# Patient Record
Sex: MALE | Race: WHITE | NOT HISPANIC OR LATINO | Employment: OTHER | URBAN - METROPOLITAN AREA
[De-identification: names, ages, dates, MRNs, and addresses within clinical notes are randomized per-mention and may not be internally consistent; named-entity substitution may affect disease eponyms.]

---

## 2023-02-13 PROBLEM — M19.90 ARTHRITIS: Status: ACTIVE | Noted: 2023-02-13

## 2023-02-13 PROBLEM — M48.00 SPINAL STENOSIS: Status: ACTIVE | Noted: 2023-02-13

## 2023-02-13 PROBLEM — R39.15 URGENCY OF URINATION: Status: ACTIVE | Noted: 2023-02-13

## 2023-02-13 PROBLEM — I10 HYPERTENSION: Status: ACTIVE | Noted: 2023-02-13

## 2023-02-13 PROBLEM — R55 NEAR SYNCOPE: Status: ACTIVE | Noted: 2023-02-13

## 2023-02-13 PROBLEM — N18.30 CKD (CHRONIC KIDNEY DISEASE) STAGE 3, GFR 30-59 ML/MIN (MULTI): Status: ACTIVE | Noted: 2023-02-13

## 2023-02-13 PROBLEM — I34.0 MILD MITRAL REGURGITATION: Status: ACTIVE | Noted: 2023-02-13

## 2023-02-13 PROBLEM — R39.9 UTI SYMPTOMS: Status: ACTIVE | Noted: 2023-02-13

## 2023-02-13 PROBLEM — R13.10 DYSPHAGIA: Status: ACTIVE | Noted: 2023-02-13

## 2023-02-13 PROBLEM — M70.20 OLECRANON BURSITIS: Status: ACTIVE | Noted: 2023-02-13

## 2023-02-13 PROBLEM — M48.061 LUMBAR CANAL STENOSIS: Status: ACTIVE | Noted: 2023-02-13

## 2023-02-13 PROBLEM — R06.09 DYSPNEA ON EXERTION: Status: ACTIVE | Noted: 2023-02-13

## 2023-02-13 PROBLEM — R26.89 IMBALANCE: Status: ACTIVE | Noted: 2023-02-13

## 2023-02-13 PROBLEM — I25.10 CAD (CORONARY ARTERY DISEASE): Status: ACTIVE | Noted: 2023-02-13

## 2023-02-13 PROBLEM — M65.342 TRIGGER RING FINGER OF LEFT HAND: Status: ACTIVE | Noted: 2023-02-13

## 2023-02-13 PROBLEM — L85.3 XEROSIS OF SKIN: Status: ACTIVE | Noted: 2023-02-13

## 2023-02-13 PROBLEM — R31.0 GROSS HEMATURIA: Status: ACTIVE | Noted: 2023-02-13

## 2023-02-13 PROBLEM — D64.9 ANEMIA: Status: ACTIVE | Noted: 2023-02-13

## 2023-02-13 PROBLEM — M25.552 PAIN OF LEFT HIP JOINT: Status: ACTIVE | Noted: 2023-02-13

## 2023-02-13 PROBLEM — M51.36 LUMBAR DEGENERATIVE DISC DISEASE: Status: ACTIVE | Noted: 2023-02-13

## 2023-02-13 PROBLEM — R91.1 PULMONARY NODULE: Status: ACTIVE | Noted: 2023-02-13

## 2023-02-13 PROBLEM — M12.811 ROTATOR CUFF ARTHROPATHY OF RIGHT SHOULDER: Status: ACTIVE | Noted: 2023-02-13

## 2023-02-13 PROBLEM — M54.9 CHRONIC BACK PAIN: Status: ACTIVE | Noted: 2023-02-13

## 2023-02-13 PROBLEM — M88.9 PAGET'S DISEASE OF THE BONE: Status: ACTIVE | Noted: 2023-02-13

## 2023-02-13 PROBLEM — I35.0 AORTIC STENOSIS, MILD: Status: ACTIVE | Noted: 2023-02-13

## 2023-02-13 PROBLEM — G89.29 CHRONIC BACK PAIN: Status: ACTIVE | Noted: 2023-02-13

## 2023-02-13 PROBLEM — R09.82 POST-NASAL DRIP: Status: ACTIVE | Noted: 2023-02-13

## 2023-02-13 PROBLEM — N40.0 BPH (BENIGN PROSTATIC HYPERPLASIA): Status: ACTIVE | Noted: 2023-02-13

## 2023-02-13 PROBLEM — E78.5 DYSLIPIDEMIA: Status: ACTIVE | Noted: 2023-02-13

## 2023-02-13 PROBLEM — I20.89 CHRONIC STABLE ANGINA (CMS-HCC): Status: ACTIVE | Noted: 2023-02-13

## 2023-02-13 PROBLEM — R35.1 NOCTURIA: Status: ACTIVE | Noted: 2023-02-13

## 2023-02-13 PROBLEM — R73.09 ELEVATED GLUCOSE: Status: ACTIVE | Noted: 2023-02-13

## 2023-02-13 PROBLEM — M25.511 RIGHT SHOULDER PAIN: Status: ACTIVE | Noted: 2023-02-13

## 2023-02-13 PROBLEM — N28.1 RENAL CYST: Status: ACTIVE | Noted: 2023-02-13

## 2023-02-13 PROBLEM — E55.9 VITAMIN D DEFICIENCY: Status: ACTIVE | Noted: 2023-02-13

## 2023-02-13 PROBLEM — R49.0 HOARSENESS: Status: ACTIVE | Noted: 2023-02-13

## 2023-02-13 PROBLEM — M47.816 LUMBAR SPONDYLOSIS: Status: ACTIVE | Noted: 2023-02-13

## 2023-02-13 PROBLEM — R21 RASH/SKIN ERUPTION: Status: ACTIVE | Noted: 2023-02-13

## 2023-02-13 PROBLEM — M10.9 ACUTE GOUT OF LEFT HAND: Status: ACTIVE | Noted: 2023-02-13

## 2023-02-13 PROBLEM — M10.9 GOUT: Status: ACTIVE | Noted: 2023-02-13

## 2023-02-13 PROBLEM — N18.2 CHRONIC KIDNEY DISEASE (CKD), STAGE II (MILD): Status: ACTIVE | Noted: 2023-02-13

## 2023-02-13 PROBLEM — R03.0 ELEVATED BLOOD PRESSURE READING: Status: ACTIVE | Noted: 2023-02-13

## 2023-02-13 PROBLEM — M54.2 NECK PAIN: Status: ACTIVE | Noted: 2023-02-13

## 2023-02-13 PROBLEM — R53.83 FATIGUE: Status: ACTIVE | Noted: 2023-02-13

## 2023-02-13 PROBLEM — F33.9 RECURRENT MAJOR DEPRESSIVE DISORDER (CMS-HCC): Status: ACTIVE | Noted: 2023-02-13

## 2023-02-13 PROBLEM — M75.100 ROTATOR CUFF TEAR: Status: ACTIVE | Noted: 2023-02-13

## 2023-02-13 PROBLEM — S46.819A TRAPEZIUS STRAIN: Status: ACTIVE | Noted: 2023-02-13

## 2023-02-13 PROBLEM — M79.89 SHOULDER SWELLING: Status: ACTIVE | Noted: 2023-02-13

## 2023-02-13 PROBLEM — H26.9 CATARACT: Status: ACTIVE | Noted: 2023-02-13

## 2023-02-13 PROBLEM — M79.646 FINGER PAIN: Status: ACTIVE | Noted: 2023-02-13

## 2023-02-13 PROBLEM — M51.369 LUMBAR DEGENERATIVE DISC DISEASE: Status: ACTIVE | Noted: 2023-02-13

## 2023-02-13 RX ORDER — POLYETHYLENE GLYCOL 3350 17 G/17G
POWDER, FOR SOLUTION ORAL
COMMUNITY
Start: 2021-02-22 | End: 2024-03-23 | Stop reason: WASHOUT

## 2023-02-13 RX ORDER — PEDI MULTIVIT NO.16 W-FLUORIDE 1 MG
1 TABLET,CHEWABLE ORAL DAILY
COMMUNITY
End: 2024-03-27 | Stop reason: WASHOUT

## 2023-02-13 RX ORDER — DULOXETINE 40 MG/1
1 CAPSULE, DELAYED RELEASE ORAL DAILY
COMMUNITY
Start: 2021-07-20 | End: 2024-03-23 | Stop reason: WASHOUT

## 2023-02-13 RX ORDER — GLUCOSAM/CHONDRO/HERB 149/HYAL 750-100 MG
1 TABLET ORAL 2 TIMES DAILY
COMMUNITY

## 2023-02-13 RX ORDER — VIT C/E/ZN/COPPR/LUTEIN/ZEAXAN 250MG-90MG
CAPSULE ORAL
COMMUNITY
End: 2024-03-22 | Stop reason: WASHOUT

## 2023-02-13 RX ORDER — COLCHICINE 0.6 MG/1
1 TABLET ORAL DAILY PRN
COMMUNITY
Start: 2012-01-28

## 2023-02-13 RX ORDER — LANOLIN ALCOHOL/MO/W.PET/CERES
1 CREAM (GRAM) TOPICAL DAILY
COMMUNITY
Start: 2018-02-26 | End: 2024-05-14 | Stop reason: ALTCHOICE

## 2023-02-13 RX ORDER — AMLODIPINE BESYLATE 2.5 MG/1
1 TABLET ORAL DAILY
COMMUNITY
Start: 2021-03-31 | End: 2023-04-24 | Stop reason: SDUPTHER

## 2023-02-13 RX ORDER — DOCUSATE SODIUM 100 MG/1
200 CAPSULE, LIQUID FILLED ORAL 2 TIMES DAILY
COMMUNITY
Start: 2021-02-22

## 2023-02-13 RX ORDER — MIRABEGRON 25 MG/1
1 TABLET, FILM COATED, EXTENDED RELEASE ORAL DAILY
COMMUNITY
Start: 2022-04-19 | End: 2023-04-24 | Stop reason: SDUPTHER

## 2023-02-13 RX ORDER — TAMSULOSIN HYDROCHLORIDE 0.4 MG/1
2 CAPSULE ORAL DAILY
COMMUNITY
Start: 2022-11-28 | End: 2023-04-24 | Stop reason: SDUPTHER

## 2023-02-13 RX ORDER — CARBOXYMETHYLCELLULOSE SODIUM 5 MG/ML
1 SOLUTION/ DROPS OPHTHALMIC 4 TIMES DAILY
COMMUNITY
Start: 2015-06-01

## 2023-02-13 RX ORDER — FLUTICASONE PROPIONATE 50 MCG
2 SPRAY, SUSPENSION (ML) NASAL DAILY PRN
COMMUNITY
Start: 2019-12-16

## 2023-02-13 RX ORDER — ATORVASTATIN CALCIUM 40 MG/1
1 TABLET, FILM COATED ORAL NIGHTLY
COMMUNITY
Start: 2020-04-15 | End: 2023-04-25 | Stop reason: SDUPTHER

## 2023-02-26 LAB — URINE CULTURE: NORMAL

## 2023-03-24 ENCOUNTER — OFFICE VISIT (OUTPATIENT)
Dept: PRIMARY CARE | Facility: CLINIC | Age: 88
End: 2023-03-24
Payer: MEDICARE

## 2023-03-24 VITALS
SYSTOLIC BLOOD PRESSURE: 120 MMHG | BODY MASS INDEX: 26.85 KG/M2 | TEMPERATURE: 97.6 F | DIASTOLIC BLOOD PRESSURE: 80 MMHG | WEIGHT: 179.2 LBS

## 2023-03-24 DIAGNOSIS — R39.9 UTI SYMPTOMS: Primary | ICD-10-CM

## 2023-03-24 DIAGNOSIS — N18.31 STAGE 3A CHRONIC KIDNEY DISEASE (MULTI): ICD-10-CM

## 2023-03-24 DIAGNOSIS — R73.09 ELEVATED GLUCOSE: ICD-10-CM

## 2023-03-24 DIAGNOSIS — D64.9 ANEMIA, UNSPECIFIED TYPE: ICD-10-CM

## 2023-03-24 DIAGNOSIS — I25.10 CORONARY ARTERY DISEASE INVOLVING NATIVE HEART WITHOUT ANGINA PECTORIS, UNSPECIFIED VESSEL OR LESION TYPE: ICD-10-CM

## 2023-03-24 DIAGNOSIS — M48.00 SPINAL STENOSIS, UNSPECIFIED SPINAL REGION: ICD-10-CM

## 2023-03-24 PROBLEM — R06.09 DYSPNEA ON EXERTION: Status: RESOLVED | Noted: 2023-02-13 | Resolved: 2023-03-24

## 2023-03-24 PROBLEM — N18.2 CHRONIC KIDNEY DISEASE (CKD), STAGE II (MILD): Status: RESOLVED | Noted: 2023-02-13 | Resolved: 2023-03-24

## 2023-03-24 LAB
APPEARANCE, URINE: CLEAR
BILIRUBIN, URINE: NEGATIVE
BLOOD, URINE: NEGATIVE
COLOR, URINE: YELLOW
GLUCOSE, URINE: NEGATIVE MG/DL
KETONES, URINE: NEGATIVE MG/DL
LEUKOCYTE ESTERASE, URINE: NEGATIVE
MUCUS, URINE: NORMAL /LPF
NITRITE, URINE: NEGATIVE
PH, URINE: 5 (ref 5–8)
PROTEIN, URINE: ABNORMAL MG/DL
RBC, URINE: 1 /HPF (ref 0–5)
SPECIFIC GRAVITY, URINE: 1.02 (ref 1–1.03)
SQUAMOUS EPITHELIAL CELLS, URINE: <1 /HPF
UROBILINOGEN, URINE: <2 MG/DL (ref 0–1.9)
WBC, URINE: <1 /HPF (ref 0–5)

## 2023-03-24 PROCEDURE — 3074F SYST BP LT 130 MM HG: CPT | Performed by: INTERNAL MEDICINE

## 2023-03-24 PROCEDURE — 87086 URINE CULTURE/COLONY COUNT: CPT

## 2023-03-24 PROCEDURE — 1036F TOBACCO NON-USER: CPT | Performed by: INTERNAL MEDICINE

## 2023-03-24 PROCEDURE — 1157F ADVNC CARE PLAN IN RCRD: CPT | Performed by: INTERNAL MEDICINE

## 2023-03-24 PROCEDURE — 1160F RVW MEDS BY RX/DR IN RCRD: CPT | Performed by: INTERNAL MEDICINE

## 2023-03-24 PROCEDURE — 81001 URINALYSIS AUTO W/SCOPE: CPT

## 2023-03-24 PROCEDURE — 99214 OFFICE O/P EST MOD 30 MIN: CPT | Performed by: INTERNAL MEDICINE

## 2023-03-24 PROCEDURE — 1159F MED LIST DOCD IN RCRD: CPT | Performed by: INTERNAL MEDICINE

## 2023-03-24 PROCEDURE — 3079F DIAST BP 80-89 MM HG: CPT | Performed by: INTERNAL MEDICINE

## 2023-03-24 ASSESSMENT — PATIENT HEALTH QUESTIONNAIRE - PHQ9
SUM OF ALL RESPONSES TO PHQ9 QUESTIONS 1 AND 2: 2
2. FEELING DOWN, DEPRESSED OR HOPELESS: SEVERAL DAYS
1. LITTLE INTEREST OR PLEASURE IN DOING THINGS: SEVERAL DAYS

## 2023-03-24 NOTE — PROGRESS NOTES
Subjective   Patient ID: Elroy Greco is a 89 y.o. male who presents for Follow-up (4 month fu).    HPI   Overall well  Increased urinary freq x 1-2 mths.  No pain.  Slowly progressive SANCHEZ x mths    Review of Systems   All other systems reviewed and are negative.      Objective   /80   Temp 36.4 °C (97.6 °F)   Wt 81.3 kg (179 lb 3.2 oz)   BMI 26.85 kg/m²     Physical Exam  Constitutional:       General: He is not in acute distress.     Appearance: Normal appearance. He is not ill-appearing or toxic-appearing.   Cardiovascular:      Rate and Rhythm: Normal rate and regular rhythm.      Heart sounds: No murmur heard.  Pulmonary:      Effort: Pulmonary effort is normal.      Breath sounds: Normal breath sounds.   Abdominal:      General: Abdomen is flat.      Palpations: Abdomen is soft. There is no mass.      Tenderness: There is no abdominal tenderness.   Neurological:      Mental Status: He is alert and oriented to person, place, and time.   Psychiatric:         Mood and Affect: Mood normal.         Thought Content: Thought content normal.         Judgment: Judgment normal.         Lab Results   Component Value Date    WBC 7.0 11/18/2022    HGB 12.7 (L) 11/18/2022    HCT 41.8 11/18/2022     11/18/2022    CHOL 157 11/18/2022    TRIG 104 11/18/2022    HDL 58.9 11/18/2022    ALT 12 11/18/2022    AST 19 06/10/2020     11/18/2022    K 4.1 11/18/2022     11/18/2022    CREATININE 1.15 11/18/2022    BUN 26 (H) 11/18/2022    CO2 30 11/18/2022    TSH 1.44 11/18/2022    INR 1.2 (H) 07/21/2020    HGBA1C 6.2 (A) 11/18/2022     par   Assessment/Plan        #1 hypertension- on target. Continue to follow  #2 ckd3- stable clinically. Check labs   #3 back pain- increased. [facility].   #4 renal/hepatic cyst- follow-up with urology  #5 Paget's disease- In remission. [facility]. Alkaline phosphatase through their office, per pt stable. stressed importance  #6 urinary  freq- increased symptoms, resume 2 Flomax every day, UA/Cx. . Follow-up with urology ASAP  #7 fatigue/ dyspnea- stable. No significant change with discontinuing metoprolol. Follow-up cardiology  #8 mod AI/AS- clinically stable. Continue afterload reduction. f/u cards   #9 mild, stable normocytic anemia. Normal iron, B12, folic acid. stable for >6 yrs. retest   #10 depression/anxiety- remains an issue. Continue increased duloxetine 30 mg po qd  #11 BPH w/ LUTS- s/p TURP. f/u   #12 mild SANCHEZ-stable/improved. f/u cards   #13 ? pulm HTN on echo 3/17- reviewed. clinically stable. f/u cardiology.   #14 diastolic dysfxn- reviewed. BP well controlled.   #15 gout- clinically resolved. UA next visit  #16 constipation- better. f/u w/ colo/rectal  #17 CAD/angina- stable. f/u cards   #18 elevated glucose (non-fasting)- subtly elevated hemoglobin A1c. stay focused on a reduced sugar low. carbohydrate diet, recheck next visit  #19 hip and shoulder pain- f/u ortho.  #20 lipids- on Rx. LDL excellent. retest 6 mths  #21 insomnia- reviewed. con't melatonin 1 mg po qhs  #22 vit d def- increase to 3,000 otc d3  #23 cough with eating/dysphasiaâ€“f/u GI  #24 mild imbalance- fall precautions reviewed. Better. Con't PT  #25 ? neuropathy rt arm- ?CTS. reviewed. rec NCS/EMG. he declines. normal exam. f/u inb 3-4 weeks.   #26 hip pain- reviewed. X-ray unremarkable. Physical therapy. Try low-dose Cymbalta.  #27 depression- low-dose Cymbalta. will try to increase to 40mg po qd  #28 syncope/presyncope- no further episodes. f/u cards.   #29 s/p surgery -doing well. Follow-up surgery. Gradually increase diet. Physical therapy.  #30 pulmonary nodules- f/u chest CT stable x >2 yrs  #31 ? pulm fibrosis- no s/sx. no cough/SOB. stable on CT 2017-->2022. reviewed DDx at length again. Rec pulm eval again. He still declines.     need for shingrix reviewed

## 2023-03-26 LAB — URINE CULTURE: NORMAL

## 2023-04-24 DIAGNOSIS — I10 PRIMARY HYPERTENSION: ICD-10-CM

## 2023-04-24 DIAGNOSIS — N40.0 BENIGN PROSTATIC HYPERPLASIA, UNSPECIFIED WHETHER LOWER URINARY TRACT SYMPTOMS PRESENT: Primary | ICD-10-CM

## 2023-04-24 RX ORDER — MIRABEGRON 25 MG/1
25 TABLET, FILM COATED, EXTENDED RELEASE ORAL DAILY
Qty: 90 TABLET | Refills: 1 | Status: SHIPPED | OUTPATIENT
Start: 2023-04-24 | End: 2023-04-25 | Stop reason: SDUPTHER

## 2023-04-24 RX ORDER — AMLODIPINE BESYLATE 2.5 MG/1
2.5 TABLET ORAL DAILY
Qty: 90 TABLET | Refills: 1 | Status: SHIPPED | OUTPATIENT
Start: 2023-04-24 | End: 2023-04-25 | Stop reason: SDUPTHER

## 2023-04-24 RX ORDER — TAMSULOSIN HYDROCHLORIDE 0.4 MG/1
0.8 CAPSULE ORAL DAILY
Qty: 90 CAPSULE | Refills: 1 | Status: SHIPPED | OUTPATIENT
Start: 2023-04-24 | End: 2023-04-25 | Stop reason: SDUPTHER

## 2023-04-24 NOTE — TELEPHONE ENCOUNTER
Pt stopped in today to see if Dr. Grayson would print out his RX's for him so [facility] they [facility] much [facility] for [facility] if he gets them filled [facility]. Pt wants to stop back in tomorrow to pick them up. RX put in for Dr. Grayson to approve.

## 2023-04-25 DIAGNOSIS — I10 PRIMARY HYPERTENSION: ICD-10-CM

## 2023-04-25 DIAGNOSIS — N40.0 BENIGN PROSTATIC HYPERPLASIA, UNSPECIFIED WHETHER LOWER URINARY TRACT SYMPTOMS PRESENT: ICD-10-CM

## 2023-04-25 DIAGNOSIS — I25.10 CORONARY ARTERY DISEASE INVOLVING NATIVE HEART WITHOUT ANGINA PECTORIS, UNSPECIFIED VESSEL OR LESION TYPE: Primary | ICD-10-CM

## 2023-04-25 RX ORDER — TAMSULOSIN HYDROCHLORIDE 0.4 MG/1
0.8 CAPSULE ORAL DAILY
Qty: 90 CAPSULE | Refills: 1 | Status: SHIPPED | OUTPATIENT
Start: 2023-04-25 | End: 2024-03-22 | Stop reason: WASHOUT

## 2023-04-25 RX ORDER — MIRABEGRON 25 MG/1
25 TABLET, FILM COATED, EXTENDED RELEASE ORAL DAILY
Qty: 90 TABLET | Refills: 1 | Status: SHIPPED | OUTPATIENT
Start: 2023-04-25 | End: 2023-10-23 | Stop reason: SDUPTHER

## 2023-04-25 RX ORDER — AMLODIPINE BESYLATE 2.5 MG/1
2.5 TABLET ORAL DAILY
Qty: 90 TABLET | Refills: 1 | Status: SHIPPED | OUTPATIENT
Start: 2023-04-25 | End: 2023-06-08 | Stop reason: SDUPTHER

## 2023-04-25 RX ORDER — ATORVASTATIN CALCIUM 40 MG/1
40 TABLET, FILM COATED ORAL NIGHTLY
Qty: 90 TABLET | Refills: 3 | Status: SHIPPED | OUTPATIENT
Start: 2023-04-25 | End: 2024-05-23

## 2023-06-08 DIAGNOSIS — I10 PRIMARY HYPERTENSION: ICD-10-CM

## 2023-06-08 RX ORDER — AMLODIPINE BESYLATE 2.5 MG/1
2.5 TABLET ORAL DAILY
Qty: 90 TABLET | Refills: 1 | Status: SHIPPED | OUTPATIENT
Start: 2023-06-08 | End: 2023-11-20 | Stop reason: WASHOUT

## 2023-07-18 ENCOUNTER — LAB (OUTPATIENT)
Dept: LAB | Facility: LAB | Age: 88
End: 2023-07-18
Payer: MEDICARE

## 2023-07-18 ENCOUNTER — OFFICE VISIT (OUTPATIENT)
Dept: PRIMARY CARE | Facility: CLINIC | Age: 88
End: 2023-07-18
Payer: MEDICARE

## 2023-07-18 VITALS
WEIGHT: 175.8 LBS | DIASTOLIC BLOOD PRESSURE: 80 MMHG | SYSTOLIC BLOOD PRESSURE: 130 MMHG | BODY MASS INDEX: 26.64 KG/M2 | HEIGHT: 68 IN

## 2023-07-18 DIAGNOSIS — F33.42 RECURRENT MAJOR DEPRESSIVE DISORDER, IN FULL REMISSION (CMS-HCC): Primary | ICD-10-CM

## 2023-07-18 DIAGNOSIS — N40.1 BENIGN PROSTATIC HYPERPLASIA WITH URINARY FREQUENCY: ICD-10-CM

## 2023-07-18 DIAGNOSIS — R73.09 ELEVATED GLUCOSE: ICD-10-CM

## 2023-07-18 DIAGNOSIS — R35.0 BENIGN PROSTATIC HYPERPLASIA WITH URINARY FREQUENCY: ICD-10-CM

## 2023-07-18 DIAGNOSIS — M88.9 PAGET'S DISEASE OF THE BONE: ICD-10-CM

## 2023-07-18 DIAGNOSIS — N18.31 STAGE 3A CHRONIC KIDNEY DISEASE (MULTI): ICD-10-CM

## 2023-07-18 DIAGNOSIS — E78.5 DYSLIPIDEMIA: ICD-10-CM

## 2023-07-18 DIAGNOSIS — Z00.00 ROUTINE CHECK-UP: ICD-10-CM

## 2023-07-18 DIAGNOSIS — I10 PRIMARY HYPERTENSION: ICD-10-CM

## 2023-07-18 DIAGNOSIS — I20.89 CHRONIC STABLE ANGINA (CMS-HCC): ICD-10-CM

## 2023-07-18 DIAGNOSIS — E44.1 MILD PROTEIN-CALORIE MALNUTRITION (MULTI): ICD-10-CM

## 2023-07-18 DIAGNOSIS — M48.062 SPINAL STENOSIS OF LUMBAR REGION WITH NEUROGENIC CLAUDICATION: ICD-10-CM

## 2023-07-18 PROBLEM — M10.9 ACUTE GOUT OF LEFT HAND: Status: RESOLVED | Noted: 2023-02-13 | Resolved: 2023-07-18

## 2023-07-18 PROBLEM — R55 NEAR SYNCOPE: Status: RESOLVED | Noted: 2023-02-13 | Resolved: 2023-07-18

## 2023-07-18 PROBLEM — M75.100 ROTATOR CUFF TEAR: Status: RESOLVED | Noted: 2023-02-13 | Resolved: 2023-07-18

## 2023-07-18 PROBLEM — R03.0 ELEVATED BLOOD PRESSURE READING: Status: RESOLVED | Noted: 2023-02-13 | Resolved: 2023-07-18

## 2023-07-18 PROBLEM — R21 RASH/SKIN ERUPTION: Status: RESOLVED | Noted: 2023-02-13 | Resolved: 2023-07-18

## 2023-07-18 PROBLEM — R39.9 UTI SYMPTOMS: Status: RESOLVED | Noted: 2023-02-13 | Resolved: 2023-07-18

## 2023-07-18 PROBLEM — R31.0 GROSS HEMATURIA: Status: RESOLVED | Noted: 2023-02-13 | Resolved: 2023-07-18

## 2023-07-18 PROBLEM — R35.1 NOCTURIA: Status: RESOLVED | Noted: 2023-02-13 | Resolved: 2023-07-18

## 2023-07-18 PROBLEM — L85.3 XEROSIS OF SKIN: Status: RESOLVED | Noted: 2023-02-13 | Resolved: 2023-07-18

## 2023-07-18 PROBLEM — R49.0 HOARSENESS: Status: RESOLVED | Noted: 2023-02-13 | Resolved: 2023-07-18

## 2023-07-18 PROBLEM — M70.20 OLECRANON BURSITIS: Status: RESOLVED | Noted: 2023-02-13 | Resolved: 2023-07-18

## 2023-07-18 LAB
ALANINE AMINOTRANSFERASE (SGPT) (U/L) IN SER/PLAS: 24 U/L (ref 10–52)
ANION GAP IN SER/PLAS: 13 MMOL/L (ref 10–20)
CALCIUM (MG/DL) IN SER/PLAS: 9.1 MG/DL (ref 8.6–10.3)
CARBON DIOXIDE, TOTAL (MMOL/L) IN SER/PLAS: 27 MMOL/L (ref 21–32)
CHLORIDE (MMOL/L) IN SER/PLAS: 106 MMOL/L (ref 98–107)
CREATININE (MG/DL) IN SER/PLAS: 1.27 MG/DL (ref 0.5–1.3)
ERYTHROCYTE DISTRIBUTION WIDTH (RATIO) BY AUTOMATED COUNT: 14.2 % (ref 11.5–14.5)
ERYTHROCYTE MEAN CORPUSCULAR HEMOGLOBIN CONCENTRATION (G/DL) BY AUTOMATED: 30.3 G/DL (ref 32–36)
ERYTHROCYTE MEAN CORPUSCULAR VOLUME (FL) BY AUTOMATED COUNT: 92 FL (ref 80–100)
ERYTHROCYTES (10*6/UL) IN BLOOD BY AUTOMATED COUNT: 4.33 X10E12/L (ref 4.5–5.9)
ESTIMATED AVERAGE GLUCOSE FOR HBA1C: 131 MG/DL
GFR MALE: 53 ML/MIN/1.73M2
GLUCOSE (MG/DL) IN SER/PLAS: 89 MG/DL (ref 74–99)
HEMATOCRIT (%) IN BLOOD BY AUTOMATED COUNT: 39.9 % (ref 41–52)
HEMOGLOBIN (G/DL) IN BLOOD: 12.1 G/DL (ref 13.5–17.5)
HEMOGLOBIN A1C/HEMOGLOBIN TOTAL IN BLOOD: 6.2 %
LEUKOCYTES (10*3/UL) IN BLOOD BY AUTOMATED COUNT: 7.9 X10E9/L (ref 4.4–11.3)
PLATELETS (10*3/UL) IN BLOOD AUTOMATED COUNT: 207 X10E9/L (ref 150–450)
POTASSIUM (MMOL/L) IN SER/PLAS: 4.5 MMOL/L (ref 3.5–5.3)
SODIUM (MMOL/L) IN SER/PLAS: 141 MMOL/L (ref 136–145)
UREA NITROGEN (MG/DL) IN SER/PLAS: 26 MG/DL (ref 6–23)

## 2023-07-18 PROCEDURE — 36415 COLL VENOUS BLD VENIPUNCTURE: CPT

## 2023-07-18 PROCEDURE — 80048 BASIC METABOLIC PNL TOTAL CA: CPT

## 2023-07-18 PROCEDURE — 3075F SYST BP GE 130 - 139MM HG: CPT | Performed by: INTERNAL MEDICINE

## 2023-07-18 PROCEDURE — 1036F TOBACCO NON-USER: CPT | Performed by: INTERNAL MEDICINE

## 2023-07-18 PROCEDURE — 1157F ADVNC CARE PLAN IN RCRD: CPT | Performed by: INTERNAL MEDICINE

## 2023-07-18 PROCEDURE — 83036 HEMOGLOBIN GLYCOSYLATED A1C: CPT

## 2023-07-18 PROCEDURE — 99397 PER PM REEVAL EST PAT 65+ YR: CPT | Performed by: INTERNAL MEDICINE

## 2023-07-18 PROCEDURE — 84460 ALANINE AMINO (ALT) (SGPT): CPT

## 2023-07-18 PROCEDURE — 1126F AMNT PAIN NOTED NONE PRSNT: CPT | Performed by: INTERNAL MEDICINE

## 2023-07-18 PROCEDURE — 85027 COMPLETE CBC AUTOMATED: CPT

## 2023-07-18 PROCEDURE — 1160F RVW MEDS BY RX/DR IN RCRD: CPT | Performed by: INTERNAL MEDICINE

## 2023-07-18 PROCEDURE — 3079F DIAST BP 80-89 MM HG: CPT | Performed by: INTERNAL MEDICINE

## 2023-07-18 PROCEDURE — 1159F MED LIST DOCD IN RCRD: CPT | Performed by: INTERNAL MEDICINE

## 2023-07-18 PROCEDURE — 1170F FXNL STATUS ASSESSED: CPT | Performed by: INTERNAL MEDICINE

## 2023-07-18 PROCEDURE — G0439 PPPS, SUBSEQ VISIT: HCPCS | Performed by: INTERNAL MEDICINE

## 2023-07-18 ASSESSMENT — PATIENT HEALTH QUESTIONNAIRE - PHQ9
1. LITTLE INTEREST OR PLEASURE IN DOING THINGS: NOT AT ALL
SUM OF ALL RESPONSES TO PHQ9 QUESTIONS 1 AND 2: 0
2. FEELING DOWN, DEPRESSED OR HOPELESS: NOT AT ALL
SUM OF ALL RESPONSES TO PHQ9 QUESTIONS 1 AND 2: 0
1. LITTLE INTEREST OR PLEASURE IN DOING THINGS: NOT AT ALL
2. FEELING DOWN, DEPRESSED OR HOPELESS: NOT AT ALL

## 2023-07-18 ASSESSMENT — ACTIVITIES OF DAILY LIVING (ADL)
DOING_HOUSEWORK: INDEPENDENT
GROCERY_SHOPPING: INDEPENDENT
BATHING: INDEPENDENT
DRESSING: INDEPENDENT
MANAGING_FINANCES: INDEPENDENT
TAKING_MEDICATION: INDEPENDENT

## 2023-07-18 NOTE — PROGRESS NOTES
"Subjective   Reason for Visit: Elroy Greco is an 89 y.o. male here for a Medicare Wellness visit.     Past Medical, Surgical, and Family History reviewed and updated in chart.    Reviewed all medications by prescribing practitioner or clinical pharmacist (such as prescriptions, OTCs, herbal therapies and supplements) and documented in the medical record.    HPI  Overall well   #1 htn  #2 CKD3  #3 LBP  #4 renal/hepatic cysts  #5 Padget's dz  #6 urinary freq    Patient Care Team:  Trice Grayson MD as PCP - General  Trice Grayson MD as PCP - [employer] PCP     Review of Systems   All other systems reviewed and are negative.      Objective   Vitals:  /80   Ht 1.721 m (5' 7.75\")   Wt 79.7 kg (175 lb 12.8 oz)   BMI 26.93 kg/m²       Physical Exam  Constitutional:       General: He is not in acute distress.     Appearance: Normal appearance. He is not ill-appearing.   HENT:      Head: Normocephalic and atraumatic.      Right Ear: Tympanic membrane and ear canal normal.      Left Ear: Tympanic membrane and ear canal normal.      Nose: Nose normal.      Mouth/Throat:      Mouth: Mucous membranes are moist.      Pharynx: Oropharynx is clear.   Eyes:      General: No scleral icterus.     Extraocular Movements: Extraocular movements intact.      Conjunctiva/sclera: Conjunctivae normal.      Pupils: Pupils are equal, round, and reactive to light.   Cardiovascular:      Rate and Rhythm: Normal rate and regular rhythm.      Pulses: Normal pulses.      Heart sounds: No murmur heard.     No friction rub.   Pulmonary:      Effort: Pulmonary effort is normal.      Breath sounds: Normal breath sounds. No rhonchi or rales.   Abdominal:      General: Abdomen is flat. Bowel sounds are normal. There is no distension.      Palpations: Abdomen is soft. There is no mass.      Tenderness: There is no abdominal tenderness.   Musculoskeletal:      Cervical back: Normal range of motion and neck supple.      Right " lower leg: No edema.      Left lower leg: No edema.   Skin:     General: Skin is warm.   Neurological:      General: No focal deficit present.      Mental Status: He is alert and oriented to person, place, and time.      Cranial Nerves: No cranial nerve deficit.   Psychiatric:         Mood and Affect: Mood normal.         Behavior: Behavior normal.         Judgment: Judgment normal.     Echo 4/23/23;  1. Left ventricular systolic function is normal with a 55-60% estimated ejection fraction.  2. Spectral Doppler shows an impaired relaxation pattern of left ventricular diastolic filling.  3. There is moderate mitral annular calcification.  4. Moderate aortic valve stenosis.  5. There is moderate aortic valve cusp calcification.    Assessment/Plan   Problem List Items Addressed This Visit    None   #1 hypertension- on target. Continue to follow  #2 ckd3- stable clinically. Check labs   #3 back pain- increased. [facility].   #4 renal/hepatic cyst- follow-up with urology  #5 Paget's disease- In remission. [facility]. Alkaline phosphatase through their office, per pt stable. stressed importance  #6 urinary freq- increased symptoms, resume 2 Flomax every day, UA/Cx. . Follow-up with urology ASAP  #7 fatigue/ dyspnea- stable. No significant change with discontinuing metoprolol. Follow-up cardiology  #8 mod AI/AS- clinically stable. Continue afterload reduction. f/u cards   #9 mild, stable normocytic anemia. Normal iron, B12, folic acid. stable for >6 yrs. retest   #10 depression/anxiety- remains an issue. Continue increased duloxetine 30 mg po qd  #11 BPH w/ LUTS- s/p TURP. f/u   #12 mild SANCHEZ-stable/improved. f/u cards   #13 ? pulm HTN on echo 3/17- reviewed. clinically stable. f/u cardiology.   #14 diastolic dysfxn- reviewed. BP well controlled.   #15 gout- clinically resolved. UA next visit  #16 constipation- better. f/u w/ colo/rectal  #17 CAD/angina- stable. f/u cards   #18 elevated  glucose (non-fasting)- elevated hemoglobin A1c. stay focused on a reduced sugar low. carbohydrate diet, recheck #19 hip and shoulder pain- f/u ortho.  #20 lipids- on Rx. LDL excellent. retest 6 mths  #21 insomnia- reviewed. con't melatonin 1 mg po qhs  #22 vit d def- increase to 3,000 otc d3  #23 cough with eating/dysphasiaâ€“f/u GI  #24 mild imbalance- fall precautions reviewed. Better. Con't PT  #25 ? neuropathy rt arm- ?CTS. reviewed. rec NCS/EMG. he declines. normal exam. f/u inb 3-4 weeks.   #26 hip pain- reviewed. Appt pending w/ [facility]  #27 depression- low-dose Cymbalta. will try to increase to 40mg po qd  #28 syncope/presyncope- no further episodes. f/u cards.   #29 s/p surgery -doing well. Follow-up surgery. Gradually increase diet. Physical therapy.  #30 pulmonary nodules- f/u chest CT stable x >2 yrs  #31 ? pulm fibrosis- no s/sx. no cough/SOB. stable on CT 2017-->2022. reviewed DDx at length again. Rec pulm eval again. He still declines.   #32 knee pain- appt pending w/ [facility]

## 2023-07-20 ENCOUNTER — TELEPHONE (OUTPATIENT)
Dept: PRIMARY CARE | Facility: CLINIC | Age: 88
End: 2023-07-20
Payer: MEDICARE

## 2023-07-20 NOTE — TELEPHONE ENCOUNTER
----- Message from Lindsey Louis CMA sent at 7/20/2023  6:31 AM EDT -----    ----- Message -----  From: Trice Grayson MD  Sent: 7/19/2023   8:25 PM EDT  To: [place] [id number] Clinical Support Staff    Notify patient all looks good/stable.  Stay focused on a low sugar and carbohydrate diet

## 2023-10-03 ENCOUNTER — APPOINTMENT (OUTPATIENT)
Dept: SPEECH THERAPY | Facility: CLINIC | Age: 88
End: 2023-10-03
Payer: MEDICARE

## 2023-10-12 ENCOUNTER — APPOINTMENT (OUTPATIENT)
Dept: SPEECH THERAPY | Facility: CLINIC | Age: 88
End: 2023-10-12
Payer: MEDICARE

## 2023-10-12 ENCOUNTER — DOCUMENTATION (OUTPATIENT)
Dept: SPEECH THERAPY | Facility: CLINIC | Age: 88
End: 2023-10-12
Payer: MEDICARE

## 2023-10-12 NOTE — PROGRESS NOTES
Speech-Language Pathology                 Therapy Communication Note    Patient Name: Elroy Greco  MRN: [id number]  Today's Date: 10/12/2023     Discipline: Speech Language Pathology    Missed Visit Reason:  Patient did not arrive for his 8:45 am speech therapy evaluation this date. Office will wait for patient to call in to reschedule if services are still desired.     Missed Time: No Show    Comment:

## 2023-10-16 ENCOUNTER — EVALUATION (OUTPATIENT)
Dept: SPEECH THERAPY | Facility: CLINIC | Age: 88
End: 2023-10-16
Payer: MEDICARE

## 2023-10-16 DIAGNOSIS — R13.12 OROPHARYNGEAL DYSPHAGIA: Primary | ICD-10-CM

## 2023-10-16 PROCEDURE — 92610 EVALUATE SWALLOWING FUNCTION: CPT | Mod: GN | Performed by: SPEECH-LANGUAGE PATHOLOGIST

## 2023-10-16 ASSESSMENT — PAIN SCALES - GENERAL: PAINLEVEL_OUTOF10: 0 - NO PAIN

## 2023-10-16 ASSESSMENT — PAIN - FUNCTIONAL ASSESSMENT: PAIN_FUNCTIONAL_ASSESSMENT: 0-10

## 2023-10-16 NOTE — Clinical Note
October 16, 2023     Patient: Elroy Greco   YOB: 1930   Date of Visit: 10/16/2023       To Whom it May Concern:    Elroy Greco was seen in my clinic on 10/16/2023. He {Return to school/sport:51370}.    If you have any questions or concerns, please don't hesitate to call.         Sincerely,          Annalisa Turner, SLP        CC: No Recipients

## 2023-10-16 NOTE — Clinical Note
October 16, 2023     Patient: Elroy Greco   YOB: 1930   Date of Visit: 10/16/2023       To Whom It May Concern:    It is my medical opinion that Elroy Greco {Work release (duty restriction):45808}.    If you have any questions or concerns, please don't hesitate to call.         Sincerely,        Annalisa Turner, SLP    CC: No Recipients

## 2023-10-16 NOTE — PROGRESS NOTES
Speech-Language Pathology    Outpatient Clinical Swallow Evaluation    Patient Name: Elroy Greco  MRN: [id number]  Today's Date: 10/16/2023   Time Calculation  Start Time: 0915  Stop Time: 1015  Time Calculation (min): 60 min          Current Problem:   Patient Active Problem List   Diagnosis    Anemia    Aortic stenosis, mild    Arthritis    BPH (benign prostatic hyperplasia)    CAD (coronary artery disease)    Cataract    Chronic back pain    CKD (chronic kidney disease) stage 3, GFR 30-59 ml/min (CMS/HCC)    Chronic stable angina    Dyslipidemia    Dysphagia    Elevated glucose    Fatigue    Finger pain    Gout    Pain of left hip joint    Hypertension    Imbalance    Lumbar canal stenosis    Lumbar degenerative disc disease    Lumbar spondylosis    Mild mitral regurgitation    Neck pain    Paget's disease of the bone    Post-nasal drip    Pulmonary nodule    Spinal stenosis    Recurrent major depressive disorder (CMS/HCC)    Right shoulder pain    Renal cyst    Rotator cuff arthropathy of right shoulder    Shoulder swelling    Trapezius strain    Trigger ring finger of left hand    Urgency of urination    Vitamin D deficiency    Mild protein-calorie malnutrition (CMS/HCC)         Recommendations:  Recommendations  Risk for Aspiration: Yes  Additional Recommendations: Modified barium swallow study, Dysphagia treatment  Solid Diet Recommendations : Soft & bite sized/chopped (IDDSI Level 6)  Liquid Diet Recommendations: Thin (IDDSI Level 0)  Compensatory Swallowing Strategies: Upright 90 degrees as possible for all oral intake, Remain upright for 20-30 minutes after meals, Alternate solids and liquids, Single sips, Small bites/sips, Eat/feed slowly, Add moisture to solids, Place bolus on left  Medication Administration Recommendations: Whole, With Liquid  Duration of Treatment: 1x/week, 6 weeks  Follow up treatments: Oral motor exercises, Pharyngeal exercises, Patient/family education  Dysphagia Goals: Patient  "will tolerate recommended diet without observed clinical signs of aspiration, Patient will demonstrate appropriate strategies for swallowing safety, Patient will progress to advanced diet      Assessment:  Assessment  Assessment Results: Patient is a 89 year old male who presents to speech therapy this date due to concerns regarding dysphagia. Per patient report, he had a bowel obstruction in 1/2022 which needed emergency surgery. He reports going to [facility]- patient reports \"a doctor looked in my throat because I was having a hard time swallowing\" though he never followed up. Per patient report and chart review, patient had an esophagram completed in 9/2023 with results coming back as normal. Patient reports avoiding solid foods such as sandwiches, steaks, meats, etc due to a combination of constipation and dysphagia. Patient reports regularly noticing globus sensations during meals and will often expectorate food such as pasta while clearing his throat. Patient reports lung nodules which are being monitored by his doctor. Patient also reports poor dentition and masticating on the left side of the oral cavity due to lack of dentition on the right side. Patient admits his mastication efforts are not as thorough or effective as they should be and feels this has a lot to do with his difficulty. Patient reports losing approximately 10 pounds since first noticing difficulty. Patient is not a smoker.  Prognosis: Fair  Treatment Provided: No  Strengths: Motivation  Barriers: Comorbidities      Plan:  Plan  Inpatient/Swing Bed or Outpatient: Outpatient  Treatment/Interventions: Diet recommendations, Patient/family education  SLP Plan: Skilled SLP  SLP Frequency: 1x per week  Duration: Other (Comment) (6 weeks)  SLP Discharge Recommendations: Outpatient SLP  Diet Recommendations: Solid, Liquid  Solid Consistency: Soft & bite sized/chopped (IDDSI Level 6)  Liquid Consistency: Thin (IDDSI Level 0)  Discussed POC: " Patient  Discussed Risks/Benefits: Yes, Patient  Patient/Caregiver Agreeable: Yes      Subjective   Patient was pleasant and participated well throughout the assessment this date.       General Visit Information:  General Information  Ordering Physician: Dr. Grayson  Reason for Referral: Dysphagia  Prior Level of Function: Decreased function (over the past year)  Developmental Status: Age Appropriate  Patient Seen During This Visit: Yes  Number of Authorized Treatments : unlimited based on med necessity  Total Number of Visits : 1  Reviewed Procedures and Risks: Yes  Date of Onset: 09/22/23  Date of Order: 09/22/23  [employer] Diet: reg/thin  Current Diet : reg/thin  Dysphagia Diagnosis:  (mild to moderate josé miguel-pharyngeal dysphagia)      Objective   Long Term Goal:  1. Patient will tolerate least restrictive diet consistencies without overt signs/symptoms of aspiration/penetration.    Short Term Goals:  1. Patient will tolerate recommended diet consistencies without overt signs/symptoms of aspiration/penetration.  2. Patient will complete swallow and oral mechanism exercises with return demo to increase strength, speed, and coordination of swallow.  3. Patient will utilize safe swallow strategies with return demo during all oral intake.   4. Patient/family education to be provided each session.      Baseline Assessment:  Baseline Assessment  Temperature Spikes Noted: No  Respiratory Status: Room air  History of Intubation: No  Behavior/Cognition: Cooperative, Pleasant mood  Vision: Functional for self-feeding  Hearing: Within Functional Limits  Patient Positioning: Upright in Chair  Baseline Vocal Quality: Dysphonic  Volitional Cough: Strong  Volitional Swallow: Within Functional Limits      Pain:  Pain Assessment  Pain Assessment: 0-10  Pain Score: 0 - No pain      Oral/Motor Assessment:  Oral/Motor Assessment  Oral Hygiene: clear, moist oral cavity. Blue lingual coating noted- patient reports using blue mouth wash  "earlier this date.  Dentition: Some Missing Teeth, Dentures (Upper Partial ) (Patient with imperfect dentures- patient reports looking into new dentures)  Oral Motor: Impaired Function  Lingual Symmetry: Abnormal symmetry left  Vocal Quality: Exceptions to WFL  Vocal Quality Impairment:  (consistently clearing throat. Dysphonia)  Apraxia: None present  Intelligibility: Intelligible  Breath Support: Adequate for speech  Hearing: Within Functional Limits      Clinical Observations:  Clinical Observations  Patient Positioning: Upright in Chair  Management of Oral Secretions: Other (Comment) (Patient with noted consistent throat clear- suspect poor management of oral secretions)  Signs/Symptoms of Aspiration: Throat Clearing (throat clearing at baseline, unclear if related to aspiration/penetration of oral trials from this date.)  Other Signs/Symptoms of Difficulty with Feeding: Other (Comment) (patient reports \"feels like a lump\" while swallowing puree trials)  Poor Management of Oral Secretions: Yes  Prolonged Oral Manipulation: Regular (IDDSI Level 7)  Impaired Mastication: Regular (IDDSI Level 7)  Oral Residue: Regular (IDDSI Level 7) (cleared with independent liquid wash)  Delayed Throat Clear: All consistencies Trialed (unclear if related to intake trialled)  Clinical Observation Comment: Patient consumed approximately 6 oz of thin liquid via cup sips- patient with seemingly adequate hyo-laryngeal elevation noted. Patient with throat clear at baseline- he reports this occurs as a conversation continues and does not feel it is directly related to oral intake.No overt signs/symptoms of aspiration/penetration noted. Patient with timely swallow onset, adequate A-P transfer. Patient consumed teaspoon sized bites of puree consistency- full oral clearance achieved, timely swallow onset, adequate bolus manipulation and transfer. Patient reports feeling a \"lump\" in his throat during all trials of puree this date, though " denied globus sensations and odynophagia. Patient consumed solid trials-- effortful mastication process noted with mild to moderate residuals remaining post swallow. Slow bolus manipulation and A-P transfer. Residuals cleared wtih liquid wash. Patient denied globus sensations post trials. No overt signs/symptoms of aspiration/penetration noted throughout any consistencies this date. Patient reports globus sensations regularly in the home setting as well as expectorating food.      SLP Outcome Measures:   Pt completed the EAT-10 this date and scored 9/40.  A score of 3 or higher indicates that there may be a swallowing dysfunction.  The higher the number, the more it is affecting the pt. Pt did note losing weight due to dysphagia, avoiding going out for meals due to dysphagia, swallowing liquids/solids takes extra effort, eating is affected by dysphagia, and food sticking in his throat.           Outpatient Education:   Education was provided to pt/family and reviewed how to carryover strategies learned in session to home.         Consultations/Referrals/Coordination of Services: Modified barium swallow study

## 2023-10-23 ENCOUNTER — APPOINTMENT (OUTPATIENT)
Dept: SPEECH THERAPY | Facility: CLINIC | Age: 88
End: 2023-10-23
Payer: MEDICARE

## 2023-10-23 ENCOUNTER — TELEPHONE (OUTPATIENT)
Dept: PRIMARY CARE | Facility: CLINIC | Age: 88
End: 2023-10-23
Payer: MEDICARE

## 2023-10-23 DIAGNOSIS — N40.0 BENIGN PROSTATIC HYPERPLASIA, UNSPECIFIED WHETHER LOWER URINARY TRACT SYMPTOMS PRESENT: ICD-10-CM

## 2023-10-23 RX ORDER — MIRABEGRON 25 MG/1
25 TABLET, FILM COATED, EXTENDED RELEASE ORAL DAILY
Qty: 90 TABLET | Refills: 1 | Status: SHIPPED | OUTPATIENT
Start: 2023-10-23 | End: 2024-03-22 | Stop reason: WASHOUT

## 2023-10-23 NOTE — TELEPHONE ENCOUNTER
Pt left a msg asking for a refill of he Myrbetriq 25 mg.  He doesn't want this script printed, he wants it sent to [facility].

## 2023-10-24 ENCOUNTER — TELEPHONE (OUTPATIENT)
Dept: PRIMARY CARE | Facility: CLINIC | Age: 88
End: 2023-10-24

## 2023-10-24 NOTE — TELEPHONE ENCOUNTER
[facility] called to request order for modified barium swallow for patient. Thank you.

## 2023-11-07 ENCOUNTER — TELEPHONE (OUTPATIENT)
Dept: PRIMARY CARE | Facility: CLINIC | Age: 88
End: 2023-11-07
Payer: MEDICARE

## 2023-11-07 NOTE — TELEPHONE ENCOUNTER
Pt left a msg stating that a swallow test was suppose to be entered for the pt.  He goes to a speech therapist in [place], Senthil Turner.  They are telling the pt that the order is not being sent to the correct place.  This therapist is part of .  I am not sure what is going on.

## 2023-11-08 DIAGNOSIS — R13.10 DYSPHAGIA, UNSPECIFIED TYPE: Primary | ICD-10-CM

## 2023-11-16 ENCOUNTER — APPOINTMENT (OUTPATIENT)
Dept: RADIOLOGY | Facility: HOSPITAL | Age: 88
End: 2023-11-16
Payer: MEDICARE

## 2023-11-20 ENCOUNTER — LAB (OUTPATIENT)
Dept: LAB | Facility: LAB | Age: 88
End: 2023-11-20
Payer: MEDICARE

## 2023-11-20 ENCOUNTER — OFFICE VISIT (OUTPATIENT)
Dept: PRIMARY CARE | Facility: CLINIC | Age: 88
End: 2023-11-20
Payer: MEDICARE

## 2023-11-20 VITALS — BODY MASS INDEX: 27.11 KG/M2 | DIASTOLIC BLOOD PRESSURE: 80 MMHG | SYSTOLIC BLOOD PRESSURE: 130 MMHG | WEIGHT: 177 LBS

## 2023-11-20 DIAGNOSIS — M25.561 CHRONIC PAIN OF BOTH KNEES: ICD-10-CM

## 2023-11-20 DIAGNOSIS — N18.31 STAGE 3A CHRONIC KIDNEY DISEASE (MULTI): ICD-10-CM

## 2023-11-20 DIAGNOSIS — R09.81 NASAL CONGESTION: ICD-10-CM

## 2023-11-20 DIAGNOSIS — M25.562 CHRONIC PAIN OF BOTH KNEES: ICD-10-CM

## 2023-11-20 DIAGNOSIS — E78.5 DYSLIPIDEMIA: ICD-10-CM

## 2023-11-20 DIAGNOSIS — G89.29 CHRONIC PAIN OF BOTH KNEES: ICD-10-CM

## 2023-11-20 DIAGNOSIS — R35.1 BENIGN PROSTATIC HYPERPLASIA WITH NOCTURIA: Primary | ICD-10-CM

## 2023-11-20 DIAGNOSIS — R73.09 ELEVATED GLUCOSE: ICD-10-CM

## 2023-11-20 DIAGNOSIS — I10 PRIMARY HYPERTENSION: ICD-10-CM

## 2023-11-20 DIAGNOSIS — N40.1 BENIGN PROSTATIC HYPERPLASIA WITH NOCTURIA: Primary | ICD-10-CM

## 2023-11-20 LAB
ALT SERPL W P-5'-P-CCNC: 10 U/L (ref 10–52)
ANION GAP SERPL CALC-SCNC: 10 MMOL/L (ref 10–20)
BUN SERPL-MCNC: 30 MG/DL (ref 6–23)
CALCIUM SERPL-MCNC: 9.1 MG/DL (ref 8.6–10.3)
CHLORIDE SERPL-SCNC: 105 MMOL/L (ref 98–107)
CHOLEST SERPL-MCNC: 147 MG/DL (ref 0–199)
CHOLESTEROL/HDL RATIO: 2.6
CO2 SERPL-SCNC: 30 MMOL/L (ref 21–32)
CREAT SERPL-MCNC: 1.1 MG/DL (ref 0.5–1.3)
ERYTHROCYTE [DISTWIDTH] IN BLOOD BY AUTOMATED COUNT: 13.4 % (ref 11.5–14.5)
EST. AVERAGE GLUCOSE BLD GHB EST-MCNC: 128 MG/DL
GFR SERPL CREATININE-BSD FRML MDRD: 63 ML/MIN/1.73M*2
GLUCOSE SERPL-MCNC: 92 MG/DL (ref 74–99)
HBA1C MFR BLD: 6.1 %
HCT VFR BLD AUTO: 39.7 % (ref 41–52)
HDLC SERPL-MCNC: 56.4 MG/DL
HGB BLD-MCNC: 12.1 G/DL (ref 13.5–17.5)
LDLC SERPL CALC-MCNC: 70 MG/DL
MCH RBC QN AUTO: 28.5 PG (ref 26–34)
MCHC RBC AUTO-ENTMCNC: 30.5 G/DL (ref 32–36)
MCV RBC AUTO: 94 FL (ref 80–100)
NON HDL CHOLESTEROL: 91 MG/DL (ref 0–149)
NRBC BLD-RTO: 0 /100 WBCS (ref 0–0)
PLATELET # BLD AUTO: 209 X10*3/UL (ref 150–450)
POTASSIUM SERPL-SCNC: 4.1 MMOL/L (ref 3.5–5.3)
RBC # BLD AUTO: 4.24 X10*6/UL (ref 4.5–5.9)
SODIUM SERPL-SCNC: 141 MMOL/L (ref 136–145)
TRIGL SERPL-MCNC: 105 MG/DL (ref 0–149)
VLDL: 21 MG/DL (ref 0–40)
WBC # BLD AUTO: 7.8 X10*3/UL (ref 4.4–11.3)

## 2023-11-20 PROCEDURE — 3075F SYST BP GE 130 - 139MM HG: CPT | Performed by: INTERNAL MEDICINE

## 2023-11-20 PROCEDURE — 1160F RVW MEDS BY RX/DR IN RCRD: CPT | Performed by: INTERNAL MEDICINE

## 2023-11-20 PROCEDURE — 3079F DIAST BP 80-89 MM HG: CPT | Performed by: INTERNAL MEDICINE

## 2023-11-20 PROCEDURE — 80048 BASIC METABOLIC PNL TOTAL CA: CPT

## 2023-11-20 PROCEDURE — 80061 LIPID PANEL: CPT

## 2023-11-20 PROCEDURE — 36415 COLL VENOUS BLD VENIPUNCTURE: CPT

## 2023-11-20 PROCEDURE — 84460 ALANINE AMINO (ALT) (SGPT): CPT

## 2023-11-20 PROCEDURE — 85027 COMPLETE CBC AUTOMATED: CPT

## 2023-11-20 PROCEDURE — 1036F TOBACCO NON-USER: CPT | Performed by: INTERNAL MEDICINE

## 2023-11-20 PROCEDURE — 1126F AMNT PAIN NOTED NONE PRSNT: CPT | Performed by: INTERNAL MEDICINE

## 2023-11-20 PROCEDURE — 99214 OFFICE O/P EST MOD 30 MIN: CPT | Performed by: INTERNAL MEDICINE

## 2023-11-20 PROCEDURE — 83036 HEMOGLOBIN GLYCOSYLATED A1C: CPT

## 2023-11-20 PROCEDURE — 1159F MED LIST DOCD IN RCRD: CPT | Performed by: INTERNAL MEDICINE

## 2023-11-20 RX ORDER — AZELASTINE 1 MG/ML
1 SPRAY, METERED NASAL 2 TIMES DAILY
Qty: 30 ML | Refills: 3 | Status: SHIPPED | OUTPATIENT
Start: 2023-11-20 | End: 2024-11-19

## 2023-11-20 ASSESSMENT — PATIENT HEALTH QUESTIONNAIRE - PHQ9
SUM OF ALL RESPONSES TO PHQ9 QUESTIONS 1 AND 2: 1
1. LITTLE INTEREST OR PLEASURE IN DOING THINGS: NOT AT ALL
2. FEELING DOWN, DEPRESSED OR HOPELESS: SEVERAL DAYS

## 2023-11-20 NOTE — PROGRESS NOTES
Subjective   Reason for Visit: Elroy Greco is an 89 y.o. male here for a f/u      Past Medical, Surgical, and Family History reviewed and updated in chart.         HPI  Overall well   #1 htn  #2 CKD3  #3 LBP  #4 renal/hepatic cysts  #5 Padget's dz  #6 urinary freq, urgency.  Increasing.  [facility]     Patient Care Team:  Trice Grayson MD as PCP - General  Trice Grayson MD as PCP - [employer] PCP     Review of Systems   All other systems reviewed and are negative.      Objective   Vitals:  /80   Wt 80.3 kg (177 lb)   BMI 27.11 kg/m²       Physical Exam  Constitutional:       General: He is not in acute distress.     Appearance: Normal appearance. He is not ill-appearing.   HENT:      Head: Normocephalic and atraumatic.      Right Ear: Tympanic membrane and ear canal normal.      Left Ear: Tympanic membrane and ear canal normal.      Nose: Nose normal.      Mouth/Throat:      Mouth: Mucous membranes are moist.      Pharynx: Oropharynx is clear.   Eyes:      General: No scleral icterus.     Extraocular Movements: Extraocular movements intact.      Conjunctiva/sclera: Conjunctivae normal.      Pupils: Pupils are equal, round, and reactive to light.   Cardiovascular:      Rate and Rhythm: Normal rate and regular rhythm.      Pulses: Normal pulses.      Heart sounds: No murmur heard.     No friction rub.   Pulmonary:      Effort: Pulmonary effort is normal.      Breath sounds: Normal breath sounds. No rhonchi or rales.   Abdominal:      General: Abdomen is flat. Bowel sounds are normal. There is no distension.      Palpations: Abdomen is soft. There is no mass.      Tenderness: There is no abdominal tenderness.   Musculoskeletal:      Cervical back: Normal range of motion and neck supple.      Right lower leg: No edema.      Left lower leg: No edema.   Skin:     General: Skin is warm.   Neurological:      General: No focal deficit present.      Mental Status: He is alert and  oriented to person, place, and time.      Cranial Nerves: No cranial nerve deficit.   Psychiatric:         Mood and Affect: Mood normal.         Behavior: Behavior normal.         Judgment: Judgment normal.       Echo 4/23/23;  1. Left ventricular systolic function is normal with a 55-60% estimated ejection fraction.  2. Spectral Doppler shows an impaired relaxation pattern of left ventricular diastolic filling.  3. There is moderate mitral annular calcification.  4. Moderate aortic valve stenosis.  5. There is moderate aortic valve cusp calcification.    Assessment/Plan   Problem List Items Addressed This Visit       BPH (benign prostatic hyperplasia) - Primary    Relevant Orders    Referral to Urology    CKD (chronic kidney disease) stage 3, GFR 30-59 ml/min (CMS/HCC)    Relevant Orders    Basic Metabolic Panel    Alanine Aminotransferase    CBC    Dyslipidemia    Relevant Orders    Alanine Aminotransferase    Lipid Panel    Elevated glucose    Relevant Orders    Hemoglobin A1C    Hypertension     Other Visit Diagnoses       Chronic pain of both knees        Relevant Orders    Referral to Orthopaedic Surgery    Nasal congestion        Relevant Medications    azelastine (Astelin) 137 mcg (0.1 %) nasal spray            #1 hypertension- on target. Continue to follow  #2 ckd3- stable clinically. Check labs next visit  #3 back pain- increased. [facility].   #4 renal/hepatic cyst- follow-up with urology  #5 Paget's disease- In remission. [facility]. Alkaline phosphatase through their office, per pt stable. stressed importance  #6 urinary freq- increased symptoms, resume 2 Flomax every day, UA/Cx. . Follow-up with urology ASAP  #7 fatigue/ dyspnea- stable. No significant change with discontinuing metoprolol. Follow-up cardiology  #8 mod AI/AS- clinically stable. Continue afterload reduction. f[facility]u cards at [facility]   #9 mild, stable normocytic anemia. Normal iron, B12, folic acid. stable for >6  yrs. retest   #10 depression/anxiety- remains an issue. Continue increased duloxetine 30 mg po qd  #11 BPH w/ LUTS- s/p TURP 2017. f[facility].  Per pt req, will c/s for 2nd opinion  #12 mild SANCHEZ-stable/improved. f/u cards   #13 ? pulm HTN on echo 3/17- reviewed. clinically stable. f/u cardiology.   #14 diastolic dysfxn- reviewed. BP well controlled.   #15 gout- clinically resolved. UA next visit  #16 constipation- remains an issue.  f/u  w/ GI (appt pending).  Add miralax every day.   #17 CAD/angina- stable. f/u cards   #18 elevated glucose (non-fasting)- elevated hemoglobin A1c. stay focused on a reduced sugar low. carbohydrate diet, recheck #19 hip and shoulder pain- f/u ortho.  #20 lipids- ? Why off Rx. resume  #21 insomnia- reviewed. con't melatonin 1 mg po qhs  #22 vit d def- increase to 3,000 otc d3  #23 cough with eating/dysphasiaâ€“f/u GI  #24 mild imbalance- fall precautions reviewed. Better. Con't PT  #25 ? neuropathy rt arm- ?CTS. reviewed. rec NCS/EMG. he declines. normal exam. f/u inb 3-4 weeks.   #26 hip pain- reviewed. Appt pending w/ [facility]  #27 depression- low-dose Cymbalta.    #28 syncope/presyncope- no further episodes. f/u cards.   #29 back pain- [facility]   #30 pulmonary nodules- f/u chest CT stable x >2 yrs  #31 ? pulm fibrosis- no s/sx. no cough/SOB. stable on CT 2017-->2022. reviewed DDx at length again. Rec pulm eval again. He still declines.   #32 knee pain- s/p remote tka. f[facility]  [facility].   C/s  ortho per pt req.   #33 nasal congestion- trial astelin

## 2023-11-29 ENCOUNTER — TELEPHONE (OUTPATIENT)
Dept: PRIMARY CARE | Facility: CLINIC | Age: 88
End: 2023-11-29
Payer: MEDICARE

## 2023-11-29 NOTE — TELEPHONE ENCOUNTER
----- Message from Lindsey Louis CMA sent at 11/22/2023  6:58 AM EST -----    ----- Message -----  From: Trice Grayson MD  Sent: 11/21/2023   6:02 PM EST  To: [id number]    Let know all good/stable

## 2023-12-14 ENCOUNTER — ANCILLARY PROCEDURE (OUTPATIENT)
Dept: RADIOLOGY | Facility: CLINIC | Age: 88
End: 2023-12-14
Payer: MEDICARE

## 2023-12-14 ENCOUNTER — OFFICE VISIT (OUTPATIENT)
Dept: ORTHOPEDIC SURGERY | Facility: CLINIC | Age: 88
End: 2023-12-14
Payer: MEDICARE

## 2023-12-14 VITALS — WEIGHT: 177 LBS | BODY MASS INDEX: 26.83 KG/M2 | HEIGHT: 68 IN

## 2023-12-14 DIAGNOSIS — G89.29 BILATERAL CHRONIC KNEE PAIN: ICD-10-CM

## 2023-12-14 DIAGNOSIS — G89.29 CHRONIC PAIN OF BOTH KNEES: ICD-10-CM

## 2023-12-14 DIAGNOSIS — M25.561 BILATERAL CHRONIC KNEE PAIN: ICD-10-CM

## 2023-12-14 DIAGNOSIS — M25.561 BILATERAL CHRONIC KNEE PAIN: Primary | ICD-10-CM

## 2023-12-14 DIAGNOSIS — G89.29 BILATERAL CHRONIC KNEE PAIN: Primary | ICD-10-CM

## 2023-12-14 DIAGNOSIS — M25.562 CHRONIC PAIN OF BOTH KNEES: ICD-10-CM

## 2023-12-14 DIAGNOSIS — Z96.653 H/O TOTAL KNEE REPLACEMENT, BILATERAL: ICD-10-CM

## 2023-12-14 DIAGNOSIS — M25.562 BILATERAL CHRONIC KNEE PAIN: Primary | ICD-10-CM

## 2023-12-14 DIAGNOSIS — M25.562 BILATERAL CHRONIC KNEE PAIN: ICD-10-CM

## 2023-12-14 DIAGNOSIS — M25.561 CHRONIC PAIN OF BOTH KNEES: ICD-10-CM

## 2023-12-14 PROCEDURE — 1036F TOBACCO NON-USER: CPT | Performed by: ORTHOPAEDIC SURGERY

## 2023-12-14 PROCEDURE — 99204 OFFICE O/P NEW MOD 45 MIN: CPT | Performed by: ORTHOPAEDIC SURGERY

## 2023-12-14 PROCEDURE — 1160F RVW MEDS BY RX/DR IN RCRD: CPT | Performed by: ORTHOPAEDIC SURGERY

## 2023-12-14 PROCEDURE — 1126F AMNT PAIN NOTED NONE PRSNT: CPT | Performed by: ORTHOPAEDIC SURGERY

## 2023-12-14 PROCEDURE — 73562 X-RAY EXAM OF KNEE 3: CPT | Mod: RIGHT SIDE | Performed by: STUDENT IN AN ORGANIZED HEALTH CARE EDUCATION/TRAINING PROGRAM

## 2023-12-14 PROCEDURE — 73560 X-RAY EXAM OF KNEE 1 OR 2: CPT | Mod: RIGHT SIDE | Performed by: STUDENT IN AN ORGANIZED HEALTH CARE EDUCATION/TRAINING PROGRAM

## 2023-12-14 PROCEDURE — 73562 X-RAY EXAM OF KNEE 3: CPT | Mod: LT

## 2023-12-14 PROCEDURE — 1159F MED LIST DOCD IN RCRD: CPT | Performed by: ORTHOPAEDIC SURGERY

## 2023-12-14 PROCEDURE — 73560 X-RAY EXAM OF KNEE 1 OR 2: CPT | Mod: RT

## 2023-12-14 NOTE — PROGRESS NOTES
PRIMARY CARE PHYSICIAN: rTice Grayson MD  REFERRING PROVIDER: No referring provider defined for this encounter.     CONSULT ORTHOPAEDIC: Knee Evaluation        ASSESSMENT & PLAN    IMPRESSION:   1.  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  Reviewed x-rays with him.  Currently no signs or symptoms of prosthetic joint infection, clinical instability, implant loosening or failure.  Most of symptoms seem to be related to overall weakness in the lower extremities.  Patient is very active but does do minimal resistance training so I recommended he work with physical therapy to see if this helps his symptoms.  Patient is agreeable to this.  Also has a history of spinal stenosis which could be a contributing factor to his current symptoms.  [facility] and had one 2 weeks ago and noticed that it did not improve any of his current symptoms.  Will follow-up.  He has improved with physical therapy but do not see any reason for surgical intervention at this time.      SUBJECTIVE  CHIEF COMPLAINT: Bilateral knee pain     HPI: Elroy Greco is a 89 y.o. patient. Elroy Greco has had progressive problems with their both knees over the past 1 month. They do report any trauma. He states he injured his knees during [place].  They do not report any constant or progressive numbness or tingling in their legs. Their symptoms are interfering with activities which include going up and down stairs, walking on inclines.     FUNCTIONAL STATUS: occasionally limited.  AMBULATORY STATUS: Househould ambulation independent without devices  PREVIOUS TREATMENTS: Goes to the [facility] center 2 times a weeks, has had replacemnts on both knees 20 and 22 years ago.   HISTORY OF SURGERY ON AFFECTED KNEE(S): Yes       REVIEW OF SYSTEMS  Constitutional: See HPI for pain assessment, No significant weight loss, recent  trauma  Cardiovascular: No chest pain, shortness of breath  Respiratory: No difficulty breathing, cough  Gastrointestinal: No nausea, vomiting, diarrhea, constipation  Musculoskeletal: Noted in HPI, positive for pain, restricted motion, stiffness  Integumentary: No rashes, easy bruising, redness   Neurological: no numbness or tingling in extremities, no gait disturbances   Psychiatric: No mood changes, memory changes, social issues  Heme/Lymph: no excessive swelling, easy bruising, excessive bleeding  ENT: No hearing changes  Eyes: No vision changes    Past Medical History:   Diagnosis Date    Nocturia 03/01/2017    Nocturia    Personal history of colonic polyps 02/24/2016    History of colonic polyps        Allergies   Allergen Reactions    Finasteride Rash    Penicillins Rash        Past Surgical History:   Procedure Laterality Date    CT ANGIO CORONARY ART WITH HEARTFLOW IF SCORE >30%  11/12/2018    CT HEART CORONARY ANGIOGRAM 11/12/2018 [facility]    HERNIA REPAIR  07/18/2012    Hernia Repair    OTHER SURGICAL HISTORY  07/18/2012    Nerve Block Transforaminal Epidural    OTHER SURGICAL HISTORY  07/18/2012    Nerve Ablation    TOTAL KNEE ARTHROPLASTY  07/18/2012    Knee Replacement    TOTAL KNEE ARTHROPLASTY  07/18/2012    Knee Replacement        Family History   Problem Relation Name Age of Onset    Cancer Mother      Cancer Father          Social History     Socioeconomic History    Marital status:      Spouse name: Not on file    Number of children: Not on file    Years of education: Not on file    Highest education level: Not on file   Occupational History    Not on file   Tobacco Use    Smoking status: Never    Smokeless tobacco: Never   Substance and Sexual Activity    Alcohol use: Never    Drug use: Never    Sexual activity: Not on file   Other Topics Concern    Not on file   Social History Narrative    Not on file     Social Determinants of Health     Financial Resource Strain: Not on  file   Food Insecurity: Not on file   Transportation Needs: Not on file   Physical Activity: Not on file   Stress: Not on file   Social Connections: Not on file   Intimate Partner Violence: Not on file   Housing Stability: Not on file        CURRENT MEDICATIONS:   Current Outpatient Medications   Medication Sig Dispense Refill    atorvastatin (Lipitor) 40 mg tablet Take 1 tablet (40 mg) by mouth once daily at bedtime. (Patient not taking: Reported on 7/18/2023) 90 tablet 3    azelastine (Astelin) 137 mcg (0.1 %) nasal spray Administer 1 spray into each nostril 2 times a day. Use in each nostril as directed 30 mL 3    carboxymethylcellulose (Refresh Plus) 0.5 % ophthalmic solution Administer 1 drop into both eyes 4 times a day.      cholecalciferol (Vitamin D-3) 25 MCG (1000 UT) capsule Take by mouth. TAKE AS DIRECTED.      colchicine 0.6 mg tablet Take 1 tablet (0.6 mg) by mouth once daily as needed.      cyanocobalamin (Vitamin B-12) 1,000 mcg tablet Take 1 tablet (1,000 mcg) by mouth once daily. AS DIRECTED.      docusate sodium (Colace) 100 mg capsule Take 1 capsule (100 mg) by mouth once daily as needed.      DULoxetine 40 mg DR capsule Take 1 capsule (40 mg) by mouth once daily.      fluticasone (Flonase) 50 mcg/actuation nasal spray Administer 2 sprays into each nostril once daily.      magnesium 250 mg tablet Take 1 tablet (250 mg) by mouth once daily.      mirabegron (Mybetriq) 25 mg tablet extended release 24 hr 24 hr tablet Take 1 tablet (25 mg) by mouth once daily. 90 tablet 1    omega 3-dha-epa-fish oil 1,000 mg (120 mg-180 mg) capsule Take 1 capsule (1,000 mg) by mouth 2 times a day.      pedi multivit no.17 w-fluoride (Multivitamins With Fluoride) 1 mg chewable tablet Chew 1 tablet once daily.      polyethylene glycol (Glycolax) 17 gram/dose powder MIX 1 PACKET IN 8 OUNCES OF LIQUID AND DRINK ONCE DAILY.      psyllium husk (METAMUCIL ORAL) Take 1 Wafer by mouth 1 (one) time each day.      tamsulosin  "(Flomax) 0.4 mg 24 hr capsule Take 2 capsules (0.8 mg) by mouth once daily. 1/2 hour after same meal or at bedtime with snack 90 capsule 1     No current facility-administered medications for this visit.        OBJECTIVE    PHYSICAL EXAM      10/21/2022     8:37 AM 11/18/2022     9:36 AM 3/24/2023     7:38 AM 4/21/2023     9:21 AM 7/18/2023     7:11 AM 11/20/2023     7:45 AM 12/14/2023     2:48 PM   Vitals   Systolic 134 130 120 135 130 130    Diastolic 86 84 80 83 80 80    Heart Rate 63 80  61      Temp  36.3 °C (97.3 °F) 36.4 °C (97.6 °F)       Height (in) 1.74 m (5' 8.5\") 1.74 m (5' 8.5\")  1.727 m (5' 8\") 1.721 m (5' 7.75\")  1.721 m (5' 7.75\")   Weight (lb) 180 181 179.2 177.5 175.8 177 177   BMI 26.97 kg/m2 27.12 kg/m2 26.85 kg/m2 26.99 kg/m2 26.93 kg/m2 27.11 kg/m2 27.11 kg/m2   BSA (m2) 1.99 m2 1.99 m2 1.98 m2 1.97 m2 1.95 m2 1.96 m2 1.96 m2   Visit Report   Report  Report Report Report      Body mass index is 27.11 kg/m².    GENERAL: A/Ox3, NAD. Appears healthy, well nourished  PSYCHIATRIC: Mood stable, appropriate memory recall  EYES: EOM intact, no scleral icterus  CARDIAC: regular rate  LUNGS: Breathing non-labored  SKIN: no erythema, rashes, or ecchymoses     MUSCULOSKELETAL:  Laterality: bilateral Knee Exam  - Alignment: Neutral  - ROM: 5 to 120 degrees  - Effusion: None evident  - Strength: knee extension and flexion 5/5, EHL/PF/DF motor intact  - Palpation: Nontender along knees  - Stability: Anterior/Posterior stable, varus/valgus stable  - Gait: normal  - Hip Exam: flexion to 100+ degrees, full extension, internal/external rotation adequate, and no pain with log roll  - Special Tests: Negative piston test bilateral      NEUROVASCULAR:  - Neurovascular Status: sensation intact to light touch distally  - Capillary refill brisk at extremities, Bilateral dorsalis pedis pulse 2+     IMAGING:  Multiple views of the affected bilateral knee(s) demonstrate: Cemented total knee arthroplasties without signs of " loosening or failure.   X-rays were personally reviewed and interpreted by me.  Radiology reports were reviewed by me as well, if readily available at the time.        Lucy Ghosh DO  Attending Surgeon  Joint Replacement and Adult Reconstructive Surgery  [place], [place]

## 2023-12-20 ENCOUNTER — OFFICE VISIT (OUTPATIENT)
Dept: UROLOGY | Facility: CLINIC | Age: 88
End: 2023-12-20
Payer: MEDICARE

## 2023-12-20 ENCOUNTER — TELEPHONE (OUTPATIENT)
Dept: PRIMARY CARE | Facility: CLINIC | Age: 88
End: 2023-12-20

## 2023-12-20 ENCOUNTER — APPOINTMENT (OUTPATIENT)
Dept: UROLOGY | Facility: CLINIC | Age: 88
End: 2023-12-20
Payer: MEDICARE

## 2023-12-20 VITALS — HEIGHT: 68 IN | WEIGHT: 176 LBS | BODY MASS INDEX: 26.67 KG/M2 | TEMPERATURE: 96.2 F

## 2023-12-20 DIAGNOSIS — N40.1 BENIGN PROSTATIC HYPERPLASIA WITH NOCTURIA: ICD-10-CM

## 2023-12-20 DIAGNOSIS — R35.1 BENIGN PROSTATIC HYPERPLASIA WITH NOCTURIA: ICD-10-CM

## 2023-12-20 PROCEDURE — 1036F TOBACCO NON-USER: CPT | Performed by: UROLOGY

## 2023-12-20 PROCEDURE — 1126F AMNT PAIN NOTED NONE PRSNT: CPT | Performed by: UROLOGY

## 2023-12-20 PROCEDURE — 1159F MED LIST DOCD IN RCRD: CPT | Performed by: UROLOGY

## 2023-12-20 PROCEDURE — 99204 OFFICE O/P NEW MOD 45 MIN: CPT | Performed by: UROLOGY

## 2023-12-20 PROCEDURE — 1160F RVW MEDS BY RX/DR IN RCRD: CPT | Performed by: UROLOGY

## 2023-12-20 ASSESSMENT — PAIN SCALES - GENERAL: PAINLEVEL: 0-NO PAIN

## 2023-12-20 NOTE — PROGRESS NOTES
Subjective   Elroy Greco is a 89 y.o. who presents today for evaluation of lower urinary tract symptoms. Patient has bothersome LUTS, he is mostly bothered by urinary frequency, urgency, and urge incontinence despite being on Tamsulosin. He was started on Myrbetriq he developed severe constipation and dry mouth and discontinued use. Patient is interested in definitive treatment. He has no personal history of prostate cancer.He denies flank pain, gross hematuria, kidney stones, and recurrent UTI.        Objective   Past Medical History:   Diagnosis Date    Nocturia 03/01/2017    Nocturia    Personal history of colonic polyps 02/24/2016    History of colonic polyps     Past Surgical History:   Procedure Laterality Date    CT ANGIO CORONARY ART WITH HEARTFLOW IF SCORE >30%  11/12/2018    CT HEART CORONARY ANGIOGRAM 11/12/2018 [facility]    HERNIA REPAIR  07/18/2012    Hernia Repair    OTHER SURGICAL HISTORY  07/18/2012    Nerve Block Transforaminal Epidural    OTHER SURGICAL HISTORY  07/18/2012    Nerve Ablation    TOTAL KNEE ARTHROPLASTY  07/18/2012    Knee Replacement    TOTAL KNEE ARTHROPLASTY  07/18/2012    Knee Replacement     Current Outpatient Medications on File Prior to Visit   Medication Sig Dispense Refill    carboxymethylcellulose (Refresh Plus) 0.5 % ophthalmic solution Administer 1 drop into both eyes 4 times a day.      cholecalciferol (Vitamin D-3) 25 MCG (1000 UT) capsule Take by mouth. TAKE AS DIRECTED.      colchicine 0.6 mg tablet Take 1 tablet (0.6 mg) by mouth once daily as needed.      cyanocobalamin (Vitamin B-12) 1,000 mcg tablet Take 1 tablet (1,000 mcg) by mouth once daily. AS DIRECTED.      docusate sodium (Colace) 100 mg capsule Take 1 capsule (100 mg) by mouth once daily as needed.      fluticasone (Flonase) 50 mcg/actuation nasal spray Administer 2 sprays into each nostril once daily.      magnesium 250 mg tablet Take 1 tablet (250 mg) by mouth once daily.      omega  "3-dha-epa-fish oil 1,000 mg (120 mg-180 mg) capsule Take 1 capsule (1,000 mg) by mouth 2 times a day.      pedi multivit no.17 w-fluoride (Multivitamins With Fluoride) 1 mg chewable tablet Chew 1 tablet once daily.      polyethylene glycol (Glycolax) 17 gram/dose powder MIX 1 PACKET IN 8 OUNCES OF LIQUID AND DRINK ONCE DAILY.      psyllium husk (METAMUCIL ORAL) Take 1 Wafer by mouth 1 (one) time each day.      atorvastatin (Lipitor) 40 mg tablet Take 1 tablet (40 mg) by mouth once daily at bedtime. (Patient not taking: Reported on 7/18/2023) 90 tablet 3    azelastine (Astelin) 137 mcg (0.1 %) nasal spray Administer 1 spray into each nostril 2 times a day. Use in each nostril as directed (Patient not taking: Reported on 12/20/2023) 30 mL 3    DULoxetine 40 mg DR capsule Take 1 capsule (40 mg) by mouth once daily.      mirabegron (Mybetriq) 25 mg tablet extended release 24 hr 24 hr tablet Take 1 tablet (25 mg) by mouth once daily. (Patient not taking: Reported on 12/20/2023) 90 tablet 1    tamsulosin (Flomax) 0.4 mg 24 hr capsule Take 2 capsules (0.8 mg) by mouth once daily. 1/2 hour after same meal or at bedtime with snack (Patient not taking: Reported on 12/20/2023) 90 capsule 1     No current facility-administered medications on file prior to visit.     Allergies   Allergen Reactions    Finasteride Rash    Penicillins Rash       Temp 35.7 °C (96.2 °F)   Ht 1.727 m (5' 8\")   Wt 79.8 kg (176 lb)   BMI 26.76 kg/m²   Physical Exam        Assessment/Plan     Diagnoses and all orders for this visit:  Benign prostatic hyperplasia with nocturia  -     Referral to Urology    BPH with LUTS     Patient has bothersome LUTS, he is interested in an outlet procedure, however, due to his age I recommend patient discuss if he is a surgical candidate with his PCP Dr. Grayson.     If patient is a surgical candidate we will proceed with cystoscopy/TRUS in anticipation of an outlet procedure with concurrent Botox.     All questions " were answered to the patient's satisfaction. Patient agrees with the plan and wishes to proceed. Follow-up will be scheduled appropriately.     Scribed for Dr. Wilkerson by Ashwini Bazan. I , Dr Wilkerson, have personally reviewed and agreed with the information entered by the Virtual Scribe.

## 2023-12-20 NOTE — TELEPHONE ENCOUNTER
Py just came from Dr. Kerline Gutiérrez office and they said he needs surgery. He said they needed to speak to you and so he gave me their number and asked if you could call them and talk to them. Their number is [phone].

## 2023-12-22 ENCOUNTER — APPOINTMENT (OUTPATIENT)
Dept: RADIOLOGY | Facility: HOSPITAL | Age: 88
End: 2023-12-22
Payer: MEDICARE

## 2023-12-29 NOTE — TELEPHONE ENCOUNTER
Jl called from Dr. Wilkerson's office to clarify message from pt. You can review Dr. Wilkerson's visit note from 12/20. She is asking for your opinion regarding the pt being a good candidate for surgery before moving forward with any testing. Do you need to see pt to discuss first? You can message Dr. Wilkerson in [facility] with your response.

## 2024-01-04 NOTE — TELEPHONE ENCOUNTER
Mr. Greco stopped in hoping to speak to you regarding the previous messages, one from him and another from Dr. Wilkerson's office regarding needed procedure and your opinion about him having this done.  Mr. Greco also provided the name of Dr. Wilkerson's nurse, Yuki Ojeda, RN, BSN, who said she would be happy to discus this with you.  She can be reached at [phone].    Mr. Greco said that if you need to see him for an appt, he would be fine with that.

## 2024-01-11 NOTE — TELEPHONE ENCOUNTER
Mr. Greco called again, asking about request for Dr. Grayson to contact Dr. Wilkerson's office as he's not heard anything back yet.  I printed off the task and spoke with Dr. Grayson about this.  I told Dr. Grayson to let me know if he wants us to scheduled an appt for Mr. Greco to be seen.

## 2024-02-29 ENCOUNTER — TELEPHONE (OUTPATIENT)
Dept: PRIMARY CARE | Facility: CLINIC | Age: 89
End: 2024-02-29
Payer: MEDICARE

## 2024-02-29 DIAGNOSIS — I1A.0 RESISTANT HYPERTENSION: ICD-10-CM

## 2024-02-29 RX ORDER — AMLODIPINE BESYLATE 2.5 MG/1
2.5 TABLET ORAL DAILY
Qty: 90 TABLET | Refills: 1 | Status: SHIPPED | OUTPATIENT
Start: 2024-02-29 | End: 2024-05-08

## 2024-02-29 NOTE — TELEPHONE ENCOUNTER
Pt left a msg asking for a refill of his Amlodipine 2.5 mg.  Pharm is [facility]. I found the script in EMR, last given 2/2023 thru 2/2024.

## 2024-03-11 ENCOUNTER — TELEPHONE (OUTPATIENT)
Dept: PRIMARY CARE | Facility: CLINIC | Age: 89
End: 2024-03-11
Payer: MEDICARE

## 2024-03-11 NOTE — TELEPHONE ENCOUNTER
Mr. Greco stopped in, requesting refill of Indomethacin 25 mg.  He said he only uses when has flare ups of his back pain.  Would like called into [facility] on [address] in [place].      LOV - 11/20/23    NOV - 3/22/24

## 2024-03-22 ENCOUNTER — OFFICE VISIT (OUTPATIENT)
Dept: PRIMARY CARE | Facility: CLINIC | Age: 89
End: 2024-03-22
Payer: MEDICARE

## 2024-03-22 VITALS
SYSTOLIC BLOOD PRESSURE: 114 MMHG | TEMPERATURE: 97.9 F | WEIGHT: 175.2 LBS | BODY MASS INDEX: 26.64 KG/M2 | DIASTOLIC BLOOD PRESSURE: 80 MMHG

## 2024-03-22 DIAGNOSIS — I35.0 AORTIC STENOSIS, MILD: ICD-10-CM

## 2024-03-22 DIAGNOSIS — E44.1 MILD PROTEIN-CALORIE MALNUTRITION (MULTI): ICD-10-CM

## 2024-03-22 DIAGNOSIS — I25.10 CORONARY ARTERY DISEASE INVOLVING NATIVE HEART WITHOUT ANGINA PECTORIS, UNSPECIFIED VESSEL OR LESION TYPE: ICD-10-CM

## 2024-03-22 DIAGNOSIS — F33.42 RECURRENT MAJOR DEPRESSIVE DISORDER, IN FULL REMISSION (CMS-HCC): ICD-10-CM

## 2024-03-22 DIAGNOSIS — N18.31 STAGE 3A CHRONIC KIDNEY DISEASE (MULTI): ICD-10-CM

## 2024-03-22 DIAGNOSIS — E78.5 DYSLIPIDEMIA: ICD-10-CM

## 2024-03-22 DIAGNOSIS — R73.09 ELEVATED GLUCOSE: ICD-10-CM

## 2024-03-22 DIAGNOSIS — I10 PRIMARY HYPERTENSION: Primary | ICD-10-CM

## 2024-03-22 PROCEDURE — 99214 OFFICE O/P EST MOD 30 MIN: CPT | Performed by: INTERNAL MEDICINE

## 2024-03-22 PROCEDURE — 3074F SYST BP LT 130 MM HG: CPT | Performed by: INTERNAL MEDICINE

## 2024-03-22 PROCEDURE — 3079F DIAST BP 80-89 MM HG: CPT | Performed by: INTERNAL MEDICINE

## 2024-03-22 PROCEDURE — 1036F TOBACCO NON-USER: CPT | Performed by: INTERNAL MEDICINE

## 2024-03-22 PROCEDURE — 1157F ADVNC CARE PLAN IN RCRD: CPT | Performed by: INTERNAL MEDICINE

## 2024-03-22 PROCEDURE — 1159F MED LIST DOCD IN RCRD: CPT | Performed by: INTERNAL MEDICINE

## 2024-03-22 RX ORDER — LIDOCAINE 50 MG/G
1 PATCH TOPICAL DAILY
COMMUNITY

## 2024-03-22 ASSESSMENT — PATIENT HEALTH QUESTIONNAIRE - PHQ9
SUM OF ALL RESPONSES TO PHQ9 QUESTIONS 1 AND 2: 3
2. FEELING DOWN, DEPRESSED OR HOPELESS: NEARLY EVERY DAY
1. LITTLE INTEREST OR PLEASURE IN DOING THINGS: NOT AT ALL

## 2024-03-22 NOTE — PROGRESS NOTES
Subjective   Reason for Visit: Elroy Greco is an 89 y.o. male here for a f/u      Past Medical, Surgical, and Family History reviewed and updated in chart.    Reviewed all medications by prescribing practitioner or clinical pharmacist (such as prescriptions, OTCs, herbal therapies and supplements) and documented in the medical record.    HPI  Overall well   #1 htn  #2 CKD3  #3 LBP  #4 renal/hepatic cysts  #5 Padget's dz  #6 urinary freq, urgency.  Increasing.  [facility].     Patient Care Team:  Trice Grayson MD as PCP - General  Trice Grayson MD as PCP - [employer] PCP     Review of Systems   All other systems reviewed and are negative.      Objective   Vitals:  /80   Temp 36.6 °C (97.9 °F)   Wt 79.5 kg (175 lb 3.2 oz)   BMI 26.64 kg/m²       Physical Exam  Constitutional:       General: He is not in acute distress.     Appearance: Normal appearance. He is not ill-appearing.   HENT:      Head: Normocephalic and atraumatic.      Right Ear: Tympanic membrane and ear canal normal.      Left Ear: Tympanic membrane and ear canal normal.      Nose: Nose normal.      Mouth/Throat:      Mouth: Mucous membranes are moist.      Pharynx: Oropharynx is clear.   Eyes:      General: No scleral icterus.     Extraocular Movements: Extraocular movements intact.      Conjunctiva/sclera: Conjunctivae normal.      Pupils: Pupils are equal, round, and reactive to light.   Cardiovascular:      Rate and Rhythm: Normal rate and regular rhythm.      Pulses: Normal pulses.      Heart sounds: No murmur heard.     No friction rub.   Pulmonary:      Effort: Pulmonary effort is normal.      Breath sounds: Normal breath sounds. No rhonchi or rales.   Abdominal:      General: Abdomen is flat. Bowel sounds are normal. There is no distension.      Palpations: Abdomen is soft. There is no mass.      Tenderness: There is no abdominal tenderness.   Musculoskeletal:      Cervical back: Normal range of motion  and neck supple.      Right lower leg: No edema.      Left lower leg: No edema.   Skin:     General: Skin is warm.   Neurological:      General: No focal deficit present.      Mental Status: He is alert and oriented to person, place, and time.      Cranial Nerves: No cranial nerve deficit.   Psychiatric:         Mood and Affect: Mood normal.         Behavior: Behavior normal.         Judgment: Judgment normal.       Lab Results   Component Value Date    WBC 7.8 11/20/2023    HGB 12.1 (L) 11/20/2023    HCT 39.7 (L) 11/20/2023     11/20/2023    CHOL 147 11/20/2023    TRIG 105 11/20/2023    HDL 56.4 11/20/2023    ALT 10 11/20/2023    AST 19 06/10/2020     11/20/2023    K 4.1 11/20/2023     11/20/2023    CREATININE 1.10 11/20/2023    BUN 30 (H) 11/20/2023    CO2 30 11/20/2023    TSH 1.44 11/18/2022    INR 1.2 (H) 07/21/2020    HGBA1C 6.1 (H) 11/20/2023       Echo 4/23/23;  1. Left ventricular systolic function is normal with a 55-60% estimated ejection fraction.  2. Spectral Doppler shows an impaired relaxation pattern of left ventricular diastolic filling.  3. There is moderate mitral annular calcification.  4. Moderate aortic valve stenosis.  5. There is moderate aortic valve cusp calcification.    Assessment/Plan   Problem List Items Addressed This Visit    None      #1 hypertension- on target. Continue to follow  #2 ckd3- stable clinically. Check labs next visit  #3 back pain- increased. Follow-up [facility] medicine [facility].   #4 renal/hepatic cyst- follow-up with urology  #5 Paget's disease- In remission. follow-up endocrinology at [facility]. Alkaline phosphatase through their office, per pt stable. stressed importance  #6 urinary freq- increased symptoms, resume 2 Flomax every day, UA/Cx. . Follow-up with urology ASA  #7 fatigue/ dyspnea- stable. No significant change with discontinuing metoprolol. Follow-up cardiology  #8 mod AI/AS- clinically stable. Continue afterload reduction. f/u cards at [facility]   #9 mild,  stable normocytic anemia. Normal iron, B12, folic acid. stable for >6 yrs. retest   #10 depression/anxiety- remains an issue. Continue increased duloxetine 30 mg po qd  #11 BPH w/ LUTS- s/p TURP 2017. f/u  at [facility].  Per pt req, will c/s for 2nd opinion  #12 mild SANCHEZ-stable/improved. f/u cards   #13 ? pulm HTN on echo 3/17- reviewed. clinically stable. f/u cardiology.   #14 diastolic dysfxn- reviewed. BP well controlled.   #15 gout- clinically resolved. UA next visit  #16 constipation- remains an issue.  f/u  w/ GI (appt pending).  Add miralax every day.   #17 CAD/angina- stable. f/u cards   #18 elevated glucose (non-fasting)- elevated hemoglobin A1c. stay focused on a reduced sugar low. carbohydrate diet, recheck #19 hip and shoulder pain- f/u ortho.  #20 lipids- ? Why off Rx. resume  #21 insomnia- reviewed. con't melatonin 1 mg po qhs  #22 vit d def- increase to 3,000 otc d3  #23 cough with eating/dysphasiaâ€“f/u GI  #24 mild imbalance- fall precautions reviewed. Better. Con't PT  #25 ? neuropathy rt arm- ?CTS. reviewed. rec NCS/EMG. he declines. normal exam. f/u inb 3-4 weeks.   #26 hip pain- reviewed. Appt pending w/ [facility]  #27 depression- low-dose Cymbalta.    #28 syncope/presyncope- no further episodes. f/u cards.   #29 back pain- f[facility]   #30 pulmonary nodules- f/u chest CT stable x >2 yrs  #31 ? pulm fibrosis- no s/sx. no cough/SOB. stable on CT 2017-->2022. reviewed DDx at length again. Rec pulm eval again. He still declines.   #32 knee pain- s/p remote tka. f/u  w/ ortho at [facility].   C/s [facility] orth[facility] per pt req.   #33 nasal congestion- trial astelin    Rt knee pain- better w/ brace.  f/u  ortho at [facility]  Bph- ok w/ surgery

## 2024-03-27 ENCOUNTER — OFFICE VISIT (OUTPATIENT)
Dept: CARDIOLOGY | Facility: CLINIC | Age: 89
End: 2024-03-27
Payer: MEDICARE

## 2024-03-27 VITALS
WEIGHT: 174 LBS | SYSTOLIC BLOOD PRESSURE: 120 MMHG | DIASTOLIC BLOOD PRESSURE: 76 MMHG | HEIGHT: 68 IN | BODY MASS INDEX: 26.37 KG/M2 | OXYGEN SATURATION: 97 % | HEART RATE: 68 BPM | RESPIRATION RATE: 20 BRPM

## 2024-03-27 DIAGNOSIS — E78.5 DYSLIPIDEMIA: ICD-10-CM

## 2024-03-27 DIAGNOSIS — I35.0 AORTIC STENOSIS, MILD: Primary | ICD-10-CM

## 2024-03-27 DIAGNOSIS — I10 PRIMARY HYPERTENSION: ICD-10-CM

## 2024-03-27 DIAGNOSIS — R06.02 SHORTNESS OF BREATH: ICD-10-CM

## 2024-03-27 DIAGNOSIS — I25.10 CORONARY ARTERY DISEASE INVOLVING NATIVE HEART WITHOUT ANGINA PECTORIS, UNSPECIFIED VESSEL OR LESION TYPE: ICD-10-CM

## 2024-03-27 PROCEDURE — 1160F RVW MEDS BY RX/DR IN RCRD: CPT | Performed by: NURSE PRACTITIONER

## 2024-03-27 PROCEDURE — 93010 ELECTROCARDIOGRAM REPORT: CPT | Performed by: INTERNAL MEDICINE

## 2024-03-27 PROCEDURE — 99214 OFFICE O/P EST MOD 30 MIN: CPT | Performed by: NURSE PRACTITIONER

## 2024-03-27 PROCEDURE — 1159F MED LIST DOCD IN RCRD: CPT | Performed by: NURSE PRACTITIONER

## 2024-03-27 PROCEDURE — 3078F DIAST BP <80 MM HG: CPT | Performed by: NURSE PRACTITIONER

## 2024-03-27 PROCEDURE — 1157F ADVNC CARE PLAN IN RCRD: CPT | Performed by: NURSE PRACTITIONER

## 2024-03-27 PROCEDURE — 1036F TOBACCO NON-USER: CPT | Performed by: NURSE PRACTITIONER

## 2024-03-27 PROCEDURE — 3074F SYST BP LT 130 MM HG: CPT | Performed by: NURSE PRACTITIONER

## 2024-03-27 PROCEDURE — 93005 ELECTROCARDIOGRAM TRACING: CPT | Performed by: NURSE PRACTITIONER

## 2024-03-27 PROCEDURE — 1126F AMNT PAIN NOTED NONE PRSNT: CPT | Performed by: NURSE PRACTITIONER

## 2024-03-27 RX ORDER — BISMUTH SUBSALICYLATE 262 MG
1 TABLET,CHEWABLE ORAL DAILY
COMMUNITY

## 2024-03-27 ASSESSMENT — ENCOUNTER SYMPTOMS
DEPRESSION: 0
LOSS OF SENSATION IN FEET: 0
OCCASIONAL FEELINGS OF UNSTEADINESS: 0

## 2024-03-27 ASSESSMENT — PATIENT HEALTH QUESTIONNAIRE - PHQ9
1. LITTLE INTEREST OR PLEASURE IN DOING THINGS: SEVERAL DAYS
10. IF YOU CHECKED OFF ANY PROBLEMS, HOW DIFFICULT HAVE THESE PROBLEMS MADE IT FOR YOU TO DO YOUR WORK, TAKE CARE OF THINGS AT HOME, OR GET ALONG WITH OTHER PEOPLE: NOT DIFFICULT AT ALL
SUM OF ALL RESPONSES TO PHQ9 QUESTIONS 1 AND 2: 2
2. FEELING DOWN, DEPRESSED OR HOPELESS: SEVERAL DAYS

## 2024-03-27 ASSESSMENT — PAIN SCALES - GENERAL: PAINLEVEL: 0-NO PAIN

## 2024-03-27 NOTE — PROGRESS NOTES
Subjective   Elroy Greco is a 89 y.o. male.    Chief Complaint:  Valve Disorder    Mr. Greco returns for his annual follow up. He is also anticipating a urologic procedure and is needing cardiac clearance. He has been feeling well from a cardiac standpoint. He has remained compliant with his medications, denying any intolerances. He remains active in water aerobics and riding a stationary bike. He has some fatigue and shortness of breath, though this remains stable and unchanged. 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 He denies any recent ER visits or hospitalizations. He offers no specific cardiovascular complaints or concerns today. He denies any complaints of chest pain, shortness of breath, lightheadedness, dizziness, palpitations, syncope, orthopnea, paroxysmal nocturnal dyspnea, lower extremity swelling or bleeding concerns.              Review of Systems   All other systems reviewed and are negative.      Objective   Physical Exam  Constitutional:       Appearance: Healthy appearance. In no distress  Pulmonary:      Effort: Pulmonary effort is normal.      Breath sounds: Normal breath sounds.   Cardiovascular:      Normal rate. Regular rhythm. Normal S1. Normal S2.       Murmurs: There is no murmur.      Carotids: right carotid pulse +2, no bruit heard over the right carotid. left carotid pulse +2, no bruit heard over the left carotid.  Edema:     Peripheral edema absent.   Abdominal:      Palpations: Abdomen is soft.   Musculoskeletal:       Cervical back: Normal range of motion.   Skin:     General: Skin is warm and dry. Normal color and pigmentation   Neurological:      Mental Status: Alert and oriented to person, place and time.   Psychiatric:     Mood and Affect: appropriate mood and appropriate affect.     EKG obtained and reviewed. Sinus rhythm with 1st AV block. Nonspecific T wave abnormality. HR 64    Lab Review:   Lab Results   Component Value Date     11/20/2023     K 4.1 11/20/2023     11/20/2023    CO2 30 11/20/2023    BUN 30 (H) 11/20/2023    CREATININE 1.10 11/20/2023    GLUCOSE 92 11/20/2023    CALCIUM 9.1 11/20/2023     Lab Results   Component Value Date    WBC 7.8 11/20/2023    HGB 12.1 (L) 11/20/2023    HCT 39.7 (L) 11/20/2023    MCV 94 11/20/2023     11/20/2023     Lab Results   Component Value Date    CHOL 147 11/20/2023    TRIG 105 11/20/2023    HDL 56.4 11/20/2023       Assessment/Plan   Mr. Greco is a very pleasant 89 year old  male with a past medical history significant for hypertension, severe 2 vessel CAD by coronary CTA in 2018, hyperlipidemia and spinal stenosis. Echocardiogram 4/2023 showed an EF of 55-60 % with moderate mitral annular calcification and moderate AS, stable and unchanged from prior studies. He presents today for routine follow up stable from a cardiac standpoint. He is also anticipating a urologic procedure and is needing cardiac clearance. His VS and EKG remain stable today. He remains on appropriate risk factor reduction therapy. Despite his age he remains fairly active. His dyspnea on exertion as well as fatigue remain stable and unchanged. He denies any exertional chest pain. I will have him continue all medications unchanged. I will schedule him for an echocardiogram to reassess his LV function and valvular disease. I will call him with his results once available and make further recommendations at that time. Should his echocardiogram remain stable, he will be cleared for his urologic procedure. He will follow up with us in clinic in one year. He knows to call for any concerns.

## 2024-03-29 LAB
ATRIAL RATE: 64 BPM
P AXIS: -9 DEGREES
P OFFSET: 157 MS
P ONSET: 99 MS
PR INTERVAL: 228 MS
Q ONSET: 213 MS
QRS COUNT: 11 BEATS
QRS DURATION: 96 MS
QT INTERVAL: 406 MS
QTC CALCULATION(BAZETT): 418 MS
QTC FREDERICIA: 414 MS
R AXIS: -41 DEGREES
T AXIS: -17 DEGREES
T OFFSET: 416 MS
VENTRICULAR RATE: 64 BPM

## 2024-04-05 ENCOUNTER — HOSPITAL ENCOUNTER (OUTPATIENT)
Dept: CARDIOLOGY | Facility: CLINIC | Age: 89
Discharge: HOME | End: 2024-04-05
Payer: MEDICARE

## 2024-04-05 DIAGNOSIS — R06.02 SHORTNESS OF BREATH: ICD-10-CM

## 2024-04-05 DIAGNOSIS — I35.0 AORTIC STENOSIS, MILD: ICD-10-CM

## 2024-04-05 PROCEDURE — 93306 TTE W/DOPPLER COMPLETE: CPT | Performed by: STUDENT IN AN ORGANIZED HEALTH CARE EDUCATION/TRAINING PROGRAM

## 2024-04-05 PROCEDURE — 93306 TTE W/DOPPLER COMPLETE: CPT

## 2024-04-07 LAB
AORTIC VALVE MEAN GRADIENT: 15 MMHG
AORTIC VALVE PEAK VELOCITY: 2.5 M/S
AV PEAK GRADIENT: 25 MMHG
AVA (PEAK VEL): 1.32 CM2
AVA (VTI): 1.27 CM2
EJECTION FRACTION APICAL 4 CHAMBER: 66.3
LEFT ATRIUM VOLUME AREA LENGTH INDEX BSA: 38.7 ML/M2
LEFT VENTRICLE INTERNAL DIMENSION DIASTOLE: 4.51 CM (ref 3.5–6)
LEFT VENTRICULAR OUTFLOW TRACT DIAMETER: 2.29 CM
LV EJECTION FRACTION BIPLANE: 65 %
MITRAL VALVE E/A RATIO: 0.5
MITRAL VALVE E/E' RATIO: 8.44
RIGHT VENTRICLE FREE WALL PEAK S': 7.69 CM/S
TRICUSPID ANNULAR PLANE SYSTOLIC EXCURSION: 1.6 CM

## 2024-04-09 ENCOUNTER — TELEPHONE (OUTPATIENT)
Dept: CARDIOLOGY | Facility: HOSPITAL | Age: 89
End: 2024-04-09
Payer: MEDICARE

## 2024-04-15 DIAGNOSIS — R35.1 BENIGN PROSTATIC HYPERPLASIA WITH NOCTURIA: Primary | ICD-10-CM

## 2024-04-15 DIAGNOSIS — Z79.2 NEED FOR PROPHYLACTIC ANTIBIOTIC: ICD-10-CM

## 2024-04-15 DIAGNOSIS — N40.1 BENIGN PROSTATIC HYPERPLASIA WITH NOCTURIA: Primary | ICD-10-CM

## 2024-04-15 RX ORDER — NITROFURANTOIN 25; 75 MG/1; MG/1
100 CAPSULE ORAL ONCE
Qty: 1 CAPSULE | Refills: 0 | Status: SHIPPED | OUTPATIENT
Start: 2024-04-15 | End: 2024-04-15

## 2024-04-19 ENCOUNTER — PROCEDURE VISIT (OUTPATIENT)
Dept: UROLOGY | Facility: CLINIC | Age: 89
End: 2024-04-19
Payer: MEDICARE

## 2024-04-19 VITALS
SYSTOLIC BLOOD PRESSURE: 139 MMHG | WEIGHT: 173 LBS | DIASTOLIC BLOOD PRESSURE: 83 MMHG | HEART RATE: 73 BPM | HEIGHT: 66 IN | TEMPERATURE: 96.6 F | BODY MASS INDEX: 27.8 KG/M2

## 2024-04-19 DIAGNOSIS — Z01.818 PREOP TESTING: ICD-10-CM

## 2024-04-19 DIAGNOSIS — N33 BLADDER DISORDERS IN DISEASES CLASSIFIED ELSEWHERE: ICD-10-CM

## 2024-04-19 DIAGNOSIS — R35.0 BENIGN PROSTATIC HYPERPLASIA WITH URINARY FREQUENCY: Primary | ICD-10-CM

## 2024-04-19 DIAGNOSIS — N40.1 BENIGN PROSTATIC HYPERPLASIA WITH URINARY FREQUENCY: Primary | ICD-10-CM

## 2024-04-19 DIAGNOSIS — N39.41 URGE INCONTINENCE: ICD-10-CM

## 2024-04-19 DIAGNOSIS — N40.0 BENIGN PROSTATIC HYPERPLASIA, UNSPECIFIED WHETHER LOWER URINARY TRACT SYMPTOMS PRESENT: ICD-10-CM

## 2024-04-19 LAB
POC APPEARANCE, URINE: CLEAR
POC BILIRUBIN, URINE: NEGATIVE
POC BLOOD, URINE: NEGATIVE
POC COLOR, URINE: YELLOW
POC GLUCOSE, URINE: NEGATIVE MG/DL
POC KETONES, URINE: NEGATIVE MG/DL
POC LEUKOCYTES, URINE: NEGATIVE
POC NITRITE,URINE: NEGATIVE
POC PH, URINE: 5.5 PH
POC PROTEIN, URINE: ABNORMAL MG/DL
POC SPECIFIC GRAVITY, URINE: 1.01
POC UROBILINOGEN, URINE: 0.2 EU/DL

## 2024-04-19 PROCEDURE — 76872 US TRANSRECTAL: CPT | Performed by: UROLOGY

## 2024-04-19 PROCEDURE — 99215 OFFICE O/P EST HI 40 MIN: CPT | Performed by: UROLOGY

## 2024-04-19 PROCEDURE — 81002 URINALYSIS NONAUTO W/O SCOPE: CPT | Performed by: UROLOGY

## 2024-04-19 PROCEDURE — 52000 CYSTOURETHROSCOPY: CPT | Performed by: UROLOGY

## 2024-04-19 RX ORDER — CIPROFLOXACIN 2 MG/ML
400 INJECTION, SOLUTION INTRAVENOUS ONCE
Status: CANCELLED | OUTPATIENT
Start: 2024-04-19 | End: 2024-04-19

## 2024-04-19 RX ORDER — SODIUM CHLORIDE, SODIUM LACTATE, POTASSIUM CHLORIDE, CALCIUM CHLORIDE 600; 310; 30; 20 MG/100ML; MG/100ML; MG/100ML; MG/100ML
100 INJECTION, SOLUTION INTRAVENOUS CONTINUOUS
Status: CANCELLED | OUTPATIENT
Start: 2024-04-19

## 2024-04-19 ASSESSMENT — PAIN SCALES - GENERAL: PAINLEVEL: 0-NO PAIN

## 2024-04-19 NOTE — PROGRESS NOTES
Scribed for Dr. Regina Wilkerson by Jose Toribio.  I, Dr. Regina Wilkerson, have personally reviewed and agreed with the information entered by the Virtual Scribe. 04/19/24    History of Present Illness (HPI):  TODAY: (04/19/24)  Elroy Greco is a 89 y.o. male with history of BPH with LUTS, OAB symptoms and UUI. Remains symptomatic despite tamsulosin, and has failed myrbetriq in the past 2/2 side-effects. Presents today for cystoscopy in anticipation of an outlet procedure.     Cystoscopy performed:   Elroy Greco identified using two (2) forms of identification.  Procedure: diagnostic cystourethroscopy  Indications for procedure: LUTS  Risks, benefits, and alternatives were discussed in detail.   Patient appears to understand and agrees to proceed.   Patient has signed the procedure consent form.     Cystoscopy findings:  Urethra: normal course and caliber, no evidence of stricture or lesion.  Prostate: lateral hypertrophy, no obstructing median lobe. Complete channel occlusion.  Bladder: heavily trabeculated bladder, no diverticulum, no stone, tumors or other lesions.  Post-cystoscopy: Patient tolerated procedure without complications.    [x] Lateral hypertrophy, with complete channel occlusion.  [x] Good sphincter coaptation.    [x] TRUS performed (Y/N)  [x] Microcalcification's present.    Prostate measurements:  Length: 46  Width: 28  Height: 44  Total volume: 30 cc's    TO REVIEW: Initial visit (12/20/23)  Patient has bothersome LUTS, he is mostly bothered by urinary frequency, urgency, and urge incontinence despite being on Tamsulosin. He was started on Myrbetriq he developed severe constipation and dry mouth and discontinued use. Patient is interested in definitive treatment. He has no personal history of prostate cancer. He denies flank pain, gross hematuria, kidney stones, and recurrent UTI.    Patient has bothersome LUTS, he is interested in an outlet procedure, however, due to his age I recommend  patient discuss if he is a surgical candidate with his PCP Dr. Grayson.     Past Medical History:   Diagnosis Date    Nocturia 03/01/2017    Nocturia    Personal history of colonic polyps 02/24/2016    History of colonic polyps     Past Surgical History:   Procedure Laterality Date    CT ANGIO CORONARY ART WITH HEARTFLOW IF SCORE >30%  11/12/2018    CT HEART CORONARY ANGIOGRAM 11/12/2018 [facility]    HERNIA REPAIR  07/18/2012    Hernia Repair    OTHER SURGICAL HISTORY  07/18/2012    Nerve Block Transforaminal Epidural    OTHER SURGICAL HISTORY  07/18/2012    Nerve Ablation    TOTAL KNEE ARTHROPLASTY  07/18/2012    Knee Replacement    TOTAL KNEE ARTHROPLASTY  07/18/2012    Knee Replacement     Family History   Problem Relation Name Age of Onset    Cancer Mother      Cancer Father       Social History     Tobacco Use   Smoking Status Never   Smokeless Tobacco Never     Current Outpatient Medications   Medication Sig Dispense Refill    amLODIPine (Norvasc) 2.5 mg tablet Take 1 tablet (2.5 mg) by mouth once daily. 90 tablet 1    atorvastatin (Lipitor) 40 mg tablet Take 1 tablet (40 mg) by mouth once daily at bedtime. 90 tablet 3    azelastine (Astelin) 137 mcg (0.1 %) nasal spray Administer 1 spray into each nostril 2 times a day. Use in each nostril as directed 30 mL 3    carboxymethylcellulose (Refresh Plus) 0.5 % ophthalmic solution Administer 1 drop into both eyes 4 times a day.      colchicine 0.6 mg tablet Take 1 tablet (0.6 mg) by mouth once daily as needed.      cyanocobalamin (Vitamin B-12) 1,000 mcg tablet Take 1 tablet (1,000 mcg) by mouth once daily. AS DIRECTED.      docusate sodium (Colace) 100 mg capsule Take 2 capsules (200 mg) by mouth 2 times a day.      fluticasone (Flonase) 50 mcg/actuation nasal spray Administer 2 sprays into each nostril once daily.      lidocaine (Lidoderm) 5 % patch Place 1 patch on the skin once daily. Remove & discard patch within 12 hours or as directed by MD.       magnesium 250 mg tablet Take 1 tablet (250 mg) by mouth once daily.      multivitamin tablet Take 1 tablet by mouth once daily.      omega 3-dha-epa-fish oil 1,000 mg (120 mg-180 mg) capsule Take 1 capsule (1,000 mg) by mouth 2 times a day.      psyllium (Metamucil) 3.4 gram packet Take 1 Wafer by mouth once daily.      psyllium husk (METAMUCIL ORAL) Take 1 Wafer by mouth 1 (one) time each day.       No current facility-administered medications for this visit.     Allergies   Allergen Reactions    Finasteride Rash    Mirabegron Swelling    Penicillins Rash     Past medical, surgical, family and social history in the chart was reviewed and is accurate including any additions to what is in this HPI.    Review of systems (ROS):   Pertinent information as listed in the HPI.        Objective   Visit Vitals  /83   Pulse 73   Temp 35.9 °C (96.6 °F)     Physical Exam:  Constitutional: NAD  HEENT: AT/NC  Resp: Non labored respirations.  Skin: No jaundice or visible skin lesions.  Neuro: No focal deficits.  Psych: Appropriate mood and affect.    Lab Review:  Lab Results   Component Value Date    WBC 7.8 11/20/2023    RBC 4.24 (L) 11/20/2023    HGB 12.1 (L) 11/20/2023    HCT 39.7 (L) 11/20/2023     11/20/2023      Lab Results   Component Value Date    BUN 30 (H) 11/20/2023    CREATININE 1.10 11/20/2023      Lab Results   Component Value Date    HGBA1C 6.1 (H) 11/20/2023     Lab Results   Component Value Date    CHOL 147 11/20/2023    TRIG 105 11/20/2023    HDL 56.4 11/20/2023    ALT 10 11/20/2023    AST 19 06/10/2020     11/20/2023    K 4.1 11/20/2023     11/20/2023    CO2 30 11/20/2023    TSH 1.44 11/18/2022    INR 1.2 (H) 07/21/2020        ASSESSMENT:  Problem List Items Addressed This Visit       BPH (benign prostatic hyperplasia) - Primary    Relevant Orders    Case Request Operating Room: Enucleation Transurethral Prostate, Cystoscopy with Injection Therapeutic Agent (Completed)    POCT UA  (nonautomated) manually resulted (Completed)    Case Request Operating Room: Enucleation Transurethral Prostate, Cystoscopy with Injection Therapeutic Agent (Completed)     Other Visit Diagnoses       Urge incontinence        Relevant Orders    Case Request Operating Room: Enucleation Transurethral Prostate, Cystoscopy with Injection Therapeutic Agent (Completed)           PLAN:  1. BPH with LUTS  2. OAB symptoms  3. UUI    Elects to proceed with HoLEP + concurrent bladder botox.   Risks, benefits and complications were discussed.   Will schedule the OR appropriately.     Given the large volume of the prostate, I recommended proceeding with HoLEP. I discussed that a laser will be used to shell out the obstructing tissue from the inside of the prostate gland. I discussed in detail the risks associated with the HoLEP procedure. As with any surgical procedure, HoLEP surgery has some risks. Such as, incontinence of urine which is common for a few months after surgery but is rarely permanent (about one percent of cases), bleeding after surgery, the need for transfusion or another operation due to bleeding, UTI, damage to the bladder, damage to the ureteral orifice (a small tube where ethe kidney drains into the bladder), prolonged need for a catheter after surgery, or scar tissue in the area of surgery.     Risks of intravesical Botox injection were discussed with the patient in great detail. Adverse reactions reported have been UTI, dysuria, hematuria, elevated PVR and urinary retention in up to 15% of patients. I explained that urinary retention is not permanent and usually resolves within 6 weeks. She will be taught CIC technique to be used PRN. To decrease the risk of infection, UA will be obtained prior to the injection. She will be started on an antibiotic one day prior to the Botox injection.     Patient will follow up for another round of intravesical Botox injections when symptoms recur, or sooner if needed.      All questions were answered to the patient’s satisfaction.  Patient agrees with the plan and wishes to proceed.  Continue follow-up for ongoing care of his chronic medical conditions.    I spent 40 minutes of dedicated E&M time, including preparation and review of records, notes, and data, time spent with patient/family, and documentation.    Scribed for Dr. Regina Wilkerson by Jose Toribio.  I, Dr. Regina Wilkerson, have personally reviewed and agreed with the information entered by the Virtual Scribe. 04/19/24

## 2024-05-08 ENCOUNTER — LAB (OUTPATIENT)
Dept: LAB | Facility: LAB | Age: 89
End: 2024-05-08
Payer: MEDICARE

## 2024-05-08 DIAGNOSIS — R35.0 BENIGN PROSTATIC HYPERPLASIA WITH URINARY FREQUENCY: ICD-10-CM

## 2024-05-08 DIAGNOSIS — I1A.0 RESISTANT HYPERTENSION: ICD-10-CM

## 2024-05-08 DIAGNOSIS — N40.1 BENIGN PROSTATIC HYPERPLASIA WITH URINARY FREQUENCY: ICD-10-CM

## 2024-05-08 DIAGNOSIS — N33 BLADDER DISORDERS IN DISEASES CLASSIFIED ELSEWHERE: ICD-10-CM

## 2024-05-08 DIAGNOSIS — Z01.818 PREOP TESTING: ICD-10-CM

## 2024-05-08 LAB
ANION GAP SERPL CALC-SCNC: 11 MMOL/L (ref 10–20)
BUN SERPL-MCNC: 26 MG/DL (ref 6–23)
CALCIUM SERPL-MCNC: 8.9 MG/DL (ref 8.6–10.6)
CHLORIDE SERPL-SCNC: 106 MMOL/L (ref 98–107)
CO2 SERPL-SCNC: 29 MMOL/L (ref 21–32)
CREAT SERPL-MCNC: 1.06 MG/DL (ref 0.5–1.3)
EGFRCR SERPLBLD CKD-EPI 2021: 65 ML/MIN/1.73M*2
ERYTHROCYTE [DISTWIDTH] IN BLOOD BY AUTOMATED COUNT: 13.3 % (ref 11.5–14.5)
GLUCOSE SERPL-MCNC: 91 MG/DL (ref 74–99)
HCT VFR BLD AUTO: 38.7 % (ref 41–52)
HGB BLD-MCNC: 12.1 G/DL (ref 13.5–17.5)
INR PPP: 0.7 (ref 0.9–1.1)
MCH RBC QN AUTO: 28.7 PG (ref 26–34)
MCHC RBC AUTO-ENTMCNC: 31.3 G/DL (ref 32–36)
MCV RBC AUTO: 92 FL (ref 80–100)
NRBC BLD-RTO: 0 /100 WBCS (ref 0–0)
PLATELET # BLD AUTO: 206 X10*3/UL (ref 150–450)
POTASSIUM SERPL-SCNC: 4.1 MMOL/L (ref 3.5–5.3)
PROTHROMBIN TIME: 8.2 SECONDS (ref 9.8–12.8)
RBC # BLD AUTO: 4.21 X10*6/UL (ref 4.5–5.9)
SODIUM SERPL-SCNC: 142 MMOL/L (ref 136–145)
WBC # BLD AUTO: 6.9 X10*3/UL (ref 4.4–11.3)

## 2024-05-08 PROCEDURE — 85610 PROTHROMBIN TIME: CPT

## 2024-05-08 PROCEDURE — 36415 COLL VENOUS BLD VENIPUNCTURE: CPT

## 2024-05-08 PROCEDURE — 87086 URINE CULTURE/COLONY COUNT: CPT

## 2024-05-08 PROCEDURE — 85027 COMPLETE CBC AUTOMATED: CPT

## 2024-05-08 PROCEDURE — 80048 BASIC METABOLIC PNL TOTAL CA: CPT

## 2024-05-08 RX ORDER — AMLODIPINE BESYLATE 2.5 MG/1
2.5 TABLET ORAL DAILY
Qty: 100 TABLET | Refills: 2 | Status: SHIPPED | OUTPATIENT
Start: 2024-05-08

## 2024-05-09 LAB — BACTERIA UR CULT: NORMAL

## 2024-05-14 ENCOUNTER — PRE-ADMISSION TESTING (OUTPATIENT)
Dept: PREADMISSION TESTING | Facility: HOSPITAL | Age: 89
End: 2024-05-14
Payer: MEDICARE

## 2024-05-14 VITALS
OXYGEN SATURATION: 96 % | BODY MASS INDEX: 27.27 KG/M2 | HEIGHT: 67 IN | WEIGHT: 173.72 LBS | DIASTOLIC BLOOD PRESSURE: 86 MMHG | RESPIRATION RATE: 16 BRPM | HEART RATE: 63 BPM | TEMPERATURE: 97.9 F | SYSTOLIC BLOOD PRESSURE: 131 MMHG

## 2024-05-14 DIAGNOSIS — Z01.818 PREOP TESTING: ICD-10-CM

## 2024-05-14 PROCEDURE — 99203 OFFICE O/P NEW LOW 30 MIN: CPT

## 2024-05-14 RX ORDER — DIPHENHYDRAMINE HCL 50 MG
50 CAPSULE ORAL NIGHTLY PRN
COMMUNITY

## 2024-05-14 RX ORDER — UBIDECARENONE 75 MG
500 CAPSULE ORAL DAILY
COMMUNITY

## 2024-05-14 ASSESSMENT — DUKE ACTIVITY SCORE INDEX (DASI)
CAN YOU DO LIGHT WORK AROUND THE HOUSE LIKE DUSTING OR WASHING DISHES: YES
DASI METS SCORE: 5.8
CAN YOU DO MODERATE WORK AROUND THE HOUSE LIKE VACUUMING, SWEEPING FLOORS OR CARRYING GROCERIES: YES
CAN YOU WALK INDOORS, SUCH AS AROUND YOUR HOUSE: YES
CAN YOU DO HEAVY WORK AROUND THE HOUSE LIKE SCRUBBING FLOORS OR LIFTING AND MOVING HEAVY FURNITURE: NO
CAN YOU PARTICIPATE IN MODERATE RECREATIONAL ACTIVITIES LIKE GOLF, BOWLING, DANCING, DOUBLES TENNIS OR THROWING A BASEBALL OR FOOTBALL: YES
CAN YOU DO YARD WORK LIKE RAKING LEAVES, WEEDING OR PUSHING A MOWER: NO
CAN YOU WALK A BLOCK OR TWO ON LEVEL GROUND: YES
CAN YOU TAKE CARE OF YOURSELF (EAT, DRESS, BATHE, OR USE TOILET): YES
TOTAL_SCORE: 24.7
CAN YOU HAVE SEXUAL RELATIONS: YES
CAN YOU PARTICIPATE IN STRENOUS SPORTS LIKE SWIMMING, SINGLES TENNIS, FOOTBALL, BASKETBALL, OR SKIING: NO
CAN YOU CLIMB A FLIGHT OF STAIRS OR WALK UP A HILL: NO
CAN YOU RUN A SHORT DISTANCE: NO

## 2024-05-14 ASSESSMENT — PAIN - FUNCTIONAL ASSESSMENT: PAIN_FUNCTIONAL_ASSESSMENT: 0-10

## 2024-05-14 ASSESSMENT — PAIN SCALES - GENERAL: PAINLEVEL_OUTOF10: 0 - NO PAIN

## 2024-05-14 NOTE — PREPROCEDURE INSTRUCTIONS
Medication List            Accurate as of May 14, 2024 12:48 PM. Always use your most recent med list.                amLODIPine 2.5 mg tablet  Commonly known as: Norvasc  TAKE 1 TABLET BY MOUTH ONCE  DAILY  Medication Adjustments for Surgery: Take morning of surgery with sip of water, no other fluids     atorvastatin 40 mg tablet  Commonly known as: Lipitor  Take 1 tablet (40 mg) by mouth once daily at bedtime.  Medication Adjustments for Surgery: Take morning of surgery with sip of water, no other fluids     azelastine 137 mcg (0.1 %) nasal spray  Commonly known as: Astelin  Administer 1 spray into each nostril 2 times a day. Use in each nostril as directed  Medication Adjustments for Surgery: Other (Comment)  Notes to patient: Continue as prescribed     carboxymethylcellulose 0.5 % ophthalmic solution  Commonly known as: Refresh Plus  Medication Adjustments for Surgery: Other (Comment)  Notes to patient: Continue as prescribed     colchicine 0.6 mg tablet  Medication Adjustments for Surgery: Other (Comment)  Notes to patient: Per prescriber instructions     COLLAGEN 1500 PLUS C ORAL  Medication Adjustments for Surgery: Stop 7 days before surgery  Notes to patient: Last dose preoperatively 5/15/2024     cyanocobalamin 500 mcg tablet  Commonly known as: Vitamin B-12  Medication Adjustments for Surgery: Stop 7 days before surgery  Notes to patient: Last dose preoperatively 5/15/2024     diphenhydrAMINE 50 mg capsule  Commonly known as: BENADryl  Medication Adjustments for Surgery: Take morning of surgery with sip of water, no other fluids     docusate sodium 100 mg capsule  Commonly known as: Colace  Medication Adjustments for Surgery: Continue until night before surgery  Notes to patient: Last dose preoperatively 5/22/2024     fluticasone 50 mcg/actuation nasal spray  Commonly known as: Flonase  Medication Adjustments for Surgery: Other (Comment)  Notes to patient: Continue as prescribed     lidocaine 5 %  patch  Commonly known as: Lidoderm  Medication Adjustments for Surgery: Other (Comment)  Notes to patient: Continue as prescribed     MAGNESIUM ORAL  Medication Adjustments for Surgery: Stop 7 days before surgery  Notes to patient: Last dose preoperatively 5/15/2024     multivitamin tablet  Medication Adjustments for Surgery: Stop 7 days before surgery  Notes to patient: Last dose preoperatively 5/15/2024     Ocuvite Eye Health 50 mg-15 unit- 4.5 mg-2.5 mg tablet,chewable  Generic drug: vit C-E-zinc cit-lutein-zeaxan  Notes to patient: Last dose preoperatively 5/15/2024     omega 3-dha-epa-fish oil 1,000 mg (120 mg-180 mg) capsule  Medication Adjustments for Surgery: Stop 7 days before surgery  Notes to patient: Last dose preoperatively 5/15/2024     psyllium powder  Commonly known as: Metamucil  Medication Adjustments for Surgery: Stop 7 days before surgery  Notes to patient: Last dose preoperatively 5/15/2024            NPO Instructions:     Do not eat any food after midnight the night before your surgery/procedure.  You may have clear liquids until TWO hours before surgery/procedure. This includes water, black tea/coffee, (no milk or cream) apple juice and electrolyte drinks (Gatorade).  You may chew gum up to TWO hours before your surgery/procedure.     Additional Instructions:      Seven/Six Days before Surgery:  Review your medication instructions, stop indicated medications  Five Days before Surgery:  Review your medication instructions, stop indicated medications  Three Days before Surgery:  Review your medication instructions, stop indicated medications  The Day before Surgery:  Review your medication instructions, stop indicated medications  You will be contacted regarding the time of your arrival to facility and surgery time  Do not eat any food after Midnight  Day of Surgery:  Review your medication instructions, take indicated medications  If you have diabetes, please check your fasting blood sugar upon  awakening.  If fasting blood sugar is <80 mg/dl, drink 100 ml of apple juice, time limit of 2 hours before  You may have clear liquids until TWO hours before surgery/procedure.  This includes water, black tea/coffee, (no milk or cream) apple juice and electrolyte drinks (Gatorade)  You may chew gum up to TWO hours before your surgery/procedure  Wear  comfortable loose fitting clothing  Do not use moisturizers, creams, lotions or perfume  All jewelry and valuables should be left at home     CONTACT SURGEON'S OFFICE IF YOU DEVELOP:  * Fever = 100.4 F   * New respiratory symptoms (e.g. cough, shortness of breath, respiratory distress, sore throat)  * Recent loss of taste or smell  *Flu like symptoms such as headache, fatigue or gastrointestinal symptoms  * You develop any open sores, shingles, burning or painful urination   AND/OR:  * You no longer wish to have the surgery.  * Any other personal circumstances change that may lead to the need to cancel or defer this surgery.  *You were admitted to any hospital within one week of your planned procedure.     SMOKING:  *Quitting smoking can make a huge difference to your health and recovery from surgery.    *If you need help with quitting, call 5-394-QUIT-NOW.     THE DAY BEFORE SURGERY:  *Do not eat any food after midnight the night before your surgery.   *You may have up to TEN OUNCES of clear liquids until TWO hours before your instructed ARRIVAL TIME to hospital. This includes water, black tea/coffee, (no milk or cream) apple juice, clear broth and electrolyte drinks (Gatorade). Please avoid clear liquids that are red in color.   *You may chew gum/mints up to TWO hours before your surgery/procedure.     SURGICAL TIME:  *You will be contacted between 2 p.m. and 3 p.m. the business day before your surgery with your arrival time.  *If you haven't received a call by 3pm, call [phone]  *Scheduled surgery times may change and you will be notified if this occurs-check  your personal voicemail for any updates.     ON THE MORNING OF SURGERY:  *Wear comfortable, loose fitting clothing.   *Do not use moisturizers, creams, lotions or perfume.  *All jewelry and valuables should be left at home.  *Prosthetic devices such as contact lenses, hearing aids, dentures, eyelash extensions, hairpins and body piercing must be removed before surgery.     BRING WITH YOU:  *Photo ID and insurance card  *Current list of medications and allergies  *Pacemaker/Defibrillator/Heart stent cards  *CPAP machine and mask  *Slings/splints/crutches  *Copy of your complete Advanced Directive/DHPOA-if applicable  *Neurostimulator implant remote     PARKING AND ARRIVAL:  *Check in at the Main Entrance desk and let them know you are here for surgery.     IF YOU ARE HAVING OUTPATIENT/SAME DAY SURGERY:  *A responsible adult MUST accompany you at the time of discharge and stay with you for 24 hours after your surgery.  *You may NOT drive yourself home after surgery.  *You may use a taxi or ride sharing service ([employer], [employer]) to return home ONLY if you are accompanied by a friend or family member.  *Instructions for resuming your medications will be provided by your surgeon.     Thank you for coming to Pre Admission testing.      If I have prescribe medication please don't forget to  at your pharmacy.      Any questions about today's visit call [phone] and leave a message in the general mailbox.     Patient instructed to ambulate as soon as possible postoperatively to decrease thromboembolic risk.     Wan Chen, APRN-CNP     Thank you for visiting [facility].  If you have any changes to your health condition, please call the surgeons office to alert them and give them details of your symptoms.        Preoperative Fasting Guidelines     Why must I stop eating and drinking near surgery time?  With sedation, food or liquid in your stomach can enter your lungs causing serious  complications  Increases nausea and vomiting     When do I need to stop eating and drinking before my surgery?  Do not eat any food after midnight the night before your surgery/procedure.  You may have up to TEN ounces of clear liquid until TWO hours before your instructed arrival time to the hospital.  This includes water, black tea/coffee, (no milk or cream) apple juice, and electrolyte drinks (Gatorade)  You may chew gum until TWO hours before your surgery/procedure        Additional Instructions:      The Day before Surgery:  -Review your medication instructions, stop indicated medications  -You will be contacted in the evening regarding the time of your arrival to facility and surgery time     Day of Surgery:  -Review your medication instructions, take indicated medications  -Wear comfortable loose fitting clothing  -Do not use moisturizers, creams, lotions or perfume  -All jewelry and valuables should be left at home                   Preoperative Brain Exercises     What are brain exercises?  A brain exercise is any activity that engages your thinking (cognitive) skills.     What types of activities are considered brain exercises?  Jigsaw puzzles, crossword puzzles, word jumble, memory games, word search, and many more.  Many can be found free online or on your phone via a mobile luis.     Why should I do brain exercises before my surgery?  More recent research has shown brain exercise before surgery can lower the risk of postoperative delirium (confusion) which can be especially important for older adults.  Patients who did brain exercises for 5 to 10 hours the days before surgery, cut their risk of postoperative delirium in half up to 1 week after surgery.                         [facility]     Preoperative Deep Breathing Exercises     Why it is important to do deep breathing exercises before my surgery?  Deep breathing exercises strengthen your breathing muscles.  This helps you  to recover after your surgery and decreases the chance of breathing complications.        How are the deep breathing exercises done?  Sit straight with your back supported.  Breathe in deeply and slowly through your nose. Your lower rib cage should expand and your abdomen may move forward.  Hold that breath for 3 to 5 seconds.  Breathe out through pursed lips, slowly and completely.  Rest and repeat 10 times every hour while awake.  Rest longer if you become dizzy or lightheaded.                      [facility]     Preoperative Deep Breathing Exercises     Why it is important to do deep breathing exercises before my surgery?  Deep breathing exercises strengthen your breathing muscles.  This helps you to recover after your surgery and decreases the chance of breathing complications.        How are the deep breathing exercises done?  Sit straight with your back supported.  Breathe in deeply and slowly through your nose. Your lower rib cage should expand and your abdomen may move forward.  Hold that breath for 3 to 5 seconds.  Breathe out through pursed lips, slowly and completely.  Rest and repeat 10 times every hour while awake.  Rest longer if you become dizzy or lightheaded.        Patient Information: Incentive Spirometer  What is an incentive spirometer?  An incentive spirometer is a device used before and after surgery to “exercise” your lungs.  It helps you to take deeper breaths to expand your lungs.  Below is an example of a basic incentive spirometer.  The device you receive may differ slightly but they all function the same.    Why do I need to use an incentive spirometer?  Using your incentive spirometer prepares your lungs for surgery and helps prevent lung problems after surgery.  How do I use my incentive spirometer?  When you're using your incentive spirometer, make sure to breathe through your mouth. If you breathe through your nose, the incentive spirometer won't work  properly. You can hold your nose if you have trouble.  If you feel dizzy at any time, stop and rest. Try again at a later time.  Follow the steps below:  Set up your incentive spirometer, expand the flexible tubing and connect to the outlet.  Sit upright in a chair or bed. Hold the incentive spirometer at eye level.   Put the mouthpiece in your mouth and close your lips tightly around it. Slowly breathe out (exhale) completely.  Breathe in (inhale) slowly through your mouth as deeply as you can. As you take a breath, you will see the piston rise inside the large column. While the piston rises, the indicator should move upwards. It should stay in between the 2 arrows (see Figure).  Try to get the piston as high as you can, while keeping the indicator between the arrows.   If the indicator doesn't stay between the arrows, you're breathing either too fast or too slow.  When you get it as high as you can, hold your breath for 10 seconds, or as long as possible. While you're holding your breath, the piston will slowly fall to the base of the spirometer.  Once the piston reaches the bottom of the spirometer, breathe out slowly through your mouth. Rest for a few seconds.  Repeat 10 times. Try to get the piston to the same level with each breath.  Repeat every hour while awake  You can carefully clean the outside of the mouthpiece with an alcohol wipe or soap and water.       Patient and Family Education             Ways You Can Help Prevent Blood Clots                    This handout explains some simple things you can do to help prevent blood clots.      Blood clots are blockages that can form in the body's veins. When a blood clot forms in your deep veins, it may be called a deep vein thrombosis, or DVT for short. Blood clots can happen in any part of the body where blood flows, but they are most common in the arms and legs. If a piece of a blood clot breaks free and travels to the lungs, it is called a pulmonary embolus  (PE). A PE can be a very serious problem.         Being in the hospital or having surgery can raise your chances of getting a blood clot because you may not be well enough to move around as much as you normally do.         Ways you can help prevent blood clots in the hospital           Wearing SCDs. SCDs stands for Sequential Compression Devices.   SCDs are special sleeves that wrap around your legs  They attach to a pump that fills them with air to gently squeeze your legs every few minutes.   This helps return the blood in your legs to your heart.   SCDs should only be taken off when walking or bathing.   SCDs may not be comfortable, but they can help save your life.                                            Wearing compression stockings - if your doctor orders them. These special snug fitting stockings gently squeeze your legs to help blood flow.       Walking. Walking helps move the blood in your legs.   If your doctor says it is ok, try walking the halls at least   5 times a day. Ask us to help you get up, so you don't fall.      Taking any blood thinning medicines your doctor orders.        Page 1 of 2            [facility]; 3/23   Ways you can help prevent blood clots at home         Wearing compression stockings - if your doctor orders them. ? Walking - to help move the blood in your legs.       Taking any blood thinning medicines your doctor orders.      Signs of a blood clot or PE        Tell your doctor or nurse know right away if you have of the problems listed below.    If you are at home, seek medical care right away. Call 911 for chest pain or problems breathing.                Signs of a blood clot (DVT) - such as pain,  swelling, redness or warmth in your arm or leg      Signs of a pulmonary embolism (PE) - such as chest     pain or feeling short of breath

## 2024-05-14 NOTE — CPM/PAT H&P
CPM/PAT Evaluation       Name: Elroy Greco (Elroy Greco)  /Age:  y.o.     In-Person       Chief Complaint: Benign prostatic hyperplasia with urinary frequency, Urge incontinence    HPI  Patient is an alert and oriented year 89 old male scheduled for a holmium laser enucleation of the prostate/intravesicular botox injection on 2024 with Dr. Wilkerson for  Benign prostatic hyperplasia with urinary frequency, Urge incontinence. PMHX includes CAD, aortic stenosis, HLD, and HTN.  Presents to [facility] PAT today for perioperative risk stratification and optimization.     Past Medical History:   Diagnosis Date    Aortic stenosis, moderate     CAD (coronary artery disease)     Hyperlipidemia     Hypertension     Nocturia 2017    Nocturia    Personal history of colonic polyps 2016    History of colonic polyps     Past Surgical History:   Procedure Laterality Date    CT ANGIO CORONARY ART WITH HEARTFLOW IF SCORE >30%  2018    CT HEART CORONARY ANGIOGRAM 2018 [facility]    HERNIA REPAIR  2012    Hernia Repair    OTHER SURGICAL HISTORY  2012    Nerve Block Transforaminal Epidural    OTHER SURGICAL HISTORY  2012    Nerve Ablation    TOTAL KNEE ARTHROPLASTY  2012    Knee Replacement    TOTAL KNEE ARTHROPLASTY  2012    Knee Replacement     Patient  has no history on file for sexual activity.    Family History   Problem Relation Name Age of Onset    Cancer Mother      Cancer Father       Allergies   Allergen Reactions    Finasteride Rash    Mirabegron Constipation    Penicillins Rash     Medication Documentation Review Audit       Reviewed by Carolyn Benson RN (Registered Nurse) on 24 at 1239      Medication Order Taking? Sig Documenting Provider Last Dose Status     Discontinued 24 1253   amLODIPine (Norvasc) 2.5 mg tablet [id number] Yes TAKE 1 TABLET BY MOUTH ONCE  DAILY Trice Grayson MD 2024 Active   atorvastatin (Lipitor)  40 mg tablet [id number] Yes Take 1 tablet (40 mg) by mouth once daily at bedtime. Trice Grayson MD 5/13/2024 Active   azelastine (Astelin) 137 mcg (0.1 %) nasal spray [id number] Yes Administer 1 spray into each nostril 2 times a day. Use in each nostril as directed Trice Grayson MD Past Week Active   carboxymethylcellulose (Refresh Plus) 0.5 % ophthalmic solution [id number] Yes Administer 1 drop into both eyes 4 times a day. Carl Mccullough MD 5/14/2024 Active   colchicine 0.6 mg tablet [id number] No Take 1 tablet (0.6 mg) by mouth once daily as needed. Carl Mccullough MD More than a month Active   collagen/biotin/ascorbic acid (COLLAGEN 1500 PLUS C ORAL) [id number] Yes Take by mouth once daily. Carl Mccullough MD 5/13/2024 Active     Discontinued 05/14/24 1233   cyanocobalamin (Vitamin B-12) 500 mcg tablet [id number] Yes Take 1 tablet (500 mcg) by mouth once daily. Carl Mccullough MD Past Week Active   diphenhydrAMINE (BENADryl) 50 mg capsule [id number] Yes Take 1 capsule (50 mg) by mouth as needed at bedtime for itching. Carl Mccullough MD Past Month Active   docusate sodium (Colace) 100 mg capsule [id number] Yes Take 2 capsules (200 mg) by mouth 2 times a day. Carl Mccullough MD 5/13/2024 Active   fluticasone (Flonase) 50 mcg/actuation nasal spray [id number] Yes Administer 2 sprays into each nostril once daily as needed. Carl Mccullough MD Past Week Active   lidocaine (Lidoderm) 5 % patch [id number] Yes Place 1 patch on the skin once daily. Remove & discard patch within 12 hours or as directed by MD. Carl Mccullough MD 5/13/2024 Active   MAGNESIUM ORAL [id number] Yes Take 1 tablet by mouth once daily. Takes 500mg Carl Mccullough MD Past Week Active   multivitamin tablet [id number] Yes Take 1 tablet by mouth once daily. Carl Mccullough MD Past Week Active   omega 3-dha-epa-fish oil 1,000 mg (120 mg-180 mg) capsule [id number] No Take 1 capsule (1,000 mg) by mouth 2 times a day.  Historical Provider, MD More than a month Active     Discontinued 05/14/24 1235   psyllium (Metamucil) powder [id number] Yes Take 1 Dose (5.8 g) by mouth 2 times a day. Historical Provider, MD 5/13/2024 Active     Discontinued 05/14/24 1235   vit C-E-zinc cit-lutein-zeaxan ([employer]) 50 mg-15 unit- 4.5 mg-2.5 mg tablet,chewable [id number] Yes Chew 2 tablets once daily. Historical Provider, MD 5/13/2024 Active                   Review of Systems   Constitutional: Negative for chills, decreased appetite, diaphoresis, fever and malaise/fatigue.   Eyes:  Negative for blurred vision and double vision.   Cardiovascular:  Negative for chest pain, claudication, cyanosis, irregular heartbeat, leg swelling, near-syncope and palpitations. Positive for dyspnea on exertion-baseline  Respiratory:  Negative for cough, hemoptysis, shortness of breath and wheezing.    Endocrine: Negative for cold intolerance, heat intolerance, polydipsia, polyphagia and polyuria.   Gastrointestinal:  Negative for abdominal pain, constipation, diarrhea, dysphagia, nausea and vomiting.   Genitourinary:  Negative for bladder incontinence, dysuria, hematuria, incomplete emptying, pelvic pain. Positive for frequency, urgency, and nocturia.  Neurological:  Negative for headaches, light-headedness, paresthesias, sensory change and weakness.   Psychiatric/Behavioral:  Negative for altered mental status.       Vitals and nursing note reviewed.     Physical exam  Constitutional:       Appearance: Normal appearance. He is normal weight.   HENT:      Head: Normocephalic and atraumatic.      Mouth/Throat:      Mouth: Mucous membranes are moist.      Pharynx: Oropharynx is clear.   Eyes:      Extraocular Movements: Extraocular movements intact.      Conjunctiva/sclera: Conjunctivae normal.      Pupils: Pupils are equal, round, and reactive to light.   Cardiovascular:      Rate and Rhythm: Normal rate and regular rhythm.      Pulses: Normal pulses.       "Heart sounds: Grade II systolic ejection murmur heard in RUSB.      No audible carotid bruit  Pulmonary:      Effort: Pulmonary effort is normal.      Breath sounds: Normal breath sounds.   Abdominal:      General: Abdomen is flat. Bowel sounds are normal.      Palpations: Abdomen is soft.   Musculoskeletal:      Cervical back: Normal range of motion and neck supple.   Skin:     General: Skin is warm and dry.      Capillary Refill: Capillary refill takes less than 2 seconds.   Neurological:      General: No focal deficit present.      Mental Status: He is alert and oriented to person, place, and time. Mental status is at baseline.   Psychiatric:         Mood and Affect: Mood normal.         Behavior: Behavior normal.         Thought Content: Thought content normal.         Judgment: Judgment normal.     Vitals and nursing note reviewed. Physical exam within normal limits.      Visit Vitals  /86   Pulse 63   Temp 36.6 °C (97.9 °F) (Temporal)   Resp 16   Ht 1.702 m (5' 7\")   Wt 78.8 kg (173 lb 11.6 oz)   SpO2 96%   BMI 27.21 kg/m²   Smoking Status Never   BSA 1.93 m²      DASI Risk Score      Flowsheet Row Most Recent Value   DASI SCORE 24.7   METS Score (Will be calculated only when all the questions are answered) 5.8          Caprini DVT Assessment      Flowsheet Row Most Recent Value   DVT Score 6   Current Status Major surgery planned, including arthroscopic and laproscopic (1-2 hours)   Age Over 75 years   BMI 30 or less          Modified Frailty Index    No data to display       CHADS2 Stroke Risk  Current as of 9 minutes ago        N/A 3 to 100%: High Risk   2 to < 3%: Medium Risk   0 to < 2%: Low Risk     Last Change: N/A          This score determines the patient's risk of having a stroke if the patient has atrial fibrillation.        This score is not applicable to this patient. Components are not calculated.          Revised Cardiac Risk Index      Flowsheet Row Most Recent Value   Revised Cardiac " Risk Calculator 1          Apfel Simplified Score    No data to display       Risk Analysis Index Results This Encounter    No data found in the last 1 encounters.       Stop Bang Score      Flowsheet Row Most Recent Value   Do you snore loudly? 0   Do you often feel tired or fatigued after your sleep? 0   Has anyone ever observed you stop breathing in your sleep? 0   Do you have or are you being treated for high blood pressure? 1   Recent BMI (Calculated) 27.2   Is BMI greater than 35 kg/m2? 0=No   Age older than 50 years old? 1=Yes   Is your neck circumference greater than 17 inches (Male) or 16 inches (Female)? 0   Gender - Male 1=Yes   STOP-BANG Total Score 3          Assessment & Plan:    Neuro:  No diagnosis or significant findings on chart review or clinical presentation and evaluation.     HEENT/Airway:  No diagnosis or significant findings on chart review or clinical presentation and evaluation.   STOP-BANG Score-3 points moderate risk for MARY    Mallampati::  II    TM distance::  >3 FB    Neck ROM::  Full  No dentures or dental implants. Crowns x 2 and multiple missing teeth    Cardiovascular:  No significant findings on chart review or clinical presentation and evaluation.   History of Hypertension-Managed with Amlodipine. BP in /88  History of Hyperlipidemia-Managed with Atorvastatin  History of Aortic stenosis-mild to moderate per ECHO completed 4/5/2024 AV area 1.27 cm. Asymptomatic. Grade II systolic ejection murmur heard over RUSB  History of CAD-No chest pain/SOB. Followed by cardiology. Severe 2 vessel disease LAD/RCA-see CTA cardiac report  METS: 5.8  RCRI: 1 point, 6.0% risk for postoperative MACE   DEBRA: 0.4% risk for postoperative MACE  EKG - Last EKG completed 3/27/2024-SR with 1st degree AVB. LAD, incomplete RBBB. Rate 64. No acute changes. Reviewed by Dr Bhat cardiology  Angio heart coronary completed 11/12/2018-1. There is severe 2 vessel coronary artery disease.  Specifically,  there are extensive multifocal calcified and noncalcified plaques throughout the proximal and mid LAD and to lesser extent within  distal LAD, resulting in hemodynamically significant stenosis on CT-FFR analysis. 2. Prominent calcifications within the second diagonal branch which  is equivocal for flow limitation on CT FFR analysis (values of 0.76). 3. Moderate calcified plaques throughout the RCA, left main, and 1st diagonal branch, with overall less than 50% stenosis, with similar findings on CT FFR analysis. 4. Minimal nonobstructive calcified atherosclerotic disease within distal LCX 5. Mild calcifications in the aortic valve. Correlate with clinical concern for aortic stenosis. 6. Main pulmonary artery is dilated; correlate with clinical concern for pulmonary hypertension.  ECHO completed 4/5/2024-LVEF 65%-in EP[facility], moderate aortic stenosis  Stress test completed 4/4/2016-No inducible ischemia  Followed by Cardiology Dr Bhat and Jill Mayo CNP-Clearance note in [facility]    Pulmonary:  No diagnosis or significant findings on chart review or clinical presentation and evaluation.   ARISCAT: <26 points, 1.6% risk of in-hospital postoperative pulmonary complication  PRODIGY: High risk for opioid induced respiratory depression  Smoking History-denies    Renal:  No diagnosis or significant findings on chart review or clinical presentation and evaluation, however, the patient is at increased risk of perioperative renal complications secondary to age>/= 56, male sex, and HTN. Preventative measures include BP monitoring, hydration management, and medication compliance.  BMP completed 5/8/2024 reviewed     Endocrine:  No diagnosis or significant findings on chart review or clinical presentation and evaluation.   Last HBA1C completed 11/20/2023 6.1%    Hematologic:  Hgb 12.1  CBC completed 5/8/2024 reviewed   Caprini Score 6, patient at High risk for postoperative DVT. Pt supplied education/VTE  handout    Gastrointestinal:   No diagnosis or significant findings on chart review or clinical presentation and evaluation.   Alcohol use-denies  Drug use-denies    Infectious disease:   No diagnosis or significant findings on chart review or clinical presentation and evaluation.     Musculoskeletal:   No diagnosis or significant findings on chart review or clinical presentation and evaluation.   JHFRAT score-17 points high risk for falls    Anesthesia:  No anesthesia complications  Family history of anesthesia complications    Labs & Imaging ordered:  Labs/EKG completed PTA  Nickel/metal allergy-denies  Shellfish allergy-denies    Overall, patient at moderate risk for the scheduled surgery. Discussed with patient medication instructions, NPO guidelines, and any questions or concerns  Face to face time-30 minutes    Followed by Cardiology Dr Bhat and Jill Mayo CNP-Clearance note in [facility]

## 2024-05-14 NOTE — H&P (VIEW-ONLY)
CPM/PAT Evaluation       Name: Elroy Greco (Elroy Greco)  /Age:  y.o.     In-Person       Chief Complaint: Benign prostatic hyperplasia with urinary frequency, Urge incontinence    HPI  Patient is an alert and oriented year 89 old male scheduled for a holmium laser enucleation of the prostate/intravesicular botox injection on 2024 with Dr. Wilkerson for  Benign prostatic hyperplasia with urinary frequency, Urge incontinence. PMHX includes CAD, aortic stenosis, HLD, and HTN.  Presents to [facility] PAT today for perioperative risk stratification and optimization.     Past Medical History:   Diagnosis Date    Aortic stenosis, moderate     CAD (coronary artery disease)     Hyperlipidemia     Hypertension     Nocturia 2017    Nocturia    Personal history of colonic polyps 2016    History of colonic polyps     Past Surgical History:   Procedure Laterality Date    CT ANGIO CORONARY ART WITH HEARTFLOW IF SCORE >30%  2018    CT HEART CORONARY ANGIOGRAM 2018 [facility]    HERNIA REPAIR  2012    Hernia Repair    OTHER SURGICAL HISTORY  2012    Nerve Block Transforaminal Epidural    OTHER SURGICAL HISTORY  2012    Nerve Ablation    TOTAL KNEE ARTHROPLASTY  2012    Knee Replacement    TOTAL KNEE ARTHROPLASTY  2012    Knee Replacement     Patient  has no history on file for sexual activity.    Family History   Problem Relation Name Age of Onset    Cancer Mother      Cancer Father       Allergies   Allergen Reactions    Finasteride Rash    Mirabegron Constipation    Penicillins Rash     Medication Documentation Review Audit       Reviewed by Carolyn Benson RN (Registered Nurse) on 24 at 1239      Medication Order Taking? Sig Documenting Provider Last Dose Status     Discontinued 24 1253   amLODIPine (Norvasc) 2.5 mg tablet [id number] Yes TAKE 1 TABLET BY MOUTH ONCE  DAILY Trice Grayson MD 2024 Active   atorvastatin (Lipitor)  40 mg tablet [id number] Yes Take 1 tablet (40 mg) by mouth once daily at bedtime. Trice Grayson MD 5/13/2024 Active   azelastine (Astelin) 137 mcg (0.1 %) nasal spray [id number] Yes Administer 1 spray into each nostril 2 times a day. Use in each nostril as directed Trice Grayson MD Past Week Active   carboxymethylcellulose (Refresh Plus) 0.5 % ophthalmic solution [id number] Yes Administer 1 drop into both eyes 4 times a day. Carl Mccullough MD 5/14/2024 Active   colchicine 0.6 mg tablet [id number] No Take 1 tablet (0.6 mg) by mouth once daily as needed. Carl Mccullough MD More than a month Active   collagen/biotin/ascorbic acid (COLLAGEN 1500 PLUS C ORAL) [id number] Yes Take by mouth once daily. Carl Mccullough MD 5/13/2024 Active     Discontinued 05/14/24 1233   cyanocobalamin (Vitamin B-12) 500 mcg tablet [id number] Yes Take 1 tablet (500 mcg) by mouth once daily. Carl Mccullough MD Past Week Active   diphenhydrAMINE (BENADryl) 50 mg capsule [id number] Yes Take 1 capsule (50 mg) by mouth as needed at bedtime for itching. Carl Mccullough MD Past Month Active   docusate sodium (Colace) 100 mg capsule [id number] Yes Take 2 capsules (200 mg) by mouth 2 times a day. Carl Mccullough MD 5/13/2024 Active   fluticasone (Flonase) 50 mcg/actuation nasal spray [id number] Yes Administer 2 sprays into each nostril once daily as needed. Carl Mccullough MD Past Week Active   lidocaine (Lidoderm) 5 % patch [id number] Yes Place 1 patch on the skin once daily. Remove & discard patch within 12 hours or as directed by MD. Carl Mccullough MD 5/13/2024 Active   MAGNESIUM ORAL [id number] Yes Take 1 tablet by mouth once daily. Takes 500mg Carl Mccullough MD Past Week Active   multivitamin tablet [id number] Yes Take 1 tablet by mouth once daily. Carl Mccullough MD Past Week Active   omega 3-dha-epa-fish oil 1,000 mg (120 mg-180 mg) capsule [id number] No Take 1 capsule (1,000 mg) by mouth 2 times a day.  Historical Provider, MD More than a month Active     Discontinued 05/14/24 1235   psyllium (Metamucil) powder [id number] Yes Take 1 Dose (5.8 g) by mouth 2 times a day. Historical Provider, MD 5/13/2024 Active     Discontinued 05/14/24 1235   vit C-E-zinc cit-lutein-zeaxan ([place]) 50 mg-15 unit- 4.5 mg-2.5 mg tablet,chewable [id number] Yes Chew 2 tablets once daily. Historical Provider, MD 5/13/2024 Active                   Review of Systems   Constitutional: Negative for chills, decreased appetite, diaphoresis, fever and malaise/fatigue.   Eyes:  Negative for blurred vision and double vision.   Cardiovascular:  Negative for chest pain, claudication, cyanosis, irregular heartbeat, leg swelling, near-syncope and palpitations. Positive for dyspnea on exertion-baseline  Respiratory:  Negative for cough, hemoptysis, shortness of breath and wheezing.    Endocrine: Negative for cold intolerance, heat intolerance, polydipsia, polyphagia and polyuria.   Gastrointestinal:  Negative for abdominal pain, constipation, diarrhea, dysphagia, nausea and vomiting.   Genitourinary:  Negative for bladder incontinence, dysuria, hematuria, incomplete emptying, pelvic pain. Positive for frequency, urgency, and nocturia.  Neurological:  Negative for headaches, light-headedness, paresthesias, sensory change and weakness.   Psychiatric/Behavioral:  Negative for altered mental status.       Vitals and nursing note reviewed.     Physical exam  Constitutional:       Appearance: Normal appearance. He is normal weight.   HENT:      Head: Normocephalic and atraumatic.      Mouth/Throat:      Mouth: Mucous membranes are moist.      Pharynx: Oropharynx is clear.   Eyes:      Extraocular Movements: Extraocular movements intact.      Conjunctiva/sclera: Conjunctivae normal.      Pupils: Pupils are equal, round, and reactive to light.   Cardiovascular:      Rate and Rhythm: Normal rate and regular rhythm.      Pulses: Normal pulses.       "Heart sounds: Grade II systolic ejection murmur heard in RUSB.      No audible carotid bruit  Pulmonary:      Effort: Pulmonary effort is normal.      Breath sounds: Normal breath sounds.   Abdominal:      General: Abdomen is flat. Bowel sounds are normal.      Palpations: Abdomen is soft.   Musculoskeletal:      Cervical back: Normal range of motion and neck supple.   Skin:     General: Skin is warm and dry.      Capillary Refill: Capillary refill takes less than 2 seconds.   Neurological:      General: No focal deficit present.      Mental Status: He is alert and oriented to person, place, and time. Mental status is at baseline.   Psychiatric:         Mood and Affect: Mood normal.         Behavior: Behavior normal.         Thought Content: Thought content normal.         Judgment: Judgment normal.     Vitals and nursing note reviewed. Physical exam within normal limits.      Visit Vitals  /86   Pulse 63   Temp 36.6 °C (97.9 °F) (Temporal)   Resp 16   Ht 1.702 m (5' 7\")   Wt 78.8 kg (173 lb 11.6 oz)   SpO2 96%   BMI 27.21 kg/m²   Smoking Status Never   BSA 1.93 m²      DASI Risk Score      Flowsheet Row Most Recent Value   DASI SCORE 24.7   METS Score (Will be calculated only when all the questions are answered) 5.8          Caprini DVT Assessment      Flowsheet Row Most Recent Value   DVT Score 6   Current Status Major surgery planned, including arthroscopic and laproscopic (1-2 hours)   Age Over 75 years   BMI 30 or less          Modified Frailty Index    No data to display       CHADS2 Stroke Risk  Current as of 9 minutes ago        N/A 3 to 100%: High Risk   2 to < 3%: Medium Risk   0 to < 2%: Low Risk     Last Change: N/A          This score determines the patient's risk of having a stroke if the patient has atrial fibrillation.        This score is not applicable to this patient. Components are not calculated.          Revised Cardiac Risk Index      Flowsheet Row Most Recent Value   Revised Cardiac " Risk Calculator 1          Apfel Simplified Score    No data to display       Risk Analysis Index Results This Encounter    No data found in the last 1 encounters.       Stop Bang Score      Flowsheet Row Most Recent Value   Do you snore loudly? 0   Do you often feel tired or fatigued after your sleep? 0   Has anyone ever observed you stop breathing in your sleep? 0   Do you have or are you being treated for high blood pressure? 1   Recent BMI (Calculated) 27.2   Is BMI greater than 35 kg/m2? 0=No   Age older than 50 years old? 1=Yes   Is your neck circumference greater than 17 inches (Male) or 16 inches (Female)? 0   Gender - Male 1=Yes   STOP-BANG Total Score 3          Assessment & Plan:    Neuro:  No diagnosis or significant findings on chart review or clinical presentation and evaluation.     HEENT/Airway:  No diagnosis or significant findings on chart review or clinical presentation and evaluation.   STOP-BANG Score-3 points moderate risk for MARY    Mallampati::  II    TM distance::  >3 FB    Neck ROM::  Full  No dentures or dental implants. Crowns x 2 and multiple missing teeth    Cardiovascular:  No significant findings on chart review or clinical presentation and evaluation.   History of Hypertension-Managed with Amlodipine. BP in /88  History of Hyperlipidemia-Managed with Atorvastatin  History of Aortic stenosis-mild to moderate per ECHO completed 4/5/2024 AV area 1.27 cm. Asymptomatic. Grade II systolic ejection murmur heard over RUSB  History of CAD-No chest pain/SOB. Followed by cardiology. Severe 2 vessel disease LAD/RCA-see CTA cardiac report  METS: 5.8  RCRI: 1 point, 6.0% risk for postoperative MACE   DEBRA: 0.4% risk for postoperative MACE  EKG - Last EKG completed 3/27/2024-SR with 1st degree AVB. LAD, incomplete RBBB. Rate 64. No acute changes. Reviewed by Dr Bhat cardiology  Angio heart coronary completed 11/12/2018-1. There is severe 2 vessel coronary artery disease.  Specifically,  there are extensive multifocal calcified and noncalcified plaques throughout the proximal and mid LAD and to lesser extent within  distal LAD, resulting in hemodynamically significant stenosis on CT-FFR analysis. 2. Prominent calcifications within the second diagonal branch which  is equivocal for flow limitation on CT FFR analysis (values of 0.76). 3. Moderate calcified plaques throughout the RCA, left main, and 1st diagonal branch, with overall less than 50% stenosis, with similar findings on CT FFR analysis. 4. Minimal nonobstructive calcified atherosclerotic disease within distal LCX 5. Mild calcifications in the aortic valve. Correlate with clinical concern for aortic stenosis. 6. Main pulmonary artery is dilated; correlate with clinical concern for pulmonary hypertension.  ECHO completed 4/5/2024-LVEF 65%-in EPIC, moderate aortic stenosis  Stress test completed 4/4/2016-No inducible ischemia  Followed by Cardiology Dr Bhat and Jill Mayo CNP-Clearance note in [facility]    Pulmonary:  No diagnosis or significant findings on chart review or clinical presentation and evaluation.   ARISCAT: <26 points, 1.6% risk of in-hospital postoperative pulmonary complication  PRODIGY: High risk for opioid induced respiratory depression  Smoking History-denies    Renal:  No diagnosis or significant findings on chart review or clinical presentation and evaluation, however, the patient is at increased risk of perioperative renal complications secondary to age>/= 56, male sex, and HTN. Preventative measures include BP monitoring, hydration management, and medication compliance.  BMP completed 5/8/2024 reviewed     Endocrine:  No diagnosis or significant findings on chart review or clinical presentation and evaluation.   Last HBA1C completed 11/20/2023 6.1%    Hematologic:  Hgb 12.1  CBC completed 5/8/2024 reviewed   Caprini Score 6, patient at High risk for postoperative DVT. Pt supplied education/VTE  handout    Gastrointestinal:   No diagnosis or significant findings on chart review or clinical presentation and evaluation.   Alcohol use-denies  Drug use-denies    Infectious disease:   No diagnosis or significant findings on chart review or clinical presentation and evaluation.     Musculoskeletal:   No diagnosis or significant findings on chart review or clinical presentation and evaluation.   JHFRAT score-17 points high risk for falls    Anesthesia:  No anesthesia complications  Family history of anesthesia complications    Labs & Imaging ordered:  Labs/EKG completed PTA  Nickel/metal allergy-denies  Shellfish allergy-denies    Overall, patient at moderate risk for the scheduled surgery. Discussed with patient medication instructions, NPO guidelines, and any questions or concerns  Face to face time-30 minutes    Followed by Cardiology Dr Bhat and Jill Mayo CNP-Clearance note in [facility]

## 2024-05-23 ENCOUNTER — ANESTHESIA EVENT (OUTPATIENT)
Dept: OPERATING ROOM | Facility: HOSPITAL | Age: 89
End: 2024-05-23
Payer: MEDICARE

## 2024-05-23 ENCOUNTER — ANESTHESIA (OUTPATIENT)
Dept: OPERATING ROOM | Facility: HOSPITAL | Age: 89
End: 2024-05-23
Payer: MEDICARE

## 2024-05-23 ENCOUNTER — HOSPITAL ENCOUNTER (OUTPATIENT)
Facility: HOSPITAL | Age: 89
Setting detail: OUTPATIENT SURGERY
Discharge: HOME | End: 2024-05-23
Attending: UROLOGY | Admitting: UROLOGY
Payer: MEDICARE

## 2024-05-23 VITALS
HEART RATE: 56 BPM | DIASTOLIC BLOOD PRESSURE: 82 MMHG | BODY MASS INDEX: 25.73 KG/M2 | TEMPERATURE: 97.5 F | WEIGHT: 169.75 LBS | HEIGHT: 68 IN | OXYGEN SATURATION: 99 % | SYSTOLIC BLOOD PRESSURE: 139 MMHG | RESPIRATION RATE: 16 BRPM

## 2024-05-23 DIAGNOSIS — N40.1 BENIGN PROSTATIC HYPERPLASIA WITH URINARY FREQUENCY: ICD-10-CM

## 2024-05-23 DIAGNOSIS — R35.0 BENIGN PROSTATIC HYPERPLASIA WITH URINARY FREQUENCY: ICD-10-CM

## 2024-05-23 DIAGNOSIS — N39.41 URGE INCONTINENCE: Primary | ICD-10-CM

## 2024-05-23 PROCEDURE — 2500000004 HC RX 250 GENERAL PHARMACY W/ HCPCS (ALT 636 FOR OP/ED): Mod: JZ | Performed by: UROLOGY

## 2024-05-23 PROCEDURE — A52287 PR CYSTOURETHROSCOPY INJ CHEMODENERVATION BLADDER: Performed by: STUDENT IN AN ORGANIZED HEALTH CARE EDUCATION/TRAINING PROGRAM

## 2024-05-23 PROCEDURE — 2500000004 HC RX 250 GENERAL PHARMACY W/ HCPCS (ALT 636 FOR OP/ED): Performed by: ANESTHESIOLOGIST ASSISTANT

## 2024-05-23 PROCEDURE — 2500000005 HC RX 250 GENERAL PHARMACY W/O HCPCS: Performed by: NURSE ANESTHETIST, CERTIFIED REGISTERED

## 2024-05-23 PROCEDURE — C1769 GUIDE WIRE: HCPCS | Performed by: UROLOGY

## 2024-05-23 PROCEDURE — 2500000005 HC RX 250 GENERAL PHARMACY W/O HCPCS: Performed by: UROLOGY

## 2024-05-23 PROCEDURE — 2720000007 HC OR 272 NO HCPCS: Performed by: UROLOGY

## 2024-05-23 PROCEDURE — C1889 IMPLANT/INSERT DEVICE, NOC: HCPCS | Performed by: UROLOGY

## 2024-05-23 PROCEDURE — 52649 PROSTATE LASER ENUCLEATION: CPT | Performed by: UROLOGY

## 2024-05-23 PROCEDURE — 7100000002 HC RECOVERY ROOM TIME - EACH INCREMENTAL 1 MINUTE: Performed by: UROLOGY

## 2024-05-23 PROCEDURE — 52287 CYSTOSCOPY CHEMODENERVATION: CPT | Performed by: UROLOGY

## 2024-05-23 PROCEDURE — C1758 CATHETER, URETERAL: HCPCS | Performed by: UROLOGY

## 2024-05-23 PROCEDURE — A52287 PR CYSTOURETHROSCOPY INJ CHEMODENERVATION BLADDER: Performed by: ANESTHESIOLOGIST ASSISTANT

## 2024-05-23 PROCEDURE — 7100000010 HC PHASE TWO TIME - EACH INCREMENTAL 1 MINUTE: Performed by: UROLOGY

## 2024-05-23 PROCEDURE — 2500000005 HC RX 250 GENERAL PHARMACY W/O HCPCS: Performed by: ANESTHESIOLOGIST ASSISTANT

## 2024-05-23 PROCEDURE — 88307 TISSUE EXAM BY PATHOLOGIST: CPT | Mod: TC,BEALAB,WESLAB | Performed by: UROLOGY

## 2024-05-23 PROCEDURE — C1782 MORCELLATOR: HCPCS | Performed by: UROLOGY

## 2024-05-23 PROCEDURE — 3600000009 HC OR TIME - EACH INCREMENTAL 1 MINUTE - PROCEDURE LEVEL FOUR: Performed by: UROLOGY

## 2024-05-23 PROCEDURE — 7100000009 HC PHASE TWO TIME - INITIAL BASE CHARGE: Performed by: UROLOGY

## 2024-05-23 PROCEDURE — 7100000001 HC RECOVERY ROOM TIME - INITIAL BASE CHARGE: Performed by: UROLOGY

## 2024-05-23 PROCEDURE — 3600000004 HC OR TIME - INITIAL BASE CHARGE - PROCEDURE LEVEL FOUR: Performed by: UROLOGY

## 2024-05-23 PROCEDURE — 3700000001 HC GENERAL ANESTHESIA TIME - INITIAL BASE CHARGE: Performed by: UROLOGY

## 2024-05-23 PROCEDURE — 2500000004 HC RX 250 GENERAL PHARMACY W/ HCPCS (ALT 636 FOR OP/ED): Performed by: STUDENT IN AN ORGANIZED HEALTH CARE EDUCATION/TRAINING PROGRAM

## 2024-05-23 PROCEDURE — 3700000002 HC GENERAL ANESTHESIA TIME - EACH INCREMENTAL 1 MINUTE: Performed by: UROLOGY

## 2024-05-23 PROCEDURE — 99100 ANES PT EXTEME AGE<1 YR&>70: CPT | Performed by: STUDENT IN AN ORGANIZED HEALTH CARE EDUCATION/TRAINING PROGRAM

## 2024-05-23 PROCEDURE — 2500000004 HC RX 250 GENERAL PHARMACY W/ HCPCS (ALT 636 FOR OP/ED): Performed by: UROLOGY

## 2024-05-23 RX ORDER — DIPHENHYDRAMINE HYDROCHLORIDE 50 MG/ML
12.5 INJECTION INTRAMUSCULAR; INTRAVENOUS ONCE AS NEEDED
Status: DISCONTINUED | OUTPATIENT
Start: 2024-05-23 | End: 2024-05-23 | Stop reason: HOSPADM

## 2024-05-23 RX ORDER — HYDRALAZINE HYDROCHLORIDE 20 MG/ML
5 INJECTION INTRAMUSCULAR; INTRAVENOUS EVERY 30 MIN PRN
Status: DISCONTINUED | OUTPATIENT
Start: 2024-05-23 | End: 2024-05-23 | Stop reason: HOSPADM

## 2024-05-23 RX ORDER — CIPROFLOXACIN 2 MG/ML
400 INJECTION, SOLUTION INTRAVENOUS ONCE
Status: COMPLETED | OUTPATIENT
Start: 2024-05-23 | End: 2024-05-23

## 2024-05-23 RX ORDER — PROCHLORPERAZINE EDISYLATE 5 MG/ML
5 INJECTION INTRAMUSCULAR; INTRAVENOUS ONCE AS NEEDED
Status: DISCONTINUED | OUTPATIENT
Start: 2024-05-23 | End: 2024-05-23 | Stop reason: HOSPADM

## 2024-05-23 RX ORDER — FENTANYL CITRATE 50 UG/ML
INJECTION, SOLUTION INTRAMUSCULAR; INTRAVENOUS AS NEEDED
Status: DISCONTINUED | OUTPATIENT
Start: 2024-05-23 | End: 2024-05-23

## 2024-05-23 RX ORDER — FENTANYL CITRATE 50 UG/ML
25 INJECTION, SOLUTION INTRAMUSCULAR; INTRAVENOUS EVERY 5 MIN PRN
Status: DISCONTINUED | OUTPATIENT
Start: 2024-05-23 | End: 2024-05-23 | Stop reason: HOSPADM

## 2024-05-23 RX ORDER — SULFAMETHOXAZOLE AND TRIMETHOPRIM 800; 160 MG/1; MG/1
1 TABLET ORAL 2 TIMES DAILY
Qty: 14 TABLET | Refills: 0 | Status: SHIPPED | OUTPATIENT
Start: 2024-05-23 | End: 2024-05-30

## 2024-05-23 RX ORDER — SODIUM CHLORIDE, SODIUM LACTATE, POTASSIUM CHLORIDE, CALCIUM CHLORIDE 600; 310; 30; 20 MG/100ML; MG/100ML; MG/100ML; MG/100ML
100 INJECTION, SOLUTION INTRAVENOUS CONTINUOUS
Status: DISCONTINUED | OUTPATIENT
Start: 2024-05-23 | End: 2024-05-23 | Stop reason: HOSPADM

## 2024-05-23 RX ORDER — IPRATROPIUM BROMIDE 0.5 MG/2.5ML
500 SOLUTION RESPIRATORY (INHALATION) AS NEEDED
Status: DISCONTINUED | OUTPATIENT
Start: 2024-05-23 | End: 2024-05-23 | Stop reason: HOSPADM

## 2024-05-23 RX ORDER — LIDOCAINE HYDROCHLORIDE 10 MG/ML
INJECTION, SOLUTION EPIDURAL; INFILTRATION; INTRACAUDAL; PERINEURAL AS NEEDED
Status: DISCONTINUED | OUTPATIENT
Start: 2024-05-23 | End: 2024-05-23

## 2024-05-23 RX ORDER — ALBUTEROL SULFATE 0.83 MG/ML
2.5 SOLUTION RESPIRATORY (INHALATION) ONCE AS NEEDED
Status: DISCONTINUED | OUTPATIENT
Start: 2024-05-23 | End: 2024-05-23 | Stop reason: HOSPADM

## 2024-05-23 RX ORDER — ONDANSETRON HYDROCHLORIDE 2 MG/ML
4 INJECTION, SOLUTION INTRAVENOUS ONCE AS NEEDED
Status: DISCONTINUED | OUTPATIENT
Start: 2024-05-23 | End: 2024-05-23 | Stop reason: HOSPADM

## 2024-05-23 RX ORDER — ROCURONIUM BROMIDE 10 MG/ML
INJECTION, SOLUTION INTRAVENOUS AS NEEDED
Status: DISCONTINUED | OUTPATIENT
Start: 2024-05-23 | End: 2024-05-23

## 2024-05-23 RX ORDER — MEPERIDINE HYDROCHLORIDE 25 MG/ML
12.5 INJECTION INTRAMUSCULAR; INTRAVENOUS; SUBCUTANEOUS EVERY 10 MIN PRN
Status: DISCONTINUED | OUTPATIENT
Start: 2024-05-23 | End: 2024-05-23 | Stop reason: HOSPADM

## 2024-05-23 RX ORDER — LABETALOL HYDROCHLORIDE 5 MG/ML
5 INJECTION, SOLUTION INTRAVENOUS ONCE AS NEEDED
Status: DISCONTINUED | OUTPATIENT
Start: 2024-05-23 | End: 2024-05-23 | Stop reason: HOSPADM

## 2024-05-23 RX ORDER — FENTANYL CITRATE 50 UG/ML
50 INJECTION, SOLUTION INTRAMUSCULAR; INTRAVENOUS EVERY 5 MIN PRN
Status: DISCONTINUED | OUTPATIENT
Start: 2024-05-23 | End: 2024-05-23 | Stop reason: HOSPADM

## 2024-05-23 RX ORDER — PROPOFOL 10 MG/ML
INJECTION, EMULSION INTRAVENOUS AS NEEDED
Status: DISCONTINUED | OUTPATIENT
Start: 2024-05-23 | End: 2024-05-23

## 2024-05-23 RX ORDER — ONDANSETRON HYDROCHLORIDE 2 MG/ML
INJECTION, SOLUTION INTRAVENOUS AS NEEDED
Status: DISCONTINUED | OUTPATIENT
Start: 2024-05-23 | End: 2024-05-23

## 2024-05-23 RX ORDER — PHENAZOPYRIDINE HYDROCHLORIDE 100 MG/1
100 TABLET, FILM COATED ORAL 3 TIMES DAILY
Qty: 42 TABLET | Refills: 0 | Status: SHIPPED | OUTPATIENT
Start: 2024-05-23 | End: 2024-06-06

## 2024-05-23 RX ORDER — LIDOCAINE HYDROCHLORIDE 20 MG/ML
JELLY TOPICAL AS NEEDED
Status: DISCONTINUED | OUTPATIENT
Start: 2024-05-23 | End: 2024-05-23 | Stop reason: HOSPADM

## 2024-05-23 RX ADMIN — ROCURONIUM BROMIDE 35 MG: 10 INJECTION INTRAVENOUS at 11:33

## 2024-05-23 RX ADMIN — CIPROFLOXACIN 400 MG: 2 INJECTION, SOLUTION INTRAVENOUS at 11:35

## 2024-05-23 RX ADMIN — SUGAMMADEX 100 MG: 100 INJECTION, SOLUTION INTRAVENOUS at 12:32

## 2024-05-23 RX ADMIN — DEXAMETHASONE SODIUM PHOSPHATE 4 MG: 4 INJECTION, SOLUTION INTRAMUSCULAR; INTRAVENOUS at 11:34

## 2024-05-23 RX ADMIN — FENTANYL CITRATE 50 MCG: 50 INJECTION INTRAMUSCULAR; INTRAVENOUS at 11:32

## 2024-05-23 RX ADMIN — HYDRALAZINE HYDROCHLORIDE 5 MG: 20 INJECTION INTRAMUSCULAR; INTRAVENOUS at 13:32

## 2024-05-23 RX ADMIN — ROCURONIUM BROMIDE 10 MG: 10 INJECTION INTRAVENOUS at 12:04

## 2024-05-23 RX ADMIN — LIDOCAINE HYDROCHLORIDE 5 ML: 10 INJECTION, SOLUTION EPIDURAL; INFILTRATION; INTRACAUDAL; PERINEURAL at 11:32

## 2024-05-23 RX ADMIN — PROPOFOL 40 MG: 10 INJECTION, EMULSION INTRAVENOUS at 11:55

## 2024-05-23 RX ADMIN — FENTANYL CITRATE 25 MCG: 50 INJECTION INTRAMUSCULAR; INTRAVENOUS at 12:10

## 2024-05-23 RX ADMIN — SUGAMMADEX 100 MG: 100 INJECTION, SOLUTION INTRAVENOUS at 12:34

## 2024-05-23 RX ADMIN — PROPOFOL 30 MG: 10 INJECTION, EMULSION INTRAVENOUS at 11:45

## 2024-05-23 RX ADMIN — PROPOFOL 100 MG: 10 INJECTION, EMULSION INTRAVENOUS at 11:32

## 2024-05-23 RX ADMIN — ROCURONIUM BROMIDE 5 MG: 10 INJECTION INTRAVENOUS at 12:13

## 2024-05-23 RX ADMIN — PROPOFOL 30 MG: 10 INJECTION, EMULSION INTRAVENOUS at 11:49

## 2024-05-23 RX ADMIN — FENTANYL CITRATE 25 MCG: 50 INJECTION INTRAMUSCULAR; INTRAVENOUS at 12:05

## 2024-05-23 RX ADMIN — ONDANSETRON 4 MG: 2 INJECTION INTRAMUSCULAR; INTRAVENOUS at 11:34

## 2024-05-23 RX ADMIN — SODIUM CHLORIDE, SODIUM LACTATE, POTASSIUM CHLORIDE, AND CALCIUM CHLORIDE 100 ML/HR: 600; 310; 30; 20 INJECTION, SOLUTION INTRAVENOUS at 11:24

## 2024-05-23 ASSESSMENT — PAIN - FUNCTIONAL ASSESSMENT
PAIN_FUNCTIONAL_ASSESSMENT: 0-10

## 2024-05-23 ASSESSMENT — PAIN SCALES - GENERAL
PAINLEVEL_OUTOF10: 0 - NO PAIN

## 2024-05-23 ASSESSMENT — COLUMBIA-SUICIDE SEVERITY RATING SCALE - C-SSRS
1. IN THE PAST MONTH, HAVE YOU WISHED YOU WERE DEAD OR WISHED YOU COULD GO TO SLEEP AND NOT WAKE UP?: NO
2. HAVE YOU ACTUALLY HAD ANY THOUGHTS OF KILLING YOURSELF?: NO
6. HAVE YOU EVER DONE ANYTHING, STARTED TO DO ANYTHING, OR PREPARED TO DO ANYTHING TO END YOUR LIFE?: NO

## 2024-05-23 NOTE — DISCHARGE INSTRUCTIONS
Diet  You can eat whatever you like after your surgery. Sometimes the anesthesia can cause nausea, so it may be a good idea to stay away from heavy foods right after you get home from the procedure.    Romo catheter  You will have a tube in the bladder called a romo catheter. This drains urine from the bladder and exits the penis. Be sure the catheter is well secured to the leg at all times. This catheter typically stays in from one day to a week (7 days) depending on your circumstances. There should never be any tension or tugging on the catheter. Take care not to pull on the catheter when rolling in bed or changing position. The nurses will show you how to attach the romo catheter to a leg bag during the day and a big bag at night.    The romo catheter has a balloon on the end of it to keep it in place in the bladder. This may give you the feeling you need to urinate. Be assured the catheter is draining and the sensation is from the romo catheter balloon. You may also notice urine or blood-tinged urine leaking around the catheter out the tip of the penis. Typically, this due to a bladder spasm and is not a cause for alarm. If the catheter stops draining, get up and walk around. If it is still not draining, call the office or come to the emergency room as it may be clogged and need to be irrigated. Once the romo catheter is removed, it is normal to have burning and stinging with urination for a few weeks after surgery. It is also common to have more frequent urination and a greater sense of the urge to urinate. There may not be much warning from the time you feel the urge to urinate to the time when the bladder is ready to empty.    Activity  It is very important to walk after your procedure. Walking prevents blood clots in the legs or lungs. You may go up and down stairs. Avoid any strenuous activity or lifting more than ten pounds for 3 to 4 weeks. This includes any heavy lifting, running, riding a bicycle  or golf. This also includes activities such as raking leaves, mowing the lawn, shoveling snow or other strenuous chores. If you see blood in the urine, increase the amount of water you are drinking and avoid strenuous activity or heavy lifting until the blood clears.    Medications  Take the medications prescribed at the time of your discharge from the hospital. If you are taking any medications on a regular basis prior to your admission to the hospital, you should continue to take those as well. For any aches, pains or headaches, you may use Tylenol or Extra-Strength Tylenol. Sometimes, you will be given a prescription for other pain killers. Do not use any aspirin or aspirin-like compounds or ibuprofen products (NSAIDs) (eg. Advil, Nuprin, Motrin, Bufferin, etc.) for four weeks after surgery. If you start aspirin or aspirin like compounds and you notice blood in your urine, please stop taking it and increase your water intake. Pyridium will be called in that will help with burning.  It will color your urine orange.  Antibiotic will be also prescribed for 1 week.    Avoid constipation  Anesthesia, surgery and narcotic pain medication all increase your risk for constipation. Do not strain to move the bowels as this can impair the healing process and start bleeding. Take plenty of fiber, water and over the counter stool softener to avoid constipation. Stool softener can be taken by mouth twice a day to avoid constipation. A stool softener or laxative is available at any drug store without a prescription (senna or Senokot or SennaGen, Dulcolax or bisacodyl, Miralax, Metamucil, Milk of Magnesia or magnesium hydroxide). Decrease or hold the stool softener for diarrhea or loose stools.    Follow up plan  If you develop a fever greater than 101 degrees Fahrenheit, the catheter stops draining or you are unable to urinate, call the office or come to the emergency room.  Your catheter removal has been scheduled  Please call  [phone] and follow prompts for Dr. Wilkerson's  if you are not sure of your appointment time or location

## 2024-05-23 NOTE — ANESTHESIA POSTPROCEDURE EVALUATION
Patient: Elroy Greco    Procedure Summary       Date: 05/23/24 Room / Location: [facility] OR 02 / Virtual [facility] OR    Anesthesia Start: 1127 Anesthesia Stop: 1257    Procedures:       HoLEP      Botox 100 units ( HoLEP set, Morcellator, holmium, P120,100 units Botox ) Diagnosis:       Urge incontinence      Benign prostatic hyperplasia with urinary frequency      (Benign prostatic hyperplasia with urinary frequency [N40.1, R35.0])      (Urge incontinence [N39.41])    Surgeons: Regina Wilkerson MD Responsible Provider: Bruno Carreon MD    Anesthesia Type: general ASA Status: 2            Anesthesia Type: general    Vitals Value Taken Time   /92 05/23/24 1335   Temp 36 °C (96.8 °F) 05/23/24 1250   Pulse 48 05/23/24 1335   Resp 16 05/23/24 1335   SpO2 97 % 05/23/24 1335       Anesthesia Post Evaluation    Patient location during evaluation: PACU  Patient participation: complete - patient participated  Level of consciousness: awake  Pain management: adequate  Multimodal analgesia pain management approach  Airway patency: patent  Cardiovascular status: acceptable and hemodynamically stable  Respiratory status: acceptable and spontaneous ventilation  Hydration status: euvolemic  Postoperative Nausea and Vomiting: none  Comments: No nausea or vomiting        There were no known notable events for this encounter.

## 2024-05-23 NOTE — OP NOTE
HoLEP, Botox 100 units ( HoLEP set, Morcellator, holmium, P120,100 units Botox ) Operative Note     Date: 2024  OR Location: [facility]    Name: Elroy Greco, : 1930, Age: 89 y.o., MRN: [id number], Sex: male    Diagnosis  Pre-op Diagnosis     * Urge incontinence [N39.41]     * Benign prostatic hyperplasia with urinary frequency [N40.1, R35.0] Post-op Diagnosis     * Urge incontinence [N39.41]     * Benign prostatic hyperplasia with urinary frequency [N40.1, R35.0]     Procedures  HoLEP  39418 - MI LASER ENUCLEATION PROSTATE W/MORCELLATION    Botox 100 units ( HoLEP set, Morcellator, holmium, P120,100 units Botox )  14382 - MI CYSTOURETHROSCOPY INJ CHEMODENERVATION BLADDER    MI INJECTION,ONABOTULINUMTOXINA []  Surgeons      * Regina Wilkerson - Primary    Resident/Fellow/Other Assistant:  Surgeons and Role:  * No surgeons found with a matching role *    Procedure Summary  Anesthesia: General  ASA: ASA status not filed in the log.  Anesthesia Staff: Anesthesiologist: Bruno Carreon MD  C-AA: GISELL Prescott  Estimated Blood Loss: 5mL  Intra-op Medications: Administrations occurring from 1100 to 1300 on 24:  * No intraprocedure medications in log *        Specimen: No specimens collected     Staff:            Drains and/or Catheters: * None in log *    Tourniquet Times:         Implants:     Findings: heavily trabeculated bladder, BPH    Indications: Elroy Greco is an 89 y.o. male who is having surgery for Benign prostatic hyperplasia with urinary frequency [N40.1, R35.0]  Urge incontinence [N39.41].     The patient was seen in the preoperative area. The risks, benefits, complications, treatment options, non-operative alternatives, expected recovery and outcomes were discussed with the patient. The possibilities of reaction to medication, pulmonary aspiration, injury to surrounding structures, bleeding, recurrent infection, the need for additional procedures, failure to diagnose  a condition, and creating a complication requiring transfusion or operation were discussed with the patient. The patient concurred with the proposed plan, giving informed consent.  The site of surgery was properly noted/marked if necessary per policy. The patient has been actively warmed in preoperative area. Preoperative antibiotics have been ordered and given within 1 hours of incision. Venous thrombosis prophylaxis have been ordered including bilateral sequential compression devices    Procedure Details: Preoperative diagnosis: BPH with LUTs, urge incontinence    Postoperative diagnosis: same    Procedure: Holmium laser nucleation of prostate and tissue morcellation, administration of intravesical Botox, cystoscopic bladder chemodenervation    Anesthesia: general    IVF: see anesthesia report    EBL: 5 ml    Complications: none    Catheters: 22 Fr 3-way Mcqueen catheter    Specimen: prostate chips    Disposition: PACU in stable condition       Enucleation time: 20  Total laser time: 25  Total energy used: 103,140       Patient is a 89 year-old male with significant obstructive and irritative voiding symptoms not responsive to maximal medical therapy.  Ambulatory evaluation demonstrated him to be a good candidate for HoLEP procedure.  Risks and alternatives were discussed in great detail, he singed the informed consent and agreed to proceed    50 ml of lubricant were injected to the urethra.  Urethral meatus was dilated with repeat sounds to 30 Fr caliber    A 26 Iraqi Parra resectoscope was inserted.  The anterior urethra was normal, there was good coaptation of the membranous urethra, there was a large obstructing prostate.  The bladder was normal without stones or lesions.  Both ureteral orifices were identified.    100 units of Botox was reconstituted in 10 mL of preservative-free normal saline and injected into the detrusor muscle and 0.5 cc aliquots avoiding trigone and obvious blood vessels.    We started the  enucleation using a 550 µm holmium laser fiber.    A circumferential incision was made in the urethra proximal to the sphincter.  It was deepened anteriorly and laterally.  We then entered the space between the capsule and the adenoma bluntly lateral to the verumontanum.  This was done bilaterally.  The posterior plane was developed bilaterally and connected by cutting the fibers proximal to the verumontanum.  The resection was carried as far proximally as possible.    We then returned to the initial incision in the urethra and detached the lateral attachments between the sphincter and the adenoma.  We were able to identify the lateral plane and developed it as proximally as possible by connecting it to the posterior plane.    We then turned to free the anterior portion of the sphincter.  The attachments between the sphincter and the anterior adenoma were taken down with the laser fiber until we met the anterior plan.  We then connected all planes circumferentially.  We continued the enucleation circumferentially until we reached the bladder neck.  The bladder neck was entered anteriorly and good hemostasis was obtained.  Then the bladder neck incision was developed circumferentially around the adenoma.  Adenoma was then rolled into the bladder and residual posterior attachments were removed.    Hemostasis was performed.The laser bridge was removed and the nephroscope loaded with the Piranha morcellator was inserted.  2 irrigations were connected to distended bladder.  Tissue was completely morcellated.    The laser bridge was inserted again and meticulous hemostasis was performed.  I verified that there was no residual tissue in the bladder, and that ureteral orifices were free.    The laser apparatus was removed and a 22 Lao three-way catheter was inserted and connected to irrigation.    Patient was extubated and moved to the postoperative area in stable condition.         Disposition: patient will have a trial  of void on postoperative day one    Complications:  None; patient tolerated the procedure well.    Disposition: PACU - hemodynamically stable.  Condition: stable         Additional Details:     Attending Attestation: I was present and scrubbed for the entire procedure.    Regina Wilkerson  Phone Number: [phone]

## 2024-05-23 NOTE — ANESTHESIA PROCEDURE NOTES
Airway  Date/Time: 5/23/2024 11:33 AM  Urgency: elective    Airway not difficult    Staffing  Performed: GISELL   Authorized by: Bruno Carreon MD    Performed by: GISELL Prescott  Patient location during procedure: OR    Indications and Patient Condition  Indications for airway management: anesthesia and airway protection  Spontaneous Ventilation: absent  Sedation level: deep  Preoxygenated: yes  Patient position: sniffing  MILS maintained throughout  Mask difficulty assessment: 1 - vent by mask  Planned trial extubation    Final Airway Details  Final airway type: endotracheal airway      Successful airway: ETT  Cuffed: yes   Successful intubation technique: direct laryngoscopy  Facilitating devices/methods: intubating stylet  Endotracheal tube insertion site: oral  Blade: Litzy  Blade size: #3  ETT size (mm): 7.5  Cormack-Lehane Classification: grade I - full view of glottis  Placement verified by: chest auscultation, capnometry and palpation of cuff   Measured from: lips  ETT to lips (cm): 21  Number of attempts at approach: 1

## 2024-05-23 NOTE — PERIOPERATIVE NURSING NOTE
Pt and wife watching TV.  States Dr. Wilkerson has seen him already so he is aware surgery should be soon.  Voices no c/o or needs at this time.

## 2024-05-23 NOTE — ANESTHESIA PREPROCEDURE EVALUATION
Patient: Elroy Greco    Procedure Information       Date/Time: 05/23/24 1100    Procedures:       HoLEP      Botox 100 units ( HoLEP set, Morcellator, holmium, P120,100 units Botox )    Location: [facility] OR 02 / [facility]    Surgeons: Regina Wilkerson MD            Relevant Problems   Anesthesia (within normal limits)      Cardiac   (+) Aortic stenosis, mild   (+) CAD (coronary artery disease)   (+) Chronic stable angina (CMS-[facility])   (+) Hypertension   (+) Mild mitral regurgitation   (-) History of coronary artery bypass graft   (-) Pacemaker      Pulmonary   (-) Asthma ([facility])   (-) Chronic obstructive pulmonary disease (Multi)   (-) MARY (obstructive sleep apnea)      Neuro   (+) Recurrent major depressive disorder (CMS-[facility])   (-) CVA (cerebral vascular accident) (Multi)   (-) Seizures (Multi)      GI   (+) Dysphagia      /Renal   (+) BPH (benign prostatic hyperplasia)      Endocrine   (-) Diabetes mellitus, type 2 (Multi)      Hematology   (+) Anemia   (-) Coagulopathy (Multi)      Musculoskeletal   (+) Lumbar canal stenosis   (+) Lumbar degenerative disc disease   (+) Lumbar spondylosis   (+) Spinal stenosis       Clinical information reviewed:   Tobacco  Allergies  Meds  Problems  Med Hx  Surg Hx   Fam Hx  Soc   Hx        NPO Detail:  NPO/Void Status  Date of Last Liquid: 05/23/24  Time of Last Liquid: 0700  Date of Last Solid: 05/22/24  Time of Last Solid: 1800  Time of Last Void: 0925         Physical Exam    Airway  Mallampati: I  TM distance: >3 FB  Neck ROM: full     Cardiovascular    Dental    Pulmonary    Abdominal            Anesthesia Plan    History of general anesthesia?: yes  History of complications of general anesthesia?: no    ASA 2     general     intravenous induction   Postoperative administration of opioids is intended.  Anesthetic plan and risks discussed with patient.  Use of blood products discussed with patient who consented to blood products.

## 2024-05-24 ENCOUNTER — OFFICE VISIT (OUTPATIENT)
Dept: UROLOGY | Facility: CLINIC | Age: 89
End: 2024-05-24
Payer: MEDICARE

## 2024-05-24 ENCOUNTER — APPOINTMENT (OUTPATIENT)
Dept: UROLOGY | Facility: CLINIC | Age: 89
End: 2024-05-24
Payer: MEDICARE

## 2024-05-24 VITALS — HEART RATE: 74 BPM | SYSTOLIC BLOOD PRESSURE: 127 MMHG | DIASTOLIC BLOOD PRESSURE: 72 MMHG | TEMPERATURE: 97.8 F

## 2024-05-24 DIAGNOSIS — N40.0 BENIGN PROSTATIC HYPERPLASIA, UNSPECIFIED WHETHER LOWER URINARY TRACT SYMPTOMS PRESENT: Primary | ICD-10-CM

## 2024-05-24 PROCEDURE — 51700 IRRIGATION OF BLADDER: CPT | Performed by: NURSE PRACTITIONER

## 2024-05-24 PROCEDURE — 3074F SYST BP LT 130 MM HG: CPT | Performed by: NURSE PRACTITIONER

## 2024-05-24 PROCEDURE — 1160F RVW MEDS BY RX/DR IN RCRD: CPT | Performed by: NURSE PRACTITIONER

## 2024-05-24 PROCEDURE — 1036F TOBACCO NON-USER: CPT | Performed by: NURSE PRACTITIONER

## 2024-05-24 PROCEDURE — 1159F MED LIST DOCD IN RCRD: CPT | Performed by: NURSE PRACTITIONER

## 2024-05-24 PROCEDURE — 3078F DIAST BP <80 MM HG: CPT | Performed by: NURSE PRACTITIONER

## 2024-05-24 PROCEDURE — 1126F AMNT PAIN NOTED NONE PRSNT: CPT | Performed by: NURSE PRACTITIONER

## 2024-05-24 PROCEDURE — 1157F ADVNC CARE PLAN IN RCRD: CPT | Performed by: NURSE PRACTITIONER

## 2024-05-24 ASSESSMENT — PAIN SCALES - GENERAL: PAINLEVEL: 0-NO PAIN

## 2024-05-24 NOTE — PROGRESS NOTES
05/24/24   [id number]    TOV     Subjective      HPI Elroy Greco is a 89 y.o. male who presents for TOV s/p HoLEP by Dr. Wilkerson on 5/23/24. Passed TOV, instilled 180 ml, voided 150 ml without difficult;  Discussed constipation management, has dulcolax he plans to take when he gets home; no symptoms of infection.     PMH, PSH, FH, SH reviewed.      Objective     /72 (BP Location: Left arm, Patient Position: Lying)   Pulse 74   Temp 36.6 °C (97.8 °F)    Physical Exam  General: Appears comfortable and in no apparent distress, well nourished  Head: Normocephalic, atraumatic  Neck: trachea midline  Respiratory: respirations unlabored, no wheezes, and no use of accessory muscles  Cardiovascular: at rest no dyspnea, well perfused  Skin: no visible rashes or lesions  Neurologic: grossly intact, oriented to person, place, and time  Psychiatric: mood and affect appropriate  Musculoskeletal: in chair for appt. no difficulty w upper body movement    Assessment/Plan   Problem List Items Addressed This Visit          Genitourinary and Reproductive    BPH (benign prostatic hyperplasia) - Primary     No orders of the defined types were placed in this encounter.     Plan:   Call if unable to urinate or symptoms of UTI, go to ER if weekend or evening  Call if fever, chills, nausea or vomiting  Discussed constipation prevention, MiraLAX or Colace  Follow up as scheduled   call nurse line sooner if any concerns on nonemergency nurse line       SABINA Espinal-CNP  Lab Results   Component Value Date    GLUCOSE 91 05/08/2024    CALCIUM 8.9 05/08/2024     05/08/2024    K 4.1 05/08/2024    CO2 29 05/08/2024     05/08/2024    BUN 26 (H) 05/08/2024    CREATININE 1.06 05/08/2024

## 2024-05-24 NOTE — PATIENT INSTRUCTIONS
Plan:   Call if unable to urinate or symptoms of UTI, go to ER if weekend or evening  Call if fever, chills, nausea or vomiting  Discussed constipation prevention, MiraLAX or Colace  Follow up as scheduled   call nurse line sooner if any concerns on nonemergency nurse line

## 2024-05-29 LAB
LABORATORY COMMENT REPORT: NORMAL
PATH REPORT.FINAL DX SPEC: NORMAL
PATH REPORT.GROSS SPEC: NORMAL
PATH REPORT.RELEVANT HX SPEC: NORMAL
PATH REPORT.TOTAL CANCER: NORMAL

## 2024-06-05 ENCOUNTER — LAB (OUTPATIENT)
Dept: LAB | Facility: LAB | Age: 89
End: 2024-06-05
Payer: MEDICARE

## 2024-06-05 DIAGNOSIS — R35.0 BENIGN PROSTATIC HYPERPLASIA WITH URINARY FREQUENCY: ICD-10-CM

## 2024-06-05 DIAGNOSIS — N40.1 BENIGN PROSTATIC HYPERPLASIA WITH URINARY FREQUENCY: ICD-10-CM

## 2024-06-05 PROCEDURE — 87086 URINE CULTURE/COLONY COUNT: CPT

## 2024-06-05 PROCEDURE — 81001 URINALYSIS AUTO W/SCOPE: CPT

## 2024-06-06 LAB
APPEARANCE UR: ABNORMAL
BACTERIA #/AREA URNS AUTO: ABNORMAL /HPF
BILIRUB UR STRIP.AUTO-MCNC: ABNORMAL MG/DL
COLOR UR: ABNORMAL
GLUCOSE UR STRIP.AUTO-MCNC: NORMAL MG/DL
KETONES UR STRIP.AUTO-MCNC: NEGATIVE MG/DL
LEUKOCYTE ESTERASE UR QL STRIP.AUTO: ABNORMAL
MUCOUS THREADS #/AREA URNS AUTO: ABNORMAL /LPF
NITRITE UR QL STRIP.AUTO: ABNORMAL
PH UR STRIP.AUTO: 5.5 [PH]
PROT UR STRIP.AUTO-MCNC: ABNORMAL MG/DL
RBC # UR STRIP.AUTO: ABNORMAL /UL
RBC #/AREA URNS AUTO: >20 /HPF
SP GR UR STRIP.AUTO: 1.02
UROBILINOGEN UR STRIP.AUTO-MCNC: ABNORMAL MG/DL
WBC #/AREA URNS AUTO: >50 /HPF

## 2024-06-07 LAB — BACTERIA UR CULT: NO GROWTH

## 2024-06-18 DIAGNOSIS — N39.41 URGE INCONTINENCE: ICD-10-CM

## 2024-06-21 ENCOUNTER — APPOINTMENT (OUTPATIENT)
Dept: PRIMARY CARE | Facility: CLINIC | Age: 89
End: 2024-06-21
Payer: MEDICARE

## 2024-06-21 ENCOUNTER — LAB (OUTPATIENT)
Dept: LAB | Facility: LAB | Age: 89
End: 2024-06-21
Payer: MEDICARE

## 2024-06-21 VITALS
SYSTOLIC BLOOD PRESSURE: 130 MMHG | BODY MASS INDEX: 26.4 KG/M2 | WEIGHT: 173.2 LBS | DIASTOLIC BLOOD PRESSURE: 70 MMHG | TEMPERATURE: 98 F

## 2024-06-21 DIAGNOSIS — I25.10 CORONARY ARTERY DISEASE INVOLVING NATIVE HEART WITHOUT ANGINA PECTORIS, UNSPECIFIED VESSEL OR LESION TYPE: ICD-10-CM

## 2024-06-21 DIAGNOSIS — F33.42 RECURRENT MAJOR DEPRESSIVE DISORDER, IN FULL REMISSION (CMS-HCC): ICD-10-CM

## 2024-06-21 DIAGNOSIS — I20.89 CHRONIC STABLE ANGINA (CMS-HCC): ICD-10-CM

## 2024-06-21 DIAGNOSIS — D64.9 ANEMIA, UNSPECIFIED TYPE: ICD-10-CM

## 2024-06-21 DIAGNOSIS — R73.09 ELEVATED GLUCOSE: ICD-10-CM

## 2024-06-21 DIAGNOSIS — M1A.9XX0 CHRONIC GOUT WITHOUT TOPHUS, UNSPECIFIED CAUSE, UNSPECIFIED SITE: ICD-10-CM

## 2024-06-21 DIAGNOSIS — E03.9 HYPOTHYROIDISM, UNSPECIFIED TYPE: ICD-10-CM

## 2024-06-21 DIAGNOSIS — I10 PRIMARY HYPERTENSION: Primary | ICD-10-CM

## 2024-06-21 DIAGNOSIS — N18.31 STAGE 3A CHRONIC KIDNEY DISEASE (MULTI): ICD-10-CM

## 2024-06-21 DIAGNOSIS — I35.0 AORTIC STENOSIS, MILD: ICD-10-CM

## 2024-06-21 DIAGNOSIS — M40.202 KYPHOSIS OF CERVICAL REGION, UNSPECIFIED KYPHOSIS TYPE: ICD-10-CM

## 2024-06-21 LAB
ERYTHROCYTE [DISTWIDTH] IN BLOOD BY AUTOMATED COUNT: 13.9 % (ref 11.5–14.5)
FERRITIN SERPL-MCNC: 238 NG/ML (ref 20–300)
FOLATE SERPL-MCNC: 21.6 NG/ML
HCT VFR BLD AUTO: 39.2 % (ref 41–52)
HGB BLD-MCNC: 11.8 G/DL (ref 13.5–17.5)
IRON SATN MFR SERPL: 16 % (ref 25–45)
IRON SERPL-MCNC: 45 UG/DL (ref 35–150)
MCH RBC QN AUTO: 28.1 PG (ref 26–34)
MCHC RBC AUTO-ENTMCNC: 30.1 G/DL (ref 32–36)
MCV RBC AUTO: 93 FL (ref 80–100)
NRBC BLD-RTO: 0 /100 WBCS (ref 0–0)
PLATELET # BLD AUTO: 219 X10*3/UL (ref 150–450)
RBC # BLD AUTO: 4.2 X10*6/UL (ref 4.5–5.9)
TIBC SERPL-MCNC: 288 UG/DL (ref 240–445)
TSH SERPL-ACNC: 1.72 MIU/L (ref 0.44–3.98)
UIBC SERPL-MCNC: 243 UG/DL (ref 110–370)
URATE SERPL-MCNC: 7.5 MG/DL (ref 4–7.5)
VIT B12 SERPL-MCNC: 368 PG/ML (ref 211–911)
WBC # BLD AUTO: 6.4 X10*3/UL (ref 4.4–11.3)

## 2024-06-21 PROCEDURE — 82746 ASSAY OF FOLIC ACID SERUM: CPT

## 2024-06-21 PROCEDURE — 83550 IRON BINDING TEST: CPT

## 2024-06-21 PROCEDURE — 1157F ADVNC CARE PLAN IN RCRD: CPT | Performed by: INTERNAL MEDICINE

## 2024-06-21 PROCEDURE — 82728 ASSAY OF FERRITIN: CPT

## 2024-06-21 PROCEDURE — 3078F DIAST BP <80 MM HG: CPT | Performed by: INTERNAL MEDICINE

## 2024-06-21 PROCEDURE — 84550 ASSAY OF BLOOD/URIC ACID: CPT

## 2024-06-21 PROCEDURE — 36415 COLL VENOUS BLD VENIPUNCTURE: CPT

## 2024-06-21 PROCEDURE — 99214 OFFICE O/P EST MOD 30 MIN: CPT | Performed by: INTERNAL MEDICINE

## 2024-06-21 PROCEDURE — 85027 COMPLETE CBC AUTOMATED: CPT

## 2024-06-21 PROCEDURE — 83036 HEMOGLOBIN GLYCOSYLATED A1C: CPT

## 2024-06-21 PROCEDURE — 1036F TOBACCO NON-USER: CPT | Performed by: INTERNAL MEDICINE

## 2024-06-21 PROCEDURE — 83540 ASSAY OF IRON: CPT

## 2024-06-21 PROCEDURE — 82607 VITAMIN B-12: CPT

## 2024-06-21 PROCEDURE — 84443 ASSAY THYROID STIM HORMONE: CPT

## 2024-06-21 PROCEDURE — 3075F SYST BP GE 130 - 139MM HG: CPT | Performed by: INTERNAL MEDICINE

## 2024-06-21 PROCEDURE — 1160F RVW MEDS BY RX/DR IN RCRD: CPT | Performed by: INTERNAL MEDICINE

## 2024-06-21 PROCEDURE — 1159F MED LIST DOCD IN RCRD: CPT | Performed by: INTERNAL MEDICINE

## 2024-06-21 ASSESSMENT — PATIENT HEALTH QUESTIONNAIRE - PHQ9
1. LITTLE INTEREST OR PLEASURE IN DOING THINGS: NOT AT ALL
SUM OF ALL RESPONSES TO PHQ9 QUESTIONS 1 AND 2: 0
2. FEELING DOWN, DEPRESSED OR HOPELESS: NOT AT ALL

## 2024-06-21 NOTE — PROGRESS NOTES
Subjective   Reason for Visit: Elroy Greco is an 89 y.o. male here for a f/u      Past Medical, Surgical, and Family History reviewed and updated in chart.    Reviewed all medications by prescribing practitioner or clinical pharmacist (such as prescriptions, OTCs, herbal therapies and supplements) and documented in the medical record.    HPI  Overall well   Resuming exercise    #1 htn  #2 CKD3  #3 LBP  #4 renal/hepatic cysts  #5 Padget's dz  #6 urinary freq, urgency.  Increasing.  Following w/ --> 1 mth s/p HoLEAP. Urination improving. No pain    Patient Care Team:  Trice Grayson MD as PCP - General  Trice Grayson MD as PCP - [employer] PCP     Review of Systems   All other systems reviewed and are negative.      Objective   Vitals:  /70   Temp 36.7 °C (98 °F)   Wt 78.6 kg (173 lb 3.2 oz)   BMI 26.40 kg/m²       Physical Exam  Constitutional:       General: He is not in acute distress.     Appearance: Normal appearance. He is not ill-appearing.   HENT:      Head: Normocephalic and atraumatic.      Right Ear: Tympanic membrane and ear canal normal.      Left Ear: Tympanic membrane and ear canal normal.      Nose: Nose normal.      Mouth/Throat:      Mouth: Mucous membranes are moist.      Pharynx: Oropharynx is clear.   Eyes:      General: No scleral icterus.     Extraocular Movements: Extraocular movements intact.      Conjunctiva/sclera: Conjunctivae normal.      Pupils: Pupils are equal, round, and reactive to light.   Cardiovascular:      Rate and Rhythm: Normal rate and regular rhythm.      Pulses: Normal pulses.      Heart sounds: No murmur heard.     No friction rub.   Pulmonary:      Effort: Pulmonary effort is normal.      Breath sounds: Normal breath sounds. No rhonchi or rales.   Abdominal:      General: Abdomen is flat. Bowel sounds are normal. There is no distension.      Palpations: Abdomen is soft. There is no mass.      Tenderness: There is no abdominal tenderness.    Musculoskeletal:      Cervical back: Normal range of motion and neck supple.      Right lower leg: No edema.      Left lower leg: No edema.   Skin:     General: Skin is warm.   Neurological:      General: No focal deficit present.      Mental Status: He is alert and oriented to person, place, and time.      Cranial Nerves: No cranial nerve deficit.   Psychiatric:         Mood and Affect: Mood normal.         Behavior: Behavior normal.         Judgment: Judgment normal.       Lab Results   Component Value Date    WBC 6.9 05/08/2024    HGB 12.1 (L) 05/08/2024    HCT 38.7 (L) 05/08/2024     05/08/2024    CHOL 147 11/20/2023    TRIG 105 11/20/2023    HDL 56.4 11/20/2023    ALT 10 11/20/2023    AST 19 06/10/2020     05/08/2024    K 4.1 05/08/2024     05/08/2024    CREATININE 1.06 05/08/2024    BUN 26 (H) 05/08/2024    CO2 29 05/08/2024    TSH 1.44 11/18/2022    INR 0.7 (L) 05/08/2024    HGBA1C 6.1 (H) 11/20/2023       Echo 4/23/23;  1. Left ventricular systolic function is normal with a 55-60% estimated ejection fraction.  2. Spectral Doppler shows an impaired relaxation pattern of left ventricular diastolic filling.  3. There is moderate mitral annular calcification.  4. Moderate aortic valve stenosis.  5. There is moderate aortic valve cusp calcification.    Assessment/Plan   Problem List Items Addressed This Visit    None      #1 hypertension- on target. Continue to follow  #2 ckd3- stable clinically. Check labs next visit  #3 back pain- increased. Follow-up pain medicine [facility].   #4 renal/hepatic cyst- follow-up with urology  #5 Paget's disease- In remission. follow-up endocrinology at [facility]. Alkaline phosphatase through their office, per pt stable. stressed importance  #6 urinary freq- increased symptoms, resume 2 Flomax every day, UA/Cx. . Follow-up with urology ASAP  #7 fatigue/ dyspnea- stable. No significant change with discontinuing metoprolol. Follow-up cardiology  #8 mod AI/AS- clinically  stable. Continue afterload reduction. f/u cards at [facility]   #9 mild, stable normocytic anemia. Normal iron, B12, folic acid. stable for >6 yrs. retest   #10 depression/anxiety- remains an issue. Continue increased duloxetine 30 mg po qd  #11 BPH w/ LUTS- s/p TURP 2017. f/u  at [facility].  Per pt req, will c/s for 2nd opinion  #12 mild SANCHEZ-stable/improved. f/u cards   #13 ? pulm HTN on echo 3/17- reviewed. clinically stable. f/u cardiology.   #14 diastolic dysfxn- reviewed. BP well controlled.   #15 gout- clinically resolved. UA next visit  #16 constipation- remains an issue.  f/u  w/ GI (appt pending).  Add miralax every day.   #17 CAD/angina- stable. f/u cards   #18 elevated glucose (non-fasting)- elevated hemoglobin A1c. stay focused on a reduced sugar low. carbohydrate diet, recheck #19 hip and shoulder pain- f/u ortho.  #20 lipids- ? Why off Rx. resume  #21 insomnia- reviewed. con't melatonin 1 mg po qhs  #22 vit d def- increase to 3,000 otc d3  #23 cough with eating/dysphasiaâ€“f/u GI  #24 mild imbalance- fall precautions reviewed. Better. Con't PT  #25 ? neuropathy rt arm- ?CTS. reviewed. rec NCS/EMG. he declines. normal exam. f/u inb 3-4 weeks.   #26 hip pain- reviewed. Appt pending w/ [facility]  #27 depression- low-dose Cymbalta.    #28 syncope/presyncope- no further episodes. f/u cards.   #29 back pain- f/u  [facility]   #30 pulmonary nodules- f/u chest CT stable x >2 yrs  #31 ? pulm fibrosis- no s/sx. no cough/SOB. stable on CT 2017-->2022. reviewed DDx at length again. Rec pulm eval again. He still declines.   #32 knee pain- s/p remote tka. f/u  w/ ortho at [facility].   C/s [facility] ortho per pt req.   #33 nasal congestion- trial astelin    Rt knee pain- better w/ brace.  f/u  ortho at [facility]  Bph- ok w/ surgery

## 2024-06-22 LAB
EST. AVERAGE GLUCOSE BLD GHB EST-MCNC: 123 MG/DL
HBA1C MFR BLD: 5.9 %

## 2024-07-22 ENCOUNTER — HOSPITAL ENCOUNTER (EMERGENCY)
Facility: HOSPITAL | Age: 89
Discharge: HOME | End: 2024-07-22
Payer: MEDICARE

## 2024-07-22 VITALS
TEMPERATURE: 97.2 F | OXYGEN SATURATION: 99 % | RESPIRATION RATE: 16 BRPM | HEIGHT: 68 IN | SYSTOLIC BLOOD PRESSURE: 137 MMHG | DIASTOLIC BLOOD PRESSURE: 73 MMHG | BODY MASS INDEX: 25.76 KG/M2 | HEART RATE: 73 BPM | WEIGHT: 170 LBS

## 2024-07-22 DIAGNOSIS — K08.89 PAIN, DENTAL: Primary | ICD-10-CM

## 2024-07-22 PROCEDURE — 99284 EMERGENCY DEPT VISIT MOD MDM: CPT

## 2024-07-22 PROCEDURE — 99281 EMR DPT VST MAYX REQ PHY/QHP: CPT

## 2024-07-22 ASSESSMENT — COLUMBIA-SUICIDE SEVERITY RATING SCALE - C-SSRS
6. HAVE YOU EVER DONE ANYTHING, STARTED TO DO ANYTHING, OR PREPARED TO DO ANYTHING TO END YOUR LIFE?: NO
1. IN THE PAST MONTH, HAVE YOU WISHED YOU WERE DEAD OR WISHED YOU COULD GO TO SLEEP AND NOT WAKE UP?: NO
2. HAVE YOU ACTUALLY HAD ANY THOUGHTS OF KILLING YOURSELF?: NO

## 2024-07-22 NOTE — ED PROVIDER NOTES
HPI   Chief Complaint   Patient presents with   • Dental Pain       Elroy Greco is a 89 year old male presenting today with chief complaint of dental pain. PMHx significant for aortic stenosis, CAD, HLD, HTN, nocturia, and colonic polyps. Patient reports recently having 5 teeth pulled at [facility] and pain starting several days after. He reports having pain when he touches his upper lip and his gums with his tongue. He also notes he feels something sharp when he runs his tongue over the gums where the teeth were pulled. He also received partial dentures for upper teeth which have been readjusted 2x due to pain. He called [facility] on Friday about his pain, but they did not get to see him or speak to him before leaving for the weekend. He states he has been eating only mushy cereal, mashed potatoes, and tuna salad because chewing solid foods exacerbates the pain. He states he has been able to drink liquids at any temperature without worsening pain. He has tried swishing warm salt water in his mouth around the painful area which only mildly relieves the pain. He has not tried any OTC pain medications. He also reports taking a prescribed antibiotic for approximately 1 week before procedure, but does not remember name of antibiotic. Denies difficulty eating/drinking, difficulty breathing, difficulty swallowing, having excess secretions he cannot clear, headaches, hearing changes, eye pain, vision changes, neck pain, chest pain, SOB.        Patient History   Past Medical History:   Diagnosis Date   • Aortic stenosis, moderate    • CAD (coronary artery disease)    • Hyperlipidemia    • Hypertension    • Nocturia 03/01/2017    Nocturia   • Personal history of colonic polyps 02/24/2016    History of colonic polyps     Past Surgical History:   Procedure Laterality Date   • CT ANGIO CORONARY ART WITH HEARTFLOW IF SCORE >30%  11/12/2018    CT HEART CORONARY ANGIOGRAM 11/12/2018 [facility] ANCILLARY LEGACY   •  HERNIA REPAIR  07/18/2012    Hernia Repair   • OTHER SURGICAL HISTORY  07/18/2012    Nerve Block Transforaminal Epidural   • OTHER SURGICAL HISTORY  07/18/2012    Nerve Ablation   • TOTAL KNEE ARTHROPLASTY  07/18/2012    Knee Replacement   • TOTAL KNEE ARTHROPLASTY  07/18/2012    Knee Replacement     Family History   Problem Relation Name Age of Onset   • Cancer Mother     • Cancer Father       Social History     Tobacco Use   • Smoking status: Never   • Smokeless tobacco: Never   Substance Use Topics   • Alcohol use: Never   • Drug use: Never       Physical Exam   ED Triage Vitals [07/22/24 1006]   Temperature Heart Rate Respirations BP   36.2 °C (97.2 °F) 73 16 137/73      Pulse Ox Temp Source Heart Rate Source Patient Position   99 % Temporal Monitor --      BP Location FiO2 (%)     -- --       Physical Exam  Vitals and nursing note reviewed.   Constitutional:       General: He is awake. He is not in acute distress.     Appearance: Normal appearance. He is well-developed.   HENT:      Head: Normocephalic and atraumatic.      Right Ear: Hearing normal.      Left Ear: Hearing normal.      Ears:      Comments: Symmetric face, eyes, and ears without erythema or edema.      Mouth/Throat:      Lips: Pink.      Mouth: Mucous membranes are moist.      Dentition: Has dentures.      Pharynx: Oropharynx is clear. Uvula midline.        Comments: Missing teeth (~3-14) with appropriately healed socketss and gums. Mild mild erythema of socket 11 without edema, open wound, or discharge. ~1-2cm nodule under the upper left lip which could not be visualized by pulling up the upper lip, non-fluctuant, non-erythematous. No bleeding of the gums with palpation. No evidence of fluctuant mass. Tongue is not edematous or erythematous. Pharynx is clear without swelling, erythema, exudate. Uvula midline.  Eyes:      Conjunctiva/sclera: Conjunctivae normal.   Cardiovascular:      Rate and Rhythm: Normal rate and regular rhythm.      Heart  sounds: No murmur heard.  Pulmonary:      Effort: Pulmonary effort is normal. No respiratory distress.      Breath sounds: Normal breath sounds.   Abdominal:      Palpations: Abdomen is soft.      Tenderness: There is no abdominal tenderness.   Musculoskeletal:         General: No swelling.      Cervical back: Neck supple.   Skin:     General: Skin is warm and dry.      Capillary Refill: Capillary refill takes less than 2 seconds.   Neurological:      Mental Status: He is alert.   Psychiatric:         Mood and Affect: Mood normal.         Behavior: Behavior is cooperative.         ED Course & MDM   Diagnoses as of 07/22/24 1145   Pain, dental                       Roseann Coma Scale Score: 15                        Medical Decision Making  Elroy Greco is a 89 year old male presenting with dental pain. PMHx significant for aortic stenosis, CAD, HLD, HTN, nocturia, and colonic polyps. Vitals reviewed and unremarkable. Physical exam reveals patient sitting comfortably, no acute distress, afebrile, speaking in full sentences with clear voice, A&Ox3, missing teeth (~3-14) with appropriately healed socketss and gums. Mild mild erythema of socket 11 without edema, open wound, or discharge. ~1-2cm nodule under the upper left lip which could not be visualized by pulling up the upper lip, non-fluctuant, non-erythematous. Consulted Dentistry for evaluation and recommendations.    The nodular area on the maxillary gingiva anterior is not concerning at this time for an abscess.  No concern for infectious process at this point.  I was able to talk directly to the provider who cared for the patient in the dental clinic and he states that there is likely extra tissue that needs to be removed.  The provider advised the patient to follow-up with the emergency clinic.  The provider's name is Dr. Silvino Fernandez.  The doctor was able to give me a phone number.  I called the phone number and help the patient schedule  appointment for Thursday morning at 830 in the dental clinic on the first floor.  I relayed all of this information to him and he was in agreement.  He was advised to return to the ED with any new or worsening symptoms and follow-up soon as possible on Thursday.  He declined any pain medication including even Orajel.  States that his pain was well-controlled at this point.  Patient in agreement with this plan.  Discharged in stable condition        Procedure  Procedures     Barber Bragg, SABINA-CNP  07/22/24 0618

## 2024-07-22 NOTE — ED TRIAGE NOTES
Getting treated at other facility 2 weeks ago and got  5 teeth pulled, 1 got broken and didn't get pulled out, tender and swollen.

## 2024-08-06 DIAGNOSIS — N40.0 BENIGN PROSTATIC HYPERPLASIA, UNSPECIFIED WHETHER LOWER URINARY TRACT SYMPTOMS PRESENT: ICD-10-CM

## 2024-08-06 DIAGNOSIS — R35.0 BENIGN PROSTATIC HYPERPLASIA WITH URINARY FREQUENCY: ICD-10-CM

## 2024-08-06 DIAGNOSIS — N40.1 BENIGN PROSTATIC HYPERPLASIA WITH URINARY FREQUENCY: ICD-10-CM

## 2024-08-08 ENCOUNTER — LAB (OUTPATIENT)
Dept: LAB | Facility: LAB | Age: 89
End: 2024-08-08
Payer: MEDICARE

## 2024-08-08 DIAGNOSIS — R35.0 BENIGN PROSTATIC HYPERPLASIA WITH URINARY FREQUENCY: ICD-10-CM

## 2024-08-08 DIAGNOSIS — N40.1 BENIGN PROSTATIC HYPERPLASIA WITH URINARY FREQUENCY: ICD-10-CM

## 2024-08-08 PROCEDURE — 87086 URINE CULTURE/COLONY COUNT: CPT

## 2024-08-09 DIAGNOSIS — I25.10 CORONARY ARTERY DISEASE INVOLVING NATIVE HEART WITHOUT ANGINA PECTORIS, UNSPECIFIED VESSEL OR LESION TYPE: ICD-10-CM

## 2024-08-09 DIAGNOSIS — I10 PRIMARY HYPERTENSION: ICD-10-CM

## 2024-08-09 LAB — BACTERIA UR CULT: NO GROWTH

## 2024-08-09 NOTE — TELEPHONE ENCOUNTER
Rx Refill Request Telephone Encounter  atorvastatin (Lipitor) 40 mg tablet     [facility]    Next appt 9/25/24

## 2024-08-10 RX ORDER — ATORVASTATIN CALCIUM 40 MG/1
40 TABLET, FILM COATED ORAL NIGHTLY
Qty: 90 TABLET | Refills: 3 | Status: SHIPPED | OUTPATIENT
Start: 2024-08-10 | End: 2025-08-10

## 2024-08-14 ENCOUNTER — TELEPHONE (OUTPATIENT)
Dept: PRIMARY CARE | Facility: CLINIC | Age: 89
End: 2024-08-14
Payer: MEDICARE

## 2024-08-14 NOTE — TELEPHONE ENCOUNTER
Rx Refill Request Telephone Encounter  Myrbetriq, not in current history    Pharm[facility]    Next appt 9/25/24

## 2024-08-16 NOTE — TELEPHONE ENCOUNTER
I called pt to discuss this refill, [facility], he will do research, call them about refill

## 2024-08-18 ENCOUNTER — HOSPITAL ENCOUNTER (OUTPATIENT)
Dept: RADIOLOGY | Facility: EXTERNAL LOCATION | Age: 89
Discharge: HOME | End: 2024-08-18
Payer: MEDICARE

## 2024-08-18 DIAGNOSIS — M25.552 LEFT HIP PAIN: ICD-10-CM

## 2024-08-19 NOTE — TELEPHONE ENCOUNTER
[facility]. Dr. Grayson filled Bayronbetriq on October 23, 2023.

## 2024-08-28 ENCOUNTER — APPOINTMENT (OUTPATIENT)
Dept: UROLOGY | Facility: CLINIC | Age: 89
End: 2024-08-28
Payer: MEDICARE

## 2024-08-28 VITALS — BODY MASS INDEX: 25.76 KG/M2 | HEIGHT: 68 IN | TEMPERATURE: 97.5 F | WEIGHT: 170 LBS

## 2024-08-28 DIAGNOSIS — Z79.2 NEED FOR PROPHYLACTIC ANTIBIOTIC: ICD-10-CM

## 2024-08-28 DIAGNOSIS — N40.1 BENIGN PROSTATIC HYPERPLASIA WITH LOWER URINARY TRACT SYMPTOMS, SYMPTOM DETAILS UNSPECIFIED: Primary | ICD-10-CM

## 2024-08-28 LAB
POC APPEARANCE, URINE: CLEAR
POC BILIRUBIN, URINE: NEGATIVE
POC BLOOD, URINE: NEGATIVE
POC COLOR, URINE: YELLOW
POC GLUCOSE, URINE: NEGATIVE MG/DL
POC KETONES, URINE: ABNORMAL MG/DL
POC LEUKOCYTES, URINE: NEGATIVE
POC NITRITE,URINE: NEGATIVE
POC PH, URINE: 5.5 PH
POC PROTEIN, URINE: NEGATIVE MG/DL
POC SPECIFIC GRAVITY, URINE: 1.02
POC UROBILINOGEN, URINE: 0.2 EU/DL

## 2024-08-28 PROCEDURE — 81002 URINALYSIS NONAUTO W/O SCOPE: CPT | Performed by: UROLOGY

## 2024-08-28 PROCEDURE — 51798 US URINE CAPACITY MEASURE: CPT | Performed by: UROLOGY

## 2024-08-28 PROCEDURE — 1159F MED LIST DOCD IN RCRD: CPT | Performed by: UROLOGY

## 2024-08-28 PROCEDURE — 99214 OFFICE O/P EST MOD 30 MIN: CPT | Performed by: UROLOGY

## 2024-08-28 PROCEDURE — 1157F ADVNC CARE PLAN IN RCRD: CPT | Performed by: UROLOGY

## 2024-08-28 PROCEDURE — 1126F AMNT PAIN NOTED NONE PRSNT: CPT | Performed by: UROLOGY

## 2024-08-28 PROCEDURE — 51741 ELECTRO-UROFLOWMETRY FIRST: CPT | Performed by: UROLOGY

## 2024-08-28 RX ORDER — NITROFURANTOIN 25; 75 MG/1; MG/1
100 CAPSULE ORAL ONCE
Qty: 1 CAPSULE | Refills: 0 | Status: SHIPPED | OUTPATIENT
Start: 2024-08-28 | End: 2024-08-28

## 2024-08-28 RX ORDER — METHYLPREDNISOLONE 4 MG/1
TABLET ORAL
COMMUNITY
Start: 2024-08-18

## 2024-08-28 ASSESSMENT — PAIN SCALES - GENERAL: PAINLEVEL: 0-NO PAIN

## 2024-08-28 NOTE — PROGRESS NOTES
Elroy Greco is a 89 y.o. male with history of BPH s/p HoLEP with concurrent bladder Botox  (100units) on 5/23/2024 who presents for 3 month post-op visit. Patient reports complete resolution of all obstructive voiding symptoms. He has persistent urinary frequency, urgency and nocturia x3-4.     Path: 15.4g of benign tissue removed.         IPSS: 22 and 6   PVR: 0ml  Uroflow: volume voided 10cc's, poorly diagnostic.     Current Outpatient Medications on File Prior to Visit   Medication Sig Dispense Refill    amLODIPine (Norvasc) 2.5 mg tablet TAKE 1 TABLET BY MOUTH ONCE  DAILY 100 tablet 2    atorvastatin (Lipitor) 40 mg tablet Take 1 tablet (40 mg) by mouth once daily at bedtime. 90 tablet 3    azelastine (Astelin) 137 mcg (0.1 %) nasal spray Administer 1 spray into each nostril 2 times a day. Use in each nostril as directed 30 mL 3    collagen/biotin/ascorbic acid (COLLAGEN 1500 PLUS C ORAL) Take by mouth once daily.      cyanocobalamin (Vitamin B-12) 500 mcg tablet Take 1 tablet (500 mcg) by mouth once daily.      docusate sodium (Colace) 100 mg capsule Take 2 capsules (200 mg) by mouth 2 times a day.      fluticasone (Flonase) 50 mcg/actuation nasal spray Administer 2 sprays into each nostril once daily as needed.      lidocaine (Lidoderm) 5 % patch Place 1 patch on the skin once daily. Remove & discard patch within 12 hours or as directed by MD.      MAGNESIUM ORAL Take 1 tablet by mouth once daily. Takes 500mg      methylPREDNISolone (Medrol Dospak) 4 mg tablets TAPER THE TABLETS OVER 6 DAYS PER PACKAGE INSERT INSTRUCTIONS. TAKE WITH FOOD TO MINIMIZE STOMACH IRRITATION      multivitamin tablet Take 1 tablet by mouth once daily.      psyllium (Metamucil) powder Take 1 Dose (5.8 g) by mouth 2 times a day.      carboxymethylcellulose (Refresh Plus) 0.5 % ophthalmic solution Administer 1 drop into both eyes 4 times a day.      colchicine 0.6 mg tablet Take 1 tablet (0.6 mg) by mouth once daily as needed.    Left patient message to call to schedule f/u appt.      diphenhydrAMINE (BENADryl) 50 mg capsule Take 1 capsule (50 mg) by mouth as needed at bedtime for itching.      omega 3-dha-epa-fish oil 1,000 mg (120 mg-180 mg) capsule Take 1 capsule (1,000 mg) by mouth 2 times a day.      vit C-E-zinc cit-lutein-zeaxan ([facility]) 50 mg-15 unit- 4.5 mg-2.5 mg tablet,chewable Chew 2 tablets once daily.       No current facility-administered medications on file prior to visit.             Plan:    BPH s/p HoLEP with concurrent bladder Botox  (100units) on 5/23/2024   Persistent urinary frequency/urgency and nocturia    We will proceed with cystoscopy, if channel is patent we will proceed with 200 units of bladder Botox injections.     The risks of cystoscopy were discussed with the patient in great detail, including risk of hematuria, UTI and discomfort. Antibiotic will be prescribed to be taken 1 hour prior to the procedure. Patient agrees to proceed.     Risks of intravesical Botox injection were discussed with the patient in great detail. Adverse reactions reported have been UTI, dysuria, hematuria, elevated PVR and urinary retention in up to 15% of patients. I explained that urinary retention is not permanent and usually resolves within 6 weeks.       All questions were answered to the patient's satisfaction. Patient agrees with the plan and wishes to proceed. Follow-up will be scheduled appropriately.     E&M visit today is associated with current or anticipated ongoing medical care services related to a patient's single, serious condition or a complex condition.    Scribed for Dr. Wilkerson by Ashwini Bazan. I , Dr Wilkerson, have personally reviewed and agreed with the information entered by the Virtual Scribe.

## 2024-09-03 DIAGNOSIS — Z79.2 NEED FOR PROPHYLACTIC ANTIBIOTIC: ICD-10-CM

## 2024-09-03 RX ORDER — NITROFURANTOIN 25; 75 MG/1; MG/1
100 CAPSULE ORAL ONCE
Qty: 1 CAPSULE | Refills: 0 | Status: SHIPPED | OUTPATIENT
Start: 2024-09-03 | End: 2024-09-03

## 2024-09-04 ENCOUNTER — APPOINTMENT (OUTPATIENT)
Dept: UROLOGY | Facility: CLINIC | Age: 89
End: 2024-09-04
Payer: MEDICARE

## 2024-09-04 VITALS
WEIGHT: 172.4 LBS | DIASTOLIC BLOOD PRESSURE: 84 MMHG | TEMPERATURE: 97.3 F | SYSTOLIC BLOOD PRESSURE: 163 MMHG | HEART RATE: 60 BPM | HEIGHT: 68 IN | BODY MASS INDEX: 26.13 KG/M2

## 2024-09-04 DIAGNOSIS — N40.1 BENIGN PROSTATIC HYPERPLASIA WITH LOWER URINARY TRACT SYMPTOMS, SYMPTOM DETAILS UNSPECIFIED: Primary | ICD-10-CM

## 2024-09-04 DIAGNOSIS — N33 BLADDER DISORDERS IN DISEASES CLASSIFIED ELSEWHERE: ICD-10-CM

## 2024-09-04 DIAGNOSIS — N39.41 URGE INCONTINENCE: ICD-10-CM

## 2024-09-04 DIAGNOSIS — Z01.818 PREOP TESTING: ICD-10-CM

## 2024-09-04 LAB
POC APPEARANCE, URINE: CLEAR
POC BILIRUBIN, URINE: NEGATIVE
POC BLOOD, URINE: ABNORMAL
POC COLOR, URINE: YELLOW
POC GLUCOSE, URINE: NEGATIVE MG/DL
POC KETONES, URINE: NEGATIVE MG/DL
POC LEUKOCYTES, URINE: NEGATIVE
POC NITRITE,URINE: NEGATIVE
POC PH, URINE: 5.5 PH
POC PROTEIN, URINE: NEGATIVE MG/DL
POC SPECIFIC GRAVITY, URINE: 1.01
POC UROBILINOGEN, URINE: 0.2 EU/DL

## 2024-09-04 PROCEDURE — 99215 OFFICE O/P EST HI 40 MIN: CPT | Performed by: UROLOGY

## 2024-09-04 PROCEDURE — 81003 URINALYSIS AUTO W/O SCOPE: CPT | Performed by: UROLOGY

## 2024-09-04 PROCEDURE — 87086 URINE CULTURE/COLONY COUNT: CPT

## 2024-09-04 PROCEDURE — 52000 CYSTOURETHROSCOPY: CPT | Performed by: UROLOGY

## 2024-09-04 RX ORDER — SODIUM CHLORIDE, SODIUM LACTATE, POTASSIUM CHLORIDE, CALCIUM CHLORIDE 600; 310; 30; 20 MG/100ML; MG/100ML; MG/100ML; MG/100ML
20 INJECTION, SOLUTION INTRAVENOUS CONTINUOUS
OUTPATIENT
Start: 2024-09-04

## 2024-09-04 RX ORDER — CIPROFLOXACIN 2 MG/ML
400 INJECTION, SOLUTION INTRAVENOUS ONCE
OUTPATIENT
Start: 2024-09-04 | End: 2024-09-04

## 2024-09-04 ASSESSMENT — PAIN SCALES - GENERAL: PAINLEVEL: 2

## 2024-09-04 NOTE — PROGRESS NOTES
Subjective   Elroy Greco is a 89 y.o. male with history of BPH s/p HoLEP with concurrent bladder Botox  (100units) on 5/23/2024. Patient had complaints of persistent urinary frequency, urgency and nocturia. He presents today for cystoscopy.         Past Medical History:   Diagnosis Date    Aortic stenosis, moderate     CAD (coronary artery disease)     Hyperlipidemia     Hypertension     Nocturia 03/01/2017    Nocturia    Personal history of colonic polyps 02/24/2016    History of colonic polyps     Past Surgical History:   Procedure Laterality Date    CT ANGIO CORONARY ART WITH HEARTFLOW IF SCORE >30%  11/12/2018    CT HEART CORONARY ANGIOGRAM 11/12/2018 [facility]    HERNIA REPAIR  07/18/2012    Hernia Repair    OTHER SURGICAL HISTORY  07/18/2012    Nerve Block Transforaminal Epidural    OTHER SURGICAL HISTORY  07/18/2012    Nerve Ablation    TOTAL KNEE ARTHROPLASTY  07/18/2012    Knee Replacement    TOTAL KNEE ARTHROPLASTY  07/18/2012    Knee Replacement     Family History   Problem Relation Name Age of Onset    Cancer Mother      Cancer Father       Current Outpatient Medications   Medication Sig Dispense Refill    amLODIPine (Norvasc) 2.5 mg tablet TAKE 1 TABLET BY MOUTH ONCE  DAILY 100 tablet 2    atorvastatin (Lipitor) 40 mg tablet Take 1 tablet (40 mg) by mouth once daily at bedtime. 90 tablet 3    azelastine (Astelin) 137 mcg (0.1 %) nasal spray Administer 1 spray into each nostril 2 times a day. Use in each nostril as directed 30 mL 3    carboxymethylcellulose (Refresh Plus) 0.5 % ophthalmic solution Administer 1 drop into both eyes 4 times a day.      colchicine 0.6 mg tablet Take 1 tablet (0.6 mg) by mouth once daily as needed.      collagen/biotin/ascorbic acid (COLLAGEN 1500 PLUS C ORAL) Take by mouth once daily.      cyanocobalamin (Vitamin B-12) 500 mcg tablet Take 1 tablet (500 mcg) by mouth once daily.      diphenhydrAMINE (BENADryl) 50 mg capsule Take 1 capsule (50 mg) by mouth as  needed at bedtime for itching.      docusate sodium (Colace) 100 mg capsule Take 2 capsules (200 mg) by mouth 2 times a day.      fluticasone (Flonase) 50 mcg/actuation nasal spray Administer 2 sprays into each nostril once daily as needed.      lidocaine (Lidoderm) 5 % patch Place 1 patch on the skin once daily. Remove & discard patch within 12 hours or as directed by MD.      MAGNESIUM ORAL Take 1 tablet by mouth once daily. Takes 500mg      methylPREDNISolone (Medrol Dospak) 4 mg tablets TAPER THE TABLETS OVER 6 DAYS PER PACKAGE INSERT INSTRUCTIONS. TAKE WITH FOOD TO MINIMIZE STOMACH IRRITATION      multivitamin tablet Take 1 tablet by mouth once daily.      omega 3-dha-epa-fish oil 1,000 mg (120 mg-180 mg) capsule Take 1 capsule (1,000 mg) by mouth 2 times a day.      psyllium (Metamucil) powder Take 1 Dose (5.8 g) by mouth 2 times a day.      vit C-E-zinc cit-lutein-zeaxan ([facility]) 50 mg-15 unit- 4.5 mg-2.5 mg tablet,chewable Chew 2 tablets once daily.       No current facility-administered medications for this visit.     Allergies   Allergen Reactions    Finasteride Rash    Mirabegron Constipation    Penicillins Rash     Social History     Socioeconomic History    Marital status:      Spouse name: Not on file    Number of children: Not on file    Years of education: Not on file    Highest education level: Not on file   Occupational History    Not on file   Tobacco Use    Smoking status: Never    Smokeless tobacco: Never   Substance and Sexual Activity    Alcohol use: Never    Drug use: Never    Sexual activity: Not on file   Other Topics Concern    Not on file   Social History Narrative    Not on file     Social Determinants of Health     Financial Resource Strain: Not on file   Food Insecurity: Not on file   Transportation Needs: Not on file   Physical Activity: Not on file   Stress: Not on file   Social Connections: Not on file   Intimate Partner Violence: Not on file   Housing Stability:  Not on file       Review of Systems  Pertinent items are noted in HPI.    Objective   Cystoscopy performed:   Elroy Greco identified using two (2) forms of identification.  Procedure: diagnostic cystourethroscopy  Indications for procedure: BPH with LUTS   Risks, benefits, and alternatives were discussed in detail.   Patient appears to understand and agrees to proceed.   Patient has signed the procedure consent form.     Cystoscopy findings:  Urethra: normal course and caliber, no evidence of stricture or lesion.  Prostate: Widely patent channel, no residual adenoma.   Bladder: normal capacity, no trabeculations, no diverticulum, no stone, tumors or other lesions.  Post-cystoscopy: Patient tolerated procedure without complications.      Lab Review  Lab Results   Component Value Date    WBC 6.4 06/21/2024    RBC 4.20 (L) 06/21/2024    HGB 11.8 (L) 06/21/2024    HCT 39.2 (L) 06/21/2024     06/21/2024      Lab Results   Component Value Date    BUN 26 (H) 05/08/2024    CREATININE 1.06 05/08/2024     Urine analysis shows +blood     Assessment/Plan   Diagnoses and all orders for this visit:  Benign prostatic hyperplasia with lower urinary tract symptoms, symptom details unspecified  -     POCT UA Automated manually resulted    BPH s/p HoLEP with concurrent bladder Botox  (100units) on 5/23/2024     Cystoscopy today showed widely patent channel with no residual adenoma.     We will proceed with 200 units of bladder Botox injections.    Risks of intravesical Botox injection were discussed with the patient in great detail. Adverse reactions reported have been UTI, dysuria, hematuria, elevated PVR and urinary retention in up to 15% of patients. I explained that urinary retention is not permanent and usually resolves within 6 weeks.       All questions were answered to the patient's satisfaction. Patient agrees with the plan and wishes to proceed. Follow-up will be scheduled appropriately.     E&M visit today is  associated with current or anticipated ongoing medical care services related to a patient's single, serious condition or a complex condition.    I spent 40 minutes of dedicated E&M time, including preparation and review of records, notes, and data, time spent with patient/family, and documentation.     Scribed for Dr. Wilkerson by Ashwini Bazan. I , Dr Wilkerson, have personally reviewed and agreed with the information entered by the Virtual Scribe.

## 2024-09-05 LAB — BACTERIA UR CULT: NO GROWTH

## 2024-09-06 ENCOUNTER — HOSPITAL ENCOUNTER (OUTPATIENT)
Dept: RADIOLOGY | Facility: CLINIC | Age: 89
Discharge: HOME | End: 2024-09-06
Payer: MEDICARE

## 2024-09-06 ENCOUNTER — OFFICE VISIT (OUTPATIENT)
Dept: ORTHOPEDIC SURGERY | Facility: CLINIC | Age: 89
End: 2024-09-06
Payer: MEDICARE

## 2024-09-06 VITALS — WEIGHT: 172.4 LBS | BODY MASS INDEX: 26.13 KG/M2 | HEIGHT: 68 IN

## 2024-09-06 DIAGNOSIS — M25.552 LEFT HIP PAIN: ICD-10-CM

## 2024-09-06 DIAGNOSIS — M25.551 RIGHT HIP PAIN: ICD-10-CM

## 2024-09-06 DIAGNOSIS — M54.16 LEFT LUMBAR RADICULOPATHY: ICD-10-CM

## 2024-09-06 DIAGNOSIS — M25.552 LEFT HIP PAIN: Primary | ICD-10-CM

## 2024-09-06 PROCEDURE — 1159F MED LIST DOCD IN RCRD: CPT | Performed by: STUDENT IN AN ORGANIZED HEALTH CARE EDUCATION/TRAINING PROGRAM

## 2024-09-06 PROCEDURE — 99215 OFFICE O/P EST HI 40 MIN: CPT | Performed by: STUDENT IN AN ORGANIZED HEALTH CARE EDUCATION/TRAINING PROGRAM

## 2024-09-06 PROCEDURE — 99205 OFFICE O/P NEW HI 60 MIN: CPT | Performed by: STUDENT IN AN ORGANIZED HEALTH CARE EDUCATION/TRAINING PROGRAM

## 2024-09-06 PROCEDURE — 1157F ADVNC CARE PLAN IN RCRD: CPT | Performed by: STUDENT IN AN ORGANIZED HEALTH CARE EDUCATION/TRAINING PROGRAM

## 2024-09-06 PROCEDURE — 1036F TOBACCO NON-USER: CPT | Performed by: STUDENT IN AN ORGANIZED HEALTH CARE EDUCATION/TRAINING PROGRAM

## 2024-09-06 ASSESSMENT — PAIN SCALES - GENERAL: PAINLEVEL_OUTOF10: 10 - WORST POSSIBLE PAIN

## 2024-09-06 ASSESSMENT — PAIN - FUNCTIONAL ASSESSMENT: PAIN_FUNCTIONAL_ASSESSMENT: 0-10

## 2024-09-06 NOTE — PROGRESS NOTES
SUMIT/TKA Related Summary           L hip: N  L knee  2001: Primary TKA (Dr. Hilario at [facility])  2002: Revision TKA (aseptic, unsure of indication) (Dr. Hilario  At [facility])  R hip: N  R knee  2008: Primary TKA (Dr. Pathak at )  Lumbar surgery or significant pathology: N            CC/SUBJECTIVE/HPI            PCP: Trice Grayson MD  Referring provider: No ref. provider found  Elroy Greco is a 89 y.o. male presenting for left hip pain.  This pain has been hurting him for over 2 years but has recently worsened over the last few months.    L hip  Symptoms  Pain: 10/10  Onset: chronic/gradual  Duration: >2yrs  Location: groin, side of hip, and buttock  Quality: sharp/stab  Limitations: pain after activity, limp, weakness, difficulty with stairs, difficulty in/out of chair/car, and difficulty with socks/shoes/clothes  Ambulation limit due to pain: <100ft  Other symptoms: none  Treatment  Tried: activity modification, home exercises, ice/heat, OTCs, and [facility] ([facility] KELSI and intra-articular)  Most recent injection <3mo ago? N  Assistive device: cane  Treatment attempted for >2yrs and is partially effective          HISTORIES (System Generated and Pt Reported on Form Today)       Dental  Pt reported: No active issues     PMH  Pt reported: Denies heart/lung/kidney/liver issues, DM, stroke, seizure, bariatric surgery, anticoag, MRSA, cancer, personal/familial coagulopathies except: none in addition to below  System generated (PMH, problem list both included since EMR change caused discrepancies):   Past Medical History:   Diagnosis Date    Aortic stenosis, moderate     CAD (coronary artery disease)     Hyperlipidemia     Hypertension     Nocturia 03/01/2017    Nocturia    Personal history of colonic polyps 02/24/2016    History of colonic polyps      Patient Active Problem List   Diagnosis    Anemia    Aortic stenosis, mild    Arthritis    BPH (benign prostatic hyperplasia)    CAD (coronary artery disease)    Cataract     Chronic back pain    CKD (chronic kidney disease) stage 3, GFR 30-59 ml/min (Multi)    Chronic stable angina (CMS-HCC)    Dyslipidemia    Dysphagia    Elevated glucose    Fatigue    Finger pain    Gout    Pain of left hip joint    Hypertension    Imbalance    Lumbar canal stenosis    Lumbar degenerative disc disease    Lumbar spondylosis    Mild mitral regurgitation    Neck pain    Paget's disease of the bone    Post-nasal drip    Pulmonary nodule    Spinal stenosis    Recurrent major depressive disorder (CMS-HCC)    Right shoulder pain    Renal cyst    Rotator cuff arthropathy of right shoulder    Shoulder swelling    Trapezius strain    Trigger ring finger of left hand    Urgency of urination    Vitamin D deficiency    Mild protein-calorie malnutrition (Multi)    Urge incontinence     PSH  Pt reported: Per above.   System generated:   Past Surgical History:   Procedure Laterality Date    CT ANGIO CORONARY ART WITH HEARTFLOW IF SCORE >30%  11/12/2018    CT HEART CORONARY ANGIOGRAM 11/12/2018 [facility]    HERNIA REPAIR  07/18/2012    Hernia Repair    OTHER SURGICAL HISTORY  07/18/2012    Nerve Block Transforaminal Epidural    OTHER SURGICAL HISTORY  07/18/2012    Nerve Ablation    TOTAL KNEE ARTHROPLASTY  07/18/2012    Knee Replacement    TOTAL KNEE ARTHROPLASTY  07/18/2012    Knee Replacement     Family Hx  Pt reported clot/coagulopathies: none    Social Hx  Pt reported substance use: N  System generated:   Social History     Tobacco Use    Smoking status: Never    Smokeless tobacco: Never   Substance Use Topics    Alcohol use: Never    Drug use: Never     Allergies  Pt reported (meds, metals): per below  System generated:   Allergies   Allergen Reactions    Finasteride Rash    Mirabegron Constipation    Penicillins Rash     Current Meds  System generated:   Current Outpatient Medications:     amLODIPine (Norvasc) 2.5 mg tablet, TAKE 1 TABLET BY MOUTH ONCE  DAILY, Disp: 100 tablet, Rfl: 2    atorvastatin  (Lipitor) 40 mg tablet, Take 1 tablet (40 mg) by mouth once daily at bedtime., Disp: 90 tablet, Rfl: 3    azelastine (Astelin) 137 mcg (0.1 %) nasal spray, Administer 1 spray into each nostril 2 times a day. Use in each nostril as directed, Disp: 30 mL, Rfl: 3    carboxymethylcellulose (Refresh Plus) 0.5 % ophthalmic solution, Administer 1 drop into both eyes 4 times a day., Disp: , Rfl:     colchicine 0.6 mg tablet, Take 1 tablet (0.6 mg) by mouth once daily as needed., Disp: , Rfl:     collagen/biotin/ascorbic acid (COLLAGEN 1500 PLUS C ORAL), Take by mouth once daily., Disp: , Rfl:     cyanocobalamin (Vitamin B-12) 500 mcg tablet, Take 1 tablet (500 mcg) by mouth once daily., Disp: , Rfl:     diphenhydrAMINE (BENADryl) 50 mg capsule, Take 1 capsule (50 mg) by mouth as needed at bedtime for itching., Disp: , Rfl:     docusate sodium (Colace) 100 mg capsule, Take 2 capsules (200 mg) by mouth 2 times a day., Disp: , Rfl:     fluticasone (Flonase) 50 mcg/actuation nasal spray, Administer 2 sprays into each nostril once daily as needed., Disp: , Rfl:     lidocaine (Lidoderm) 5 % patch, Place 1 patch on the skin once daily. Remove & discard patch within 12 hours or as directed by MD., Disp: , Rfl:     MAGNESIUM ORAL, Take 1 tablet by mouth once daily. Takes 500mg, Disp: , Rfl:     methylPREDNISolone (Medrol Dospak) 4 mg tablets, TAPER THE TABLETS OVER 6 DAYS PER PACKAGE INSERT INSTRUCTIONS. TAKE WITH FOOD TO MINIMIZE STOMACH IRRITATION, Disp: , Rfl:     multivitamin tablet, Take 1 tablet by mouth once daily., Disp: , Rfl:     omega 3-dha-epa-fish oil 1,000 mg (120 mg-180 mg) capsule, Take 1 capsule (1,000 mg) by mouth 2 times a day., Disp: , Rfl:     psyllium (Metamucil) powder, Take 1 Dose (5.8 g) by mouth 2 times a day., Disp: , Rfl:     vit C-E-zinc cit-lutein-zeaxan ([facility]) 50 mg-15 unit- 4.5 mg-2.5 mg tablet,chewable, Chew 2 tablets once daily., Disp: , Rfl:     ROS: Neg except  "HPI            OBJECTIVE            Physical exam  Estimated body mass index is 25.85 kg/m² as calculated from the following:    Height as of 8/28/24: 1.727 m (5' 8\").    Weight as of 8/28/24: 77.1 kg (170 lb).  Gen/psych: NAD, conversational, appropriate    Ambulation  Gait: antalgic  Assistive device: cane  Spine  Lumbar tenderness: pos  Limited ROM: pos, with no radiation or pain with flex/ex, lateral bending  SLR test: neg    Focused MSK exam: L  Hip  Trendelenberg test: neg  Skin/incision: normal in area of planned/possible incision (DA approach)   Tenderness: peritrochanteric, gluteal, and groin  Pain with log roll: pos  Stinchfield: pos  ROM: limited, painful  Flexion: 100º  IR: <0º  ER: 50º  Abd: 30º  TKA  Thrust: neg  Skin/Incision: well healed.   Effusion: trace  Alignment: neutral  Alignment correctable: na  Knee tenderness: none  Extension: passive flexion contracture 5-10° and active extension lag 0°  Flexion: 115º  Flexion stability: stable  Varus/Valgus stability: stable  Referred pain to knee with hip 90° flexion and 45° ER/IR: neg  Neurovascular    Strength: 5/5 hip/knee/ankle flexion and extension  Sensory (L2-S1): SILT throughout lower extremity  Edema/stasis: no pitting edema  Pulse: DP 1+, PT 1+     Focused MSK exam: R  TKA  Thrust: neg  Skin/Incision: well healed.   Effusion: trace  Alignment: neutral  Alignment correctable: na  Knee tenderness: none  Extension: passive flexion contracture 0-5° and active extension lag 0°  Flexion: 115º  Flexion stability: stable  Varus/Valgus stability: stable  Referred pain to knee with hip 90° flexion and 45° ER/IR: neg  Neurovascular    Strength: 5/5 hip/knee/ankle flexion and extension  Sensory (L2-S1): SILT throughout lower extremity  Edema/stasis: no pitting edema  Pulse: DP 1+, PT 1+    Imaging  9/6/2024 L hip radiographs (AP Pelvis, AP/Lateral) on my read: Joint space narrowing (severe) with osteophytes, sclerosis, and large femoral head subchondral " cyst, and prominent cam deformity.  12/14/2023 L knee radiographs ([facility], WB AP/lateral) on my read: TKA components in acceptable position with no sign of gross implant/hardware failure.  12/14/2023 R knee radiographs ([facility], WB AP/lateral) on my read: TKA components in acceptable position with no sign of gross implant/hardware failure.          ASSESSMENT & PLAN           Patient verbalized understanding of below A&P. All questions answered.  L hip DJD  Differential includes radicular pain from spine. H&P most consistent with hip origin.  General information  Evaluation, imaging, diagnosis, and treatment options (conservative and surgical as well as risks, benefits, recovery, and outcomes) discussed. Conservative options should be maximized and include activity modification, bracing, weight loss, PT, home exercise, local pain control (ice, heat, topicals), OTCs, and assistive devices. Once exhausted, injections may provide relief. If these fail to improve pain, function, and quality of life, elective arthroplasty may be an option.  Shared decision making   Patient has tried activity modification, home exercises, ice/heat, OTCs, and CSI ([facility]: both KELSI and intra-articular)  End-stage osteoarthritis with a large femoral head subchondral cyst.  Currently, however, I think his more severe symptoms are coming from his lower back.  Follow-up: As needed.  If the hip becomes a more significant problem, Elroy is remarkably healthy and in shape for his age and would be an excellent arthroplasty candidate.  L Lumbar radiculopathy / Sciatica  Differential includes pain from an articular source in the hip or knee itself or a primary neuropathy. History, exam, and imaging are consistent with lumbar radiculopathy / sciatica.  Background information: Lumbar radiculopathy is caused by inflammation of a nerve root in the lower back (or the sciatic nerve itself), leading to leg pain, weakness, numbness, or tingling.    Treatment: Conservative treatments include Rx and OTC pain meds, limiting heavy lifting while staying active, PT, and weight loss. Once these are exhausted, injections can be helpful. In rare cases, surgery is warranted.  Given the absence of alarming signs of nerve compression (i.e. janey weakness, bowel/bladder incontinence), I provided a referral to our nonoperative spine team.  [facility] but I think having another team provide input could be helpful.  Follow-up: As needed  Bilateral TKA (L 2002 rTKA  ?, R 2008 Primary TKA Dr. Pathak)  Activity/Therapy/Incision: Continue low impact exercise and weight mgmt.  Pain: OTCs, ice as needed.  Follow-up: 5yrs (2029)  PMH, PSH, other considerations discussed  Anemia (Hgb 11.8 on 6/21/24)  Paget's  PCN allergy (rash)  Limited lumbar ROM may affect SUMIT  Listed in EMR but not endorsed by pt or evident on exam today: CKD, mild protein calorie malnutrition  Occupation: Retired (). 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

## 2024-09-10 ENCOUNTER — TELEPHONE (OUTPATIENT)
Dept: PRIMARY CARE | Facility: CLINIC | Age: 89
End: 2024-09-10
Payer: MEDICARE

## 2024-09-10 DIAGNOSIS — N39.41 URGE INCONTINENCE: ICD-10-CM

## 2024-09-10 DIAGNOSIS — N40.1 BENIGN PROSTATIC HYPERPLASIA WITH LOWER URINARY TRACT SYMPTOMS, SYMPTOM DETAILS UNSPECIFIED: ICD-10-CM

## 2024-09-10 RX ORDER — MIRABEGRON 25 MG/1
25 TABLET, FILM COATED, EXTENDED RELEASE ORAL DAILY
Qty: 90 TABLET | Refills: 0 | Status: SHIPPED | OUTPATIENT
Start: 2024-09-10

## 2024-09-10 NOTE — TELEPHONE ENCOUNTER
See previous task, Dr Grayson did approve the refill, but must have not been sent, I have re-sent to him to approve and send in

## 2024-09-10 NOTE — TELEPHONE ENCOUNTER
Pharmacist from [facility] in [place] called, LVM, stating they got a request from patient for refill of Mybetriq 25 mg, 1 daily, qty #90.  They've not filled this since last Nov.  Patient told them he was off this medication, but was put back on it.  They do not have an active Rx for this.  Restart?

## 2024-09-20 ENCOUNTER — LAB (OUTPATIENT)
Dept: LAB | Facility: LAB | Age: 89
End: 2024-09-20
Payer: MEDICARE

## 2024-09-20 ENCOUNTER — PRE-ADMISSION TESTING (OUTPATIENT)
Dept: PREADMISSION TESTING | Facility: HOSPITAL | Age: 89
End: 2024-09-20
Payer: MEDICARE

## 2024-09-20 VITALS
OXYGEN SATURATION: 96 % | TEMPERATURE: 97.9 F | DIASTOLIC BLOOD PRESSURE: 87 MMHG | HEART RATE: 59 BPM | BODY MASS INDEX: 27.58 KG/M2 | WEIGHT: 175.71 LBS | HEIGHT: 67 IN | SYSTOLIC BLOOD PRESSURE: 150 MMHG | RESPIRATION RATE: 16 BRPM

## 2024-09-20 DIAGNOSIS — N33 BLADDER DISORDERS IN DISEASES CLASSIFIED ELSEWHERE: ICD-10-CM

## 2024-09-20 DIAGNOSIS — Z01.818 PREOP TESTING: ICD-10-CM

## 2024-09-20 DIAGNOSIS — N40.1 BENIGN PROSTATIC HYPERPLASIA WITH LOWER URINARY TRACT SYMPTOMS, SYMPTOM DETAILS UNSPECIFIED: ICD-10-CM

## 2024-09-20 DIAGNOSIS — N39.41 URGE INCONTINENCE: ICD-10-CM

## 2024-09-20 LAB
ANION GAP SERPL CALC-SCNC: 12 MMOL/L (ref 10–20)
BUN SERPL-MCNC: 25 MG/DL (ref 6–23)
CALCIUM SERPL-MCNC: 9.2 MG/DL (ref 8.6–10.3)
CHLORIDE SERPL-SCNC: 105 MMOL/L (ref 98–107)
CO2 SERPL-SCNC: 28 MMOL/L (ref 21–32)
CREAT SERPL-MCNC: 1.12 MG/DL (ref 0.5–1.3)
EGFRCR SERPLBLD CKD-EPI 2021: 61 ML/MIN/1.73M*2
ERYTHROCYTE [DISTWIDTH] IN BLOOD BY AUTOMATED COUNT: 13.5 % (ref 11.5–14.5)
GLUCOSE SERPL-MCNC: 105 MG/DL (ref 74–99)
HCT VFR BLD AUTO: 37.9 % (ref 41–52)
HGB BLD-MCNC: 11.5 G/DL (ref 13.5–17.5)
INR PPP: 1.2 (ref 0.9–1.2)
MCH RBC QN AUTO: 27.8 PG (ref 26–34)
MCHC RBC AUTO-ENTMCNC: 30.3 G/DL (ref 32–36)
MCV RBC AUTO: 92 FL (ref 80–100)
NRBC BLD-RTO: ABNORMAL /100{WBCS}
PLATELET # BLD AUTO: 203 X10*3/UL (ref 150–450)
POTASSIUM SERPL-SCNC: 4.3 MMOL/L (ref 3.5–5.3)
PROTHROMBIN TIME: 11.9 SECONDS (ref 9.3–12.7)
RBC # BLD AUTO: 4.14 X10*6/UL (ref 4.5–5.9)
SODIUM SERPL-SCNC: 141 MMOL/L (ref 136–145)
WBC # BLD AUTO: 5.9 X10*3/UL (ref 4.4–11.3)

## 2024-09-20 PROCEDURE — 85610 PROTHROMBIN TIME: CPT

## 2024-09-20 PROCEDURE — 80048 BASIC METABOLIC PNL TOTAL CA: CPT

## 2024-09-20 PROCEDURE — 85027 COMPLETE CBC AUTOMATED: CPT

## 2024-09-20 PROCEDURE — 36415 COLL VENOUS BLD VENIPUNCTURE: CPT

## 2024-09-20 ASSESSMENT — PAIN - FUNCTIONAL ASSESSMENT: PAIN_FUNCTIONAL_ASSESSMENT: 0-10

## 2024-09-20 ASSESSMENT — PAIN DESCRIPTION - DESCRIPTORS: DESCRIPTORS: ACHING

## 2024-09-20 ASSESSMENT — PAIN SCALES - GENERAL: PAINLEVEL_OUTOF10: 4

## 2024-09-20 NOTE — H&P (VIEW-ONLY)
Patient is an alert and oriented 89 year old male scheduled for a  Cystoscopy with 200 units of botox on 9/27/2024 with Dr. Wilkerson for  Urge Incontinence.  The patient denies hematuria, dysuria, burning with urination, fever, chills, n/v/d.  No lower abdominal of flank pain, has chronic back pain.  Reports he has urgency and frequency as well as incontinence.     PMHX includes Aortic Stenosis, CAD 2 vessel, Anemia, Hyperlipidemia, HTN, CKD 3a, Chronic Back pain, Gout, Mitld Mitral Regurgitation.      P[place] to [facility] [place] for perioperative risk stratification and optimization.      Review of Systems   Constitutional: Negative for chills, decreased appetite, diaphoresis, fever and malaise/fatigue.   Eyes:  Negative for blurred vision and double vision.   Cardiovascular:  Negative for chest pain, claudication, cyanosis, dyspnea on exertion, irregular heartbeat, leg swelling, near-syncope and palpitations.   Respiratory:  Negative for cough, hemoptysis, shortness of breath and wheezing.    Endocrine: Negative for cold intolerance, heat intolerance, polydipsia, polyphagia and polyuria.   Gastrointestinal:  Negative for abdominal pain, constipation, diarrhea, dysphagia, nausea and vomiting.   Genitourinary:  Negative for bladder incontinence, dysuria, hematuria, incomplete emptying, nocturia, pelvic pain and urgency.   Neurological:  Negative for headaches, light-headedness, paresthesias, sensory change and weakness.   Psychiatric/Behavioral:  Negative for altered mental status.       Vitals and nursing note reviewed.     Physical exam  Constitutional:       Appearance: Normal appearance. He is normal weight.   HENT:      Head: Normocephalic and atraumatic.      Mouth/Throat:      Mouth: Mucous membranes are moist.      Pharynx: Oropharynx is clear.   Eyes:      Extraocular Movements: Extraocular movements intact.      Conjunctiva/sclera: Conjunctivae normal.      Pupils: Pupils are equal, round, and reactive to  light.   Cardiovascular:      Rate and Rhythm: Normal rate and regular rhythm.      Pulses: Normal pulses.      Heart sounds: Normal heart sounds.      No audible carotid bruit  Pulmonary:      Effort: Pulmonary effort is normal.      Breath sounds: Normal breath sounds.   Abdominal:      General: Abdomen is flat. Bowel sounds are normal.      Palpations: Abdomen is soft.   Musculoskeletal:      Cervical back: Normal range of motion and neck supple.   Skin:     General: Skin is warm and dry.      Capillary Refill: Capillary refill takes less than 2 seconds.   Neurological:      General: No focal deficit present.      Mental Status: He is alert and oriented to person, place, and time. Mental status is at baseline.   Psychiatric:         Mood and Affect: Mood normal.         Behavior: Behavior normal.         Thought Content: Thought content normal.         Judgment: Judgment normal.     Vitals and nursing note reviewed. Physical exam within normal limits.   Vitals:    09/20/24 0903   BP: 150/87   Pulse: 59   Resp: 16   Temp: 36.6 °C (97.9 °F)   SpO2: 96%     Assessment & Plan:    Neuro:  No diagnosis or significant findings on chart review or clinical presentation and evaluation.     HEENT/Airway:  No diagnosis or significant findings on chart review or clinical presentation and evaluation.   STOP-BANG Score 3    Mallampati::  III    TM distance::  >3 FB    Neck ROM::  limited rom d/t neck pain  Mouth opening 3  Has full upper denture    Cardiovascular:    Cardiac clearance  his advanced age and aortic stenosis put him at risk for any procedure, though he may proceed without any additional testing at this time.  Per Jill MUHAMMAD CNP cardiology.  [facility]    History of Hypertension-  Managed with Amlodipine.    History of Hyperlipidemia-  Managed with Atorvastatin    History of Aortic stenosis-mild to moderate per ECHO completed 4/5/2024 AV area 1.27 cm. Asymptomatic.   Grade II  systolic ejection murmur heard over RUSB    History of CAD-No chest pain/SOB. Followed by cardiology. Severe 2 vessel disease LAD/RCA-see CTA cardiac report    TTE 4/5/2024   1. Left ventricular systolic function is normal with a 65% estimated ejection fraction.   2. Spectral Doppler shows an impaired relaxation pattern of left ventricular diastolic filling.   3. There is low normal right ventricular systolic function.   4. Mild to moderate aortic valve stenosis.   5. There is aortic valve annular calcification.   6. There is moderate aortic valve cusp calcification.   7. Compared with study from 4/21/2023, no significant change.    METS: 7.01  RCRI: 0 points, 3.9%  risk for postoperative MACE   DEBRA: 0.4% risk for postoperative MACE  EKG - 3/27/2024 same as EKG of 2/28/2023    Pulmonary:  No diagnosis or significant findings on chart review or clinical presentation and evaluation.   ARISCAT: <26 points, 1.6% risk of in-hospital postoperative pulmonary complication  PRODIGY: High risk for opioid induced respiratory depression  Smoking History-does not smoke   deep breathing handout given    Renal:  Urge Incontinence  Cystoscopy w/ 200 units of botox  Hx: holep 5/23/2024  Managed with [id number] daily     the patient is at increased risk of perioperative renal complications secondary to age>/= 56, male sex, and HTN. Preventative measures include  CMP ordered     Endocrine:  No diagnosis or significant findings on chart review or clinical presentation and evaluation.     Hematologic:  anemia  CBC ordered by Dr. Jaeger Caprini Score 5, patient at High risk for postoperative DVT. Pt supplied education/VTE handout    Gastrointestinal:   No diagnosis or significant findings on chart review or clinical presentation and evaluation.   Alcohol use-none  Drug use-none    Infectious disease:   No diagnosis or significant findings on chart review or clinical presentation and evaluation.     Musculoskeletal:   Chronic Back  Pain    JHFRAT score 13 high falls risk    Anesthesia:  No anesthesia complications  Family history of anesthesia complications none    Labs & Imaging ordered:  CBC, BMP, PT/INR by Dr. Wilkerson  Nickel/metal allergy-none  Shellfish allergy-none    Overall, patient at moderate  risk for the scheduled surgery. Discussed with patient medication instructions, NPO guidelines, and any questions or concerns. Patient needs further workup prior to preceding with elective surgery, based on cardiology.     Face to face time-30 minutes

## 2024-09-20 NOTE — CPM/PAT H&P
Patient is an alert and oriented 89 year old male scheduled for a  Cystoscopy with 200 units of botox on 9/27/2024 with Dr. Wilkerson for  Urge Incontinence.  The patient denies hematuria, dysuria, burning with urination, fever, chills, n/v/d.  No lower abdominal of flank pain, has chronic back pain.  Reports he has urgency and frequency as well as incontinence.     PMHX includes Aortic Stenosis, CAD 2 vessel, Anemia, Hyperlipidemia, HTN, CKD 3a, Chronic Back pain, Gout, Mitld Mitral Regurgitation.      [place] [facility] [place] for perioperative risk stratification and optimization.      Review of Systems   Constitutional: Negative for chills, decreased appetite, diaphoresis, fever and malaise/fatigue.   Eyes:  Negative for blurred vision and double vision.   Cardiovascular:  Negative for chest pain, claudication, cyanosis, dyspnea on exertion, irregular heartbeat, leg swelling, near-syncope and palpitations.   Respiratory:  Negative for cough, hemoptysis, shortness of breath and wheezing.    Endocrine: Negative for cold intolerance, heat intolerance, polydipsia, polyphagia and polyuria.   Gastrointestinal:  Negative for abdominal pain, constipation, diarrhea, dysphagia, nausea and vomiting.   Genitourinary:  Negative for bladder incontinence, dysuria, hematuria, incomplete emptying, nocturia, pelvic pain and urgency.   Neurological:  Negative for headaches, light-headedness, paresthesias, sensory change and weakness.   Psychiatric/Behavioral:  Negative for altered mental status.       Vitals and nursing note reviewed.     Physical exam  Constitutional:       Appearance: Normal appearance. He is normal weight.   HENT:      Head: Normocephalic and atraumatic.      Mouth/Throat:      Mouth: Mucous membranes are moist.      Pharynx: Oropharynx is clear.   Eyes:      Extraocular Movements: Extraocular movements intact.      Conjunctiva/sclera: Conjunctivae normal.      Pupils: Pupils are equal, round, and reactive to  light.   Cardiovascular:      Rate and Rhythm: Normal rate and regular rhythm.      Pulses: Normal pulses.      Heart sounds: Normal heart sounds.      No audible carotid bruit  Pulmonary:      Effort: Pulmonary effort is normal.      Breath sounds: Normal breath sounds.   Abdominal:      General: Abdomen is flat. Bowel sounds are normal.      Palpations: Abdomen is soft.   Musculoskeletal:      Cervical back: Normal range of motion and neck supple.   Skin:     General: Skin is warm and dry.      Capillary Refill: Capillary refill takes less than 2 seconds.   Neurological:      General: No focal deficit present.      Mental Status: He is alert and oriented to person, place, and time. Mental status is at baseline.   Psychiatric:         Mood and Affect: Mood normal.         Behavior: Behavior normal.         Thought Content: Thought content normal.         Judgment: Judgment normal.     Vitals and nursing note reviewed. Physical exam within normal limits.   Vitals:    09/20/24 0903   BP: 150/87   Pulse: 59   Resp: 16   Temp: 36.6 °C (97.9 °F)   SpO2: 96%     Assessment & Plan:    Neuro:  No diagnosis or significant findings on chart review or clinical presentation and evaluation.     HEENT/Airway:  No diagnosis or significant findings on chart review or clinical presentation and evaluation.   STOP-BANG Score 3    Mallampati::  III    TM distance::  >3 FB    Neck ROM::  limited rom d/t neck pain  Mouth opening 3  Has full upper denture    Cardiovascular:    Cardiac clearance  his advanced age and aortic stenosis put him at risk for any procedure, though he may proceed without any additional testing at this time.  Per Jill MUHAMMAD CNP cardiology.  [facility].    History of Hypertension-  Managed with Amlodipine.    History of Hyperlipidemia-  Managed with Atorvastatin    History of Aortic stenosis-mild to moderate per ECHO completed 4/5/2024 AV area 1.27 cm. Asymptomatic.   Grade II  systolic ejection murmur heard over RUSB    History of CAD-No chest pain/SOB. Followed by cardiology. Severe 2 vessel disease LAD/RCA-see CTA cardiac report    TTE 4/5/2024   1. Left ventricular systolic function is normal with a 65% estimated ejection fraction.   2. Spectral Doppler shows an impaired relaxation pattern of left ventricular diastolic filling.   3. There is low normal right ventricular systolic function.   4. Mild to moderate aortic valve stenosis.   5. There is aortic valve annular calcification.   6. There is moderate aortic valve cusp calcification.   7. Compared with study from 4/21/2023, no significant change.    METS: 7.01  RCRI: 0 points, 3.9%  risk for postoperative MACE   DEBRA: 0.4% risk for postoperative MACE  EKG - 3/27/2024 same as EKG of 2/28/2023    Pulmonary:  No diagnosis or significant findings on chart review or clinical presentation and evaluation.   ARISCAT: <26 points, 1.6% risk of in-hospital postoperative pulmonary complication  PRODIGY: High risk for opioid induced respiratory depression  Smoking History-does not smoke   deep breathing handout given    Renal:  Urge Incontinence  Cystoscopy w/ 200 units of botox  Hx: holep 5/23/2024  Managed with [id number] daily     the patient is at increased risk of perioperative renal complications secondary to age>/= 56, male sex, and HTN. Preventative measures include  CMP ordered     Endocrine:  No diagnosis or significant findings on chart review or clinical presentation and evaluation.     Hematologic:  anemia  CBC ordered by Dr. Jaeger Caprini Score 5, patient at High risk for postoperative DVT. Pt supplied education/VTE handout    Gastrointestinal:   No diagnosis or significant findings on chart review or clinical presentation and evaluation.   Alcohol use-none  Drug use-none    Infectious disease:   No diagnosis or significant findings on chart review or clinical presentation and evaluation.     Musculoskeletal:   Chronic Back  Pain    JHFRAT score 13 high falls risk    Anesthesia:  No anesthesia complications  Family history of anesthesia complications none    Labs & Imaging ordered:  CBC, BMP, PT/INR by Dr. Wilkerson  Nickel/metal allergy-none  Shellfish allergy-none    Overall, patient at moderate  risk for the scheduled surgery. Discussed with patient medication instructions, NPO guidelines, and any questions or concerns. Patient needs further workup prior to preceding with elective surgery, based on cardiology.     Face to face time-30 minutes

## 2024-09-20 NOTE — PREPROCEDURE INSTRUCTIONS
Medication List            Accurate as of September 20, 2024  9:25 AM. Always use your most recent med list.                amLODIPine 2.5 mg tablet  Commonly known as: Norvasc  TAKE 1 TABLET BY MOUTH ONCE  DAILY  Medication Adjustments for Surgery: Take/Use as prescribed     atorvastatin 40 mg tablet  Commonly known as: Lipitor  Take 1 tablet (40 mg) by mouth once daily at bedtime.  Notes to patient: Currently not taking     azelastine 137 mcg (0.1 %) nasal spray  Commonly known as: Astelin  Administer 1 spray into each nostril 2 times a day. Use in each nostril as directed  Medication Adjustments for Surgery: Take/Use as prescribed     carboxymethylcellulose 0.5 % ophthalmic solution  Commonly known as: Refresh Plus  Medication Adjustments for Surgery: Take/Use as prescribed     colchicine 0.6 mg tablet  Medication Adjustments for Surgery: Take/Use as prescribed     COLLAGEN 1500 PLUS C ORAL  Additional Medication Adjustments for Surgery: Take last dose 7 days before surgery  Notes to patient: Last dose on 9/20/2024     cyanocobalamin 500 mcg tablet  Commonly known as: Vitamin B-12  Additional Medication Adjustments for Surgery: Take last dose 7 days before surgery  Notes to patient: Last dose 9/20/2024     diphenhydrAMINE 50 mg capsule  Commonly known as: BENADryl  Medication Adjustments for Surgery: Take/Use as prescribed     docusate sodium 100 mg capsule  Commonly known as: Colace  Medication Adjustments for Surgery: Take/Use as prescribed     fluticasone 50 mcg/actuation nasal spray  Commonly known as: Flonase  Medication Adjustments for Surgery: Take/Use as prescribed     lidocaine 5 % patch  Commonly known as: Lidoderm  Additional Medication Adjustments for Surgery: Take last dose 7 days before surgery  Notes to patient: Last dose today 9/20/2024     MAGNESIUM ORAL  Medication Adjustments for Surgery: Do Not take on the morning of surgery     mirabegron 25 mg tablet extended release 24 hr 24 hr  tablet  Commonly known as: Myrbetriq  Take 1 tablet (25 mg) by mouth once daily.  Medication Adjustments for Surgery: Take/Use as prescribed     multivitamin tablet  Additional Medication Adjustments for Surgery: Take last dose 7 days before surgery  Notes to patient: Last dose today     Ocuvite Eye Health 50 mg-15 unit- 4.5 mg-2.5 mg tablet,chewable  Generic drug: vit C-E-zinc cit-lutein-zeaxan  Additional Medication Adjustments for Surgery: Take last dose 7 days before surgery  Notes to patient: Last dose today     omega 3-dha-epa-fish oil 1,000 (120-180) mg capsule  Additional Medication Adjustments for Surgery: Take last dose 7 days before surgery  Notes to patient: Last dose today     psyllium powder  Commonly known as: Metamucil  Medication Adjustments for Surgery: Take/Use as prescribed            NPO Instructions:    Do not eat any food after midnight the night before your surgery/procedure.  You may have clear liquids until TWO hours before surgery/procedure. This includes water, black tea/coffee, (no milk or cream) apple juice and electrolyte drinks (Gatorade).  You may chew gum up to TWO hours before your surgery/procedure.    Additional Instructions:     Seven/Six Days before Surgery:  Review your medication instructions, stop indicated medications  Five Days before Surgery:  Review your medication instructions, stop indicated medications  Three Days before Surgery:  Review your medication instructions, stop indicated medications  The Day before Surgery:  Review your medication instructions, stop indicated medications  You will be contacted regarding the time of your arrival to facility and surgery time  Do not eat any food after Midnight  Day of Surgery:  Review your medication instructions, take indicated medications  You may have clear liquids until TWO hours before surgery/procedure.  This includes water, black tea/coffee, (no milk or cream) apple juice and electrolyte drinks (Gatorade)  You may chew  gum up to TWO hours before your surgery/procedure  Wear  comfortable loose fitting clothing  Do not use moisturizers, creams, lotions or perfume  All jewelry and valuables should be left at home  CONTACT SURGEON'S OFFICE IF YOU DEVELOP:  * Fever = 100.4 F   * New respiratory symptoms (e.g. cough, shortness of breath, respiratory distress, sore throat)  * Recent loss of taste or smell  *Flu like symptoms such as headache, fatigue or gastrointestinal symptoms  * You develop any open sores, shingles, burning or painful urination   AND/OR:  * You no longer wish to have the surgery.  * Any other personal circumstances change that may lead to the need to cancel or defer this surgery.  *You were admitted to any hospital within one week of your planned procedure.    SMOKING:  *Quitting smoking can make a huge difference to your health and recovery from surgery.    *If you need help with quitting, call 2-642-QUIT-NOW.    THE DAY BEFORE SURGERY:  *Do not eat any food after midnight the night before your surgery.   *You may have up to 13.5 OUNCES of clear liquids until TWO hours before your instructed ARRIVAL TIME to hospital. This includes water, black tea/coffee, (no milk or cream) apple juice, clear broth and electrolyte drinks (Gatorade). Please avoid clear liquids that are red in color.   *You may chew gum/mints up to TWO hours before your surgery/procedure.    SURGICAL TIME:  *You will be contacted between 2 p.m. and 3 p.m. the business day before your surgery with your arrival time.  *If you haven't received a call by 3pm, call [phone]  *Scheduled surgery times may change and you will be notified if this occurs-check your personal voicemail for any updates.    ON THE MORNING OF SURGERY:  *Wear comfortable, loose fitting clothing.   *Do not use moisturizers, creams, lotions or perfume.  *All jewelry and valuables should be left at home.  *Prosthetic devices such as contact lenses, hearing aids, dentures, eyelash  extensions, hairpins and body piercing must be removed before surgery.    BRING WITH YOU:  *Photo ID and insurance card  *Current list of medications and allergies  *Pacemaker/Defibrillator/Heart stent cards  *CPAP machine and mask  *Slings/splints/crutches  *Copy of your complete Advanced Directive/DHPOA-if applicable  *Neurostimulator implant remote    PARKING AND ARRIVAL:  *Check in at the Main Entrance desk and let them know you are here for surgery.    IF YOU ARE HAVING OUTPATIENT/SAME DAY SURGERY:  *A responsible adult MUST accompany you at the time of discharge and stay with you for 24 hours after your surgery.  *You may NOT drive yourself home after surgery.  *You may use a taxi or ride sharing service ([place], Uber) to return home ONLY if you are accompanied by a friend or family member.  *Instructions for resuming your medications will be provided by your surgeon.    Thank you for coming to Pre Admission testing.     If I have prescribe medication please don't forget to  at your pharmacy.     Any questions about today's visit call [phone] and leave a message in the general mailbox.    Patient instructed to ambulate as soon as possible postoperatively to decrease thromboembolic risk.    [place]aisy [place], APRN-CNP    Thank you for visiting [facility].  If you have any changes to your health condition, please call the surgeons office to alert them and give them details of your symptoms.        Preoperative Fasting Guidelines    Why must I stop eating and drinking near surgery time?  With sedation, food or liquid in your stomach can enter your lungs causing serious complications  Increases nausea and vomiting    When do I need to stop eating and drinking before my surgery?  Do not eat any food after midnight the night before your surgery/procedure.  You may have up to 13.5 ounces of clear liquid until TWO hours before your instructed arrival time to the hospital.  This includes  water, black tea/coffee, (no milk or cream) apple juice, and electrolyte drinks (Gatorade)  You may chew gum until TWO hours before your surgery/procedure      Additional Instructions:     The Day before Surgery:  -Review your medication instructions, stop indicated medications  -You will be contacted in the evening regarding the time of your arrival to facility and surgery time    Day of Surgery:  -Review your medication instructions, take indicated medications  -Wear comfortable loose fitting clothing  -Do not use moisturizers, creams, lotions or perfume  -All jewelry and valuables should be left at home              Preoperative Brain Exercises    What are brain exercises?  A brain exercise is any activity that engages your thinking (cognitive) skills.    What types of activities are considered brain exercises?  Jigsaw puzzles, crossword puzzles, word jumble, memory games, word search, and many more.  Many can be found free online or on your phone via a mobile luis.    Why should I do brain exercises before my surgery?  More recent research has shown brain exercise before surgery can lower the risk of postoperative delirium (confusion) which can be especially important for older adults.  Patients who did brain exercises for 5 to 10 hours the days before surgery, cut their risk of postoperative delirium in half up to 1 week after surgery.                  [facility]    Preoperative Deep Breathing Exercises    Why it is important to do deep breathing exercises before my surgery?  Deep breathing exercises strengthen your breathing muscles.  This helps you to recover after your surgery and decreases the chance of breathing complications.      How are the deep breathing exercises done?  Sit straight with your back supported.  Breathe in deeply and slowly through your nose. Your lower rib cage should expand and your abdomen may move forward.  Hold that breath for 3 to 5 seconds.  Breathe out  through pursed lips, slowly and completely.  Rest and repeat 10 times every hour while awake.  Rest longer if you become dizzy or lightheaded.                [facility]    Preoperative Deep Breathing Exercises    Why it is important to do deep breathing exercises before my surgery?  Deep breathing exercises strengthen your breathing muscles.  This helps you to recover after your surgery and decreases the chance of breathing complications.      How are the deep breathing exercises done?  Sit straight with your back supported.  Breathe in deeply and slowly through your nose. Your lower rib cage should expand and your abdomen may move forward.  Hold that breath for 3 to 5 seconds.  Breathe out through pursed lips, slowly and completely.  Rest and repeat 10 times every hour while awake.  Rest longer if you become dizzy or lightheaded.      Patient Information: Incentive Spirometer  What is an incentive spirometer?  An incentive spirometer is a device used before and after surgery to “exercise” your lungs.  It helps you to take deeper breaths to expand your lungs.  Below is an example of a basic incentive spirometer.  The device you receive may differ slightly but they all function the same.    Why do I need to use an incentive spirometer?  Using your incentive spirometer prepares your lungs for surgery and helps prevent lung problems after surgery.  How do I use my incentive spirometer?  When you're using your incentive spirometer, make sure to breathe through your mouth. If you breathe through your nose, the incentive spirometer won't work properly. You can hold your nose if you have trouble.  If you feel dizzy at any time, stop and rest. Try again at a later time.  Follow the steps below:  Set up your incentive spirometer, expand the flexible tubing and connect to the outlet.  Sit upright in a chair or bed. Hold the incentive spirometer at eye level.   Put the mouthpiece in your mouth and  close your lips tightly around it. Slowly breathe out (exhale) completely.  Breathe in (inhale) slowly through your mouth as deeply as you can. As you take a breath, you will see the piston rise inside the large column. While the piston rises, the indicator should move upwards. It should stay in between the 2 arrows (see Figure).  Try to get the piston as high as you can, while keeping the indicator between the arrows.   If the indicator doesn't stay between the arrows, you're breathing either too fast or too slow.  When you get it as high as you can, hold your breath for 10 seconds, or as long as possible. While you're holding your breath, the piston will slowly fall to the base of the spirometer.  Once the piston reaches the bottom of the spirometer, breathe out slowly through your mouth. Rest for a few seconds.  Repeat 10 times. Try to get the piston to the same level with each breath.  Repeat every hour while awake  You can carefully clean the outside of the mouthpiece with an alcohol wipe or soap and water.      Patient and Family Education             Ways You Can Help Prevent Blood Clots             This handout explains some simple things you can do to help prevent blood clots.      Blood clots are blockages that can form in the body's veins. When a blood clot forms in your deep veins, it may be called a deep vein thrombosis, or DVT for short. Blood clots can happen in any part of the body where blood flows, but they are most common in the arms and legs. If a piece of a blood clot breaks free and travels to the lungs, it is called a pulmonary embolus (PE). A PE can be a very serious problem.         Being in the hospital or having surgery can raise your chances of getting a blood clot because you may not be well enough to move around as much as you normally do.         Ways you can help prevent blood clots in the hospital         Wearing SCDs. SCDs stands for Sequential Compression Devices.   SCDs are  special sleeves that wrap around your legs  They attach to a pump that fills them with air to gently squeeze your legs every few minutes.   This helps return the blood in your legs to your heart.   SCDs should only be taken off when walking or bathing.   SCDs may not be comfortable, but they can help save your life.               Wearing compression stockings - if your doctor orders them. These special snug fitting stockings gently squeeze your legs to help blood flow.       Walking. Walking helps move the blood in your legs.   If your doctor says it is ok, try walking the halls at least   5 times a day. Ask us to help you get up, so you don't fall.      Taking any blood thinning medicines your doctor orders.        Page 1 of 2     [facility]; 3/23   Ways you can help prevent blood clots at home       Wearing compression stockings - if your doctor orders them. ? Walking - to help move the blood in your legs.       Taking any blood thinning medicines your doctor orders.      Signs of a blood clot or PE      Tell your doctor or nurse know right away if you have of the problems listed below.    If you are at home, seek medical care right away. Call 911 for chest pain or problems breathing.               Signs of a blood clot (DVT) - such as pain,  swelling, redness or warmth in your arm or leg      Signs of a pulmonary embolism (PE) - such as chest     pain or feeling short of breath

## 2024-09-25 ENCOUNTER — APPOINTMENT (OUTPATIENT)
Dept: PRIMARY CARE | Facility: CLINIC | Age: 89
End: 2024-09-25
Payer: MEDICARE

## 2024-09-25 VITALS
DIASTOLIC BLOOD PRESSURE: 84 MMHG | WEIGHT: 177.2 LBS | BODY MASS INDEX: 27.81 KG/M2 | HEIGHT: 67 IN | SYSTOLIC BLOOD PRESSURE: 134 MMHG

## 2024-09-25 DIAGNOSIS — I10 PRIMARY HYPERTENSION: ICD-10-CM

## 2024-09-25 DIAGNOSIS — I25.10 CORONARY ARTERY DISEASE INVOLVING NATIVE HEART WITHOUT ANGINA PECTORIS, UNSPECIFIED VESSEL OR LESION TYPE: ICD-10-CM

## 2024-09-25 DIAGNOSIS — M25.552 PAIN OF LEFT HIP: ICD-10-CM

## 2024-09-25 DIAGNOSIS — K08.89 PAIN, DENTAL: ICD-10-CM

## 2024-09-25 DIAGNOSIS — J84.10 PULMONARY FIBROSIS (MULTI): Primary | ICD-10-CM

## 2024-09-25 PROCEDURE — 1170F FXNL STATUS ASSESSED: CPT | Performed by: INTERNAL MEDICINE

## 2024-09-25 PROCEDURE — 1157F ADVNC CARE PLAN IN RCRD: CPT | Performed by: INTERNAL MEDICINE

## 2024-09-25 PROCEDURE — G0439 PPPS, SUBSEQ VISIT: HCPCS | Performed by: INTERNAL MEDICINE

## 2024-09-25 PROCEDURE — 99214 OFFICE O/P EST MOD 30 MIN: CPT | Performed by: INTERNAL MEDICINE

## 2024-09-25 PROCEDURE — 1159F MED LIST DOCD IN RCRD: CPT | Performed by: INTERNAL MEDICINE

## 2024-09-25 PROCEDURE — 1036F TOBACCO NON-USER: CPT | Performed by: INTERNAL MEDICINE

## 2024-09-25 PROCEDURE — 1158F ADVNC CARE PLAN TLK DOCD: CPT | Performed by: INTERNAL MEDICINE

## 2024-09-25 PROCEDURE — 3075F SYST BP GE 130 - 139MM HG: CPT | Performed by: INTERNAL MEDICINE

## 2024-09-25 PROCEDURE — 1123F ACP DISCUSS/DSCN MKR DOCD: CPT | Performed by: INTERNAL MEDICINE

## 2024-09-25 PROCEDURE — 3079F DIAST BP 80-89 MM HG: CPT | Performed by: INTERNAL MEDICINE

## 2024-09-25 RX ORDER — ATORVASTATIN CALCIUM 40 MG/1
40 TABLET, FILM COATED ORAL NIGHTLY
Qty: 90 TABLET | Refills: 3 | Status: SHIPPED | OUTPATIENT
Start: 2024-09-25 | End: 2025-09-25

## 2024-09-25 ASSESSMENT — PATIENT HEALTH QUESTIONNAIRE - PHQ9
1. LITTLE INTEREST OR PLEASURE IN DOING THINGS: SEVERAL DAYS
1. LITTLE INTEREST OR PLEASURE IN DOING THINGS: NOT AT ALL
SUM OF ALL RESPONSES TO PHQ9 QUESTIONS 1 AND 2: 2
2. FEELING DOWN, DEPRESSED OR HOPELESS: NOT AT ALL
2. FEELING DOWN, DEPRESSED OR HOPELESS: SEVERAL DAYS
SUM OF ALL RESPONSES TO PHQ9 QUESTIONS 1 AND 2: 0
SUM OF ALL RESPONSES TO PHQ9 QUESTIONS 1 AND 2: 0
2. FEELING DOWN, DEPRESSED OR HOPELESS: NOT AT ALL
1. LITTLE INTEREST OR PLEASURE IN DOING THINGS: NOT AT ALL

## 2024-09-25 ASSESSMENT — ACTIVITIES OF DAILY LIVING (ADL)
MANAGING_FINANCES: INDEPENDENT
TAKING_MEDICATION: INDEPENDENT
DOING_HOUSEWORK: INDEPENDENT
BATHING: INDEPENDENT
GROCERY_SHOPPING: INDEPENDENT
DRESSING: INDEPENDENT

## 2024-09-25 NOTE — PROGRESS NOTES
"Subjective   Reason for Visit: Elroy Greco is an 89 y.o. male here for a Medicare Wellness visit.               HPI    S/p fall x2.  Most recent ~1 week ptv.  No head trauma.  No confusion/HA/nausea.  Some increase left hip pain.     #1 htn  #2 CKD3  #3 LBP  #4 renal/hepatic cysts  #5 Padget's dz  #6 urinary freq, urgency.  Increasing.  Following w/ --> 1 mth s/p HoLEAP. Urination improving. No pain  Patient Care Team:  Trice Grayson MD as PCP - General  Trice Grayson MD as PCP - [employer] PCP     Review of Systems    Objective   Vitals:  /84   Ht 1.702 m (5' 7\")   Wt 80.4 kg (177 lb 3.2 oz)   BMI 27.75 kg/m²       Physical Exam      Lab Results   Component Value Date    WBC 5.9 09/20/2024    HGB 11.5 (L) 09/20/2024    HCT 37.9 (L) 09/20/2024     09/20/2024    CHOL 147 11/20/2023    TRIG 105 11/20/2023    HDL 56.4 11/20/2023    ALT 10 11/20/2023    AST 19 06/10/2020     09/20/2024    K 4.3 09/20/2024     09/20/2024    CREATININE 1.12 09/20/2024    BUN 25 (H) 09/20/2024    CO2 28 09/20/2024    TSH 1.72 06/21/2024    INR 1.2 09/20/2024    HGBA1C 5.9 (H) 06/21/2024       Assessment & Plan  Pain, dental    Orders:  •  Referral to Primary Care    Pulmonary fibrosis (Multi)    Orders:  •  Referral to Pulmonology; Future    Pain of left hip    Orders:  •  XR hip left with pelvis when performed 2 or 3 views; Future                  #1 hypertension- on target. Continue to follow  #2 ckd3- stable. Check labs next visit  #3 back pain- increased. [facility].   Appt pending w/ spine at   #4 renal/hepatic cyst- follow-up with urology  #5 Paget's disease- In remission. follow-[facility]ocrinology at [facility]. Alkaline phosphatase through their office, per pt stable. stressed importance again  #6 urinary freq- increased symptoms, resume 2 Flomax every day, UA/Cx. . Follow-up with urology ASAP  #7 fatigue/ dyspnea- stable/improved. No significant change with " discontinuing metoprolol. Follow-up cardiology  #8 mod AI/AS- clinically stable. Continue afterload reduction. f[facility]u cards at [facility]   #9 mild, stable normocytic anemia. Normal iron, B12, folic acid. stable for >6 yrs. retest   #10 depression/anxiety- remains an issue. Continue increased duloxetine 30 mg po qd  #11 BPH w/ LUTS- s/p TURP 2017. f/u  - appt pending  #12 mild SANCHEZ-stable/improved. f/u cards   #13 ? pulm HTN on echo 3/17- reviewed. clinically stable. f/u cardiology.   #14 diastolic dysfxn- reviewed. BP well controlled.   #15 gout- clinically resolved. UA next visit  #16 constipation- remains an issue.  f/u  w/ GI , con't  miralax every day.   #17 CAD/angina- stable. f/u cards   #18 elevated glucose (non-fasting)- elevated hemoglobin A1c. stay focused on a reduced sugar low. carbohydrate diet, recheck 6 mths  #19 hip and shoulder pain- f/u ortho.  #20 lipids- good  #21 insomnia- reviewed. con't melatonin 1 mg po qhs  #22 vit d def- increase to 3,000 otc d3  #23 cough with eating/dysphasia- f/u GI  #24 mild imbalance- fall precautions reviewed. Better. Con't PT  #25 ? neuropathy rt arm- ?CTS. reviewed. rec NCS/EMG. he declines. normal exam. f/u inb 3-4 weeks.   #26 hip pain- reviewed. Appt pending w/ [facility]  #27 depression- low-dose Cymbalta.    #28 syncope/presyncope- no further episodes. f/u cards.   #29 back pain- f[facility]   #30 pulmonary nodules- f/u chest CT stable x >2 yrs  #31 ? pulm fibrosis- no s/sx. no cough/SOB. stable on CT 2017-->2022. reviewed DDx at length again. Rec pulm eval again. He has declined, he now agrees.   #32 knee pain- s/p remote tka. f/u  w/ ortho at [facility].   C/s [facility] ortho per pt req.   #33 nasal congestion- trial astelin     Rt knee pain- better w/ brace.  f/u  ortho at [facility]  Bph- ok w/ surgery

## 2024-09-26 ENCOUNTER — HOSPITAL ENCOUNTER (OUTPATIENT)
Dept: RADIOLOGY | Facility: CLINIC | Age: 89
Discharge: HOME | End: 2024-09-26
Payer: MEDICARE

## 2024-09-26 DIAGNOSIS — M25.552 PAIN OF LEFT HIP: ICD-10-CM

## 2024-09-26 PROCEDURE — 73502 X-RAY EXAM HIP UNI 2-3 VIEWS: CPT | Mod: LEFT SIDE | Performed by: RADIOLOGY

## 2024-09-26 PROCEDURE — 73502 X-RAY EXAM HIP UNI 2-3 VIEWS: CPT | Mod: LT

## 2024-09-27 ENCOUNTER — ANESTHESIA EVENT (OUTPATIENT)
Dept: OPERATING ROOM | Facility: HOSPITAL | Age: 89
End: 2024-09-27
Payer: MEDICARE

## 2024-09-27 ENCOUNTER — HOSPITAL ENCOUNTER (OUTPATIENT)
Facility: HOSPITAL | Age: 89
Setting detail: OUTPATIENT SURGERY
Discharge: HOME | End: 2024-09-27
Attending: UROLOGY | Admitting: UROLOGY
Payer: MEDICARE

## 2024-09-27 ENCOUNTER — ANESTHESIA (OUTPATIENT)
Dept: OPERATING ROOM | Facility: HOSPITAL | Age: 89
End: 2024-09-27
Payer: MEDICARE

## 2024-09-27 VITALS
WEIGHT: 175.3 LBS | HEART RATE: 54 BPM | SYSTOLIC BLOOD PRESSURE: 151 MMHG | HEIGHT: 67 IN | TEMPERATURE: 97 F | BODY MASS INDEX: 27.51 KG/M2 | OXYGEN SATURATION: 99 % | DIASTOLIC BLOOD PRESSURE: 89 MMHG | RESPIRATION RATE: 16 BRPM

## 2024-09-27 DIAGNOSIS — N39.41 URGE INCONTINENCE: Primary | ICD-10-CM

## 2024-09-27 PROCEDURE — 2500000004 HC RX 250 GENERAL PHARMACY W/ HCPCS (ALT 636 FOR OP/ED): Performed by: UROLOGY

## 2024-09-27 PROCEDURE — 96372 THER/PROPH/DIAG INJ SC/IM: CPT | Performed by: UROLOGY

## 2024-09-27 PROCEDURE — 2720000007 HC OR 272 NO HCPCS: Performed by: UROLOGY

## 2024-09-27 PROCEDURE — 2500000004 HC RX 250 GENERAL PHARMACY W/ HCPCS (ALT 636 FOR OP/ED): Performed by: NURSE ANESTHETIST, CERTIFIED REGISTERED

## 2024-09-27 PROCEDURE — 3700000002 HC GENERAL ANESTHESIA TIME - EACH INCREMENTAL 1 MINUTE: Performed by: UROLOGY

## 2024-09-27 PROCEDURE — 7100000001 HC RECOVERY ROOM TIME - INITIAL BASE CHARGE: Performed by: UROLOGY

## 2024-09-27 PROCEDURE — 3600000003 HC OR TIME - INITIAL BASE CHARGE - PROCEDURE LEVEL THREE: Performed by: UROLOGY

## 2024-09-27 PROCEDURE — 7100000010 HC PHASE TWO TIME - EACH INCREMENTAL 1 MINUTE: Performed by: UROLOGY

## 2024-09-27 PROCEDURE — 7100000002 HC RECOVERY ROOM TIME - EACH INCREMENTAL 1 MINUTE: Performed by: UROLOGY

## 2024-09-27 PROCEDURE — 52287 CYSTOSCOPY CHEMODENERVATION: CPT | Performed by: UROLOGY

## 2024-09-27 PROCEDURE — 3700000001 HC GENERAL ANESTHESIA TIME - INITIAL BASE CHARGE: Performed by: UROLOGY

## 2024-09-27 PROCEDURE — 3600000008 HC OR TIME - EACH INCREMENTAL 1 MINUTE - PROCEDURE LEVEL THREE: Performed by: UROLOGY

## 2024-09-27 PROCEDURE — 7100000009 HC PHASE TWO TIME - INITIAL BASE CHARGE: Performed by: UROLOGY

## 2024-09-27 RX ORDER — ACETAMINOPHEN 500 MG
1000 TABLET ORAL EVERY 6 HOURS PRN
COMMUNITY

## 2024-09-27 RX ORDER — OXYCODONE HYDROCHLORIDE 5 MG/1
5 TABLET ORAL EVERY 4 HOURS PRN
Status: DISCONTINUED | OUTPATIENT
Start: 2024-09-27 | End: 2024-09-27 | Stop reason: HOSPADM

## 2024-09-27 RX ORDER — ONDANSETRON HYDROCHLORIDE 2 MG/ML
4 INJECTION, SOLUTION INTRAVENOUS ONCE AS NEEDED
Status: DISCONTINUED | OUTPATIENT
Start: 2024-09-27 | End: 2024-09-27 | Stop reason: HOSPADM

## 2024-09-27 RX ORDER — LIDOCAINE HYDROCHLORIDE 10 MG/ML
0.1 INJECTION, SOLUTION EPIDURAL; INFILTRATION; INTRACAUDAL; PERINEURAL ONCE
Status: DISCONTINUED | OUTPATIENT
Start: 2024-09-27 | End: 2024-09-27 | Stop reason: HOSPADM

## 2024-09-27 RX ORDER — SODIUM CHLORIDE, SODIUM LACTATE, POTASSIUM CHLORIDE, CALCIUM CHLORIDE 600; 310; 30; 20 MG/100ML; MG/100ML; MG/100ML; MG/100ML
20 INJECTION, SOLUTION INTRAVENOUS CONTINUOUS
Status: DISCONTINUED | OUTPATIENT
Start: 2024-09-27 | End: 2024-09-27 | Stop reason: HOSPADM

## 2024-09-27 RX ORDER — MEPERIDINE HYDROCHLORIDE 25 MG/ML
12.5 INJECTION INTRAMUSCULAR; INTRAVENOUS; SUBCUTANEOUS EVERY 10 MIN PRN
Status: DISCONTINUED | OUTPATIENT
Start: 2024-09-27 | End: 2024-09-27 | Stop reason: HOSPADM

## 2024-09-27 RX ORDER — FENTANYL CITRATE 50 UG/ML
25 INJECTION, SOLUTION INTRAMUSCULAR; INTRAVENOUS EVERY 5 MIN PRN
Status: DISCONTINUED | OUTPATIENT
Start: 2024-09-27 | End: 2024-09-27 | Stop reason: HOSPADM

## 2024-09-27 RX ORDER — LABETALOL HYDROCHLORIDE 5 MG/ML
5 INJECTION, SOLUTION INTRAVENOUS ONCE AS NEEDED
Status: DISCONTINUED | OUTPATIENT
Start: 2024-09-27 | End: 2024-09-27 | Stop reason: HOSPADM

## 2024-09-27 RX ORDER — SODIUM CHLORIDE, SODIUM LACTATE, POTASSIUM CHLORIDE, CALCIUM CHLORIDE 600; 310; 30; 20 MG/100ML; MG/100ML; MG/100ML; MG/100ML
100 INJECTION, SOLUTION INTRAVENOUS CONTINUOUS
Status: DISCONTINUED | OUTPATIENT
Start: 2024-09-27 | End: 2024-09-27 | Stop reason: HOSPADM

## 2024-09-27 RX ORDER — FENTANYL CITRATE 50 UG/ML
50 INJECTION, SOLUTION INTRAMUSCULAR; INTRAVENOUS EVERY 5 MIN PRN
Status: DISCONTINUED | OUTPATIENT
Start: 2024-09-27 | End: 2024-09-27 | Stop reason: HOSPADM

## 2024-09-27 RX ORDER — CIPROFLOXACIN 2 MG/ML
400 INJECTION, SOLUTION INTRAVENOUS ONCE
Status: COMPLETED | OUTPATIENT
Start: 2024-09-27 | End: 2024-09-27

## 2024-09-27 RX ORDER — PROPOFOL 10 MG/ML
INJECTION, EMULSION INTRAVENOUS AS NEEDED
Status: DISCONTINUED | OUTPATIENT
Start: 2024-09-27 | End: 2024-09-27

## 2024-09-27 RX ORDER — PHENAZOPYRIDINE HYDROCHLORIDE 200 MG/1
200 TABLET, FILM COATED ORAL 3 TIMES DAILY PRN
Qty: 9 TABLET | Refills: 0 | Status: SHIPPED | OUTPATIENT
Start: 2024-09-27 | End: 2024-09-30

## 2024-09-27 ASSESSMENT — PAIN SCALES - GENERAL
PAINLEVEL_OUTOF10: 0 - NO PAIN
PAINLEVEL_OUTOF10: 5 - MODERATE PAIN
PAINLEVEL_OUTOF10: 0 - NO PAIN

## 2024-09-27 ASSESSMENT — COLUMBIA-SUICIDE SEVERITY RATING SCALE - C-SSRS
6. HAVE YOU EVER DONE ANYTHING, STARTED TO DO ANYTHING, OR PREPARED TO DO ANYTHING TO END YOUR LIFE?: NO
2. HAVE YOU ACTUALLY HAD ANY THOUGHTS OF KILLING YOURSELF?: NO
1. IN THE PAST MONTH, HAVE YOU WISHED YOU WERE DEAD OR WISHED YOU COULD GO TO SLEEP AND NOT WAKE UP?: NO

## 2024-09-27 ASSESSMENT — PAIN - FUNCTIONAL ASSESSMENT
PAIN_FUNCTIONAL_ASSESSMENT: 0-10
PAIN_FUNCTIONAL_ASSESSMENT: WONG-BAKER FACES
PAIN_FUNCTIONAL_ASSESSMENT: 0-10

## 2024-09-27 NOTE — NURSING NOTE
Pt state she fell in the last few days, saw a doc, fractured hip, may need hip replacement-dr wilson aware

## 2024-09-27 NOTE — ANESTHESIA PREPROCEDURE EVALUATION
Patient: Elroy Greco    Procedure Information       Date/Time: 09/27/24 0900    Procedure: Cystoscopy with 200 units Botox - Botox 200 U    Location: [facility] OR 02 / Virtual [facility] OR    Surgeons: Regina Wilkerson MD            Relevant Problems   Cardiac   (+) Aortic stenosis, mild   (+) CAD (coronary artery disease)   (+) Chronic stable angina (CMS-HCC)   (+) Hypertension   (+) Mild mitral regurgitation      Neuro   (+) Recurrent major depressive disorder (CMS-HCC)      GI   (+) Dysphagia      /Renal   (+) BPH (benign prostatic hyperplasia)      Hematology   (+) Anemia      Musculoskeletal   (+) Lumbar canal stenosis   (+) Lumbar degenerative disc disease   (+) Lumbar spondylosis   (+) Spinal stenosis     EF 65% per echo 4/2024.    Clinical information reviewed:   Tobacco  Allergies  Meds   Med Hx  Surg Hx   Fam Hx  Soc Hx        NPO Detail:  No data recorded     Physical Exam    Airway  Mallampati: I  TM distance: >3 FB  Neck ROM: full     Cardiovascular    Dental    Pulmonary    Abdominal            Anesthesia Plan    History of general anesthesia?: yes  History of complications of general anesthesia?: no    ASA 3     general     intravenous induction   Anesthetic plan and risks discussed with patient.    Plan discussed with CRNA.

## 2024-09-27 NOTE — ANESTHESIA POSTPROCEDURE EVALUATION
Patient: Elroy Greco    Procedure Summary       Date: 09/27/24 Room / Location: [facility] OR 02 / [facility] OR    Anesthesia Start: 0948 Anesthesia Stop: 1018    Procedure: Cystoscopy with 200 units Botox Diagnosis:       Urge incontinence      (Urge incontinence [N39.41])    Surgeons: Regina Wilkerson MD Responsible Provider: Munir Way MD    Anesthesia Type: general ASA Status: 3            Anesthesia Type: general    Vitals Value Taken Time   /85 09/27/24 1045   Temp 36.5 °C (97.7 °F) 09/27/24 1045   Pulse 57 09/27/24 1045   Resp 14 09/27/24 1045   SpO2 98 % 09/27/24 1045       Anesthesia Post Evaluation    Patient location during evaluation: PACU  Patient participation: complete - patient participated  Level of consciousness: awake  Pain management: adequate  Airway patency: patent  Cardiovascular status: acceptable  Respiratory status: acceptable  Hydration status: acceptable  Postoperative Nausea and Vomiting: none        No notable events documented.

## 2024-09-27 NOTE — OP NOTE
Cystoscopy with 200 units Botox Operative Note     Date: 2024  OR Location: [facility]    Name: Elroy Greco, : 1930, Age: 89 y.o., MRN: [id number], Sex: male    Diagnosis  Pre-op Diagnosis      * Urge incontinence [N39.41] Post-op Diagnosis     * Urge incontinence [N39.41]     Procedures  Cystoscopy with 200 units Botox  80668 - LA CYSTOURETHROSCOPY INJ CHEMODENERVATION BLADDER    LA INJECTION,ONABOTULINUMTOXINA []  Surgeons      * Regina Wilkerson - Primary    Resident/Fellow/Other Assistant:  Surgeons and Role:  * No surgeons found with a matching role *    Procedure Summary  Anesthesia: General  ASA: III  Anesthesia Staff: No anesthesia staff entered.  Estimated Blood Loss: 2mL  Intra-op Medications: Administrations occurring from 0900 to 0940 on 24:  * No intraprocedure medications in log *           Anesthesia Record               Intraprocedure I/O Totals       None           Specimen: No specimens collected     Staff:            Drains and/or Catheters:   Urethral Catheter  22 Fr. (Active)       Tourniquet Times:         Implants:     Findings: 200 units intravesical botox    Indications: Elroy Greco is an 89 y.o. male who is having surgery for Urge incontinence [N39.41].     The patient was seen in the preoperative area. The risks, benefits, complications, treatment options, non-operative alternatives, expected recovery and outcomes were discussed with the patient. The possibilities of reaction to medication, pulmonary aspiration, injury to surrounding structures, bleeding, recurrent infection, the need for additional procedures, failure to diagnose a condition, and creating a complication requiring transfusion or operation were discussed with the patient. The patient concurred with the proposed plan, giving informed consent.  The site of surgery was properly noted/marked if necessary per policy. The patient has been actively warmed in preoperative area. Preoperative antibiotics  have been ordered and given within 1 hours of incision. Venous thrombosis prophylaxis have been ordered including bilateral sequential compression devices    Procedure Details:     Patient was consented in the preoperative area. Risks and benefits of the procedure were discussed. Patient was brought to the operating room and placed in the supine position on the OR table. A timeout was performed and all were in agreement. Preoperative antibiotics were administered. Patient underwent anesthesia without complication. Pt was repositioned in dorsal lithotomy then prepped and draped in the usual sterile fashion.     A 21 Fr rigid cystoscope was used to perform pan-cystoscopy. No bladder masses or lesions were identified. UOs were noted in normal orthotopic position.  Cystoscopic injection needle was set to 3mm depth. 200 units of botox were diluted into 20mL of normal saline. Patient received 20 x 1mL intravesical botox injections in a grid-like pattern, approximately 1 cm apart. Visible blood vessels were avoided.  Cystoscopy was performed again to ensure hemostasis. Then, bladder was drained and instruments removed. Patient was awoken from anesthesia without complication and transferred to PACU in stable condition.      Dr. Kwame Sanders for Dr. Wilkerson   Complications:  None; patient tolerated the procedure well.    Disposition: PACU - hemodynamically stable.  Condition: stable         Additional Details:     Attending Attestation:     Regina Wilkerson  Phone Number: [phone]

## 2024-09-27 NOTE — DISCHARGE INSTRUCTIONS
DEPARTMENT OF UROLOGY  DISCHARGE INSTRUCTIONS CYSTOSCOPY  Outpatient Surgery    C O N F I D E N T I A L   I N F O R M A T I O N    Elroy Greco      Call [phone] during regular daytime business hours (8:00 am - 5:00 pm) and after 5:00 pm ask for the Urology resident with any questions or concerns.      If it is a life-threatening situation, proceed to the nearest emergency department.        Follow-up appointment:    Future Appointments   Date Time Provider Department [place]   10/1/2024  8:30 AM Rafael Hui MD [id number] [facility]   10/15/2024  2:20 PM Regina Wilkerson MD [id number] [facility]   2/4/2025  7:00 AM Trice Grayson MD [id number] [facility]        Thank you for the opportunity to care for you today.  Your health and healing are very important to us.  We hope we made you feel as comfortable as possible and are committed to your recovery and continued well-being.      The following is a brief overview of your cystoscopy procedure today. Some of the information contained on this summary may be confidential.  This information should be kept in your records and should be shared with your regular doctor.    Physicians:   Dr. Wilkerson      Procedure performed: cystoscopy (looked inside your bladder), Bladder botox injections  Pending results:  none     What to Expect During your Recovery and Home Care  Anesthesia Side Effects   You received  anesthesia today.  You may feel sleepy, tired, or have a sore throat.   You may also feel drowsiness, dizziness, or inability to think clearly.  For your safety, do not drive, drink alcoholic beverages, take any unprescribed medication or make any important decisions for 24 hours.  A responsible adult should be with you for 24 hours.        Activity and Recovery    No heavy lifting today. Rest for the next 24 hours.       Pain Control  Unfortunately, you may experience pain after your procedure.  Frequency and urgency to urinate and mild discomfort are expected. Adequate pain  management can include alternative measures to help ease your pain and that can include over the counter Tylenol or ibuprofen which can be taken as prescribed as needed for breakthrough pain. Do not take more than 4,000mg of Tylenol in a 24-hour period.        Nausea/Vomiting   Clear liquids are best tolerated at first. Start slow, advance your diet as tolerated to normal foods. Avoid spicy, greasy, heavy foods at first. Also, you may feel nauseous or like you need to vomit if you take any type of medication on an empty stomach.  Call your physician if you are unable to eat or drink and have persistent vomiting.    Signs of Bleeding   You are going to have some blood in your urine. Your urine will be light pink to yellow. If urine becomes thick dark adelfo red, has large clots or you are unable to urinate, please notify your physician.    Treatment/wound care:   Keep area(s) clean and dry.   It is okay to shower 24 hours after time of surgery.      Signs of Infection  Signs of infection can include fever, drainage(green/yellow), chills, burning sensation with passing of urine, or severe abdominal pain.  If you see any of these occur, please contact your doctor's office at [phone].  Any fever higher than 100.4, especially if associated with an ill feeling, abdominal pain, chills, or nausea should be reported to your surgeon.        Assist in bowel movements/urination  Increase fiber in diet  Increase water (6 to 8 glasses)  Increase walking   Urination should occur within 6 hours of anesthesia  If you have tried these methods and your bladder still feels full and you cannot use the bathroom, please go to your nearest Emergency room/contact your physician.    Additional Instructions:   Increase water intake for the next 24 hours to help flush out our urinary system.

## 2024-09-30 ENCOUNTER — TRANSCRIBE ORDERS (OUTPATIENT)
Dept: ORTHOPEDIC SURGERY | Facility: HOSPITAL | Age: 89
End: 2024-09-30
Payer: MEDICARE

## 2024-09-30 DIAGNOSIS — M54.16 LEFT LUMBAR RADICULOPATHY: ICD-10-CM

## 2024-10-01 ENCOUNTER — HOSPITAL ENCOUNTER (OUTPATIENT)
Dept: RADIOLOGY | Facility: CLINIC | Age: 89
Discharge: HOME | End: 2024-10-01
Payer: MEDICARE

## 2024-10-01 ENCOUNTER — OFFICE VISIT (OUTPATIENT)
Dept: ORTHOPEDIC SURGERY | Facility: CLINIC | Age: 89
End: 2024-10-01
Payer: MEDICARE

## 2024-10-01 VITALS — BODY MASS INDEX: 27.47 KG/M2 | HEIGHT: 67 IN | WEIGHT: 175 LBS

## 2024-10-01 DIAGNOSIS — M54.16 LEFT LUMBAR RADICULOPATHY: ICD-10-CM

## 2024-10-01 DIAGNOSIS — M47.816 LUMBAR SPONDYLOSIS: ICD-10-CM

## 2024-10-01 PROCEDURE — 72110 X-RAY EXAM L-2 SPINE 4/>VWS: CPT

## 2024-10-01 PROCEDURE — 72110 X-RAY EXAM L-2 SPINE 4/>VWS: CPT | Performed by: RADIOLOGY

## 2024-10-01 PROCEDURE — 99214 OFFICE O/P EST MOD 30 MIN: CPT | Performed by: ORTHOPAEDIC SURGERY

## 2024-10-01 PROCEDURE — 1157F ADVNC CARE PLAN IN RCRD: CPT | Performed by: ORTHOPAEDIC SURGERY

## 2024-10-01 PROCEDURE — 1123F ACP DISCUSS/DSCN MKR DOCD: CPT | Performed by: ORTHOPAEDIC SURGERY

## 2024-10-01 PROCEDURE — 1159F MED LIST DOCD IN RCRD: CPT | Performed by: ORTHOPAEDIC SURGERY

## 2024-10-01 ASSESSMENT — PAIN - FUNCTIONAL ASSESSMENT: PAIN_FUNCTIONAL_ASSESSMENT: 0-10

## 2024-10-01 NOTE — LETTER
October 2, 2024     Trice Grayson MD  [address]  [facility], [address]  [facility] [address]    Patient: Elroy Greco   YOB: 1930   Date of Visit: 10/1/2024       Dear Dr. Trice Grayson MD:    Thank you for referring Elroy Greco to me for evaluation. Below are my notes for this consultation.  If you have questions, please do not hesitate to call me. I look forward to following your patient along with you.       Sincerely,     Rafael Hui MD      CC: Cipriano Carbone MD  ______________________________________________________________________________________    HPI:Elroy Greco is a 89-year-old man, who comes in today with complaints of a history of back pain.  Patient gets around well.  He has no claudication or radicular symptoms.  In the past he has had physical therapy and steroid injections.  He also has Paget's disease.  He continues to do his exercises and his overall quality of life is acceptable.      ROS:  Reviewed on EMR and patient intake sheet.    PMH/SH:  Reviewed on EMR and patient intake sheet.    Exam:  Physical Exam    Constitutional: Well appearing; no acute distress  Eyes: pupils are equal and round  Psych: normal affect  Respiratory: non-labored breathing  Cardiovascular: regular rate and rhythm  GI: non-distended abdomen  Musculoskeletal: no pain with range of motion of the hips bilaterally  Neurologic: [4+]/5 strength in the lower extremities bilaterally]; [negative] straight leg raise    Radiology:     X-rays demonstrate severe multilevel lumbar spondylosis and a scoliosis.  There is radiographic evidence of Paget's disease    Diagnosis:    Lumbar spondylosis    Assessment and Plan:   89-year-old man with chronic back pain in the setting of severe multilevel lumbar spondylosis and a degenerative scoliosis.  I have recommended continued exercises, core strengthening, and activity as tolerated.  I can see him back as needed.    The patient was in  agreement with the plan. At the end of the visit today, the patient felt that all questions had been answered satisfactorily.  The patient was pleased with the visit and very appreciative for the care rendered.     Thank you very much for the kind referral.  It is a privilege, and a pleasure, to partner with you in the care of your patients.  I would be delighted to assist you with any further consultations as needed.          Rafael Hui MD    Chief of Spine Surgery, [facility]  Director of Spine Service, [facility]  , Department of Orthopaedics  [employer]  [address] [place] [id number]  P: [phone]  [facility]    This note was dictated with voice recognition software.  It has not been proofread for grammatical errors, typographical mistakes or other semantic inconsistencies.

## 2024-10-02 NOTE — PROGRESS NOTES
HPI:Elroy Greco is a 89-year-old man, who comes in today with complaints of a history of back pain.  Patient gets around well.  He has no claudication or radicular symptoms.  In the past he has had physical therapy and steroid injections.  He also has Paget's disease.  He continues to do his exercises and his overall quality of life is acceptable.      ROS:  Reviewed on EMR and patient intake sheet.    PMH/SH:  Reviewed on EMR and patient intake sheet.    Exam:  Physical Exam    Constitutional: Well appearing; no acute distress  Eyes: pupils are equal and round  Psych: normal affect  Respiratory: non-labored breathing  Cardiovascular: regular rate and rhythm  GI: non-distended abdomen  Musculoskeletal: no pain with range of motion of the hips bilaterally  Neurologic: [4+]/5 strength in the lower extremities bilaterally]; [negative] straight leg raise    Radiology:     X-rays demonstrate severe multilevel lumbar spondylosis and a scoliosis.  There is radiographic evidence of Paget's disease    Diagnosis:    Lumbar spondylosis    Assessment and Plan:   89-year-old man with chronic back pain in the setting of severe multilevel lumbar spondylosis and a degenerative scoliosis.  I have recommended continued exercises, core strengthening, and activity as tolerated.  I can see him back as needed.    The patient was in agreement with the plan. At the end of the visit today, the patient felt that all questions had been answered satisfactorily.  The patient was pleased with the visit and very appreciative for the care rendered.     Thank you very much for the kind referral.  It is a privilege, and a pleasure, to partner with you in the care of your patients.  I would be delighted to assist you with any further consultations as needed.          Rafael Hui MD    Chief of Spine Surgery, [facility]  Director of Spine Service, [facility]  Associate  Professor, Department of Orthopaedics  [facility]  [address] [place] [address]  P: [phone]  [facility]    This note was dictated with voice recognition software.  It has not been proofread for grammatical errors, typographical mistakes or other semantic inconsistencies.

## 2024-10-14 NOTE — PROGRESS NOTES
Subjective   Elroy Greco is a 89 y.o. male with history of BPH s/p HoLEP with concurrent bladder Botox (100 units) on 5/23/2024 and urge incontinence s/p bladder Botox 200 units on 9/27/2024. He presents today for a follow up visit. Patient reports slight improvement in his urinary symptoms. He has significant urinary urgency and frequency every 30 minutes to 1 hour. He states since his last Botox injection he noticed slowing of his urinary stream. Denies any recent gross hematuria, fevers, chills, urinary retention, intractable flank or abdominal pain, nausea or vomiting.                Past Medical History:   Diagnosis Date    Aortic stenosis     Aortic stenosis, moderate     CAD (coronary artery disease)     CKD (chronic kidney disease)     Gout     Hyperlipidemia     Hypertension     Nocturia 03/01/2017    Nocturia    Personal history of colonic polyps 02/24/2016    History of colonic polyps     Past Surgical History:   Procedure Laterality Date    CT ANGIO CORONARY ART WITH HEARTFLOW IF SCORE >30%  11/12/2018    CT HEART CORONARY ANGIOGRAM 11/12/2018 [facility]    HERNIA REPAIR  07/18/2012    Hernia Repair    OTHER SURGICAL HISTORY  07/18/2012    Nerve Block Transforaminal Epidural    OTHER SURGICAL HISTORY  07/18/2012    Nerve Ablation    OTHER SURGICAL HISTORY      shoulder replacement    OTHER SURGICAL HISTORY      blockage in abdomen-part of intenstine removed    TOTAL KNEE ARTHROPLASTY  07/18/2012    Knee Replacement    TOTAL KNEE ARTHROPLASTY  07/18/2012    Knee Replacement     Family History   Problem Relation Name Age of Onset    Cancer Mother      Cancer Father       Current Outpatient Medications   Medication Sig Dispense Refill    acetaminophen (Tylenol) 500 mg tablet Take 2 tablets (1,000 mg) by mouth every 6 hours if needed for mild pain (1 - 3).      amLODIPine (Norvasc) 2.5 mg tablet TAKE 1 TABLET BY MOUTH ONCE  DAILY 100 tablet 2    atorvastatin (Lipitor) 40 mg tablet Take 1  tablet (40 mg) by mouth once daily at bedtime. 90 tablet 3    azelastine (Astelin) 137 mcg (0.1 %) nasal spray Administer 1 spray into each nostril 2 times a day. Use in each nostril as directed 30 mL 3    carboxymethylcellulose (Refresh Plus) 0.5 % ophthalmic solution Administer 1 drop into both eyes 4 times a day.      colchicine 0.6 mg tablet Take 1 tablet (0.6 mg) by mouth once daily as needed.      collagen/biotin/ascorbic acid (COLLAGEN 1500 PLUS C ORAL) Take by mouth once daily.      cyanocobalamin (Vitamin B-12) 500 mcg tablet Take 1 tablet (500 mcg) by mouth once daily.      diphenhydrAMINE (BENADryl) 50 mg capsule Take 1 capsule (50 mg) by mouth as needed at bedtime for itching.      docusate sodium (Colace) 100 mg capsule Take 2 capsules (200 mg) by mouth 2 times a day.      fluticasone (Flonase) 50 mcg/actuation nasal spray Administer 2 sprays into each nostril once daily as needed.      lidocaine (Lidoderm) 5 % patch Place 1 patch on the skin once daily. Remove & discard patch within 12 hours or as directed by MD.      MAGNESIUM ORAL Take 1 tablet by mouth once daily. Takes 500mg      mirabegron (Myrbetriq) 25 mg tablet extended release 24 hr 24 hr tablet Take 1 tablet (25 mg) by mouth once daily. 90 tablet 0    multivitamin tablet Take 1 tablet by mouth once daily.      omega 3-dha-epa-fish oil 1,000 mg (120 mg-180 mg) capsule Take 1 capsule (1,000 mg) by mouth 2 times a day.      psyllium (Metamucil) powder Take 1 Dose (5.8 g) by mouth 2 times a day.      vit C-E-zinc cit-lutein-zeaxan ([facility]) 50 mg-15 unit- 4.5 mg-2.5 mg tablet,chewable Chew 2 tablets once daily.       No current facility-administered medications for this visit.     Allergies   Allergen Reactions    Finasteride Rash    Mirabegron Constipation    Penicillins Rash     Social History     Socioeconomic History    Marital status:      Spouse name: Not on file    Number of children: Not on file    Years of education:  Not on file    Highest education level: Not on file   Occupational History    Not on file   Tobacco Use    Smoking status: Never    Smokeless tobacco: Never   Vaping Use    Vaping status: Never Used   Substance and Sexual Activity    Alcohol use: Never    Drug use: Never    Sexual activity: Not on file   Other Topics Concern    Not on file   Social History Narrative    Not on file     Social Determinants of Health     Financial Resource Strain: Not on file   Food Insecurity: Not on file   Transportation Needs: Not on file   Physical Activity: Not on file   Stress: Not on file   Social Connections: Not on file   Intimate Partner Violence: Not on file   Housing Stability: Not on file       Review of Systems  Pertinent items are noted in HPI.    Objective       Lab Review  Lab Results   Component Value Date    WBC 5.9 09/20/2024    RBC 4.14 (L) 09/20/2024    HGB 11.5 (L) 09/20/2024    HCT 37.9 (L) 09/20/2024     09/20/2024      Lab Results   Component Value Date    BUN 25 (H) 09/20/2024    CREATININE 1.12 09/20/2024      PVR 23ml  Uroflow study demonstrated voided volume of 66 and maximal flow rate of 7.5 ml/s.   Urine analysis shows negative     Assessment/Plan   There are no diagnoses linked to this encounter.      History of BPH s/p HoLEP with concurrent bladder Botox (100 units) on 5/23//2024.   Urge incontinence s/p bladder Botox 200 units on 9/27/2024    Patient has had slight improvement in his urinary symptoms. He continues to have significant urinary urgency and frequency every 30 minutes to 1 hour and slowing of his urinary stream.    We will refer patient to Dr. Mckinney to discuss neuro stimulation.    We will refill Myrbetriq and Colace.        All questions were answered to the patient's satisfaction. Patient agrees with the plan and wishes to proceed. Follow-up will be scheduled appropriately.     E&M visit today is associated with current or anticipated ongoing medical care services related to a  patient's single, serious condition or a complex condition.      Scribed for Dr. Wilkerson by Ashwini Bazan. I , Dr Wilkerson, have personally reviewed and agreed with the information entered by the Virtual Scribe.

## 2024-10-15 ENCOUNTER — APPOINTMENT (OUTPATIENT)
Dept: UROLOGY | Facility: CLINIC | Age: 89
End: 2024-10-15
Payer: MEDICARE

## 2024-10-15 VITALS — BODY MASS INDEX: 27.47 KG/M2 | WEIGHT: 175 LBS | HEIGHT: 67 IN | TEMPERATURE: 96.9 F

## 2024-10-15 DIAGNOSIS — K59.09 CHRONIC CONSTIPATION: ICD-10-CM

## 2024-10-15 DIAGNOSIS — N40.1 BENIGN PROSTATIC HYPERPLASIA WITH URINARY FREQUENCY: ICD-10-CM

## 2024-10-15 DIAGNOSIS — N39.41 URGE INCONTINENCE: Primary | ICD-10-CM

## 2024-10-15 DIAGNOSIS — R35.0 BENIGN PROSTATIC HYPERPLASIA WITH URINARY FREQUENCY: ICD-10-CM

## 2024-10-15 LAB
POC APPEARANCE, URINE: CLEAR
POC BILIRUBIN, URINE: NEGATIVE
POC BLOOD, URINE: NEGATIVE
POC COLOR, URINE: YELLOW
POC GLUCOSE, URINE: NEGATIVE MG/DL
POC KETONES, URINE: NEGATIVE MG/DL
POC LEUKOCYTES, URINE: NEGATIVE
POC NITRITE,URINE: NEGATIVE
POC PH, URINE: 6 PH
POC PROTEIN, URINE: NEGATIVE MG/DL
POC SPECIFIC GRAVITY, URINE: 1.02
POC UROBILINOGEN, URINE: 0.2 EU/DL

## 2024-10-15 PROCEDURE — 1123F ACP DISCUSS/DSCN MKR DOCD: CPT | Performed by: UROLOGY

## 2024-10-15 PROCEDURE — 1159F MED LIST DOCD IN RCRD: CPT | Performed by: UROLOGY

## 2024-10-15 PROCEDURE — 51741 ELECTRO-UROFLOWMETRY FIRST: CPT | Performed by: UROLOGY

## 2024-10-15 PROCEDURE — 99214 OFFICE O/P EST MOD 30 MIN: CPT | Performed by: UROLOGY

## 2024-10-15 PROCEDURE — 51798 US URINE CAPACITY MEASURE: CPT | Performed by: UROLOGY

## 2024-10-15 PROCEDURE — 1157F ADVNC CARE PLAN IN RCRD: CPT | Performed by: UROLOGY

## 2024-10-15 PROCEDURE — 1126F AMNT PAIN NOTED NONE PRSNT: CPT | Performed by: UROLOGY

## 2024-10-15 PROCEDURE — 81002 URINALYSIS NONAUTO W/O SCOPE: CPT | Performed by: UROLOGY

## 2024-10-15 RX ORDER — DOCUSATE SODIUM 100 MG/1
200 CAPSULE, LIQUID FILLED ORAL 2 TIMES DAILY
Qty: 120 CAPSULE | Refills: 1 | Status: SHIPPED | OUTPATIENT
Start: 2024-10-15 | End: 2024-12-14

## 2024-10-15 ASSESSMENT — PAIN SCALES - GENERAL: PAINLEVEL: 0-NO PAIN

## 2024-10-31 ENCOUNTER — APPOINTMENT (OUTPATIENT)
Dept: UROLOGY | Facility: HOSPITAL | Age: 89
End: 2024-10-31
Payer: MEDICARE

## 2024-10-31 DIAGNOSIS — N39.41 URGE INCONTINENCE: ICD-10-CM

## 2024-10-31 DIAGNOSIS — R39.15 URGENCY OF URINATION: Primary | ICD-10-CM

## 2024-10-31 PROCEDURE — 1123F ACP DISCUSS/DSCN MKR DOCD: CPT | Performed by: UROLOGY

## 2024-10-31 PROCEDURE — 99214 OFFICE O/P EST MOD 30 MIN: CPT | Performed by: UROLOGY

## 2024-10-31 PROCEDURE — 1157F ADVNC CARE PLAN IN RCRD: CPT | Performed by: UROLOGY

## 2024-11-08 ENCOUNTER — PROCEDURE VISIT (OUTPATIENT)
Dept: UROLOGY | Facility: HOSPITAL | Age: 89
End: 2024-11-08
Payer: MEDICARE

## 2024-11-08 DIAGNOSIS — N39.41 URGE INCONTINENCE: ICD-10-CM

## 2024-11-08 DIAGNOSIS — R39.15 URGENCY OF URINATION: Primary | ICD-10-CM

## 2024-11-08 PROCEDURE — 64561 IMPLANT NEUROELECTRODES: CPT | Performed by: UROLOGY

## 2024-11-08 NOTE — PROGRESS NOTES
Elroy Greco is a 89 y.o. male     Has terrible urge, UI, frequency. Gets up 3-4x     Dr Wilkerson has done HOLEP and botox. Has been taking myrbetriq.  Has tried anti-cholinergics in past.      ---    After surgical consent was reviewed with the patient, including risks, benefits and alternatives the patient expressed desire to proceed with the basic evaluation.     The patient was then taken to the procedure room and placed in the prone position. Pillows were placed under the lower abdomen and hips to level and flatten the sacrum and allow the toes to dangle freely.  A ground pad was placed on the bottom of the patient's foot and was connected to the Clinician  along with the test stimulation cable (J-hook). The patient was then prepped and draped in the usual fashion.      Attention was turned to placement of the percutaneous lead wire using the [employer] system. The level of S3 and the medial border of the sacral foramen were using bony landmarks. After administration of local anesthesia, a foramen needle was placed in the superior, medial aspect of the S3 foramen such that the needle was parallel to medial border of the foramen. The needle was advanced such that the tip of the foramen needle was just at the anterior aspect of the sacrum. The J-hook was then placed on the uninsulated portion of the foramen needle and stimulation applied to obtain the opening threshold amplitude. Sensory response was noted to be in the seat area and reported to be comfortable.     The foramen needle was then advanced approximately 1-2 cm and retested at that depth to insure continued motor/sensory response.  the percutaneous lead was placed through the foramen needle. The foramen needle was retracted over the percutaneous lead.     The procedure was then repeated on the contralateral side.     The percutaneous leads were then coiled and a 2x2 cm gauze placed over each followed by a small Tegaderm. The PNE leads were  then connected to the ground pad and basic trial cable, placing the ground pad on skin close to the Tegaderm. The basic trial cable was connected to external trial system (ETS) and impedances were checked.  A cable strain relief was created and covered with a medium to large Tegaderm. This was accomplished bilaterally. A large Tegaderm was placed to cover the connections and any exposed leads.     Patient tolerated the procedure with no complications and was instructed on the use of the patient remote in the post-procedure area.

## 2024-11-11 DIAGNOSIS — N39.41 URGE INCONTINENCE: ICD-10-CM

## 2024-11-11 DIAGNOSIS — N40.1 BENIGN PROSTATIC HYPERPLASIA WITH LOWER URINARY TRACT SYMPTOMS, SYMPTOM DETAILS UNSPECIFIED: ICD-10-CM

## 2024-11-11 RX ORDER — MIRABEGRON 25 MG/1
25 TABLET, FILM COATED, EXTENDED RELEASE ORAL DAILY
Qty: 90 TABLET | Refills: 0 | Status: SHIPPED | OUTPATIENT
Start: 2024-11-11

## 2024-11-11 NOTE — TELEPHONE ENCOUNTER
Rx Refill Request Telephone Encounter  Myrbetriq 25mg    Pharmacy:   [facility]    NOV:  2/4/25    Pt is out.

## 2024-11-15 ENCOUNTER — OFFICE VISIT (OUTPATIENT)
Dept: UROLOGY | Facility: HOSPITAL | Age: 89
End: 2024-11-15
Payer: MEDICARE

## 2024-11-15 ENCOUNTER — PREP FOR PROCEDURE (OUTPATIENT)
Dept: UROLOGY | Facility: HOSPITAL | Age: 89
End: 2024-11-15

## 2024-11-15 DIAGNOSIS — R39.15 URGENCY OF URINATION: ICD-10-CM

## 2024-11-15 DIAGNOSIS — N39.41 URGE INCONTINENCE: Primary | ICD-10-CM

## 2024-11-15 DIAGNOSIS — N39.41 URINARY INCONTINENCE, URGE: Primary | ICD-10-CM

## 2024-11-15 DIAGNOSIS — R79.1 ABNORMAL COAGULATION PROFILE: ICD-10-CM

## 2024-11-15 PROCEDURE — 1159F MED LIST DOCD IN RCRD: CPT | Performed by: UROLOGY

## 2024-11-15 PROCEDURE — 1157F ADVNC CARE PLAN IN RCRD: CPT | Performed by: UROLOGY

## 2024-11-15 PROCEDURE — 99211 OFF/OP EST MAY X REQ PHY/QHP: CPT | Performed by: UROLOGY

## 2024-11-15 PROCEDURE — 1123F ACP DISCUSS/DSCN MKR DOCD: CPT | Performed by: UROLOGY

## 2024-11-15 RX ORDER — VANCOMYCIN HYDROCHLORIDE 1 G/200ML
1000 INJECTION, SOLUTION INTRAVENOUS ONCE
OUTPATIENT
Start: 2024-11-15 | End: 2024-11-15

## 2024-11-15 RX ORDER — CHLORHEXIDINE GLUCONATE 40 MG/ML
SOLUTION TOPICAL DAILY PRN
OUTPATIENT
Start: 2024-11-15

## 2024-11-15 NOTE — PROGRESS NOTES
FUV    Virtual or Telephone Consent    An interactive audio and video telecommunication system which permits real time communications between the patient (at the originating site) and provider (at the distant site) was utilized to provide this telehealth service.   Verbal consent was requested and obtained from Elroy Greco on this date, 11/15/24 for a telehealth visit.       HISTORY OF PRESENT ILLNESS:   Elroy Greco is a 89 y.o. male who is being seen today for fuv    Has terrible urge, UI, frequency. Gets up 3-4x    Dr Wilkerson has done HOLEP and botox. Has been taking myrbetriq.  Has tried anti-cholinergics in past.      Pt axonics PNE in office on 11/8/24 - Doing well.  Urge and UI much improved.  Daytime and night time frequency improved    PAST MEDICAL HISTORY:  Past Medical History:   Diagnosis Date    Aortic stenosis     Aortic stenosis, moderate     CAD (coronary artery disease)     CKD (chronic kidney disease)     Gout     Hyperlipidemia     Hypertension     Nocturia 03/01/2017    Nocturia    Personal history of colonic polyps 02/24/2016    History of colonic polyps       PAST SURGICAL HISTORY:  Past Surgical History:   Procedure Laterality Date    CT ANGIO CORONARY ART WITH HEARTFLOW IF SCORE >30%  11/12/2018    CT HEART CORONARY ANGIOGRAM 11/12/2018 [facility] ANCILLARY LEGACY    HERNIA REPAIR  07/18/2012    Hernia Repair    OTHER SURGICAL HISTORY  07/18/2012    Nerve Block Transforaminal Epidural    OTHER SURGICAL HISTORY  07/18/2012    Nerve Ablation    OTHER SURGICAL HISTORY      shoulder replacement    OTHER SURGICAL HISTORY      blockage in abdomen-part of intenstine removed    TOTAL KNEE ARTHROPLASTY  07/18/2012    Knee Replacement    TOTAL KNEE ARTHROPLASTY  07/18/2012    Knee Replacement        ALLERGIES:   Allergies   Allergen Reactions    Finasteride Rash    Mirabegron Constipation    Penicillins Rash        MEDICATIONS:   Current Outpatient Medications   Medication Instructions     acetaminophen (TYLENOL) 1,000 mg, oral, Every 6 hours PRN    amLODIPine (NORVASC) 2.5 mg, oral, Daily    atorvastatin (LIPITOR) 40 mg, oral, Nightly    azelastine (Astelin) 137 mcg (0.1 %) nasal spray 1 spray, Each Nostril, 2 times daily, Use in each nostril as directed    carboxymethylcellulose (Refresh Plus) 0.5 % ophthalmic solution 1 drop, Both Eyes, 4 times daily    colchicine 0.6 mg tablet 1 tablet, oral, Daily PRN    collagen/biotin/ascorbic acid (COLLAGEN 1500 PLUS C ORAL) oral, Daily    cyanocobalamin (VITAMIN B-12) 500 mcg, oral, Daily    diphenhydrAMINE (BENADRYL) 50 mg, oral, Nightly PRN    docusate sodium (COLACE) 200 mg, oral, 2 times daily    fluticasone (Flonase) 50 mcg/actuation nasal spray 2 sprays, Each Nostril, Daily PRN    lidocaine (Lidoderm) 5 % patch 1 patch, transdermal, Daily, Remove & discard patch within 12 hours or as directed by MD.    MAGNESIUM ORAL 1 tablet, oral, Daily, Takes 500mg    mirabegron (MYRBETRIQ) 25 mg, oral, Daily    multivitamin tablet 1 tablet, oral, Daily    omega 3-dha-epa-fish oil 1,000 mg (120 mg-180 mg) capsule 1 capsule, oral, 2 times daily    psyllium (Metamucil) powder 1 Dose, oral, 2 times daily    vit C-E-zinc cit-lutein-zeaxan ([facility]) 50 mg-15 unit- 4.5 mg-2.5 mg tablet,chewable 2 tablets, oral, Daily        PHYSICAL EXAM:  There were no vitals taken for this visit.  Constitutional: Patient appears well-developed and well-nourished. No distress.    Pulmonary/Chest: Effort normal. No respiratory distress.   Musculoskeletal: Normal range of motion.    Neurological: Alert and oriented to person, place, and time.  Psychiatric: Normal mood and affect. Behavior is normal. Thought content normal.    Back - bilateral lead removed    Labs  Lab Results   Component Value Date    GFRMALE 53 (A) 07/18/2023     Lab Results   Component Value Date    CREATININE 1.12 09/20/2024     Lab Results   Component Value Date    CHOL 147 11/20/2023     Lab Results    Component Value Date    HDL 56.4 11/20/2023     Lab Results   Component Value Date    CHHDL 2.6 11/20/2023     Lab Results   Component Value Date    LDLF 77 11/18/2022     Lab Results   Component Value Date    VLDL 21 11/20/2023     Lab Results   Component Value Date    TRIG 105 11/20/2023     Lab Results   Component Value Date    HGBA1C 5.9 (H) 06/21/2024     Lab Results   Component Value Date    HCT 37.9 (L) 09/20/2024       Imaging    Procedures      Assessment:      1. Urge incontinence        2. Urgency of urination          Elroy JOSE Greco is a 89 y.o. male here for NPV     Plan:   Success [facility] PNE trial, >50% improvement  Plan for Stage 1/2 in OR

## 2024-11-15 NOTE — H&P (VIEW-ONLY)
FUV    Virtual or Telephone Consent    An interactive audio and video telecommunication system which permits real time communications between the patient (at the originating site) and provider (at the distant site) was utilized to provide this telehealth service.   Verbal consent was requested and obtained from Elroy Greco on this date, 11/15/24 for a telehealth visit.       HISTORY OF PRESENT ILLNESS:   Elroy Greco is a 89 y.o. male who is being seen today for fuv    Has terrible urge, UI, frequency. Gets up 3-4x    Dr Wilkerson has done HOLEP and botox. Has been taking myrbetriq.  Has tried anti-cholinergics in past.      Pt axonics PNE in office on 11/8/24 - Doing well.  Urge and UI much improved.  Daytime and night time frequency improved    PAST MEDICAL HISTORY:  Past Medical History:   Diagnosis Date    Aortic stenosis     Aortic stenosis, moderate     CAD (coronary artery disease)     CKD (chronic kidney disease)     Gout     Hyperlipidemia     Hypertension     Nocturia 03/01/2017    Nocturia    Personal history of colonic polyps 02/24/2016    History of colonic polyps       PAST SURGICAL HISTORY:  Past Surgical History:   Procedure Laterality Date    CT ANGIO CORONARY ART WITH HEARTFLOW IF SCORE >30%  11/12/2018    CT HEART CORONARY ANGIOGRAM 11/12/2018 [facility] [place] ANCILLARY LEGACY    HERNIA REPAIR  07/18/2012    Hernia Repair    OTHER SURGICAL HISTORY  07/18/2012    Nerve Block Transforaminal Epidural    OTHER SURGICAL HISTORY  07/18/2012    Nerve Ablation    OTHER SURGICAL HISTORY      shoulder replacement    OTHER SURGICAL HISTORY      blockage in abdomen-part of intenstine removed    TOTAL KNEE ARTHROPLASTY  07/18/2012    Knee Replacement    TOTAL KNEE ARTHROPLASTY  07/18/2012    Knee Replacement        ALLERGIES:   Allergies   Allergen Reactions    Finasteride Rash    Mirabegron Constipation    Penicillins Rash        MEDICATIONS:   Current Outpatient Medications   Medication Instructions     acetaminophen (TYLENOL) 1,000 mg, oral, Every 6 hours PRN    amLODIPine (NORVASC) 2.5 mg, oral, Daily    atorvastatin (LIPITOR) 40 mg, oral, Nightly    azelastine (Astelin) 137 mcg (0.1 %) nasal spray 1 spray, Each Nostril, 2 times daily, Use in each nostril as directed    carboxymethylcellulose (Refresh Plus) 0.5 % ophthalmic solution 1 drop, Both Eyes, 4 times daily    colchicine 0.6 mg tablet 1 tablet, oral, Daily PRN    collagen/biotin/ascorbic acid (COLLAGEN 1500 PLUS C ORAL) oral, Daily    cyanocobalamin (VITAMIN B-12) 500 mcg, oral, Daily    diphenhydrAMINE (BENADRYL) 50 mg, oral, Nightly PRN    docusate sodium (COLACE) 200 mg, oral, 2 times daily    fluticasone (Flonase) 50 mcg/actuation nasal spray 2 sprays, Each Nostril, Daily PRN    lidocaine (Lidoderm) 5 % patch 1 patch, transdermal, Daily, Remove & discard patch within 12 hours or as directed by MD.    MAGNESIUM ORAL 1 tablet, oral, Daily, Takes 500mg    mirabegron (MYRBETRIQ) 25 mg, oral, Daily    multivitamin tablet 1 tablet, oral, Daily    omega 3-dha-epa-fish oil 1,000 mg (120 mg-180 mg) capsule 1 capsule, oral, 2 times daily    psyllium (Metamucil) powder 1 Dose, oral, 2 times daily    vit C-E-zinc cit-lutein-zeaxan ([facility]) 50 mg-15 unit- 4.5 mg-2.5 mg tablet,chewable 2 tablets, oral, Daily        PHYSICAL EXAM:  There were no vitals taken for this visit.  Constitutional: Patient appears well-developed and well-nourished. No distress.    Pulmonary/Chest: Effort normal. No respiratory distress.   Musculoskeletal: Normal range of motion.    Neurological: Alert and oriented to person, place, and time.  Psychiatric: Normal mood and affect. Behavior is normal. Thought content normal.    Back - bilateral lead removed    Labs  Lab Results   Component Value Date    GFRMALE 53 (A) 07/18/2023     Lab Results   Component Value Date    CREATININE 1.12 09/20/2024     Lab Results   Component Value Date    CHOL 147 11/20/2023     Lab Results    Component Value Date    HDL 56.4 11/20/2023     Lab Results   Component Value Date    CHHDL 2.6 11/20/2023     Lab Results   Component Value Date    LDLF 77 11/18/2022     Lab Results   Component Value Date    VLDL 21 11/20/2023     Lab Results   Component Value Date    TRIG 105 11/20/2023     Lab Results   Component Value Date    HGBA1C 5.9 (H) 06/21/2024     Lab Results   Component Value Date    HCT 37.9 (L) 09/20/2024       Imaging    Procedures      Assessment:      1. Urge incontinence        2. Urgency of urination          Elroy JOSE Greco is a 89 y.o. male here for NPV     Plan:   Success [facility] PNE trial, >50% improvement  Plan for Stage 1/2 in OR

## 2024-11-20 ENCOUNTER — APPOINTMENT (OUTPATIENT)
Dept: PRIMARY CARE | Facility: CLINIC | Age: 89
End: 2024-11-20
Payer: MEDICARE

## 2024-11-20 VITALS
SYSTOLIC BLOOD PRESSURE: 138 MMHG | BODY MASS INDEX: 27.72 KG/M2 | WEIGHT: 177 LBS | DIASTOLIC BLOOD PRESSURE: 80 MMHG | OXYGEN SATURATION: 99 % | HEART RATE: 54 BPM

## 2024-11-20 DIAGNOSIS — E78.5 DYSLIPIDEMIA: ICD-10-CM

## 2024-11-20 DIAGNOSIS — M81.0 OSTEOPOROSIS, UNSPECIFIED OSTEOPOROSIS TYPE, UNSPECIFIED PATHOLOGICAL FRACTURE PRESENCE: ICD-10-CM

## 2024-11-20 DIAGNOSIS — N18.31 STAGE 3A CHRONIC KIDNEY DISEASE (MULTI): ICD-10-CM

## 2024-11-20 DIAGNOSIS — I25.10 CORONARY ARTERY DISEASE INVOLVING NATIVE HEART WITHOUT ANGINA PECTORIS, UNSPECIFIED VESSEL OR LESION TYPE: Primary | ICD-10-CM

## 2024-11-20 DIAGNOSIS — I10 PRIMARY HYPERTENSION: ICD-10-CM

## 2024-11-20 DIAGNOSIS — D64.9 ANEMIA, UNSPECIFIED TYPE: ICD-10-CM

## 2024-11-20 PROCEDURE — 99214 OFFICE O/P EST MOD 30 MIN: CPT | Performed by: INTERNAL MEDICINE

## 2024-11-20 PROCEDURE — 3079F DIAST BP 80-89 MM HG: CPT | Performed by: INTERNAL MEDICINE

## 2024-11-20 PROCEDURE — 1159F MED LIST DOCD IN RCRD: CPT | Performed by: INTERNAL MEDICINE

## 2024-11-20 PROCEDURE — 1160F RVW MEDS BY RX/DR IN RCRD: CPT | Performed by: INTERNAL MEDICINE

## 2024-11-20 PROCEDURE — 1157F ADVNC CARE PLAN IN RCRD: CPT | Performed by: INTERNAL MEDICINE

## 2024-11-20 PROCEDURE — 1124F ACP DISCUSS-NO DSCNMKR DOCD: CPT | Performed by: INTERNAL MEDICINE

## 2024-11-20 PROCEDURE — 1036F TOBACCO NON-USER: CPT | Performed by: INTERNAL MEDICINE

## 2024-11-20 PROCEDURE — 3075F SYST BP GE 130 - 139MM HG: CPT | Performed by: INTERNAL MEDICINE

## 2024-11-20 ASSESSMENT — PATIENT HEALTH QUESTIONNAIRE - PHQ9
2. FEELING DOWN, DEPRESSED OR HOPELESS: NOT AT ALL
1. LITTLE INTEREST OR PLEASURE IN DOING THINGS: NOT AT ALL
SUM OF ALL RESPONSES TO PHQ9 QUESTIONS 1 AND 2: 0

## 2024-11-20 NOTE — PROGRESS NOTES
"Subjective   Reason for Visit: Elroy Greco is an 89 y.o. male here for f/u      Past Medical, Surgical, and Family History reviewed and updated in chart.    Reviewed all medications by prescribing practitioner or clinical pharmacist (such as prescriptions, OTCs, herbal therapies and supplements) and documented in the medical record.    HPI    Overall well   LBP a bit better recently.  [facility]  #1 htn- increased  last week--> ED note 11/12/24 reviewed.   120s/70s at home.   #2 CKD3  #3 LBP  #4 renal/hepatic cysts  #5 Padget's dz  #6 urinary freq, urgency.  Increasing.  Following w/ -->  s/p HoLEAP w/o sig benefit.  \"Implant\" pending    Patient Care Team:  Trice Grayson MD as PCP - General  Trice Grayson MD as PCP - [employer] PCP     Review of Systems    Objective   Vitals:  /80   Pulse 54   Wt 80.3 kg (177 lb)   SpO2 99%   BMI 27.72 kg/m²       Physical Exam  Constitutional:       Appearance: Normal appearance.   Cardiovascular:      Rate and Rhythm: Normal rate and regular rhythm.      Pulses: Normal pulses.      Heart sounds: No murmur heard.     No gallop.   Pulmonary:      Effort: Pulmonary effort is normal. No respiratory distress.      Breath sounds: Normal breath sounds. No wheezing, rhonchi or rales.   Neurological:      Mental Status: He is alert.   Psychiatric:         Mood and Affect: Mood normal.         Behavior: Behavior normal.         Thought Content: Thought content normal.         Judgment: Judgment normal.           Lab Results   Component Value Date    WBC 5.9 09/20/2024    HGB 11.5 (L) 09/20/2024    HCT 37.9 (L) 09/20/2024     09/20/2024    CHOL 147 11/20/2023    TRIG 105 11/20/2023    HDL 56.4 11/20/2023    ALT 10 11/20/2023    AST 19 06/10/2020     09/20/2024    K 4.3 09/20/2024     09/20/2024    CREATININE 1.12 09/20/2024    BUN 25 (H) 09/20/2024    CO2 28 09/20/2024    TSH 1.72 06/21/2024    INR 1.2 09/20/2024    HGBA1C " 5.9 (H) 06/21/2024       Assessment & Plan  Osteoporosis, unspecified osteoporosis type, unspecified pathological fracture presence    Orders:    Disability Placard                  #1 hypertension- on target. Continue to follow  #2 ckd3- stable. Check labs next visit  #3 back pain- increased. [facility].   Appt pending w/ spine at   #4 renal/hepatic cyst- follow-up with urology  #5 Paget's disease- In remission. [facility]. Alkaline phosphatase through their office, per pt stable. stressed importance again  #6 urinary freq- increased symptoms, resume 2 Flomax every day, UA/Cx. . Follow-up with urology [facility]  #7 fatigue/ dyspnea- stable/improved. No significant change with discontinuing metoprolol. Follow-up cardiology  #8 mod AI/AS- clinically stable. Continue afterload reduction. f[facility] cards at [facility]   #9 mild, stable normocytic anemia. Normal iron, B12, folic acid. stable for >6 yrs. retest   #10 depression/anxiety- remains an issue. Continue increased duloxetine 30 mg po qd  #11 BPH w/ LUTS- s/p TURP 2017. f/u  - appt pending  #12 mild SANCHEZ-stable/improved. f/u cards   #13 ? pulm HTN on echo 3/17- reviewed. clinically stable. f/u cardiology.   #14 diastolic dysfxn- reviewed. BP well controlled.   #15 gout- clinically resolved. UA next visit  #16 constipation- remains an issue.  f/u  w/ GI , con't  miralax every day.   #17 CAD/angina- stable. f/u cards   #18 elevated glucose (non-fasting)- elevated hemoglobin A1c. stay focused on a reduced sugar low. carbohydrate diet, recheck 6 mths  #19 hip and shoulder pain- f/u ortho.  #20 lipids- good  #21 insomnia- reviewed. con't melatonin 1 mg po qhs  #22 vit d def- increase to 3,000 otc d3  #23 cough with eating/dysphasia- f/u GI  #24 mild imbalance- fall precautions reviewed. Better. Con't PT  #25 ? neuropathy rt arm- ?CTS. reviewed. rec NCS/EMG. he declines. normal exam. f/u inb 3-4 weeks.   #26 hip pain- reviewed. Appt pending [facility]/ [facility]  #27 depression- low-dose Cymbalta.    #28 syncope/presyncope- no further episodes. f/u cards.   #29 back pain- [facility]   #30 pulmonary nodules- f/u chest CT stable x >2 yrs  #31 ? pulm fibrosis- no s/sx. no cough/SOB. stable on CT 2017-->2022. reviewed DDx at length again. Rec pulm eval again. He has declined, he now agrees.   #32 knee pain- s/p remote tka. f/u  [facility]/ [facility] at [facility].   C/s  ortho per pt req.   #33 nasal congestion- trial astelin     Rt knee pain- better w/ brace.  f/u  ortho at [facility]  Bph- ok w/ surgery

## 2024-11-21 ENCOUNTER — CLINICAL SUPPORT (OUTPATIENT)
Dept: PREADMISSION TESTING | Facility: HOSPITAL | Age: 89
End: 2024-11-21
Payer: MEDICARE

## 2024-11-21 DIAGNOSIS — N39.41 URINARY INCONTINENCE, URGE: ICD-10-CM

## 2024-11-25 ENCOUNTER — LAB (OUTPATIENT)
Dept: LAB | Facility: LAB | Age: 89
End: 2024-11-25
Payer: MEDICARE

## 2024-11-25 ENCOUNTER — DOCUMENTATION (OUTPATIENT)
Dept: PREADMISSION TESTING | Facility: HOSPITAL | Age: 89
End: 2024-11-25

## 2024-11-25 ENCOUNTER — PRE-ADMISSION TESTING (OUTPATIENT)
Dept: PREADMISSION TESTING | Facility: HOSPITAL | Age: 89
End: 2024-11-25
Payer: MEDICARE

## 2024-11-25 VITALS
HEART RATE: 60 BPM | BODY MASS INDEX: 27.34 KG/M2 | RESPIRATION RATE: 16 BRPM | WEIGHT: 174.16 LBS | HEIGHT: 67 IN | SYSTOLIC BLOOD PRESSURE: 130 MMHG | OXYGEN SATURATION: 99 % | DIASTOLIC BLOOD PRESSURE: 79 MMHG | TEMPERATURE: 97 F

## 2024-11-25 DIAGNOSIS — I10 PRIMARY HYPERTENSION: Primary | ICD-10-CM

## 2024-11-25 DIAGNOSIS — N39.41 URGE INCONTINENCE: ICD-10-CM

## 2024-11-25 DIAGNOSIS — R79.1 ABNORMAL COAGULATION PROFILE: ICD-10-CM

## 2024-11-25 DIAGNOSIS — N39.41 URINARY INCONTINENCE, URGE: ICD-10-CM

## 2024-11-25 LAB
ANION GAP SERPL CALC-SCNC: 10 MMOL/L (ref 10–20)
APTT PPP: 30 SECONDS (ref 27–38)
ATRIAL RATE: 69 BPM
BUN SERPL-MCNC: 27 MG/DL (ref 6–23)
CALCIUM SERPL-MCNC: 9.1 MG/DL (ref 8.6–10.3)
CHLORIDE SERPL-SCNC: 106 MMOL/L (ref 98–107)
CO2 SERPL-SCNC: 29 MMOL/L (ref 21–32)
CREAT SERPL-MCNC: 1.1 MG/DL (ref 0.5–1.3)
EGFRCR SERPLBLD CKD-EPI 2021: 63 ML/MIN/1.73M*2
ERYTHROCYTE [DISTWIDTH] IN BLOOD BY AUTOMATED COUNT: 13.7 % (ref 11.5–14.5)
GLUCOSE SERPL-MCNC: 89 MG/DL (ref 74–99)
HCT VFR BLD AUTO: 38.8 % (ref 41–52)
HGB BLD-MCNC: 12 G/DL (ref 13.5–17.5)
INR PPP: 1.3 (ref 0.9–1.1)
MCH RBC QN AUTO: 28 PG (ref 26–34)
MCHC RBC AUTO-ENTMCNC: 30.9 G/DL (ref 32–36)
MCV RBC AUTO: 90 FL (ref 80–100)
NRBC BLD-RTO: 0 /100 WBCS (ref 0–0)
P AXIS: 8 DEGREES
P OFFSET: 156 MS
P ONSET: 98 MS
PLATELET # BLD AUTO: 195 X10*3/UL (ref 150–450)
POTASSIUM SERPL-SCNC: 4 MMOL/L (ref 3.5–5.3)
PR INTERVAL: 232 MS
PROTHROMBIN TIME: 14.5 SECONDS (ref 9.8–12.8)
Q ONSET: 214 MS
QRS COUNT: 11 BEATS
QRS DURATION: 96 MS
QT INTERVAL: 400 MS
QTC CALCULATION(BAZETT): 428 MS
QTC FREDERICIA: 419 MS
R AXIS: -42 DEGREES
RBC # BLD AUTO: 4.29 X10*6/UL (ref 4.5–5.9)
SODIUM SERPL-SCNC: 141 MMOL/L (ref 136–145)
T AXIS: 4 DEGREES
T OFFSET: 414 MS
VENTRICULAR RATE: 69 BPM
WBC # BLD AUTO: 6.3 X10*3/UL (ref 4.4–11.3)

## 2024-11-25 PROCEDURE — 85027 COMPLETE CBC AUTOMATED: CPT

## 2024-11-25 PROCEDURE — 93010 ELECTROCARDIOGRAM REPORT: CPT | Performed by: INTERNAL MEDICINE

## 2024-11-25 PROCEDURE — 36415 COLL VENOUS BLD VENIPUNCTURE: CPT

## 2024-11-25 PROCEDURE — 87086 URINE CULTURE/COLONY COUNT: CPT

## 2024-11-25 PROCEDURE — 80048 BASIC METABOLIC PNL TOTAL CA: CPT

## 2024-11-25 PROCEDURE — 85730 THROMBOPLASTIN TIME PARTIAL: CPT

## 2024-11-25 PROCEDURE — 99214 OFFICE O/P EST MOD 30 MIN: CPT | Performed by: NURSE PRACTITIONER

## 2024-11-25 PROCEDURE — 93005 ELECTROCARDIOGRAM TRACING: CPT | Performed by: NURSE PRACTITIONER

## 2024-11-25 PROCEDURE — 85610 PROTHROMBIN TIME: CPT

## 2024-11-25 ASSESSMENT — ENCOUNTER SYMPTOMS
ARTHRALGIAS: 1
LIGHT-HEADEDNESS: 1
RESPIRATORY NEGATIVE: 1
LIMITED RANGE OF MOTION: 1
GASTROINTESTINAL NEGATIVE: 1
CONSTITUTIONAL NEGATIVE: 1
ENDOCRINE NEGATIVE: 1
DYSPNEA WITH EXERTION: 1
NECK NEGATIVE: 1

## 2024-11-25 NOTE — CPM/PAT NURSE NOTE
CPM/PAT Nurse Note      Name: Elroy Buttsrosio (Elroy GARCIA Angus)  /Age: 1930/89 y.o.       Past Medical History:   Diagnosis Date    Aortic stenosis     CAD (coronary artery disease)     Cardiology follow-up encounter 2024    JOHNNY Hernandez    CKD (chronic kidney disease)     Gout     H/O cardiovascular stress test 2016    H/O echocardiogram 2024    Hyperlipidemia     Hypertension     Nocturia 2017    Nocturia    Preoperative clearance     JOHNNY Hernandez 3/27/24   Should his echocardiogram remain stable, he will be cleared for his urologic procedure. He will follow up with us in clinic in one year. He knows to call for any concerns.    Urinary incontinence, urge     Plan: Stage 1 & 2 Insertion Continence Control Stimulator Lead & Generator 24       Past Surgical History:   Procedure Laterality Date    CT ANGIO CORONARY ART WITH HEARTFLOW IF SCORE >30%  2018    CT [place]ART [place]ONARY [place] [facility] [place]    HERNIA REPAIR  2012    Hernia Repair    OTHER SURGICAL HISTORY  2012    Nerve Block Transforaminal Epidural    OTHER SURGICAL HISTORY  2012    Nerve Ablation    OTHER SURGICAL HISTORY      shoulder replacement    OTHER SURGICAL HISTORY      blockage in abdomen-part of intenstine removed    TOTAL KNEE ARTHROPLASTY  2012    Knee Replacement    TOTAL KNEE ARTHROPLASTY  2012    Knee Replacement       Patient Sexual activity questions deferred to the physician.    Family History   Problem Relation Name Age of Onset    Cancer Mother      Cancer Father         Allergies   Allergen Reactions    Finasteride Rash    Mirabegron Constipation    Penicillins Rash       Prior to Admission medications    Medication Sig Start Date End Date Taking? Authorizing Provider   acetaminophen (Tylenol) 500 mg tablet Take 2 tablets (1,000 mg) by mouth every 6 hours if needed for mild pain (1 - 3).    Historical Provider, MD    amLODIPine (Norvasc) 2.5 mg tablet TAKE 1 TABLET BY MOUTH ONCE  DAILY  Patient taking differently: Take 1 tablet (2.5 mg) by mouth 2 times a day. 5/8/24   Trice Grayson MD   atorvastatin (Lipitor) 40 mg tablet Take 1 tablet (40 mg) by mouth once daily at bedtime. 9/25/24 9/25/25  Trice Grayson MD   azelastine (Astelin) 137 mcg (0.1 %) nasal spray Administer 1 spray into each nostril 2 times a day. Use in each nostril as directed  Patient not taking: Reported on 11/25/2024 11/20/23 11/20/24  Trice Grayson MD   carboxymethylcellulose (Refresh Plus) 0.5 % ophthalmic solution Administer 1 drop into both eyes 4 times a day. 6/1/15   Historical Provider, MD   colchicine 0.6 mg tablet Take 1 tablet (0.6 mg) by mouth once daily as needed. 1/28/12   Historical Provider, MD   collagen/biotin/ascorbic acid (COLLAGEN 1500 PLUS C ORAL) Take by mouth once daily.    Historical Provider, MD   cyanocobalamin (Vitamin B-12) 500 mcg tablet Take 1 tablet (500 mcg) by mouth once daily.    Historical Provider, MD   diphenhydrAMINE (BENADryl) 50 mg capsule Take 1 capsule (50 mg) by mouth as needed at bedtime for itching.    Historical Provider, MD   docusate sodium (Colace) 100 mg capsule Take 2 capsules (200 mg) by mouth 2 times a day. 10/15/24 12/14/24  Regina Wilkerson MD   fluticasone (Flonase) 50 mcg/actuation nasal spray Administer 2 sprays into each nostril once daily as needed. 12/16/19   Historical Provider, MD   lidocaine (Lidoderm) 5 % patch Place 1 patch on the skin once daily. Remove & discard patch within 12 hours or as directed by MD.  Patient not taking: Reported on 11/25/2024    Historical Provider, MD   MAGNESIUM ORAL Take 1 tablet by mouth once daily. Takes 500mg 2/26/18   Historical Provider, MD   mirabegron (Myrbetriq) 25 mg tablet extended release 24 hr 24 hr tablet Take 1 tablet (25 mg) by mouth once daily. 11/11/24   Trice Grayson MD   multivitamin tablet Take 1 tablet by mouth once daily.    Historical  Provider, MD   omega 3-dha-epa-fish oil 1,000 mg (120 mg-180 mg) capsule Take 1 capsule (1,000 mg) by mouth 2 times a day.    Historical Provider, MD   psyllium (Metamucil) powder Take 1 Dose (5.8 g) by mouth 2 times a day.    Historical Provider, MD   vit C-E-zinc cit-lutein-zeaxan ([facility]) 50 mg-15 unit- 4.5 mg-2.5 mg tablet,chewable Chew 2 tablets once daily.    Historical Provider, MD CADE ROS     DASI Risk Score      Flowsheet Row Pre-Admission Testing from 5/14/2024 in [facility]   Can you take care of yourself (eat, dress, bathe, or use toilet)?  2.75 filed at 05/14/2024 1317   Can you walk indoors, such as around your house? 1.75 filed at 05/14/2024 1317   Can you walk a block or two on level ground?  2.75 filed at 05/14/2024 1317   Can you climb a flight of stairs or walk up a hill? 0 filed at 05/14/2024 1317   Can you run a short distance? 0 filed at 05/14/2024 1317   Can you do light work around the house like dusting or washing dishes? 2.7 filed at 05/14/2024 1317   Can you do moderate work around the house like vacuuming, sweeping floors or carrying groceries? 3.5 filed at 05/14/2024 1317   Can you do heavy work around the house like scrubbing floors or lifting and moving heavy furniture?  0 filed at 05/14/2024 1317   Can you do yard work like raking leaves, weeding or pushing a mower? 0 filed at 05/14/2024 1317   Can you have sexual relations? 5.25 filed at 05/14/2024 1317   Can you participate in moderate recreational activities like golf, bowling, dancing, doubles tennis or throwing a baseball or football? 6 filed at 05/14/2024 1317   Can you participate in strenous sports like swimming, singles tennis, football, basketball, or skiing? 0 filed at 05/14/2024 1317   DASI SCORE 24.7 filed at 05/14/2024 1317   METS Score (Will be calculated only when all the questions are answered) 5.8 filed at 05/14/2024 1317          Caprini DVT Assessment      Flowsheet Row  Pre-Admission Testing from 5/14/2024 in [facility]   DVT Score 6 filed at 05/14/2024 1317   BMI 30 or less filed at 05/14/2024 1317   RETIRED: Current Status Major surgery planned, including arthroscopic and laproscopic (1-2 hours) filed at 05/14/2024 1317   RETIRED: Age Over 75 years filed at 05/14/2024 1317          Modified Frailty Index      Flowsheet Row Pre-Admission Testing from 5/14/2024 in [facility]   Non-independent functional status (problems with dressing, bathing, personal grooming, or cooking) 0 filed at 05/14/2024 1317   History of diabetes mellitus  0 filed at 05/14/2024 1317   History of COPD 0 filed at 05/14/2024 1317   History of MI 0 filed at 05/14/2024 1317   History of Percutaneous Coronary Intervention, Cardiac Surgery, or Angina No filed at 05/14/2024 1317   Hypertension requiring the use of medication  0.0909 filed at 05/14/2024 1317   Peripheral vascular disease 0 filed at 05/14/2024 1317   Impaired sensorium (cognitive impairement or loss, clouding, or delirium) 0 filed at 05/14/2024 1317   TIA or CVA withouy residual deficit 0 filed at 05/14/2024 1317   Cerebrovascular accident with deficit 0 filed at 05/14/2024 1317          CHADS2 Stroke Risk  Current as of 31 minutes ago        N/A 3 to 100%: High Risk   2 to < 3%: Medium Risk   0 to < 2%: Low Risk     Last Change: N/A          This score determines the patient's risk of having a stroke if the patient has atrial fibrillation.        This score is not applicable to this patient. Components are not calculated.          Revised Cardiac Risk Index      Flowsheet Row Pre-Admission Testing from 5/14/2024 in [facility]   High-Risk Surgery (Intraperitoneal, Intrathoracic,Suprainguinal vascular) 0 filed at 05/14/2024 1317   History of ischemic heart disease (History of MI, History of positive exercuse test, Current chest paint considered due to myocardial ischemia, Use of nitrate therapy, ECG  with pathological Q Waves) 1 filed at 05/14/2024 1317   History of congestive heart failure (pulmonary edemia, bilateral rales or S3 gallop, Paroxysmal nocturnal dyspnea, CXR showing pulmonary vascular redistribution) 0 filed at 05/14/2024 1317   History of cerebrovascular disease (Prior TIA or stroke) 0 filed at 05/14/2024 1317   Pre-operative insulin treatment 0 filed at 05/14/2024 1317   Pre-operative creatinine>2 mg/dl 0 filed at 05/14/2024 1317   Revised Cardiac Risk Calculator 1 filed at 05/14/2024 1317          Apfel Simplified Score    No data to display       Risk Analysis Index Results This Encounter    No data found in the last 10 encounters.       Stop Bang Score      Flowsheet Row Admission (Discharged) from 9/27/2024 in [facility] OR with Regina Wilkerson MD Pre-Admission Testing from 9/20/2024 in [facility]   Do you snore loudly? 0 filed at 09/27/2024 0838 0 filed at 09/20/2024 0917   Do you often feel tired or fatigued after your sleep? 0 filed at 09/27/2024 0838 0 filed at 09/20/2024 0917   Has anyone ever observed you stop breathing in your sleep? 0 filed at 09/27/2024 0838 0 filed at 09/20/2024 0917   Do you have or are you being treated for high blood pressure? 1 filed at 09/27/2024 0838 1 filed at 09/20/2024 0917   Recent BMI (Calculated) 27.5 filed at 09/27/2024 0838 27.5 filed at 09/20/2024 0917   Is BMI greater than 35 kg/m2? 0=No filed at 09/27/2024 0838 0=No filed at 09/20/2024 0917   Age older than 50 years old? 1=Yes filed at 09/27/2024 0838 1=Yes filed at 09/20/2024 0917   Is your neck circumference greater than 17 inches (Male) or 16 inches (Female)? 0 filed at 09/27/2024 0838 0 filed at 09/20/2024 0917   Gender - Male 1=Yes filed at 09/27/2024 0838 1=Yes filed at 09/20/2024 0917   STOP-BANG Total Score 3 filed at 09/27/2024 0838 3 filed at 09/20/2024 0917          Prodigy: High Risk  Total Score: 24              Prodigy Age Score      Prodigy Gender Score           ARISCAT Score for Postoperative Pulmonary Complications    No data to display       Barriga Perioperative Risk for Myocardial Infarction or Cardiac Arrest (DEBRA)    No data to display         Nurse Plan of Action:     After Visit Summary (AVS) reviewed and patient verbalized good understanding of medications and NPO instructions.

## 2024-11-25 NOTE — PREPROCEDURE INSTRUCTIONS
Medication List            Accurate as of November 25, 2024  8:36 AM. Always use your most recent med list.                acetaminophen 500 mg tablet  Commonly known as: Tylenol  Medication Adjustments for Surgery: Take/Use as prescribed     amLODIPine 2.5 mg tablet  Commonly known as: Norvasc  TAKE 1 TABLET BY MOUTH ONCE  DAILY  Medication Adjustments for Surgery: Take/Use as prescribed     atorvastatin 40 mg tablet  Commonly known as: Lipitor  Take 1 tablet (40 mg) by mouth once daily at bedtime.  Medication Adjustments for Surgery: Take/Use as prescribed     azelastine 137 mcg (0.1 %) nasal spray  Commonly known as: Astelin  Administer 1 spray into each nostril 2 times a day. Use in each nostril as directed  Medication Adjustments for Surgery: Take/Use as prescribed     carboxymethylcellulose 0.5 % ophthalmic solution  Commonly known as: Refresh Plus  Medication Adjustments for Surgery: Take/Use as prescribed     colchicine 0.6 mg tablet  Medication Adjustments for Surgery: Take/Use as prescribed     COLLAGEN 1500 PLUS C ORAL  Medication Adjustments for Surgery: Do Not take on the morning of surgery     cyanocobalamin 500 mcg tablet  Commonly known as: Vitamin B-12  Medication Adjustments for Surgery: Do Not take on the morning of surgery     diphenhydrAMINE 50 mg capsule  Commonly known as: BENADryl  Medication Adjustments for Surgery: Do Not take on the morning of surgery     docusate sodium 100 mg capsule  Commonly known as: Colace  Take 2 capsules (200 mg) by mouth 2 times a day.  Medication Adjustments for Surgery: Do Not take on the morning of surgery     fluticasone 50 mcg/actuation nasal spray  Commonly known as: Flonase  Medication Adjustments for Surgery: Take/Use as prescribed     lidocaine 5 % patch  Commonly known as: Lidoderm  Medication Adjustments for Surgery: Do Not take on the morning of surgery     MAGNESIUM ORAL  Medication Adjustments for Surgery: Do Not take on the morning of surgery      mirabegron 25 mg tablet extended release 24 hr 24 hr tablet  Commonly known as: Myrbetriq  Take 1 tablet (25 mg) by mouth once daily.  Medication Adjustments for Surgery: Take/Use as prescribed     multivitamin tablet  Medication Adjustments for Surgery: Do Not take on the morning of surgery     Ocuvite Eye Health 50 mg-15 unit- 4.5 mg-2.5 mg tablet,chewable  Generic drug: vit C-E-zinc cit-lutein-zeaxan  Medication Adjustments for Surgery: Do Not take on the morning of surgery     omega 3-dha-epa-fish oil 1,000 (120-180) mg capsule  Notes to patient: Stop today 11/25/24     psyllium powder  Commonly known as: Metamucil  Medication Adjustments for Surgery: Do Not take on the morning of surgery            CONTACT SURGEON'S OFFICE IF YOU DEVELOP:  * Fever = 100.4 F   * New respiratory symptoms (e.g. cough, shortness of breath, respiratory distress, sore throat)  * Recent loss of taste or smell  *Flu like symptoms such as headache, fatigue or gastrointestinal symptoms  * You develop any open sores, shingles, burning or painful urination   AND/OR:  * You no longer wish to have the surgery.  * Any other personal circumstances change that may lead to the need to cancel or defer this surgery.  *You were admitted to any hospital within one week of your planned procedure.    SMOKING:  *Quitting smoking can make a huge difference to your health and recovery from surgery.    *If you need help with quitting, call 0-538-QUIT-NOW.    THE DAY OF SURGERY:  *Do not eat any food after midnight the night before your surgery.   *YOU MUST drink 14 OUNCES of clear liquids TWO hours before your instructed ARRIVAL TIME to the hospital. This includes water, black tea/coffee (no milk or cream), apple juice, clear broth and electrolyte drinks (Gatorade).  Please avoid clear liquids that are red in color.   *You may chew gum/mints up to TWO hours before your surgery/procedure.    SURGICAL TIME:  *You will be contacted between 2 p.m. and 6  p.m. the business day before your surgery with your arrival time.  *If you haven't received a call by 6pm, call [phone].  *Scheduled surgery times may change and you will be notified if this occurs-check your personal voicemail for any updates.    ON THE MORNING OF SURGERY:  *Wear comfortable, loose fitting clothing.   *Do not use moisturizers, creams, lotions or perfume.  *All jewelry and valuables should be left at home.  *Prosthetic devices such as contact lenses, hearing aids, dentures, eyelash extensions, hairpins and body piercing must be removed before surgery.    BRING WITH YOU:  *Photo ID and insurance card  *Current list of medications and allergies  *Pacemaker/Defibrillator/Heart stent cards  *CPAP machine and mask  *Slings/splints/crutches  *Copy of your complete Advanced Directive/DHPOA-if applicable  *Neurostimulator implant remote    PARKING AND ARRIVAL:  *Check in at the Main Entrance desk and let them know you are here for surgery.  *You will be directed to the 2nd floor surgical waiting area.    IF YOU ARE HAVING OUTPATIENT/SAME DAY SURGERY:  *A responsible adult MUST accompany you at the time of discharge and stay with you for 24 hours after your surgery.  *You may NOT drive yourself home after surgery.  *You may use a taxi or ride sharing service ([employer], [employer]) to return home ONLY if you are accompanied by a friend or family member.  *Instructions for resuming your medications will be provided by your surgeon.

## 2024-11-25 NOTE — CPM/PAT H&P
[facility] Evaluation       Name: Elroy Greco (Elroy Greco)  /Age: [age]/ y.o.     In-Person         Date of Consult: 24    Referring Provider:  Dr. Mckinney    Date, Surgery, and Length: 24, stage 1&2 insertion of continence control stimulator lead and generator, 90 minutes     Patient presents to [facility] for perioperative risk assessment prior to scheduled surgery. He complains of terrible urge incontinence. He has undergone HOLEP and botox. Medication have helped him. He underwent axonics PNE and has been doing well with improvement with urge incontinence. He will proceed with control stimulator lead and generator.     This note was created in part upon personal review of patient's medical records.        Pt denies any past history of anesthetic complications such as PONV, awareness, prolonged sedation, dental damage, aspiration, cardiac arrest, difficult intubation, difficult I.V. access or unexpected hospital admissions. No history of malignant hyperthermia and or pseudocholinesterase deficiency.    No history of blood transfusions.    The patient IS NOT a Sikh and will accept blood and blood products if medically indicated.     Type and screen NOT sent.        Past Medical History:   Diagnosis Date    Aortic stenosis     CAD (coronary artery disease)     Cardiology follow-up encounter 2024    JOHNNY Hernandez    CKD (chronic kidney disease)     Gout     H/O cardiovascular stress test 2016    H/O echocardiogram 2024    Hyperlipidemia     Hypertension     Nocturia 2017    Nocturia    Paget's disease of bone     Preoperative clearance     JOHNNY Hernandez 3/27/24   Should his echocardiogram remain stable, he will be cleared for his urologic procedure. He will follow up with us in clinic in one year. He knows to call for any concerns.    Spinal stenosis     Urinary incontinence, urge     Plan: Stage 1 & 2 Insertion Continence Control  "Stimulator Lead & Generator 11/27/24       Past Surgical History:   Procedure Laterality Date    CT ANGIO CORONARY ART WITH HEARTFLOW IF SCORE >30%  11/12/2018    CT HEART CORONARY ANGIOGRAM 11/12/2018 [facility]    HERNIA REPAIR  07/18/2012    Hernia Repair    OTHER SURGICAL HISTORY  07/18/2012    Nerve Block Transforaminal Epidural    OTHER SURGICAL HISTORY  07/18/2012    Nerve Ablation    OTHER SURGICAL HISTORY      shoulder replacement    OTHER SURGICAL HISTORY      blockage in abdomen-part of intenstine removed    TOTAL KNEE ARTHROPLASTY  07/18/2012    Knee Replacement    TOTAL KNEE ARTHROPLASTY  07/18/2012    Knee Replacement       Family History   Problem Relation Name Age of Onset    Cancer Mother      Cancer Father         Allergies   Allergen Reactions    Finasteride Rash    Mirabegron Constipation    Penicillins Rash     Social History     Tobacco Use   Smoking Status Never   Smokeless Tobacco Never     Social History     Substance and Sexual Activity   Alcohol Use Never     Social History     Substance and Sexual Activity   Drug Use Never       No current outpatient medications on file.       PAT ROS:   Constitutional:   neg    Neuro/Psych:    light-headedness  Eyes:    use of corrective lenses  Ears:   Nose:   neg    Mouth:    Upper dentures  Throat:   neg    Neck:   neg    Cardio:    SANCHEZ  Respiratory:   neg    Endocrine:   neg    GI:   neg    :    Urge incontinence  Musculoskeletal:    arthralgias   decreased ROM  Hematologic:   neg    Skin:  neg        Physical Exam  Vitals reviewed. Physical exam within normal limits.  (except comments below)  Cardiovascular:      Heart sounds: Murmur heard.          PAT AIRWAY:   Airway:     Mallampati::  II   upper dentures        Visit Vitals  /79   Pulse 60   Temp 36.1 °C (97 °F)   Resp 16   Ht 1.69 m (5' 6.54\")   Wt 79 kg (174 lb 2.6 oz)   SpO2 99%   BMI 27.66 kg/m²   Smoking Status Never   BSA 1.93 m²       Assessment and Plan:     Patient is " a 89 year old male scheduled for stage 1&2 insertion of continence control stimulator lead and generator with Dr. Mckinney on 11/27/24.    Patient is at acceptable risk to proceed with planned surgical procedure. Further cardiac risk stratification deferred at this time.This patient is LOW-INTERMEDIATE risk candidate undergoing LOW-MODERATE risk procedure, patient is medically optimized for surgery.    Plan    Neuro:    Depression     Cardiovascular:    RCRI: 0 Risk of Mace: 3.9%      Patient denies any chest pain, tightness, heaviness, pressure, radiating pain, palpitations, irregular heartbeats, lightheadedness, cough, congestion, shortness of breath, SANCHEZ, PND, near syncope, weight loss or gain.    Fair functional capacity      EKG in PAT 11/25/24:  Encounter Date: 11/25/24   ECG 12 Lead   Result Value    Ventricular Rate 69    Atrial Rate 69    NH Interval 232    QRS Duration 96    QT Interval 400    QTC Calculation(Bazett) 428    P Axis 8    R Axis -42    T Axis 4    QRS Count 11    Q Onset 214    P Onset 98    P Offset 156    T Offset 414    QTC Fredericia 419    Narrative    Sinus rhythm with 1st degree AV block  Left axis deviation  Incomplete right bundle branch block  Voltage criteria for left ventricular hypertrophy  Abnormal ECG  When compared with ECG of 27-MAR-2024 09:34,  Nonspecific T wave abnormality no longer evident in Lateral leads  Confirmed by Thierry Souza ([id number]) on 11/25/2024 11:08:24 AM     Echo 4/25/24  CONCLUSIONS:   1. Left ventricular systolic function is normal with a 65% estimated ejection fraction.   2. Spectral Doppler shows an impaired relaxation pattern of left ventricular diastolic filling.   3. There is low normal right ventricular systolic function.   4. Mild to moderate aortic valve stenosis.   5. There is aortic valve annular calcification.   6. There is moderate aortic valve cusp calcification.   7. Compared with study from 4/21/2023, no significant change.    Follows with Jill  "ANCELMO Mayo last seen 3/27/24 per note:    'Mr. Greco is a very pleasant 89 year old  male with a past medical history significant for hypertension, severe 2 vessel CAD by coronary CTA in 2018, hyperlipidemia and spinal stenosis. Echocardiogram 4/2023 showed an EF of 55-60 % with moderate mitral annular calcification and moderate AS, stable and unchanged from prior studies. He presents today for routine follow up stable from a cardiac standpoint. He is also anticipating a urologic procedure and is needing cardiac clearance. His VS and EKG remain stable today. He remains on appropriate risk factor reduction therapy. Despite his age he remains fairly active. His dyspnea on exertion as well as fatigue remain stable and unchanged. He denies any exertional chest pain. I will have him continue all medications unchanged. I will schedule him for an echocardiogram to reassess his LV function and valvular disease. I will call him with his results once available and make further recommendations at that time. Should his echocardiogram remain stable, he will be cleared for his urologic procedure. He will follow up with us in clinic in one year. He knows to call for any concerns. \"    HTN- continue Amlodipine    HLD- continue statin      Pulm:  Known or suspected MARY is considered an independent risk factor for difficult mask ventilation, difficult intubation or both.  Increased vigilance is recommended with the use of narcotics due to an increased risk for opioid induced respiratory depression.  The patient may benefit from continuous pulse oximetry to monitor for hypoxic events until baseline Sp02 is normal on room air.      Stop Bang= 3, age >50, HTN, male      Renal/endo:  Recommendations to avoid nephrotoxic drugs and carefully monitor fluid status to maintain euvolemia. Use dose adjusted medications as needed for the underlying level of renal function.    CKD III- stable    Lab Results   Component Value Date    " GLUCOSE 89 11/25/2024    CALCIUM 9.1 11/25/2024     11/25/2024    K 4.0 11/25/2024    CO2 29 11/25/2024     11/25/2024    BUN 27 (H) 11/25/2024    CREATININE 1.10 11/25/2024         GI/:    Urge incontinence- pending stimulator lead placement and generator    Heme:  Patient instructed to ambulate as soon as possible postoperatively to decrease thromboembolic risk.    Initiate mechanical DVT prophylaxis as soon as possible and initiate chemical prophylaxis when deemed safe from a bleeding standpoint post surgery.        Caprini= 4      Risk assessment complete.  Patient is scheduled for a LOW-INTERMEDIATE surgical risk procedure.     He IS considered medically optimized for the planned procedure.        Labs/testing obtained in PAT on 11/25/24: CBC, BMP , COAG, EKG, URINE CX    Lab Results   Component Value Date    WBC 6.3 11/25/2024    HGB 12.0 (L) 11/25/2024    HCT 38.8 (L) 11/25/2024    MCV 90 11/25/2024     11/25/2024     Lab Results   Component Value Date    GLUCOSE 89 11/25/2024    CALCIUM 9.1 11/25/2024     11/25/2024    K 4.0 11/25/2024    CO2 29 11/25/2024     11/25/2024    BUN 27 (H) 11/25/2024    CREATININE 1.10 11/25/2024     Lab Results   Component Value Date    INR 1.3 (H) 11/25/2024    INR 1.2 09/20/2024    INR 0.7 (L) 05/08/2024    PROTIME 14.5 (H) 11/25/2024    PROTIME 11.9 09/20/2024    PROTIME 8.2 (L) 05/08/2024       Follow up/communication: none      Preoperative medication instructions were provided and reviewed with the patient.  Any additional testing or evaluation was explained to the patient.  Nothing by mouth instructions were discussed and patient's questions were answered prior to conclusion to this encounter.  Patient verbalized understanding of preoperative instructions given in preadmission testing; discharge instructions available in EMR.    This note was dictated with speech recognition.  Minor errors may have been detected during use of speech  recognition.

## 2024-11-26 ENCOUNTER — APPOINTMENT (OUTPATIENT)
Dept: UROLOGY | Facility: HOSPITAL | Age: 89
End: 2024-11-26
Payer: MEDICARE

## 2024-11-27 ENCOUNTER — ANESTHESIA (OUTPATIENT)
Dept: OPERATING ROOM | Facility: HOSPITAL | Age: 89
End: 2024-11-27
Payer: MEDICARE

## 2024-11-27 ENCOUNTER — APPOINTMENT (OUTPATIENT)
Dept: RADIOLOGY | Facility: HOSPITAL | Age: 89
End: 2024-11-27
Payer: MEDICARE

## 2024-11-27 ENCOUNTER — ANESTHESIA EVENT (OUTPATIENT)
Dept: OPERATING ROOM | Facility: HOSPITAL | Age: 89
End: 2024-11-27
Payer: MEDICARE

## 2024-11-27 ENCOUNTER — HOSPITAL ENCOUNTER (OUTPATIENT)
Facility: HOSPITAL | Age: 89
Setting detail: OUTPATIENT SURGERY
Discharge: HOME | End: 2024-11-27
Attending: UROLOGY | Admitting: UROLOGY
Payer: MEDICARE

## 2024-11-27 VITALS
WEIGHT: 174.16 LBS | HEART RATE: 62 BPM | RESPIRATION RATE: 14 BRPM | HEIGHT: 67 IN | DIASTOLIC BLOOD PRESSURE: 86 MMHG | OXYGEN SATURATION: 100 % | SYSTOLIC BLOOD PRESSURE: 166 MMHG | BODY MASS INDEX: 27.34 KG/M2 | TEMPERATURE: 97.5 F

## 2024-11-27 DIAGNOSIS — N39.41 URINARY INCONTINENCE, URGE: Primary | ICD-10-CM

## 2024-11-27 LAB — BACTERIA UR CULT: NO GROWTH

## 2024-11-27 PROCEDURE — 76000 FLUOROSCOPY <1 HR PHYS/QHP: CPT

## 2024-11-27 PROCEDURE — 3700000001 HC GENERAL ANESTHESIA TIME - INITIAL BASE CHARGE: Performed by: UROLOGY

## 2024-11-27 PROCEDURE — C1778 LEAD, NEUROSTIMULATOR: HCPCS | Performed by: UROLOGY

## 2024-11-27 PROCEDURE — C1889 IMPLANT/INSERT DEVICE, NOC: HCPCS | Performed by: UROLOGY

## 2024-11-27 PROCEDURE — 64590 INS/RPL PRPH SAC/GSTR NPG/R: CPT | Performed by: UROLOGY

## 2024-11-27 PROCEDURE — 7100000010 HC PHASE TWO TIME - EACH INCREMENTAL 1 MINUTE: Performed by: UROLOGY

## 2024-11-27 PROCEDURE — 3600000004 HC OR TIME - INITIAL BASE CHARGE - PROCEDURE LEVEL FOUR: Performed by: UROLOGY

## 2024-11-27 PROCEDURE — 7100000009 HC PHASE TWO TIME - INITIAL BASE CHARGE: Performed by: UROLOGY

## 2024-11-27 PROCEDURE — C1787 PATIENT PROGR, NEUROSTIM: HCPCS | Performed by: UROLOGY

## 2024-11-27 PROCEDURE — C1767 GENERATOR, NEURO NON-RECHARG: HCPCS | Performed by: UROLOGY

## 2024-11-27 PROCEDURE — 2500000004 HC RX 250 GENERAL PHARMACY W/ HCPCS (ALT 636 FOR OP/ED): Performed by: ANESTHESIOLOGIST ASSISTANT

## 2024-11-27 PROCEDURE — 64561 IMPLANT NEUROELECTRODES: CPT | Performed by: UROLOGY

## 2024-11-27 PROCEDURE — 3600000009 HC OR TIME - EACH INCREMENTAL 1 MINUTE - PROCEDURE LEVEL FOUR: Performed by: UROLOGY

## 2024-11-27 PROCEDURE — 2780000003 HC OR 278 NO HCPCS: Performed by: UROLOGY

## 2024-11-27 PROCEDURE — 3700000002 HC GENERAL ANESTHESIA TIME - EACH INCREMENTAL 1 MINUTE: Performed by: UROLOGY

## 2024-11-27 PROCEDURE — 7100000001 HC RECOVERY ROOM TIME - INITIAL BASE CHARGE: Performed by: UROLOGY

## 2024-11-27 PROCEDURE — 2500000004 HC RX 250 GENERAL PHARMACY W/ HCPCS (ALT 636 FOR OP/ED): Performed by: UROLOGY

## 2024-11-27 PROCEDURE — 2720000007 HC OR 272 NO HCPCS: Performed by: UROLOGY

## 2024-11-27 PROCEDURE — 7100000002 HC RECOVERY ROOM TIME - EACH INCREMENTAL 1 MINUTE: Performed by: UROLOGY

## 2024-11-27 DEVICE — NEUROSTIMULATOR
Type: IMPLANTABLE DEVICE | Site: BACK | Status: FUNCTIONAL
Brand: AXONICS

## 2024-11-27 DEVICE — TINED LEAD KIT
Type: IMPLANTABLE DEVICE | Site: BACK | Status: FUNCTIONAL
Brand: AXONICS

## 2024-11-27 RX ORDER — ONDANSETRON HYDROCHLORIDE 2 MG/ML
4 INJECTION, SOLUTION INTRAVENOUS ONCE AS NEEDED
Status: DISCONTINUED | OUTPATIENT
Start: 2024-11-27 | End: 2024-11-27 | Stop reason: HOSPADM

## 2024-11-27 RX ORDER — CEFAZOLIN 1 G/1
INJECTION, POWDER, FOR SOLUTION INTRAVENOUS AS NEEDED
Status: DISCONTINUED | OUTPATIENT
Start: 2024-11-27 | End: 2024-11-27

## 2024-11-27 RX ORDER — LABETALOL HYDROCHLORIDE 5 MG/ML
5 INJECTION, SOLUTION INTRAVENOUS ONCE AS NEEDED
Status: DISCONTINUED | OUTPATIENT
Start: 2024-11-27 | End: 2024-11-27 | Stop reason: HOSPADM

## 2024-11-27 RX ORDER — PROPOFOL 10 MG/ML
INJECTION, EMULSION INTRAVENOUS CONTINUOUS PRN
Status: DISCONTINUED | OUTPATIENT
Start: 2024-11-27 | End: 2024-11-27

## 2024-11-27 RX ORDER — DIPHENHYDRAMINE HYDROCHLORIDE 50 MG/ML
12.5 INJECTION INTRAMUSCULAR; INTRAVENOUS ONCE AS NEEDED
Status: DISCONTINUED | OUTPATIENT
Start: 2024-11-27 | End: 2024-11-27 | Stop reason: HOSPADM

## 2024-11-27 RX ORDER — LIDOCAINE HYDROCHLORIDE 10 MG/ML
0.1 INJECTION, SOLUTION EPIDURAL; INFILTRATION; INTRACAUDAL; PERINEURAL ONCE
Status: DISCONTINUED | OUTPATIENT
Start: 2024-11-27 | End: 2024-11-27 | Stop reason: HOSPADM

## 2024-11-27 RX ORDER — LIDOCAINE HYDROCHLORIDE 10 MG/ML
INJECTION, SOLUTION EPIDURAL; INFILTRATION; INTRACAUDAL; PERINEURAL AS NEEDED
Status: DISCONTINUED | OUTPATIENT
Start: 2024-11-27 | End: 2024-11-27 | Stop reason: HOSPADM

## 2024-11-27 RX ORDER — ONDANSETRON HYDROCHLORIDE 2 MG/ML
INJECTION, SOLUTION INTRAVENOUS AS NEEDED
Status: DISCONTINUED | OUTPATIENT
Start: 2024-11-27 | End: 2024-11-27

## 2024-11-27 RX ORDER — SODIUM CHLORIDE, SODIUM LACTATE, POTASSIUM CHLORIDE, CALCIUM CHLORIDE 600; 310; 30; 20 MG/100ML; MG/100ML; MG/100ML; MG/100ML
INJECTION, SOLUTION INTRAVENOUS CONTINUOUS PRN
Status: DISCONTINUED | OUTPATIENT
Start: 2024-11-27 | End: 2024-11-27

## 2024-11-27 RX ORDER — OXYCODONE HYDROCHLORIDE 5 MG/1
5 TABLET ORAL EVERY 4 HOURS PRN
Status: DISCONTINUED | OUTPATIENT
Start: 2024-11-27 | End: 2024-11-27 | Stop reason: HOSPADM

## 2024-11-27 ASSESSMENT — PAIN - FUNCTIONAL ASSESSMENT
PAIN_FUNCTIONAL_ASSESSMENT: 0-10

## 2024-11-27 ASSESSMENT — PAIN SCALES - GENERAL
PAINLEVEL_OUTOF10: 0 - NO PAIN

## 2024-11-27 ASSESSMENT — COLUMBIA-SUICIDE SEVERITY RATING SCALE - C-SSRS
1. IN THE PAST MONTH, HAVE YOU WISHED YOU WERE DEAD OR WISHED YOU COULD GO TO SLEEP AND NOT WAKE UP?: NO
6. HAVE YOU EVER DONE ANYTHING, STARTED TO DO ANYTHING, OR PREPARED TO DO ANYTHING TO END YOUR LIFE?: NO
2. HAVE YOU ACTUALLY HAD ANY THOUGHTS OF KILLING YOURSELF?: NO

## 2024-11-27 NOTE — DISCHARGE INSTRUCTIONS
Call Dr. Mckinney for any problems and/or concerns    *No lifting/straining greater than 10 pounds  *You may shower 48 hours after your surgery and remove dressing at that time  *Prevent constipation by using stool softeners and staying hydrated, so that you do not strain against your stitches or have pain from constipation    Call Doctor right away for:    *Fever above 100.4/shaking chills  *A foul smelling discharge    *Persistent nausea/vomiting  *Redness, swelling, or drainage at your incision sites  *Chest pain/shortness of breath-call 911.    - Can take over the counter pain medication as needed., alternate a dose of Acetaminophen (Tylenol) and Ibuprofen (Motrin) every 3 hours.

## 2024-11-27 NOTE — ANESTHESIA PREPROCEDURE EVALUATION
Patient: Elroy Greco    Procedure Information       Date/Time: 11/27/24 0845    Procedure: Stage 1 & 2 Insertion Continence Control Stimulator Lead & Generator    Location: [facility] OR 01 / [facility] OR    Surgeons: Pantera Mckinney MD          CONCLUSIONS:   1. Left ventricular systolic function is normal with a 65% estimated ejection fraction.   2. Spectral Doppler shows an impaired relaxation pattern of left ventricular diastolic filling.   3. There is low normal right ventricular systolic function.   4. Mild to moderate aortic valve stenosis.   5. There is aortic valve annular calcification.   6. There is moderate aortic valve cusp calcification.   7. Compared with study from 4/21/2023, no significant change.    Relevant Problems   Cardiac   (+) Aortic stenosis, mild   (+) CAD (coronary artery disease)   (+) Chronic stable angina (CMS-HCC)   (+) Hypertension   (+) Mild mitral regurgitation      Neuro   (+) Recurrent major depressive disorder (CMS-HCC)      GI   (+) Dysphagia      /Renal   (+) BPH (benign prostatic hyperplasia)      Hematology   (+) Anemia      Musculoskeletal   (+) Lumbar canal stenosis   (+) Lumbar degenerative disc disease   (+) Lumbar spondylosis   (+) Spinal stenosis       Clinical information reviewed:   Tobacco  Allergies  Meds   Med Hx  Surg Hx   Fam Hx  Soc Hx        NPO Detail:  NPO/Void Status  Carbohydrate Drink Given Prior to Surgery? : N  Date of Last Liquid: 11/27/24  Time of Last Liquid: 0615  Date of Last Solid: 11/26/24  Time of Last Solid: 1700  Last Intake Type: Clear fluids  Time of Last Void: 0700         Physical Exam    Airway  Mallampati: II  TM distance: >3 FB  Neck ROM: full     Cardiovascular   Rhythm: regular  Rate: normal     Dental    Pulmonary   Breath sounds clear to auscultation     Abdominal            Anesthesia Plan    History of general anesthesia?: yes  History of complications of general anesthesia?: no    ASA 3     MAC     intravenous induction    Anesthetic plan and risks discussed with patient.    Plan discussed with CRNA.

## 2024-11-27 NOTE — OP NOTE
Stage 1 & 2 Insertion Continence Control Stimulator Lead & Generator Operative Note     Date: 2024  OR Location: [facility]    Name: Elroy Greco, : 1930, Age: 89 y.o., MRN: [id number], Sex: male    Diagnosis  Pre-op Diagnosis      * Urinary incontinence, urge [N39.41] Post-op Diagnosis     * Urinary incontinence, urge [N39.41]     Procedures  Stage 1 & 2 Insertion Continence Control Stimulator Lead & Generator  49286 - CO PRQ IMPLTJ NEUROSTIM ELTRD SACRAL NRVE W/IMAGING   30334    Surgeons      * Pantera Mckinney - Primary    Resident/Fellow/Other Assistant:  Surgeons and Role:  * No surgeons found with a matching role *    Staff:   Circulator: Krissy Short Person: Katey Moore Circulator: Abby    Anesthesia Staff: Anesthesiologist: Jordan Cooper MD  C-AA: GISELL Rondon    Procedure Summary  Anesthesia: Monitor Anesthesia Care  ASA: III  Estimated Blood Loss: 5mL  Intra-op Medications:   Administrations occurring from 0845 to 1015 on 24:   Medication Name Total Dose   lidocaine PF (Xylocaine) 10 mg/mL (1 %) injection 15 mL   ceFAZolin (Ancef) vial 1 g 2 g   dexAMETHasone (Decadron) injection 4 mg/mL 4 mg   lactated Ringer's infusion Cannot be calculated   ondansetron (Zofran) 2 mg/mL injection 4 mg   propofol (Diprivan) injection 10 mg/mL 128.2 mg              Anesthesia Record               Intraprocedure I/O Totals          Intake    lactated Ringer's 400.00 mL    Total Intake 400 mL       Output    Est. Blood Loss 5 mL    Total Output 5 mL       Net    Net Volume 395 mL          Specimen: No specimens collected              Drains and/or Catheters: * None in log *    Tourniquet Times:         Implants:  Implants       Type Name Action Serial No.      Neuro Interventional Implant KIT, TINED LEAD - [id number] - [id number] Implanted [id number]     Neuro Interventional Implant NEUROSTIMULATOR, F15 - [id number] - [id number] Implanted [id number]              Findings:  Excellent motor responses in right S3    Indications: Elroy Greco is an 89 y.o. male who is having surgery for Urinary incontinence, urge [N39.41].     The patient was seen in the preoperative area. The risks, benefits, complications, treatment options, non-operative alternatives, expected recovery and outcomes were discussed with the patient. The possibilities of reaction to medication, pulmonary aspiration, injury to surrounding structures, bleeding, recurrent infection, the need for additional procedures, failure to diagnose a condition, and creating a complication requiring transfusion or operation were discussed with the patient. The patient concurred with the proposed plan, giving informed consent.  The site of surgery was properly noted/marked if necessary per policy. The patient has been actively warmed in preoperative area. Preoperative antibiotics have been ordered and given within 1 hours of incision. Venous thrombosis prophylaxis have been ordered including bilateral sequential compression devices    Procedure Details:   Procedure(s):  PLACEMENT OF LEAD WIRE AND IMPLANTABLE NEUROSTIMULATOR (INS) right  CPT Code(s): 35077, 46635   Wound Class: Class I (Clean)     Findings:  Placement of lead wire in the right S3 foramen confirmed with fluoroscopy and appropriate placement of the neurostimulator (INS) in the right upper buttock in the hollow of the ilium    Details of Procedure Performed:  After surgical consent was reviewed with the patient, including risks, benefits and alternatives, the patient expressed desire to proceed with surgery.  The patient was then taken to the operating room and placed in the prone position. Pillows were placed in the lower abdomen and hips to level and flatten the sacrum and allow the toes to dangle freely.  Intravenous Ancef was infused 30 minutes prior to incision for infection prophylaxis. A ground pad was placed on the bottom of the patient's foot and was connected  to the Clinician  along with the test stimulation cable (J-hook). Tape was used on the buttocks to expose the anus and allow for observation of the yazan response. The patient was then prepped and draped in the usual fashion.   MAC anesthesia was induced. Preoperative fluoroscopy was performed to visualize the sacral anatomy.    Attention was turned to placement of the lead wire using the [employer] System. The level of S3 and the medial border of the sacral foramen were identified bilaterally using fluoroscopy in the AP view. After administration of local anesthesia, a foramen needle was placed in the superior, medial aspect of the S3 foramen such that the needle was parallel to medial border of the foramen. A lateral fluoroscopic image was then obtained to ensure the needle entry point was approximately 1 cm above and parallel to the fusion plate of S3. The needle was advanced such that the tip of the foramen needle was just at the anterior aspect of the sacrum. The J-hook was then placed on the uninsulated portion of the foramen needle and stimulation applied to obtain the opening threshold amplitude. There was noted to be a positive yazan response at <1 mA, followed by great toe flexion at <1mA. Once the ideal location was confirmed, a small incision was made at the foramen needle to accommodate the lead wire and tunneling tool. The directional guide was placed through the foramen needle and the needle removed. The introducer was placed over the directional guide and advanced under live fluoroscopy such that the radiopaque marker was placed half-way through the sacral plate. The dilator was removed along with the directional guide. Using live fluoroscopy, the tined lead with the curved stylet was placed through the introducer until electrode three straddled the anterior surface of the sacrum and ensuring the lead had a gentle downward trajectory. The stimulation clip was then placed on the distal lead  and each electrode was tested observing for a motor response. After satisfactory lead positioning was confirmed, the introducer was retracted over the lead under continuous fluoroscopy, deploying the tines into presacral tissue. Stimulation thresholds were established using the least amount of stimulation required to obtain a motor response.     After administration of local anesthetic, a 4cm incision was made over the site of INS placement site, to a depth of 2cm. A pocket was created to accommodate the INS using combination of sharp and blunt dissection, maintaining the depth of 2 cm. The tunneling tool was passed from the lead wire insertion site to the lateral pocket. The sharp tip of the tunneling tool was removed and the lead was fed through the sheath, exiting at the pocket site. The sheath was removed. The lead was cleaned and dried and connected to the [employer] INS by inserting the lead into the header of the neurostimulator until the white tip was visualized in the strain relief. The single set screw was tightened using the torque wrench    The neurostimulator was placed into the pocket. Any excessive lead was coiled and placed behind the neurostimulator. Impedances were confirmed to be within normal limits.    The incisions were irrigated with antibiotic solution in sterile water and the INS incision was closed with 2-0 Vicryl for the subcutaneous layer, skin was closed with running 4-0 Monocryl skin sutures. Skin glue was applied and covered with transparent dressing.    Instrument, sponge, and needle counts were correct times two.    Pantera Mckinney MD   Complications:  None; patient tolerated the procedure well.    Disposition: PACU - hemodynamically stable.  Condition: stable     Additional Details: Will be programmed in pacu, fu in 1m    Attending Attestation: I was present and scrubbed for the entire procedure.    Pantera Mckinney  Phone Number: [phone]

## 2024-11-29 NOTE — ANESTHESIA POSTPROCEDURE EVALUATION
Patient: Elroy Greco    Procedure Summary       Date: 11/27/24 Room / Location: [facility] OR 01 / Virtual [facility] OR    Anesthesia Start: 0902 Anesthesia Stop: 0942    Procedure: Stage 1 & 2 Insertion Continence Control Stimulator Lead & Generator (Back) Diagnosis:       Urinary incontinence, urge      (Urinary incontinence, urge [N39.41])    Surgeons: Pantera Mckinney MD Responsible Provider: Jordan Cooper MD    Anesthesia Type: MAC ASA Status: 3            Anesthesia Type: MAC    Vitals Value Taken Time   /86 11/27/24 1014   Temp 36.4 °C (97.5 °F) 11/27/24 1014   Pulse 61 11/27/24 1014   Resp 14 11/27/24 1014   SpO2 100 % 11/27/24 1014       Anesthesia Post Evaluation    Patient location during evaluation: bedside  Patient participation: complete - patient participated  Level of consciousness: awake  Pain management: adequate  Multimodal analgesia pain management approach  Airway patency: patent  Cardiovascular status: stable  Respiratory status: spontaneous ventilation and unassisted  Hydration status: acceptable  Postoperative Nausea and Vomiting: none  Comments: No significant PONV.        No notable events documented.

## 2024-11-30 ENCOUNTER — APPOINTMENT (OUTPATIENT)
Dept: CARDIOLOGY | Facility: HOSPITAL | Age: 89
End: 2024-11-30
Payer: MEDICARE

## 2024-11-30 ENCOUNTER — HOSPITAL ENCOUNTER (INPATIENT)
Facility: HOSPITAL | Age: 89
End: 2024-11-30
Attending: EMERGENCY MEDICINE | Admitting: STUDENT IN AN ORGANIZED HEALTH CARE EDUCATION/TRAINING PROGRAM
Payer: MEDICARE

## 2024-11-30 DIAGNOSIS — R33.9 URINARY RETENTION: ICD-10-CM

## 2024-11-30 DIAGNOSIS — K52.9 COLITIS: Primary | ICD-10-CM

## 2024-11-30 DIAGNOSIS — K55.039 ACUTE ISCHEMIC COLITIS (MULTI): Primary | ICD-10-CM

## 2024-11-30 DIAGNOSIS — I1A.0 RESISTANT HYPERTENSION: ICD-10-CM

## 2024-11-30 LAB
APPEARANCE UR: CLEAR
BASOPHILS # BLD AUTO: 0.02 X10*3/UL (ref 0–0.1)
BASOPHILS NFR BLD AUTO: 0.1 %
BILIRUB UR STRIP.AUTO-MCNC: NEGATIVE MG/DL
COLOR UR: NORMAL
EOSINOPHIL # BLD AUTO: 0 X10*3/UL (ref 0–0.4)
EOSINOPHIL NFR BLD AUTO: 0 %
ERYTHROCYTE [DISTWIDTH] IN BLOOD BY AUTOMATED COUNT: 13.5 % (ref 11.5–14.5)
GLUCOSE UR STRIP.AUTO-MCNC: NORMAL MG/DL
HCT VFR BLD AUTO: 40 % (ref 41–52)
HGB BLD-MCNC: 12.6 G/DL (ref 13.5–17.5)
IMM GRANULOCYTES # BLD AUTO: 0.07 X10*3/UL (ref 0–0.5)
IMM GRANULOCYTES NFR BLD AUTO: 0.5 % (ref 0–0.9)
KETONES UR STRIP.AUTO-MCNC: NEGATIVE MG/DL
LEUKOCYTE ESTERASE UR QL STRIP.AUTO: NEGATIVE
LYMPHOCYTES # BLD AUTO: 0.61 X10*3/UL (ref 0.8–3)
LYMPHOCYTES NFR BLD AUTO: 4.3 %
MCH RBC QN AUTO: 27.5 PG (ref 26–34)
MCHC RBC AUTO-ENTMCNC: 31.5 G/DL (ref 32–36)
MCV RBC AUTO: 87 FL (ref 80–100)
MONOCYTES # BLD AUTO: 1.28 X10*3/UL (ref 0.05–0.8)
MONOCYTES NFR BLD AUTO: 9.1 %
NEUTROPHILS # BLD AUTO: 12.07 X10*3/UL (ref 1.6–5.5)
NEUTROPHILS NFR BLD AUTO: 86 %
NITRITE UR QL STRIP.AUTO: NEGATIVE
NRBC BLD-RTO: 0 /100 WBCS (ref 0–0)
PH UR STRIP.AUTO: 5.5 [PH]
PLATELET # BLD AUTO: 222 X10*3/UL (ref 150–450)
PROT UR STRIP.AUTO-MCNC: NEGATIVE MG/DL
RBC # BLD AUTO: 4.58 X10*6/UL (ref 4.5–5.9)
RBC # UR STRIP.AUTO: NEGATIVE /UL
SP GR UR STRIP.AUTO: 1.01
UROBILINOGEN UR STRIP.AUTO-MCNC: NORMAL MG/DL
WBC # BLD AUTO: 14.1 X10*3/UL (ref 4.4–11.3)

## 2024-11-30 PROCEDURE — 93005 ELECTROCARDIOGRAM TRACING: CPT

## 2024-11-30 PROCEDURE — 83735 ASSAY OF MAGNESIUM: CPT | Performed by: EMERGENCY MEDICINE

## 2024-11-30 PROCEDURE — 83605 ASSAY OF LACTIC ACID: CPT | Performed by: EMERGENCY MEDICINE

## 2024-11-30 PROCEDURE — 51702 INSERT TEMP BLADDER CATH: CPT

## 2024-11-30 PROCEDURE — 84484 ASSAY OF TROPONIN QUANT: CPT | Performed by: EMERGENCY MEDICINE

## 2024-11-30 PROCEDURE — 99285 EMERGENCY DEPT VISIT HI MDM: CPT | Mod: 25

## 2024-11-30 PROCEDURE — 85610 PROTHROMBIN TIME: CPT | Performed by: EMERGENCY MEDICINE

## 2024-11-30 PROCEDURE — 36415 COLL VENOUS BLD VENIPUNCTURE: CPT | Performed by: EMERGENCY MEDICINE

## 2024-11-30 PROCEDURE — 2500000004 HC RX 250 GENERAL PHARMACY W/ HCPCS (ALT 636 FOR OP/ED): Performed by: EMERGENCY MEDICINE

## 2024-11-30 PROCEDURE — 85025 COMPLETE CBC W/AUTO DIFF WBC: CPT | Performed by: EMERGENCY MEDICINE

## 2024-11-30 PROCEDURE — 83690 ASSAY OF LIPASE: CPT | Performed by: EMERGENCY MEDICINE

## 2024-11-30 PROCEDURE — 96375 TX/PRO/DX INJ NEW DRUG ADDON: CPT | Mod: 59

## 2024-11-30 PROCEDURE — 81003 URINALYSIS AUTO W/O SCOPE: CPT | Performed by: EMERGENCY MEDICINE

## 2024-11-30 PROCEDURE — 80053 COMPREHEN METABOLIC PANEL: CPT | Performed by: EMERGENCY MEDICINE

## 2024-11-30 RX ORDER — HYDROMORPHONE HYDROCHLORIDE 0.2 MG/ML
0.2 INJECTION INTRAMUSCULAR; INTRAVENOUS; SUBCUTANEOUS ONCE
Status: COMPLETED | OUTPATIENT
Start: 2024-11-30 | End: 2024-11-30

## 2024-11-30 RX ADMIN — HYDROMORPHONE HYDROCHLORIDE 0.2 MG: 0.2 INJECTION, SOLUTION INTRAMUSCULAR; INTRAVENOUS; SUBCUTANEOUS at 23:53

## 2024-11-30 ASSESSMENT — COLUMBIA-SUICIDE SEVERITY RATING SCALE - C-SSRS
2. HAVE YOU ACTUALLY HAD ANY THOUGHTS OF KILLING YOURSELF?: NO
1. IN THE PAST MONTH, HAVE YOU WISHED YOU WERE DEAD OR WISHED YOU COULD GO TO SLEEP AND NOT WAKE UP?: NO
6. HAVE YOU EVER DONE ANYTHING, STARTED TO DO ANYTHING, OR PREPARED TO DO ANYTHING TO END YOUR LIFE?: NO

## 2024-11-30 ASSESSMENT — PAIN DESCRIPTION - LOCATION: LOCATION: ABDOMEN

## 2024-11-30 ASSESSMENT — PAIN SCALES - GENERAL
PAINLEVEL_OUTOF10: 10 - WORST POSSIBLE PAIN
PAINLEVEL_OUTOF10: 10 - WORST POSSIBLE PAIN
PAINLEVEL_OUTOF10: 0 - NO PAIN

## 2024-11-30 ASSESSMENT — PAIN - FUNCTIONAL ASSESSMENT: PAIN_FUNCTIONAL_ASSESSMENT: 0-10

## 2024-11-30 ASSESSMENT — PAIN DESCRIPTION - PAIN TYPE: TYPE: ACUTE PAIN

## 2024-12-01 ENCOUNTER — APPOINTMENT (OUTPATIENT)
Dept: RADIOLOGY | Facility: HOSPITAL | Age: 89
End: 2024-12-01
Payer: MEDICARE

## 2024-12-01 ENCOUNTER — APPOINTMENT (OUTPATIENT)
Dept: CARDIOLOGY | Facility: HOSPITAL | Age: 89
End: 2024-12-01
Payer: MEDICARE

## 2024-12-01 VITALS
TEMPERATURE: 98.9 F | HEIGHT: 67 IN | OXYGEN SATURATION: 93 % | SYSTOLIC BLOOD PRESSURE: 114 MMHG | WEIGHT: 170 LBS | RESPIRATION RATE: 16 BRPM | DIASTOLIC BLOOD PRESSURE: 73 MMHG | BODY MASS INDEX: 26.68 KG/M2 | HEART RATE: 74 BPM

## 2024-12-01 PROBLEM — K55.019: Status: ACTIVE | Noted: 2024-12-01

## 2024-12-01 PROBLEM — K55.039 ACUTE ISCHEMIC COLITIS (MULTI): Status: ACTIVE | Noted: 2024-12-01

## 2024-12-01 LAB
ALBUMIN SERPL BCP-MCNC: 3.8 G/DL (ref 3.4–5)
ALP SERPL-CCNC: 194 U/L (ref 33–136)
ALT SERPL W P-5'-P-CCNC: 11 U/L (ref 10–52)
ANION GAP SERPL CALC-SCNC: 11 MMOL/L (ref 10–20)
ANION GAP SERPL CALC-SCNC: 11 MMOL/L (ref 10–20)
ANION GAP SERPL CALC-SCNC: 14 MMOL/L
AST SERPL W P-5'-P-CCNC: 18 U/L (ref 9–39)
BACTERIA BLD CULT: NORMAL
BACTERIA BLD CULT: NORMAL
BILIRUB SERPL-MCNC: 0.8 MG/DL (ref 0–1.2)
BNP SERPL-MCNC: 281 PG/ML (ref 0–99)
BUN SERPL-MCNC: 23 MG/DL (ref 6–23)
BUN SERPL-MCNC: 27 MG/DL (ref 6–23)
BUN SERPL-MCNC: 28 MG/DL (ref 6–23)
CA-I BLD-SCNC: 1.2 MMOL/L (ref 1.1–1.33)
CALCIUM SERPL-MCNC: 10 MG/DL (ref 8.6–10.3)
CALCIUM SERPL-MCNC: 6.4 MG/DL (ref 8.6–10.3)
CALCIUM SERPL-MCNC: 7.9 MG/DL (ref 8.6–10.3)
CARDIAC TROPONIN I PNL SERPL HS: 10 NG/L (ref 0–20)
CARDIAC TROPONIN I PNL SERPL HS: 4 NG/L (ref 0–20)
CARDIAC TROPONIN I PNL SERPL HS: 8 NG/L (ref 0–20)
CHLORIDE SERPL-SCNC: 103 MMOL/L (ref 98–107)
CHLORIDE SERPL-SCNC: 105 MMOL/L (ref 98–107)
CHLORIDE SERPL-SCNC: 99 MMOL/L (ref 98–107)
CO2 SERPL-SCNC: 21 MMOL/L (ref 21–32)
CO2 SERPL-SCNC: 25 MMOL/L (ref 21–32)
CO2 SERPL-SCNC: 27 MMOL/L (ref 21–32)
CREAT SERPL-MCNC: 0.91 MG/DL (ref 0.5–1.3)
CREAT SERPL-MCNC: 1.13 MG/DL (ref 0.5–1.3)
CREAT SERPL-MCNC: 1.16 MG/DL (ref 0.5–1.3)
EGFRCR SERPLBLD CKD-EPI 2021: 59 ML/MIN/1.73M*2
EGFRCR SERPLBLD CKD-EPI 2021: 61 ML/MIN/1.73M*2
EGFRCR SERPLBLD CKD-EPI 2021: 79 ML/MIN/1.73M*2
ERYTHROCYTE [DISTWIDTH] IN BLOOD BY AUTOMATED COUNT: 13.6 % (ref 11.5–14.5)
GLUCOSE SERPL-MCNC: 147 MG/DL (ref 74–99)
GLUCOSE SERPL-MCNC: 167 MG/DL (ref 74–99)
GLUCOSE SERPL-MCNC: 174 MG/DL (ref 74–99)
HCT VFR BLD AUTO: 38.5 % (ref 41–52)
HGB BLD-MCNC: 12.4 G/DL (ref 13.5–17.5)
HOLD SPECIMEN: NORMAL
INR PPP: 1.3 (ref 0.9–1.1)
LACTATE SERPL-SCNC: 1.8 MMOL/L (ref 0.4–2)
LACTATE SERPL-SCNC: 2.9 MMOL/L (ref 0.4–2)
LACTATE SERPL-SCNC: 3.1 MMOL/L (ref 0.4–2)
LACTATE SERPL-SCNC: 4.1 MMOL/L (ref 0.4–2)
LIPASE SERPL-CCNC: 12 U/L (ref 9–82)
MAGNESIUM SERPL-MCNC: 2.76 MG/DL (ref 1.6–2.4)
MCH RBC QN AUTO: 28.5 PG (ref 26–34)
MCHC RBC AUTO-ENTMCNC: 32.2 G/DL (ref 32–36)
MCV RBC AUTO: 89 FL (ref 80–100)
NRBC BLD-RTO: 0 /100 WBCS (ref 0–0)
PLATELET # BLD AUTO: 208 X10*3/UL (ref 150–450)
POTASSIUM SERPL-SCNC: 2.9 MMOL/L (ref 3.5–5.3)
POTASSIUM SERPL-SCNC: 3.4 MMOL/L (ref 3.5–5.3)
POTASSIUM SERPL-SCNC: 4.1 MMOL/L (ref 3.5–5.3)
PROT SERPL-MCNC: 7.2 G/DL (ref 6.4–8.2)
PROTHROMBIN TIME: 14.7 SECONDS (ref 9.8–12.8)
RBC # BLD AUTO: 4.35 X10*6/UL (ref 4.5–5.9)
SARS-COV-2 RNA RESP QL NAA+PROBE: NOT DETECTED
SODIUM SERPL-SCNC: 132 MMOL/L (ref 136–145)
SODIUM SERPL-SCNC: 134 MMOL/L (ref 136–145)
SODIUM SERPL-SCNC: 140 MMOL/L (ref 136–145)
WBC # BLD AUTO: 17.2 X10*3/UL (ref 4.4–11.3)

## 2024-12-01 PROCEDURE — 74018 RADEX ABDOMEN 1 VIEW: CPT | Performed by: RADIOLOGY

## 2024-12-01 PROCEDURE — 87635 SARS-COV-2 COVID-19 AMP PRB: CPT | Performed by: EMERGENCY MEDICINE

## 2024-12-01 PROCEDURE — 51702 INSERT TEMP BLADDER CATH: CPT

## 2024-12-01 PROCEDURE — 2500000001 HC RX 250 WO HCPCS SELF ADMINISTERED DRUGS (ALT 637 FOR MEDICARE OP)

## 2024-12-01 PROCEDURE — 2500000002 HC RX 250 W HCPCS SELF ADMINISTERED DRUGS (ALT 637 FOR MEDICARE OP, ALT 636 FOR OP/ED)

## 2024-12-01 PROCEDURE — 85027 COMPLETE CBC AUTOMATED: CPT | Performed by: INTERNAL MEDICINE

## 2024-12-01 PROCEDURE — 99222 1ST HOSP IP/OBS MODERATE 55: CPT

## 2024-12-01 PROCEDURE — 2500000004 HC RX 250 GENERAL PHARMACY W/ HCPCS (ALT 636 FOR OP/ED): Performed by: INTERNAL MEDICINE

## 2024-12-01 PROCEDURE — 2500000001 HC RX 250 WO HCPCS SELF ADMINISTERED DRUGS (ALT 637 FOR MEDICARE OP): Performed by: INTERNAL MEDICINE

## 2024-12-01 PROCEDURE — 83605 ASSAY OF LACTIC ACID: CPT | Performed by: INTERNAL MEDICINE

## 2024-12-01 PROCEDURE — 1200000002 HC GENERAL ROOM WITH TELEMETRY DAILY

## 2024-12-01 PROCEDURE — 83880 ASSAY OF NATRIURETIC PEPTIDE: CPT

## 2024-12-01 PROCEDURE — 74177 CT ABD & PELVIS W/CONTRAST: CPT | Performed by: RADIOLOGY

## 2024-12-01 PROCEDURE — 80048 BASIC METABOLIC PNL TOTAL CA: CPT | Performed by: INTERNAL MEDICINE

## 2024-12-01 PROCEDURE — 82330 ASSAY OF CALCIUM: CPT | Performed by: EMERGENCY MEDICINE

## 2024-12-01 PROCEDURE — 99221 1ST HOSP IP/OBS SF/LOW 40: CPT

## 2024-12-01 PROCEDURE — 96365 THER/PROPH/DIAG IV INF INIT: CPT | Mod: 59

## 2024-12-01 PROCEDURE — 74018 RADEX ABDOMEN 1 VIEW: CPT

## 2024-12-01 PROCEDURE — 96361 HYDRATE IV INFUSION ADD-ON: CPT

## 2024-12-01 PROCEDURE — 71275 CT ANGIOGRAPHY CHEST: CPT | Performed by: RADIOLOGY

## 2024-12-01 PROCEDURE — 36415 COLL VENOUS BLD VENIPUNCTURE: CPT | Performed by: INTERNAL MEDICINE

## 2024-12-01 PROCEDURE — 87040 BLOOD CULTURE FOR BACTERIA: CPT | Mod: AHULAB | Performed by: EMERGENCY MEDICINE

## 2024-12-01 PROCEDURE — 80048 BASIC METABOLIC PNL TOTAL CA: CPT | Performed by: EMERGENCY MEDICINE

## 2024-12-01 PROCEDURE — 2500000004 HC RX 250 GENERAL PHARMACY W/ HCPCS (ALT 636 FOR OP/ED)

## 2024-12-01 PROCEDURE — 2500000004 HC RX 250 GENERAL PHARMACY W/ HCPCS (ALT 636 FOR OP/ED): Performed by: EMERGENCY MEDICINE

## 2024-12-01 PROCEDURE — 2500000002 HC RX 250 W HCPCS SELF ADMINISTERED DRUGS (ALT 637 FOR MEDICARE OP, ALT 636 FOR OP/ED): Performed by: INTERNAL MEDICINE

## 2024-12-01 PROCEDURE — 71275 CT ANGIOGRAPHY CHEST: CPT

## 2024-12-01 PROCEDURE — 96372 THER/PROPH/DIAG INJ SC/IM: CPT | Performed by: INTERNAL MEDICINE

## 2024-12-01 PROCEDURE — 93005 ELECTROCARDIOGRAM TRACING: CPT

## 2024-12-01 PROCEDURE — 36415 COLL VENOUS BLD VENIPUNCTURE: CPT | Performed by: EMERGENCY MEDICINE

## 2024-12-01 PROCEDURE — 2500000004 HC RX 250 GENERAL PHARMACY W/ HCPCS (ALT 636 FOR OP/ED): Performed by: STUDENT IN AN ORGANIZED HEALTH CARE EDUCATION/TRAINING PROGRAM

## 2024-12-01 PROCEDURE — 84484 ASSAY OF TROPONIN QUANT: CPT

## 2024-12-01 PROCEDURE — 83605 ASSAY OF LACTIC ACID: CPT

## 2024-12-01 PROCEDURE — 2550000001 HC RX 255 CONTRASTS: Performed by: EMERGENCY MEDICINE

## 2024-12-01 PROCEDURE — 99222 1ST HOSP IP/OBS MODERATE 55: CPT | Performed by: INTERNAL MEDICINE

## 2024-12-01 PROCEDURE — 74177 CT ABD & PELVIS W/CONTRAST: CPT

## 2024-12-01 RX ORDER — ONDANSETRON HYDROCHLORIDE 2 MG/ML
4 INJECTION, SOLUTION INTRAVENOUS EVERY 8 HOURS PRN
Status: DISCONTINUED | OUTPATIENT
Start: 2024-12-01 | End: 2024-12-05 | Stop reason: HOSPADM

## 2024-12-01 RX ORDER — DOCUSATE SODIUM 100 MG/1
200 CAPSULE, LIQUID FILLED ORAL 2 TIMES DAILY
Status: DISCONTINUED | OUTPATIENT
Start: 2024-12-01 | End: 2024-12-01 | Stop reason: SDUPTHER

## 2024-12-01 RX ORDER — PANTOPRAZOLE SODIUM 40 MG/10ML
40 INJECTION, POWDER, LYOPHILIZED, FOR SOLUTION INTRAVENOUS
Status: DISCONTINUED | OUTPATIENT
Start: 2024-12-01 | End: 2024-12-05 | Stop reason: HOSPADM

## 2024-12-01 RX ORDER — METRONIDAZOLE 500 MG/100ML
500 INJECTION, SOLUTION INTRAVENOUS ONCE
Status: COMPLETED | OUTPATIENT
Start: 2024-12-01 | End: 2024-12-01

## 2024-12-01 RX ORDER — ONDANSETRON 4 MG/1
4 TABLET, FILM COATED ORAL EVERY 8 HOURS PRN
Status: DISCONTINUED | OUTPATIENT
Start: 2024-12-01 | End: 2024-12-05 | Stop reason: HOSPADM

## 2024-12-01 RX ORDER — HEPARIN SODIUM 5000 [USP'U]/ML
5000 INJECTION, SOLUTION INTRAVENOUS; SUBCUTANEOUS EVERY 8 HOURS
Status: DISCONTINUED | OUTPATIENT
Start: 2024-12-01 | End: 2024-12-05 | Stop reason: HOSPADM

## 2024-12-01 RX ORDER — METRONIDAZOLE 500 MG/100ML
500 INJECTION, SOLUTION INTRAVENOUS EVERY 8 HOURS
Status: DISCONTINUED | OUTPATIENT
Start: 2024-12-01 | End: 2024-12-04

## 2024-12-01 RX ORDER — PANTOPRAZOLE SODIUM 40 MG/1
40 TABLET, DELAYED RELEASE ORAL
Status: DISCONTINUED | OUTPATIENT
Start: 2024-12-01 | End: 2024-12-05 | Stop reason: HOSPADM

## 2024-12-01 RX ORDER — POTASSIUM CHLORIDE 14.9 MG/ML
20 INJECTION INTRAVENOUS
Status: DISCONTINUED | OUTPATIENT
Start: 2024-12-01 | End: 2024-12-01

## 2024-12-01 RX ORDER — AMLODIPINE BESYLATE 2.5 MG/1
2.5 TABLET ORAL 2 TIMES DAILY
Status: DISCONTINUED | OUTPATIENT
Start: 2024-12-01 | End: 2024-12-05 | Stop reason: HOSPADM

## 2024-12-01 RX ORDER — ACETAMINOPHEN 160 MG/5ML
650 SOLUTION ORAL EVERY 4 HOURS PRN
Status: DISCONTINUED | OUTPATIENT
Start: 2024-12-01 | End: 2024-12-04

## 2024-12-01 RX ORDER — ACETAMINOPHEN 650 MG/1
650 SUPPOSITORY RECTAL EVERY 4 HOURS PRN
Status: DISCONTINUED | OUTPATIENT
Start: 2024-12-01 | End: 2024-12-04

## 2024-12-01 RX ORDER — POLYETHYLENE GLYCOL 3350 17 G/17G
17 POWDER, FOR SOLUTION ORAL DAILY
Status: DISCONTINUED | OUTPATIENT
Start: 2024-12-01 | End: 2024-12-02

## 2024-12-01 RX ORDER — POTASSIUM CHLORIDE 1.5 G/1.58G
20 POWDER, FOR SOLUTION ORAL 2 TIMES DAILY
Status: DISCONTINUED | OUTPATIENT
Start: 2024-12-01 | End: 2024-12-01

## 2024-12-01 RX ORDER — ATORVASTATIN CALCIUM 40 MG/1
40 TABLET, FILM COATED ORAL NIGHTLY
Status: DISCONTINUED | OUTPATIENT
Start: 2024-12-01 | End: 2024-12-05 | Stop reason: HOSPADM

## 2024-12-01 RX ORDER — ACETAMINOPHEN 325 MG/1
650 TABLET ORAL EVERY 4 HOURS PRN
Status: DISCONTINUED | OUTPATIENT
Start: 2024-12-01 | End: 2024-12-04

## 2024-12-01 RX ORDER — OXYBUTYNIN CHLORIDE 5 MG/1
5 TABLET ORAL 2 TIMES DAILY
Status: DISCONTINUED | OUTPATIENT
Start: 2024-12-01 | End: 2024-12-05 | Stop reason: HOSPADM

## 2024-12-01 RX ORDER — DOCUSATE SODIUM 100 MG/1
100 CAPSULE, LIQUID FILLED ORAL 2 TIMES DAILY
Status: DISCONTINUED | OUTPATIENT
Start: 2024-12-01 | End: 2024-12-01

## 2024-12-01 RX ORDER — TAMSULOSIN HYDROCHLORIDE 0.4 MG/1
0.4 CAPSULE ORAL DAILY
Status: DISCONTINUED | OUTPATIENT
Start: 2024-12-01 | End: 2024-12-05 | Stop reason: HOSPADM

## 2024-12-01 RX ORDER — POTASSIUM CHLORIDE 20 MEQ/1
40 TABLET, EXTENDED RELEASE ORAL ONCE
Status: DISCONTINUED | OUTPATIENT
Start: 2024-12-01 | End: 2024-12-01

## 2024-12-01 RX ORDER — POTASSIUM CHLORIDE 20 MEQ/1
40 TABLET, EXTENDED RELEASE ORAL ONCE
Status: COMPLETED | OUTPATIENT
Start: 2024-12-01 | End: 2024-12-01

## 2024-12-01 RX ADMIN — CEFEPIME 1 G: 1 INJECTION, POWDER, FOR SOLUTION INTRAMUSCULAR; INTRAVENOUS at 13:01

## 2024-12-01 RX ADMIN — PANTOPRAZOLE SODIUM 40 MG: 40 TABLET, DELAYED RELEASE ORAL at 09:41

## 2024-12-01 RX ADMIN — CEFEPIME 2 G: 2 INJECTION, POWDER, FOR SOLUTION INTRAVENOUS at 01:28

## 2024-12-01 RX ADMIN — SODIUM CHLORIDE 1000 ML: 9 INJECTION, SOLUTION INTRAVENOUS at 09:41

## 2024-12-01 RX ADMIN — AMLODIPINE BESYLATE 2.5 MG: 2.5 TABLET ORAL at 21:37

## 2024-12-01 RX ADMIN — OXYBUTYNIN CHLORIDE 5 MG: 5 TABLET ORAL at 21:36

## 2024-12-01 RX ADMIN — METRONIDAZOLE 500 MG: 500 INJECTION, SOLUTION INTRAVENOUS at 17:32

## 2024-12-01 RX ADMIN — HEPARIN SODIUM 5000 UNITS: 5000 INJECTION, SOLUTION INTRAVENOUS; SUBCUTANEOUS at 21:38

## 2024-12-01 RX ADMIN — OXYBUTYNIN CHLORIDE 5 MG: 5 TABLET ORAL at 09:41

## 2024-12-01 RX ADMIN — IOHEXOL 80 ML: 350 INJECTION, SOLUTION INTRAVENOUS at 01:50

## 2024-12-01 RX ADMIN — AMLODIPINE BESYLATE 2.5 MG: 2.5 TABLET ORAL at 09:41

## 2024-12-01 RX ADMIN — SODIUM CHLORIDE 1000 ML: 9 INJECTION, SOLUTION INTRAVENOUS at 07:17

## 2024-12-01 RX ADMIN — METRONIDAZOLE 500 MG: 500 INJECTION, SOLUTION INTRAVENOUS at 01:26

## 2024-12-01 RX ADMIN — METRONIDAZOLE 500 MG: 500 INJECTION, SOLUTION INTRAVENOUS at 10:12

## 2024-12-01 RX ADMIN — SODIUM CHLORIDE 500 ML: 9 INJECTION, SOLUTION INTRAVENOUS at 01:18

## 2024-12-01 RX ADMIN — ATORVASTATIN CALCIUM 40 MG: 40 TABLET, FILM COATED ORAL at 21:36

## 2024-12-01 RX ADMIN — POLYETHYLENE GLYCOL 3350 17 G: 17 POWDER, FOR SOLUTION ORAL at 09:41

## 2024-12-01 RX ADMIN — TAMSULOSIN HYDROCHLORIDE 0.4 MG: 0.4 CAPSULE ORAL at 09:57

## 2024-12-01 RX ADMIN — HEPARIN SODIUM 5000 UNITS: 5000 INJECTION, SOLUTION INTRAVENOUS; SUBCUTANEOUS at 13:01

## 2024-12-01 RX ADMIN — POTASSIUM CHLORIDE 40 MEQ: 1500 TABLET, EXTENDED RELEASE ORAL at 21:37

## 2024-12-01 RX ADMIN — SODIUM CHLORIDE 1000 ML: 9 INJECTION, SOLUTION INTRAVENOUS at 15:34

## 2024-12-01 SDOH — HEALTH STABILITY: MENTAL HEALTH: HOW OFTEN DO YOU HAVE SIX OR MORE DRINKS ON ONE OCCASION?: NEVER

## 2024-12-01 SDOH — HEALTH STABILITY: MENTAL HEALTH: HOW OFTEN DO YOU HAVE A DRINK CONTAINING ALCOHOL?: NEVER

## 2024-12-01 SDOH — HEALTH STABILITY: MENTAL HEALTH: HOW MANY DRINKS CONTAINING ALCOHOL DO YOU HAVE ON A TYPICAL DAY WHEN YOU ARE DRINKING?: PATIENT DOES NOT DRINK

## 2024-12-01 SDOH — SOCIAL STABILITY: SOCIAL INSECURITY: DO YOU FEEL UNSAFE GOING BACK TO THE PLACE WHERE YOU ARE LIVING?: NO

## 2024-12-01 SDOH — ECONOMIC STABILITY: FOOD INSECURITY: WITHIN THE PAST 12 MONTHS, YOU WORRIED THAT YOUR FOOD WOULD RUN OUT BEFORE YOU GOT THE MONEY TO BUY MORE.: NEVER TRUE

## 2024-12-01 SDOH — SOCIAL STABILITY: SOCIAL INSECURITY: HAVE YOU HAD THOUGHTS OF HARMING ANYONE ELSE?: NO

## 2024-12-01 SDOH — SOCIAL STABILITY: SOCIAL INSECURITY: DOES ANYONE TRY TO KEEP YOU FROM HAVING/CONTACTING OTHER FRIENDS OR DOING THINGS OUTSIDE YOUR HOME?: NO

## 2024-12-01 SDOH — ECONOMIC STABILITY: INCOME INSECURITY: IN THE PAST 12 MONTHS HAS THE ELECTRIC, GAS, OIL, OR WATER COMPANY THREATENED TO SHUT OFF SERVICES IN YOUR HOME?: NO

## 2024-12-01 SDOH — SOCIAL STABILITY: SOCIAL INSECURITY
WITHIN THE LAST YEAR, HAVE YOU BEEN KICKED, HIT, SLAPPED, OR OTHERWISE PHYSICALLY HURT BY YOUR PARTNER OR EX-PARTNER?: NO

## 2024-12-01 SDOH — ECONOMIC STABILITY: HOUSING INSECURITY: AT ANY TIME IN THE PAST 12 MONTHS, WERE YOU HOMELESS OR LIVING IN A SHELTER (INCLUDING NOW)?: NO

## 2024-12-01 SDOH — ECONOMIC STABILITY: TRANSPORTATION INSECURITY: IN THE PAST 12 MONTHS, HAS LACK OF TRANSPORTATION KEPT YOU FROM MEDICAL APPOINTMENTS OR FROM GETTING MEDICATIONS?: NO

## 2024-12-01 SDOH — ECONOMIC STABILITY: HOUSING INSECURITY: IN THE LAST 12 MONTHS, WAS THERE A TIME WHEN YOU WERE NOT ABLE TO PAY THE MORTGAGE OR RENT ON TIME?: NO

## 2024-12-01 SDOH — ECONOMIC STABILITY: FOOD INSECURITY: WITHIN THE PAST 12 MONTHS, THE FOOD YOU BOUGHT JUST DIDN'T LAST AND YOU DIDN'T HAVE MONEY TO GET MORE.: NEVER TRUE

## 2024-12-01 SDOH — ECONOMIC STABILITY: FOOD INSECURITY: HOW HARD IS IT FOR YOU TO PAY FOR THE VERY BASICS LIKE FOOD, HOUSING, MEDICAL CARE, AND HEATING?: NOT HARD AT ALL

## 2024-12-01 SDOH — SOCIAL STABILITY: SOCIAL INSECURITY: WITHIN THE LAST YEAR, HAVE YOU BEEN HUMILIATED OR EMOTIONALLY ABUSED IN OTHER WAYS BY YOUR PARTNER OR EX-PARTNER?: NO

## 2024-12-01 SDOH — SOCIAL STABILITY: SOCIAL INSECURITY: WITHIN THE LAST YEAR, HAVE YOU BEEN AFRAID OF YOUR PARTNER OR EX-PARTNER?: NO

## 2024-12-01 SDOH — SOCIAL STABILITY: SOCIAL INSECURITY
WITHIN THE LAST YEAR, HAVE YOU BEEN RAPED OR FORCED TO HAVE ANY KIND OF SEXUAL ACTIVITY BY YOUR PARTNER OR EX-PARTNER?: NO

## 2024-12-01 SDOH — SOCIAL STABILITY: SOCIAL INSECURITY: ARE THERE ANY APPARENT SIGNS OF INJURIES/BEHAVIORS THAT COULD BE RELATED TO ABUSE/NEGLECT?: NO

## 2024-12-01 SDOH — SOCIAL STABILITY: SOCIAL INSECURITY: ARE YOU OR HAVE YOU BEEN THREATENED OR ABUSED PHYSICALLY, EMOTIONALLY, OR SEXUALLY BY ANYONE?: NO

## 2024-12-01 SDOH — SOCIAL STABILITY: SOCIAL INSECURITY: DO YOU FEEL ANYONE HAS EXPLOITED OR TAKEN ADVANTAGE OF YOU FINANCIALLY OR OF YOUR PERSONAL PROPERTY?: NO

## 2024-12-01 SDOH — SOCIAL STABILITY: SOCIAL INSECURITY: ABUSE: ADULT

## 2024-12-01 SDOH — SOCIAL STABILITY: SOCIAL INSECURITY: HAVE YOU HAD ANY THOUGHTS OF HARMING ANYONE ELSE?: NO

## 2024-12-01 SDOH — SOCIAL STABILITY: SOCIAL INSECURITY: HAS ANYONE EVER THREATENED TO HURT YOUR FAMILY OR YOUR PETS?: NO

## 2024-12-01 SDOH — ECONOMIC STABILITY: HOUSING INSECURITY: IN THE PAST 12 MONTHS, HOW MANY TIMES HAVE YOU MOVED WHERE YOU WERE LIVING?: 0

## 2024-12-01 SDOH — SOCIAL STABILITY: SOCIAL INSECURITY: WERE YOU ABLE TO COMPLETE ALL THE BEHAVIORAL HEALTH SCREENINGS?: YES

## 2024-12-01 ASSESSMENT — ACTIVITIES OF DAILY LIVING (ADL)
HEARING - RIGHT EAR: FUNCTIONAL
JUDGMENT_ADEQUATE_SAFELY_COMPLETE_DAILY_ACTIVITIES: YES
GROOMING: INDEPENDENT
WALKS IN HOME: INDEPENDENT
ADEQUATE_TO_COMPLETE_ADL: YES
LACK_OF_TRANSPORTATION: NO
FEEDING YOURSELF: INDEPENDENT
DRESSING YOURSELF: INDEPENDENT
HEARING - LEFT EAR: FUNCTIONAL
BATHING: INDEPENDENT
LACK_OF_TRANSPORTATION: NO
PATIENT'S MEMORY ADEQUATE TO SAFELY COMPLETE DAILY ACTIVITIES?: YES
TOILETING: INDEPENDENT
LACK_OF_TRANSPORTATION: NO

## 2024-12-01 ASSESSMENT — COGNITIVE AND FUNCTIONAL STATUS - GENERAL
MOVING FROM LYING ON BACK TO SITTING ON SIDE OF FLAT BED WITH BEDRAILS: A LITTLE
STANDING UP FROM CHAIR USING ARMS: A LOT
WALKING IN HOSPITAL ROOM: A LOT
DAILY ACTIVITIY SCORE: 24
WALKING IN HOSPITAL ROOM: A LOT
DRESSING REGULAR UPPER BODY CLOTHING: A LOT
CLIMB 3 TO 5 STEPS WITH RAILING: A LOT
MOBILITY SCORE: 14
TOILETING: A LOT
PERSONAL GROOMING: A LOT
TURNING FROM BACK TO SIDE WHILE IN FLAT BAD: A LITTLE
EATING MEALS: TOTAL
DAILY ACTIVITIY SCORE: 13
TURNING FROM BACK TO SIDE WHILE IN FLAT BAD: A LITTLE
MOVING TO AND FROM BED TO CHAIR: A LOT
PATIENT BASELINE BEDBOUND: NO
HELP NEEDED FOR BATHING: A LOT
CLIMB 3 TO 5 STEPS WITH RAILING: A LOT
MOVING FROM LYING ON BACK TO SITTING ON SIDE OF FLAT BED WITH BEDRAILS: A LITTLE
MOVING TO AND FROM BED TO CHAIR: A LOT
MOBILITY SCORE: 14
STANDING UP FROM CHAIR USING ARMS: A LOT

## 2024-12-01 ASSESSMENT — PAIN SCALES - GENERAL
PAINLEVEL_OUTOF10: 0 - NO PAIN
PAINLEVEL_OUTOF10: 0 - NO PAIN

## 2024-12-01 ASSESSMENT — ENCOUNTER SYMPTOMS
FATIGUE: 0
APPETITE CHANGE: 0
DIZZINESS: 0
EYES NEGATIVE: 1
MUSCULOSKELETAL NEGATIVE: 1
ACTIVITY CHANGE: 0
VOMITING: 1
SLEEP DISTURBANCE: 0
WHEEZING: 0
WOUND: 0
SINUS PRESSURE: 0
HEMATOLOGIC/LYMPHATIC NEGATIVE: 1
RHINORRHEA: 0
ENDOCRINE NEGATIVE: 1
HEADACHES: 0
BRUISES/BLEEDS EASILY: 0
FREQUENCY: 0
DIFFICULTY URINATING: 1
PALPITATIONS: 0
CHEST TIGHTNESS: 0
COUGH: 0
UNEXPECTED WEIGHT CHANGE: 0
WEAKNESS: 0
CARDIOVASCULAR NEGATIVE: 1
PSYCHIATRIC NEGATIVE: 1
SORE THROAT: 0
CONSTIPATION: 1
BLOOD IN STOOL: 0
NAUSEA: 1
CHILLS: 0
DYSURIA: 0
NAUSEA: 0
LIGHT-HEADEDNESS: 1
DIARRHEA: 0
SPEECH DIFFICULTY: 0
FLANK PAIN: 0
ABDOMINAL DISTENTION: 0
VOMITING: 0
ABDOMINAL PAIN: 1
ALLERGIC/IMMUNOLOGIC NEGATIVE: 1
FACIAL ASYMMETRY: 0
SHORTNESS OF BREATH: 1
CONSTITUTIONAL NEGATIVE: 1
HEMATURIA: 0
POLYDIPSIA: 0
TROUBLE SWALLOWING: 0
DIZZINESS: 1
BACK PAIN: 0
NERVOUS/ANXIOUS: 0
DIAPHORESIS: 0
MYALGIAS: 0

## 2024-12-01 ASSESSMENT — LIFESTYLE VARIABLES
SKIP TO QUESTIONS 9-10: 1
AUDIT-C TOTAL SCORE: 0

## 2024-12-01 ASSESSMENT — PAIN - FUNCTIONAL ASSESSMENT
PAIN_FUNCTIONAL_ASSESSMENT: 0-10
PAIN_FUNCTIONAL_ASSESSMENT: 0-10

## 2024-12-01 ASSESSMENT — PATIENT HEALTH QUESTIONNAIRE - PHQ9
1. LITTLE INTEREST OR PLEASURE IN DOING THINGS: NOT AT ALL
2. FEELING DOWN, DEPRESSED OR HOPELESS: NOT AT ALL
SUM OF ALL RESPONSES TO PHQ9 QUESTIONS 1 & 2: 0

## 2024-12-01 NOTE — CONSULTS
Reason For Consult  Enteritis, constipation    History Of Present Illness  Elroy Greco is a 89 y.o. male with a PMH of HTN, CKD3, Moderate Aortic Stenosis, BPH, Paget's disease, gout, constipation, CAD, diastolic dysfunction, depression, and syncope who  presented to the ER with urinary retention and constipation.  Patient reports that he has not had a bowel movement for 2 days.  He has chronic constipation and takes fiber, stool softeners and Dulcolax.  Usually, fecal matter is really hard and long.  He took Dulcolax to relieve constipation but nothing was coming out and his abdominal pain became worse.  Denies fever chills, dysphagia, odynophagia, hematemesis, coffee-ground emesis, hematochezia, melena, EtOH use, marijuana use, tobacco products use, street drug use, NSAIDs, antithrombotics, PPI or H2 blocker use, and family history of GI cancer.  Endorses a couple episodes of nausea and vomiting.  ER labs remarkable for BUN/creatinine 28/1.16, GFR 59, alk phos 194, magnesium 2.76, lactate 2.9, INR 1.3, WBC 14.1, H&H 12.6/40, and MCV 87.  12/1/2024 CT abdomen pelvis with IV contrast  -Large colonic stool burden. Pericolonic inflammatory stranding extending from the splenic flexure through the rectum is most compatible with colitis.  Unable to find EGD/C-scope reports in EMR.  6/14/2012 BX  A.  TRANSVERSE COLON, BIOPSY:   -- TUBULAR ADENOMA   B.  RECTUM COLON, BIOPSY:   --HYPERPLASTIC POLYP   GI service was consulted for management of enteritis and constipation.    Past Medical History  He has a past medical history of Aortic stenosis, CAD (coronary artery disease), Cardiology follow-up encounter (03/27/2024), CKD (chronic kidney disease), Gout, H/O cardiovascular stress test (04/04/2016), H/O echocardiogram (04/05/2024), Hyperlipidemia, Hypertension, Nocturia (03/01/2017), Paget's disease of bone, Preoperative clearance, Spinal stenosis, and Urinary incontinence, urge.    He has no past medical history of  Asthma, Awareness under anesthesia, Delayed emergence from general anesthesia, Diabetes mellitus type I (Multi), Hard to intubate, Hearing aid worn, HL (hearing loss), Hyperthyroidism, Hypothyroidism, Malignant hyperthermia, PONV (postoperative nausea and vomiting), Refusal of blood product, Sleep apnea, or Type 2 diabetes mellitus.    Surgical History  He has a past surgical history that includes Other surgical history (07/18/2012); Other surgical history (07/18/2012); Total knee arthroplasty (07/18/2012); Total knee arthroplasty (07/18/2012); Hernia repair (07/18/2012); CT angio coronary art with heartflow if score >30% (11/12/2018); Other surgical history; and Other surgical history.     Social History  He reports that he has never smoked. He has never used smokeless tobacco. He reports that he does not drink alcohol and does not use drugs.    Family History  Family History   Problem Relation Name Age of Onset    Cancer Mother      Cancer Father          Allergies  Finasteride, Mirabegron, and Penicillins    Review of Systems  Review of Systems   Constitutional: Negative.    HENT: Negative.     Eyes: Negative.    Respiratory:  Positive for shortness of breath.    Cardiovascular: Negative.    Gastrointestinal:  Positive for abdominal pain, constipation, nausea and vomiting.   Endocrine: Negative.    Genitourinary: Negative.    Musculoskeletal: Negative.    Skin: Negative.    Allergic/Immunologic: Negative.    Neurological:  Positive for dizziness.   Hematological: Negative.    Psychiatric/Behavioral: Negative.           Physical Exam  Physical Exam  Vitals reviewed.   Constitutional:       Appearance: He is ill-appearing.   HENT:      Head: Atraumatic.   Cardiovascular:      Rate and Rhythm: Normal rate and regular rhythm.      Heart sounds: Murmur heard.      Systolic murmur is present with a grade of 3/6.   Pulmonary:      Effort: Pulmonary effort is normal.      Breath sounds: Normal breath sounds.   Abdominal:  "     General: Abdomen is flat. Bowel sounds are normal. There is no distension.      Palpations: Abdomen is soft.      Tenderness: There is no abdominal tenderness. There is no guarding or rebound.   Musculoskeletal:         General: Normal range of motion.      Cervical back: Neck supple.   Skin:     General: Skin is warm and dry.   Neurological:      General: No focal deficit present.      Mental Status: He is alert and oriented to person, place, and time.   Psychiatric:         Mood and Affect: Mood normal.         Behavior: Behavior normal.           Last Recorded Vitals  Blood pressure 122/78, pulse 84, temperature 37.5 °C (99.5 °F), temperature source Temporal, resp. rate 17, height 1.702 m (5' 7\"), weight 77.1 kg (170 lb), SpO2 95%.    Relevant Results      Scheduled medications  amLODIPine, 2.5 mg, oral, BID  atorvastatin, 40 mg, oral, Nightly  cefepime, 1 g, intravenous, q12h  heparin (porcine), 5,000 Units, subcutaneous, q8h  metroNIDAZOLE, 500 mg, intravenous, q8h  oxybutynin, 5 mg, oral, BID  pantoprazole, 40 mg, oral, Daily before breakfast   Or  pantoprazole, 40 mg, intravenous, Daily before breakfast  polyethylene glycol, 17 g, oral, Daily  tamsulosin, 0.4 mg, oral, Daily      Continuous medications     PRN medications  PRN medications: acetaminophen **OR** acetaminophen **OR** acetaminophen, ondansetron **OR** ondansetron   Results for orders placed or performed during the hospital encounter of 11/30/24 (from the past 24 hours)   Urinalysis with Reflex Culture and Microscopic   Result Value Ref Range    Color, Urine Light-Yellow Light-Yellow, Yellow, Dark-Yellow    Appearance, Urine Clear Clear    Specific Gravity, Urine 1.010 1.005 - 1.035    pH, Urine 5.5 5.0, 5.5, 6.0, 6.5, 7.0, 7.5, 8.0    Protein, Urine NEGATIVE NEGATIVE, 10 (TRACE), 20 (TRACE) mg/dL    Glucose, Urine Normal Normal mg/dL    Blood, Urine NEGATIVE NEGATIVE    Ketones, Urine NEGATIVE NEGATIVE mg/dL    Bilirubin, Urine NEGATIVE " NEGATIVE    Urobilinogen, Urine Normal Normal mg/dL    Nitrite, Urine NEGATIVE NEGATIVE    Leukocyte Esterase, Urine NEGATIVE NEGATIVE   CBC and Auto Differential   Result Value Ref Range    WBC 14.1 (H) 4.4 - 11.3 x10*3/uL    nRBC 0.0 0.0 - 0.0 /100 WBCs    RBC 4.58 4.50 - 5.90 x10*6/uL    Hemoglobin 12.6 (L) 13.5 - 17.5 g/dL    Hematocrit 40.0 (L) 41.0 - 52.0 %    MCV 87 80 - 100 fL    MCH 27.5 26.0 - 34.0 pg    MCHC 31.5 (L) 32.0 - 36.0 g/dL    RDW 13.5 11.5 - 14.5 %    Platelets 222 150 - 450 x10*3/uL    Neutrophils % 86.0 40.0 - 80.0 %    Immature Granulocytes %, Automated 0.5 0.0 - 0.9 %    Lymphocytes % 4.3 13.0 - 44.0 %    Monocytes % 9.1 2.0 - 10.0 %    Eosinophils % 0.0 0.0 - 6.0 %    Basophils % 0.1 0.0 - 2.0 %    Neutrophils Absolute 12.07 (H) 1.60 - 5.50 x10*3/uL    Immature Granulocytes Absolute, Automated 0.07 0.00 - 0.50 x10*3/uL    Lymphocytes Absolute 0.61 (L) 0.80 - 3.00 x10*3/uL    Monocytes Absolute 1.28 (H) 0.05 - 0.80 x10*3/uL    Eosinophils Absolute 0.00 0.00 - 0.40 x10*3/uL    Basophils Absolute 0.02 0.00 - 0.10 x10*3/uL   Comprehensive metabolic panel   Result Value Ref Range    Glucose 174 (H) 74 - 99 mg/dL    Sodium 140 136 - 145 mmol/L    Potassium 4.1 3.5 - 5.3 mmol/L    Chloride 103 98 - 107 mmol/L    Bicarbonate 27 21 - 32 mmol/L    Anion Gap 14 mmol/L    Urea Nitrogen 28 (H) 6 - 23 mg/dL    Creatinine 1.16 0.50 - 1.30 mg/dL    eGFR 59 (L) >60 mL/min/1.73m*2    Calcium 10.0 8.6 - 10.3 mg/dL    Albumin 3.8 3.4 - 5.0 g/dL    Alkaline Phosphatase 194 (H) 33 - 136 U/L    Total Protein 7.2 6.4 - 8.2 g/dL    AST 18 9 - 39 U/L    Bilirubin, Total 0.8 0.0 - 1.2 mg/dL    ALT 11 10 - 52 U/L   Magnesium   Result Value Ref Range    Magnesium 2.76 (H) 1.60 - 2.40 mg/dL   Lipase   Result Value Ref Range    Lipase 12 9 - 82 U/L   Troponin I, High Sensitivity   Result Value Ref Range    Troponin I, High Sensitivity 4 0 - 20 ng/L   Lactate   Result Value Ref Range    Lactate 2.9 (H) 0.4 - 2.0 mmol/L    Protime-INR   Result Value Ref Range    Protime 14.7 (H) 9.8 - 12.8 seconds    INR 1.3 (H) 0.9 - 1.1   Blood Culture    Specimen: Peripheral Venipuncture; Blood culture   Result Value Ref Range    Blood Culture Loaded on Instrument - Culture in progress    Blood Culture    Specimen: Peripheral Venipuncture; Blood culture   Result Value Ref Range    Blood Culture Loaded on Instrument - Culture in progress    Sars-CoV-2 PCR, Symptomatic   Result Value Ref Range    Coronavirus 2019, PCR Not Detected Not Detected   Lactate   Result Value Ref Range    Lactate 3.1 (H) 0.4 - 2.0 mmol/L   CBC   Result Value Ref Range    WBC 17.2 (H) 4.4 - 11.3 x10*3/uL    nRBC 0.0 0.0 - 0.0 /100 WBCs    RBC 4.35 (L) 4.50 - 5.90 x10*6/uL    Hemoglobin 12.4 (L) 13.5 - 17.5 g/dL    Hematocrit 38.5 (L) 41.0 - 52.0 %    MCV 89 80 - 100 fL    MCH 28.5 26.0 - 34.0 pg    MCHC 32.2 32.0 - 36.0 g/dL    RDW 13.6 11.5 - 14.5 %    Platelets 208 150 - 450 x10*3/uL   Basic metabolic panel   Result Value Ref Range    Glucose 147 (H) 74 - 99 mg/dL    Sodium 134 (L) 136 - 145 mmol/L    Potassium 2.9 (LL) 3.5 - 5.3 mmol/L    Chloride 105 98 - 107 mmol/L    Bicarbonate 21 21 - 32 mmol/L    Anion Gap 11 10 - 20 mmol/L    Urea Nitrogen 23 6 - 23 mg/dL    Creatinine 0.91 0.50 - 1.30 mg/dL    eGFR 79 >60 mL/min/1.73m*2    Calcium 6.4 (L) 8.6 - 10.3 mg/dL   B-type natriuretic peptide   Result Value Ref Range     (H) 0 - 99 pg/mL   Calcium, Ionized   Result Value Ref Range    POCT Calcium, Ionized 1.20 1.1 - 1.33 mmol/L   Basic metabolic panel   Result Value Ref Range    Glucose 167 (H) 74 - 99 mg/dL    Sodium 132 (L) 136 - 145 mmol/L    Potassium 3.4 (L) 3.5 - 5.3 mmol/L    Chloride 99 98 - 107 mmol/L    Bicarbonate 25 21 - 32 mmol/L    Anion Gap 11 10 - 20 mmol/L    Urea Nitrogen 27 (H) 6 - 23 mg/dL    Creatinine 1.13 0.50 - 1.30 mg/dL    eGFR 61 >60 mL/min/1.73m*2    Calcium 7.9 (L) 8.6 - 10.3 mg/dL   Troponin I, High Sensitivity   Result Value Ref Range     Troponin I, High Sensitivity 8 0 - 20 ng/L   Green Top   Result Value Ref Range    Extra Tube Hold for add-ons.    Troponin I, High Sensitivity   Result Value Ref Range    Troponin I, High Sensitivity 10 0 - 20 ng/L     *Note: Due to a large number of results and/or encounters for the requested time period, some results have not been displayed. A complete set of results can be found in Results Review.    CT abdomen pelvis w IV contrast    Result Date: 12/1/2024  Interpreted By:  Finkelstein, Evan, STUDY: CT ABDOMEN PELVIS W IV CONTRAST; CT ANGIO CHEST FOR PULMONARY EMBOLISM;  12/1/2024 1:55 am   INDICATION: Signs/Symptoms:Recent surgery, severe abdominal pain nausea vomiting lack of bowel movement; Signs/Symptoms:Shortness breath, recent surgery.     COMPARISON: None.   ACCESSION NUMBER(S): [id number]; [id number]   ORDERING CLINICIAN: ODALYS GARNICA   TECHNIQUE: Contiguous axial CTA images of the chest were obtained with IV contrast using CT angiographic technique. Coronal and sagittal reconstructions were created. MIP images were created and reviewed.   Contiguous axial CT images of the abdomen and pelvis were obtained with IV contrast. Coronal and sagittal reconstructions were created and reviewed.   80 mL Omnipaque 350 IV contrast was used for the above examinations.   FINDINGS: VASCULAR: AORTA: No aortic aneurysm or dissection. Moderate atherosclerotic disease. Pulmonary artery:  Normal caliber. No pulmonary embolus to the segmental level.   CHEST:   HEART: Enlarged in size. No pericardial effusion. MEDIASTINUM AND BAN: No pathologically enlarged thoracic lymph nodes. LUNG, PLEURA, LARGE AIRWAYS: Thickening of the interlobular septal markings. Mild atelectatic changes throughout the lungs bilaterally. CHEST WALL AND LOWER NECK: Reverse right shoulder arthroplasty hardware is present.   ABDOMEN/PELVIS:   LIVER: Subcentimeter hypodensity in the left hepatic lobe is too small to characterize. BILE DUCTS:  Normal caliber. GALLBLADDER: No calcified gallstones. No wall thickening. SPLEEN: Unremarkable. PANCREAS: Unremarkable. ADRENALS:  Unremarkable. KIDNEYS, URETERS AND BLADDER: Symmetric renal enhancement. No hydronephrosis or perinephric fluid collection. Hypodense lesions in the kidneys bilaterally are most compatible with simple cysts. The bladder is decompressed with a Mcqueen catheter. Mild bladder wall thickening. Lucencies in the nondependent portion of the urinary bladder likely related to instrumentation.   REPRODUCTIVE ORGANS: No pelvic masses.   ABDOMINAL WALL: A stimulator is present within the right gluteal soft tissues with leads terminating in the presacral space. Fat containing inguinal hernias bilaterally.   BOWEL: Large colonic stool burden. Pericolonic inflammatory stranding extending from the splenic flexure through the rectum. The appendix is not definitively visualized, without focal pericecal inflammatory stranding. PERITONEUM: No ascites or free air, no fluid collection. RETROPERITONEUM: Within normal limits.   BONES: No acute osseous abnormality. Redemonstrated expansion of the bony structures of the pelvis with cortical and trabecular thickening most compatible with Paget's disease.       No acute pulmonary embolus to the segmental level   Cardiomegaly with thickened interlobular septal markings compatible with pulmonary interstitial edema.   The bladder is decompressed with a Mcqueen catheter, however, there is mild bladder wall thickening for which cystitis is not excluded. Recommend correlation with urinalysis as clinically warranted.   Large colonic stool burden. Pericolonic inflammatory stranding extending from the splenic flexure through the rectum is most compatible with colitis.   MACRO: None.   Signed by: Evan Finkelstein 12/1/2024 2:15 AM Dictation workstation:   [id number]    CT angio chest for pulmonary embolism    Result Date: 12/1/2024  Interpreted By:  Finkelstein, Evan, STUDY:  CT ABDOMEN PELVIS W IV CONTRAST; CT ANGIO CHEST FOR PULMONARY EMBOLISM;  12/1/2024 1:55 am   INDICATION: Signs/Symptoms:Recent surgery, severe abdominal pain nausea vomiting lack of bowel movement; Signs/Symptoms:Shortness breath, recent surgery.     COMPARISON: None.   ACCESSION NUMBER(S): [id number]; [id number]   ORDERING CLINICIAN: ODALYS GARNICA   TECHNIQUE: Contiguous axial CTA images of the chest were obtained with IV contrast using CT angiographic technique. Coronal and sagittal reconstructions were created. MIP images were created and reviewed.   Contiguous axial CT images of the abdomen and pelvis were obtained with IV contrast. Coronal and sagittal reconstructions were created and reviewed.   80 mL Omnipaque 350 IV contrast was used for the above examinations.   FINDINGS: VASCULAR: AORTA: No aortic aneurysm or dissection. Moderate atherosclerotic disease. Pulmonary artery:  Normal caliber. No pulmonary embolus to the segmental level.   CHEST:   HEART: Enlarged in size. No pericardial effusion. MEDIASTINUM AND BAN: No pathologically enlarged thoracic lymph nodes. LUNG, PLEURA, LARGE AIRWAYS: Thickening of the interlobular septal markings. Mild atelectatic changes throughout the lungs bilaterally. CHEST WALL AND LOWER NECK: Reverse right shoulder arthroplasty hardware is present.   ABDOMEN/PELVIS:   LIVER: Subcentimeter hypodensity in the left hepatic lobe is too small to characterize. BILE DUCTS: Normal caliber. GALLBLADDER: No calcified gallstones. No wall thickening. SPLEEN: Unremarkable. PANCREAS: Unremarkable. ADRENALS:  Unremarkable. KIDNEYS, URETERS AND BLADDER: Symmetric renal enhancement. No hydronephrosis or perinephric fluid collection. Hypodense lesions in the kidneys bilaterally are most compatible with simple cysts. The bladder is decompressed with a Mcqueen catheter. Mild bladder wall thickening. Lucencies in the nondependent portion of the urinary bladder likely related to  instrumentation.   REPRODUCTIVE ORGANS: No pelvic masses.   ABDOMINAL WALL: A stimulator is present within the right gluteal soft tissues with leads terminating in the presacral space. Fat containing inguinal hernias bilaterally.   BOWEL: Large colonic stool burden. Pericolonic inflammatory stranding extending from the splenic flexure through the rectum. The appendix is not definitively visualized, without focal pericecal inflammatory stranding. PERITONEUM: No ascites or free air, no fluid collection. RETROPERITONEUM: Within normal limits.   BONES: No acute osseous abnormality. Redemonstrated expansion of the bony structures of the pelvis with cortical and trabecular thickening most compatible with Paget's disease.       No acute pulmonary embolus to the segmental level   Cardiomegaly with thickened interlobular septal markings compatible with pulmonary interstitial edema.   The bladder is decompressed with a Mcqueen catheter, however, there is mild bladder wall thickening for which cystitis is not excluded. Recommend correlation with urinalysis as clinically warranted.   Large colonic stool burden. Pericolonic inflammatory stranding extending from the splenic flexure through the rectum is most compatible with colitis.   MACRO: None.   Signed by: Evan Finkelstein 12/1/2024 2:15 AM Dictation workstation:   [id number]    FL less than 1 hour    Result Date: 11/27/2024  These images are not reportable by radiology and will not be interpreted by  Radiologists.    ECG 12 Lead    Result Date: 11/25/2024  Sinus rhythm with 1st degree AV block Left axis deviation Incomplete right bundle branch block Voltage criteria for left ventricular hypertrophy Abnormal ECG When compared with ECG of 27-MAR-2024 09:34, Nonspecific T wave abnormality no longer evident in Lateral leads Confirmed by Thierry Souza ([id number]) on 11/25/2024 11:08:24 AM       Assessment/Plan   Elroy Greco is a 89 y.o. male with a PMH of HTN, CKD3, Moderate  Aortic Stenosis, BPH, Paget's disease, gout, constipation, CAD, diastolic dysfunction, depression, and syncope who  presented to the ER with urinary retention and constipation.   GI service was consulted for management of enteritis and constipation.  Stercoral colitis caused by constipation but infectious colitis is also in the differential.  Patient had 3 bowel movements overnight and feels much better.    -Diet as tolerated  -Recommend MiraLAX twice daily  -Continue antibiotics  -Avoid opioids as able  -Recommend infectious stool workup if diarrhea  -No plan for endoscopies  -Supportive care as per primary team  -GI will sign off.  Please reach out if any questions or further assistance needed    Case discussed with Dr. Shiv Lind, APRN-CNP

## 2024-12-01 NOTE — CARE PLAN
The patient's goals for the shift include      The clinical goals for the shift include Pain management      Problem: Pain - Adult  Goal: Verbalizes/displays adequate comfort level or baseline comfort level  Outcome: Progressing     Problem: Safety - Adult  Goal: Free from fall injury  Outcome: Progressing     Problem: Discharge Planning  Goal: Discharge to home or other facility with appropriate resources  Outcome: Progressing     Problem: Chronic Conditions and Co-morbidities  Goal: Patient's chronic conditions and co-morbidity symptoms are monitored and maintained or improved  Outcome: Progressing

## 2024-12-01 NOTE — ED TRIAGE NOTES
The pt brought in to the ed for constipation he had a procedure a week ago to control his urine the procedure can cause constipation he has rock hard stool and has to break it up with a ruler he took three stool softeners at 1500 stool softeners had no effect he felt faintr foir the last three days he has been throwing up food and water   16

## 2024-12-01 NOTE — H&P
History Of Present Illness  Elroy Greco is a 89 y.o. male with a PMH of HTN, CKD3, Moderate Aortic Stenosis, BPH, constipation, CAD, diastolic dysfunction, depression, and syncope,  presenting with urinary retention and constipation. The patient had a continence stimulator implanted on 11-27-24 with Dr. Mckinney and last night developed lower abdominal pain and was unable to urinate. The patient took a Dulcolax yesterday around 1400 and he has also been taking 2 stool softeners twice a day. The patient also had complaints of chest pain that started yesterday as well as, lightheadedness and near syncope x 1 week, and SANCHEZ for the past couple of weeks. The patient called Dr. Bhat and states that he has an appointment with him next week. In the ED the patient has urinary retention >1L requiring placement of Mcqueen catheter. A CT abdomen was completed which revealed a large stool burden and pericolonic inflammatory stranding concerning for colitis. The patient denies nausea and vomiting to this clinician but reported nausea and vomiting to the ED provider overnight. The patient was also with leukocytosis and lactic acidosis requiring IVF boluses and IV antibiotics.      Past Medical History  Past Medical History:   Diagnosis Date    Aortic stenosis     CAD (coronary artery disease)     Cardiology follow-up encounter 03/27/2024    JOHNNY Hernandez    CKD (chronic kidney disease)     Gout     H/O cardiovascular stress test 04/04/2016    H/O echocardiogram 04/05/2024    Hyperlipidemia     Hypertension     Nocturia 03/01/2017    Nocturia    Paget's disease of bone     Preoperative clearance     JOHNNY Hernandez 3/27/24   Should his echocardiogram remain stable, he will be cleared for his urologic procedure. He will follow up with us in clinic in one year. He knows to call for any concerns.    Spinal stenosis     Urinary incontinence, urge     Plan: Stage 1 & 2 Insertion Continence Control Stimulator Lead &  Generator 11/27/24       Surgical History  Past Surgical History:   Procedure Laterality Date    CT ANGIO CORONARY ART WITH HEARTFLOW IF SCORE >30%  11/12/2018    CT HEART CORONARY ANGIOGRAM 11/12/2018 [facility]    HERNIA REPAIR  07/18/2012    Hernia Repair    OTHER SURGICAL HISTORY  07/18/2012    Nerve Block Transforaminal Epidural    OTHER SURGICAL HISTORY  07/18/2012    Nerve Ablation    OTHER SURGICAL HISTORY      shoulder replacement    OTHER SURGICAL HISTORY      blockage in abdomen-part of intenstine removed    TOTAL KNEE ARTHROPLASTY  07/18/2012    Knee Replacement    TOTAL KNEE ARTHROPLASTY  07/18/2012    Knee Replacement        Social History  He reports that he has never smoked. He has never used smokeless tobacco. He reports that he does not drink alcohol and does not use drugs.    Family History  Family History   Problem Relation Name Age of Onset    Cancer Mother      Cancer Father          Allergies  Finasteride, Mirabegron, and Penicillins    Review of Systems   Constitutional:  Negative for activity change, appetite change, chills, diaphoresis, fatigue and unexpected weight change.   HENT:  Negative for congestion, postnasal drip, rhinorrhea, sinus pressure, sore throat and trouble swallowing.    Eyes:  Negative for visual disturbance.   Respiratory:  Positive for shortness of breath. Negative for cough, chest tightness and wheezing.    Cardiovascular:  Positive for chest pain. Negative for palpitations and leg swelling.   Gastrointestinal:  Positive for abdominal pain and constipation. Negative for abdominal distention, blood in stool, diarrhea, nausea and vomiting.   Endocrine: Negative for polydipsia and polyuria.   Genitourinary:  Positive for difficulty urinating. Negative for dysuria, flank pain, frequency, hematuria and urgency.   Musculoskeletal:  Negative for back pain, gait problem and myalgias.   Skin:  Negative for pallor, rash and wound.   Neurological:  Positive for  "light-headedness. Negative for dizziness, syncope, facial asymmetry, speech difficulty, weakness and headaches.        Near-suncope   Hematological:  Does not bruise/bleed easily.   Psychiatric/Behavioral:  Negative for sleep disturbance. The patient is not nervous/anxious.         Physical Exam  Constitutional:       General: He is not in acute distress.     Appearance: Normal appearance.   HENT:      Head: Normocephalic and atraumatic.   Eyes:      Pupils: Pupils are equal, round, and reactive to light.   Cardiovascular:      Rate and Rhythm: Normal rate and regular rhythm.      Heart sounds: Murmur heard.   Pulmonary:      Effort: Pulmonary effort is normal. No respiratory distress.      Breath sounds: Normal breath sounds. No wheezing.   Abdominal:      General: Bowel sounds are normal. There is distension.      Palpations: Abdomen is soft.      Tenderness: There is no abdominal tenderness.      Comments: Mildly distended   Musculoskeletal:         General: Normal range of motion.      Comments: Trace BLE edema    Skin:     General: Skin is warm and dry.      Capillary Refill: Capillary refill takes less than 2 seconds.   Neurological:      Mental Status: He is alert and oriented to person, place, and time.   Psychiatric:         Mood and Affect: Mood normal.         Behavior: Behavior normal.          Last Recorded Vitals  Blood pressure 132/76, pulse 82, temperature 36.6 °C (97.9 °F), temperature source Temporal, resp. rate 18, height 1.702 m (5' 7\"), weight 77.1 kg (170 lb), SpO2 95%.    Relevant Results  Scheduled medications  amLODIPine, 2.5 mg, oral, BID  atorvastatin, 40 mg, oral, Nightly  cefepime, 1 g, intravenous, q12h  heparin (porcine), 5,000 Units, subcutaneous, q8h  metroNIDAZOLE, 500 mg, intravenous, q8h  oxybutynin, 5 mg, oral, BID  pantoprazole, 40 mg, oral, Daily before breakfast   Or  pantoprazole, 40 mg, intravenous, Daily before breakfast  polyethylene glycol, 17 g, oral, Daily  tamsulosin, " 0.4 mg, oral, Daily      Continuous medications     PRN medications  PRN medications: acetaminophen **OR** acetaminophen **OR** acetaminophen, ondansetron **OR** ondansetron   Results for orders placed or performed during the hospital encounter of 11/30/24 (from the past 24 hours)   Urinalysis with Reflex Culture and Microscopic   Result Value Ref Range    Color, Urine Light-Yellow Light-Yellow, Yellow, Dark-Yellow    Appearance, Urine Clear Clear    Specific Gravity, Urine 1.010 1.005 - 1.035    pH, Urine 5.5 5.0, 5.5, 6.0, 6.5, 7.0, 7.5, 8.0    Protein, Urine NEGATIVE NEGATIVE, 10 (TRACE), 20 (TRACE) mg/dL    Glucose, Urine Normal Normal mg/dL    Blood, Urine NEGATIVE NEGATIVE    Ketones, Urine NEGATIVE NEGATIVE mg/dL    Bilirubin, Urine NEGATIVE NEGATIVE    Urobilinogen, Urine Normal Normal mg/dL    Nitrite, Urine NEGATIVE NEGATIVE    Leukocyte Esterase, Urine NEGATIVE NEGATIVE   CBC and Auto Differential   Result Value Ref Range    WBC 14.1 (H) 4.4 - 11.3 x10*3/uL    nRBC 0.0 0.0 - 0.0 /100 WBCs    RBC 4.58 4.50 - 5.90 x10*6/uL    Hemoglobin 12.6 (L) 13.5 - 17.5 g/dL    Hematocrit 40.0 (L) 41.0 - 52.0 %    MCV 87 80 - 100 fL    MCH 27.5 26.0 - 34.0 pg    MCHC 31.5 (L) 32.0 - 36.0 g/dL    RDW 13.5 11.5 - 14.5 %    Platelets 222 150 - 450 x10*3/uL    Neutrophils % 86.0 40.0 - 80.0 %    Immature Granulocytes %, Automated 0.5 0.0 - 0.9 %    Lymphocytes % 4.3 13.0 - 44.0 %    Monocytes % 9.1 2.0 - 10.0 %    Eosinophils % 0.0 0.0 - 6.0 %    Basophils % 0.1 0.0 - 2.0 %    Neutrophils Absolute 12.07 (H) 1.60 - 5.50 x10*3/uL    Immature Granulocytes Absolute, Automated 0.07 0.00 - 0.50 x10*3/uL    Lymphocytes Absolute 0.61 (L) 0.80 - 3.00 x10*3/uL    Monocytes Absolute 1.28 (H) 0.05 - 0.80 x10*3/uL    Eosinophils Absolute 0.00 0.00 - 0.40 x10*3/uL    Basophils Absolute 0.02 0.00 - 0.10 x10*3/uL   Comprehensive metabolic panel   Result Value Ref Range    Glucose 174 (H) 74 - 99 mg/dL    Sodium 140 136 - 145 mmol/L     Potassium 4.1 3.5 - 5.3 mmol/L    Chloride 103 98 - 107 mmol/L    Bicarbonate 27 21 - 32 mmol/L    Anion Gap 14 mmol/L    Urea Nitrogen 28 (H) 6 - 23 mg/dL    Creatinine 1.16 0.50 - 1.30 mg/dL    eGFR 59 (L) >60 mL/min/1.73m*2    Calcium 10.0 8.6 - 10.3 mg/dL    Albumin 3.8 3.4 - 5.0 g/dL    Alkaline Phosphatase 194 (H) 33 - 136 U/L    Total Protein 7.2 6.4 - 8.2 g/dL    AST 18 9 - 39 U/L    Bilirubin, Total 0.8 0.0 - 1.2 mg/dL    ALT 11 10 - 52 U/L   Magnesium   Result Value Ref Range    Magnesium 2.76 (H) 1.60 - 2.40 mg/dL   Lipase   Result Value Ref Range    Lipase 12 9 - 82 U/L   Troponin I, High Sensitivity   Result Value Ref Range    Troponin I, High Sensitivity 4 0 - 20 ng/L   Lactate   Result Value Ref Range    Lactate 2.9 (H) 0.4 - 2.0 mmol/L   Protime-INR   Result Value Ref Range    Protime 14.7 (H) 9.8 - 12.8 seconds    INR 1.3 (H) 0.9 - 1.1   Blood Culture    Specimen: Peripheral Venipuncture; Blood culture   Result Value Ref Range    Blood Culture Loaded on Instrument - Culture in progress    Blood Culture    Specimen: Peripheral Venipuncture; Blood culture   Result Value Ref Range    Blood Culture Loaded on Instrument - Culture in progress    Sars-CoV-2 PCR, Symptomatic   Result Value Ref Range    Coronavirus 2019, PCR Not Detected Not Detected   Lactate   Result Value Ref Range    Lactate 3.1 (H) 0.4 - 2.0 mmol/L   CBC   Result Value Ref Range    WBC 17.2 (H) 4.4 - 11.3 x10*3/uL    nRBC 0.0 0.0 - 0.0 /100 WBCs    RBC 4.35 (L) 4.50 - 5.90 x10*6/uL    Hemoglobin 12.4 (L) 13.5 - 17.5 g/dL    Hematocrit 38.5 (L) 41.0 - 52.0 %    MCV 89 80 - 100 fL    MCH 28.5 26.0 - 34.0 pg    MCHC 32.2 32.0 - 36.0 g/dL    RDW 13.6 11.5 - 14.5 %    Platelets 208 150 - 450 x10*3/uL   Basic metabolic panel   Result Value Ref Range    Glucose 147 (H) 74 - 99 mg/dL    Sodium 134 (L) 136 - 145 mmol/L    Potassium 2.9 (LL) 3.5 - 5.3 mmol/L    Chloride 105 98 - 107 mmol/L    Bicarbonate 21 21 - 32 mmol/L    Anion Gap 11 10 - 20  mmol/L    Urea Nitrogen 23 6 - 23 mg/dL    Creatinine 0.91 0.50 - 1.30 mg/dL    eGFR 79 >60 mL/min/1.73m*2    Calcium 6.4 (L) 8.6 - 10.3 mg/dL   B-type natriuretic peptide   Result Value Ref Range     (H) 0 - 99 pg/mL   Calcium, Ionized   Result Value Ref Range    POCT Calcium, Ionized 1.20 1.1 - 1.33 mmol/L   Basic metabolic panel   Result Value Ref Range    Glucose 167 (H) 74 - 99 mg/dL    Sodium 132 (L) 136 - 145 mmol/L    Potassium 3.4 (L) 3.5 - 5.3 mmol/L    Chloride 99 98 - 107 mmol/L    Bicarbonate 25 21 - 32 mmol/L    Anion Gap 11 10 - 20 mmol/L    Urea Nitrogen 27 (H) 6 - 23 mg/dL    Creatinine 1.13 0.50 - 1.30 mg/dL    eGFR 61 >60 mL/min/1.73m*2    Calcium 7.9 (L) 8.6 - 10.3 mg/dL   Troponin I, High Sensitivity   Result Value Ref Range    Troponin I, High Sensitivity 8 0 - 20 ng/L   Green Top   Result Value Ref Range    Extra Tube Hold for add-ons.      *Note: Due to a large number of results and/or encounters for the requested time period, some results have not been displayed. A complete set of results can be found in Results Review.      CT abdomen pelvis w IV contrast    Result Date: 12/1/2024  Interpreted By:  Finkelstein, Evan, STUDY: CT ABDOMEN PELVIS W IV CONTRAST; CT ANGIO CHEST FOR PULMONARY EMBOLISM;  12/1/2024 1:55 am   INDICATION: Signs/Symptoms:Recent surgery, severe abdominal pain nausea vomiting lack of bowel movement; Signs/Symptoms:Shortness breath, recent surgery.     COMPARISON: None.   ACCESSION NUMBER(S): [id number]; [id number]   ORDERING CLINICIAN: ODALYS GARNICA   TECHNIQUE: Contiguous axial CTA images of the chest were obtained with IV contrast using CT angiographic technique. Coronal and sagittal reconstructions were created. MIP images were created and reviewed.   Contiguous axial CT images of the abdomen and pelvis were obtained with IV contrast. Coronal and sagittal reconstructions were created and reviewed.   80 mL Omnipaque 350 IV contrast was used for the above  examinations.   FINDINGS: VASCULAR: AORTA: No aortic aneurysm or dissection. Moderate atherosclerotic disease. Pulmonary artery:  Normal caliber. No pulmonary embolus to the segmental level.   CHEST:   HEART: Enlarged in size. No pericardial effusion. MEDIASTINUM AND BAN: No pathologically enlarged thoracic lymph nodes. LUNG, PLEURA, LARGE AIRWAYS: Thickening of the interlobular septal markings. Mild atelectatic changes throughout the lungs bilaterally. CHEST WALL AND LOWER NECK: Reverse right shoulder arthroplasty hardware is present.   ABDOMEN/PELVIS:   LIVER: Subcentimeter hypodensity in the left hepatic lobe is too small to characterize. BILE DUCTS: Normal caliber. GALLBLADDER: No calcified gallstones. No wall thickening. SPLEEN: Unremarkable. PANCREAS: Unremarkable. ADRENALS:  Unremarkable. KIDNEYS, URETERS AND BLADDER: Symmetric renal enhancement. No hydronephrosis or perinephric fluid collection. Hypodense lesions in the kidneys bilaterally are most compatible with simple cysts. The bladder is decompressed with a Mcqueen catheter. Mild bladder wall thickening. Lucencies in the nondependent portion of the urinary bladder likely related to instrumentation.   REPRODUCTIVE ORGANS: No pelvic masses.   ABDOMINAL WALL: A stimulator is present within the right gluteal soft tissues with leads terminating in the presacral space. Fat containing inguinal hernias bilaterally.   BOWEL: Large colonic stool burden. Pericolonic inflammatory stranding extending from the splenic flexure through the rectum. The appendix is not definitively visualized, without focal pericecal inflammatory stranding. PERITONEUM: No ascites or free air, no fluid collection. RETROPERITONEUM: Within normal limits.   BONES: No acute osseous abnormality. Redemonstrated expansion of the bony structures of the pelvis with cortical and trabecular thickening most compatible with Paget's disease.       No acute pulmonary embolus to the segmental level    Cardiomegaly with thickened interlobular septal markings compatible with pulmonary interstitial edema.   The bladder is decompressed with a Mcqueen catheter, however, there is mild bladder wall thickening for which cystitis is not excluded. Recommend correlation with urinalysis as clinically warranted.   Large colonic stool burden. Pericolonic inflammatory stranding extending from the splenic flexure through the rectum is most compatible with colitis.   MACRO: None.   Signed by: Evan Finkelstein 12/1/2024 2:15 AM Dictation workstation:   [id number]    CT angio chest for pulmonary embolism    Result Date: 12/1/2024  Interpreted By:  Finkelstein, Evan, STUDY: CT ABDOMEN PELVIS W IV CONTRAST; CT ANGIO CHEST FOR PULMONARY EMBOLISM;  12/1/2024 1:55 am   INDICATION: Signs/Symptoms:Recent surgery, severe abdominal pain nausea vomiting lack of bowel movement; Signs/Symptoms:Shortness breath, recent surgery.     COMPARISON: None.   ACCESSION NUMBER(S): [id number]; [id number]   ORDERING CLINICIAN: ODALYS GARNICA   TECHNIQUE: Contiguous axial CTA images of the chest were obtained with IV contrast using CT angiographic technique. Coronal and sagittal reconstructions were created. MIP images were created and reviewed.   Contiguous axial CT images of the abdomen and pelvis were obtained with IV contrast. Coronal and sagittal reconstructions were created and reviewed.   80 mL Omnipaque 350 IV contrast was used for the above examinations.   FINDINGS: VASCULAR: AORTA: No aortic aneurysm or dissection. Moderate atherosclerotic disease. Pulmonary artery:  Normal caliber. No pulmonary embolus to the segmental level.   CHEST:   HEART: Enlarged in size. No pericardial effusion. MEDIASTINUM AND BAN: No pathologically enlarged thoracic lymph nodes. LUNG, PLEURA, LARGE AIRWAYS: Thickening of the interlobular septal markings. Mild atelectatic changes throughout the lungs bilaterally. CHEST WALL AND LOWER NECK: Reverse right shoulder  arthroplasty hardware is present.   ABDOMEN/PELVIS:   LIVER: Subcentimeter hypodensity in the left hepatic lobe is too small to characterize. BILE DUCTS: Normal caliber. GALLBLADDER: No calcified gallstones. No wall thickening. SPLEEN: Unremarkable. PANCREAS: Unremarkable. ADRENALS:  Unremarkable. KIDNEYS, URETERS AND BLADDER: Symmetric renal enhancement. No hydronephrosis or perinephric fluid collection. Hypodense lesions in the kidneys bilaterally are most compatible with simple cysts. The bladder is decompressed with a Mcqueen catheter. Mild bladder wall thickening. Lucencies in the nondependent portion of the urinary bladder likely related to instrumentation.   REPRODUCTIVE ORGANS: No pelvic masses.   ABDOMINAL WALL: A stimulator is present within the right gluteal soft tissues with leads terminating in the presacral space. Fat containing inguinal hernias bilaterally.   BOWEL: Large colonic stool burden. Pericolonic inflammatory stranding extending from the splenic flexure through the rectum. The appendix is not definitively visualized, without focal pericecal inflammatory stranding. PERITONEUM: No ascites or free air, no fluid collection. RETROPERITONEUM: Within normal limits.   BONES: No acute osseous abnormality. Redemonstrated expansion of the bony structures of the pelvis with cortical and trabecular thickening most compatible with Paget's disease.       No acute pulmonary embolus to the segmental level   Cardiomegaly with thickened interlobular septal markings compatible with pulmonary interstitial edema.   The bladder is decompressed with a Mcqueen catheter, however, there is mild bladder wall thickening for which cystitis is not excluded. Recommend correlation with urinalysis as clinically warranted.   Large colonic stool burden. Pericolonic inflammatory stranding extending from the splenic flexure through the rectum is most compatible with colitis.   MACRO: None.   Signed by: Evan Finkelstein 12/1/2024 2:15  AM Dictation workstation:   [id number]    FL less than 1 hour    Result Date: 11/27/2024  These images are not reportable by radiology and will not be interpreted by  Radiologists.    ECG 12 Lead    Result Date: 11/25/2024  Sinus rhythm with 1st degree AV block Left axis deviation Incomplete right bundle branch block Voltage criteria for left ventricular hypertrophy Abnormal ECG When compared with ECG of 27-MAR-2024 09:34, Nonspecific T wave abnormality no longer evident in Lateral leads Confirmed by Thierry Souza ([id number]) on 11/25/2024 11:08:24 AM       Assessment/Plan   Assessment & Plan      Constipation  Colitis   -worsening lactic acidosis 2.9 -> 3.1 s/p 1500mL  -Additional 1L NS ordered will repeat lactate this afternoon  -Worsening leukocytosis noted 14.1 ->17.2  -blood cultures in process  -c/w cefepime and flagyl   -GI consulted appreciated recs  -cw Miralax daily     Urinary retention   Hx BPH  -s/p continence control stimulator with Dr. Mckinney 11-27-24  -1L retention s/p Romo placement 12-1-24  -seen by urology, possibly discharge with romo pending length of stay  -c/w Flomax     Chest pain  Hx moderate aortic stenosis, CAD   -trops 4--8-- 3rd ordered  -EKG on admission with ST () with 1 degree   -c/w telemetry  -last Echo 4/2024 with EF 65%, impaired LV diastolic filling, mild to mod aortic stenosis, moderate aortic valve cusp calcifications  -c/w lipitor    Hx HTN  -BP intermittently soft with SBP mid 90s, trend VS closely and adjust home meds as appropriate    Hx CKD3  -renal function stable    Hypokalemia   -Replenished, repeat in AM     Hyponatremia   -134 ->132  -likely 2/2 urinary retention, trend labs     DVT Prophylaxis   Subcutaneous heparin    Discharge Disposition   Return home when medically ready       Plan of care discussed with Dr. Rk AHN spent 60 minutes in the professional and overall care of this patient.      Yuki Joel, APRN-CNP

## 2024-12-01 NOTE — ED PROVIDER NOTES
History/Exam limitations: none.   Additional history was obtained from patient, spouse/SO, and past medical records.          HPI:    Elroy Greco is a 89 y.o. male PMH aortic stenosis, CAD, angina, hypertension, mitral regurgitation, depression, BPH, constipation, recent insertion of bladder continence control stimulator presenting for evaluation of severe abdominal pain nausea vomiting.  Patient states that he had onset of severe diffuse abdominal pain around 5:30 PM today.  He said recurrent nausea and vomiting as well.  Has not had a bowel movement since his surgery.  Has been taking laxatives without improvement.  Having some difficulty urinating as well.  No headache, vision changes.  Does feel chills.  No fever.  Had some shortness of breath that onset around the same time as well.  No chest pain.          Physical Exam:  ED Triage Vitals [11/30/24 2110]   Temperature Heart Rate Respirations BP   36.3 °C (97.4 °F) 85 18 (!) 155/101      Pulse Ox Temp Source Heart Rate Source Patient Position   96 % Oral Monitor Lying      BP Location FiO2 (%)     Right arm --        GEN:      Alert, NAD  Eyes:       PERRL, EOMI  HENT:      NC/AT, OP clear, airway patent, MM  CV:      Tachycardic rate, RR, no MRG, no LE pitting edema, 2+ radial and pedal pulses  PULM:     CTAB, no w/r/r, easy WOB, symmetric chest rise  ABD:      Soft, diffuse abdominal tenderness and mild distention, no rebound or guarding, negative Lloyd, ND, no masses, BS +  :       No CVA TTP  NEURO:   A&Ox3, no focal deficits    MSK:      FROM, no joint deformities or swelling, no e/o trauma  SKIN:       Warm and dry  PSYCH:    Appropriate mood and affect         MDM/ED Course:   Elroy Greco is a 89 y.o. male PMH aortic stenosis, CAD, angina, hypertension, mitral regurgitation, depression, BPH, constipation, recent insertion of bladder continence control stimulator presenting for evaluation of severe abdominal pain nausea vomiting.  Vitals  and exam as documented above.  Differential for abdominal pain includes but is not limited to SBO, incarcerated hernia, pancreatitis, viscous organ rupture, mesenteric ischemia, aortic pathology, biliary pathology, diverticulitis, appendicitis, UTI/pyelonephritis, nephrolithiasis, viral illness.  Differential clues atypical ACS.  Patient has pretty significant suprapubic fullness and tenderness, bladder scanner 600 cc.  Patient is unable to urinate.  Attempted turning off patient's stimulator and additionally attempted having on changing settings, patient unable to urinate despite this.  Given this Mcqueen catheter was placed and 1200 cc urine output.  Urology consulted, spoke to urology WILLIAM.  Labs obtained and CBC with leukocytosis of 14.1 with left shift.  Hemoglobin 12.6.  Chemistry with elevated alkaline phosphatase to 196 with no recent prior for comparison.  Lipase negative.  Troponin negative.  Patient felt markedly improved after bladder decompression.  Urinalysis reassuring.  CT angio chest for PE and CT abdomen pelvis obtained and displays interstitial edema, bladder wall thickening present, large colonic stool burden, pericolonic inflammatory stranding concerning for colitis.  Blood cultures obtained and IV cefepime/Flagyl provided given concern for intra-abdominal infection/leukocytosis.  Lactate 2.9.  Patient given IV fluid.  Pain improved as well after 0.2 mg IV Dilaudid.  Patient care signed out to admitting providers for continued management in stable condition.    EKG reviewed by me: 11:37 PM sinus tachycardia, rate 101, first-degree AV block with DC interval 234 ms,  ms, no STEMI, no ectopy, similar to prior EKGs with exception of new tachycardia.     ED Course as of 12/03/24 1732   Sat Nov 30, 2024   2336 Patient was unable to urinate, approximate 600 cc of urine in bladder, quite a bit of discomfort, attempted to turn often on the bladder device however unable, Mcqueen catheter to be placed  [IFRAH]   Sun Dec 01, 2024   0100 1200cc output w romo.  [IFRAH]   0110 Urology consulted.  [JM]      ED Course User Index  [IFRAH] Jeferson Atwood MD         Diagnoses as of 12/03/24 1732   Colitis   Urinary retention         Chronic medical conditions affecting care listed in MDM. I independently reviewed imaging studies and agreed with radiology reads. I reviewed recent and relevant outside records including PCP notes, Prior discharge summaries, and prior radiology reports.    Procedure  Procedures    Diagnosis:   1.  Colitis  2.  Urinary retention    Dispo: Hospitalized in stable condition      Disclaimer: Portions of this note were dictated by speech recognition. An attempt at proof reading was made to minimize errors. Minor errors in transcription may be present.  Please call if questions.     Jeferson Atwood MD  12/03/24 1732

## 2024-12-01 NOTE — CONSULTS
"Reason For Consult  \"Recent urologic procedure, urinary retention\"    History Of Present Illness  Elroy Greco is a 89 y.o. male presenting with lower abdominal pain, constipation, nausea and vomiting. He reported not having a bowel movement since prior to his procedure. He underwent a stage 1 & 2 insertion continence control stimulator lead and generator with Dr. Mckinney on 11/27/24 (Intraoperative Findings: excellent motor responses in right S3). He had this placed due to urge incontinence. He did undergo a HoLEP and Botox with Dr. Wilkerson in the past. He also underwent [employer] PNE in the office on 11/8 with Dr. Mckinney.    He reports improved urination since the stimulator placement. Reports that he is urinating 2X/night instead of 3-4X/night. Denies dysuria. In the ED, he was found to be retaining > 1 L of urine and a romo catheter was subsequently placed. The stimulator was turned off and back on prior to romo placement in the ED and he was still unable to urinate. He states he did not feel like he had to urinate and feels like he has been emptying his bladder completely at home.    He is afebrile and is not tachycardic or hypotensive (he is noted to be hypertensive). WBC 14.1, H/H 12.6/40, BUN 28 (27 on 11/25), Cr 1.16 (1.10 on 11/25), GFR 59 (63 on 11/25), lactate 2.9. UA negative for infection. CT showed a compressed bladder with a romo catheter with mild bladder wall thickening. Urology was consulted due to his recent urological procedure and urinary retention.       Past Medical History  He has a past medical history of Aortic stenosis, CAD (coronary artery disease), Cardiology follow-up encounter (03/27/2024), CKD (chronic kidney disease), Gout, H/O cardiovascular stress test (04/04/2016), H/O echocardiogram (04/05/2024), Hyperlipidemia, Hypertension, Nocturia (03/01/2017), Paget's disease of bone, Preoperative clearance, Spinal stenosis, and Urinary incontinence, urge.    He has no past medical history " "of Asthma, Awareness under anesthesia, Delayed emergence from general anesthesia, Diabetes mellitus type I (Multi), Hard to intubate, Hearing aid worn, HL (hearing loss), Hyperthyroidism, Hypothyroidism, Malignant hyperthermia, PONV (postoperative nausea and vomiting), Refusal of blood product, Sleep apnea, or Type 2 diabetes mellitus.    Surgical History  He has a past surgical history that includes Other surgical history (07/18/2012); Other surgical history (07/18/2012); Total knee arthroplasty (07/18/2012); Total knee arthroplasty (07/18/2012); Hernia repair (07/18/2012); CT angio coronary art with heartflow if score >30% (11/12/2018); Other surgical history; and Other surgical history.     Social History  He reports that he has never smoked. He has never used smokeless tobacco. He reports that he does not drink alcohol and does not use drugs.    Family History  Family History   Problem Relation Name Age of Onset    Cancer Mother      Cancer Father          Allergies  Finasteride, Mirabegron, and Penicillins    Review of Systems  URINARY: + retention, nocturia. Denies dysuria.      Physical Exam  Constitutional: Awake/alert/oriented x3, no distress, cooperative.  Skin: Warm and dry.  Eyes: EOMI, clear sclera.  ENMT: Mucus membranes moist, no apparent injury.  Head/Neck: Neck supple, no apparent injury.  Respiratory: Unlabored breathing.  Cardiovascular: RRR.  GI: Non distended, soft, mildly tender to palpation of the lower abdomen.  : Mcqueen catheter in place with adelfo colored urine in tubing and bag.   Extremities: MARIN.  Neurological: Alert and oriented x3, no focal deficits.  Psychological: Appropriate mood and behavior.      Last Recorded Vitals  Blood pressure 103/65, pulse 90, temperature 36.3 °C (97.4 °F), temperature source Oral, resp. rate 18, height 1.702 m (5' 7\"), weight 77.1 kg (170 lb), SpO2 94%.    Relevant Results  Results for orders placed or performed during the hospital encounter of 11/30/24 " (from the past 24 hours)   Urinalysis with Reflex Culture and Microscopic   Result Value Ref Range    Color, Urine Light-Yellow Light-Yellow, Yellow, Dark-Yellow    Appearance, Urine Clear Clear    Specific Gravity, Urine 1.010 1.005 - 1.035    pH, Urine 5.5 5.0, 5.5, 6.0, 6.5, 7.0, 7.5, 8.0    Protein, Urine NEGATIVE NEGATIVE, 10 (TRACE), 20 (TRACE) mg/dL    Glucose, Urine Normal Normal mg/dL    Blood, Urine NEGATIVE NEGATIVE    Ketones, Urine NEGATIVE NEGATIVE mg/dL    Bilirubin, Urine NEGATIVE NEGATIVE    Urobilinogen, Urine Normal Normal mg/dL    Nitrite, Urine NEGATIVE NEGATIVE    Leukocyte Esterase, Urine NEGATIVE NEGATIVE   CBC and Auto Differential   Result Value Ref Range    WBC 14.1 (H) 4.4 - 11.3 x10*3/uL    nRBC 0.0 0.0 - 0.0 /100 WBCs    RBC 4.58 4.50 - 5.90 x10*6/uL    Hemoglobin 12.6 (L) 13.5 - 17.5 g/dL    Hematocrit 40.0 (L) 41.0 - 52.0 %    MCV 87 80 - 100 fL    MCH 27.5 26.0 - 34.0 pg    MCHC 31.5 (L) 32.0 - 36.0 g/dL    RDW 13.5 11.5 - 14.5 %    Platelets 222 150 - 450 x10*3/uL    Neutrophils % 86.0 40.0 - 80.0 %    Immature Granulocytes %, Automated 0.5 0.0 - 0.9 %    Lymphocytes % 4.3 13.0 - 44.0 %    Monocytes % 9.1 2.0 - 10.0 %    Eosinophils % 0.0 0.0 - 6.0 %    Basophils % 0.1 0.0 - 2.0 %    Neutrophils Absolute 12.07 (H) 1.60 - 5.50 x10*3/uL    Immature Granulocytes Absolute, Automated 0.07 0.00 - 0.50 x10*3/uL    Lymphocytes Absolute 0.61 (L) 0.80 - 3.00 x10*3/uL    Monocytes Absolute 1.28 (H) 0.05 - 0.80 x10*3/uL    Eosinophils Absolute 0.00 0.00 - 0.40 x10*3/uL    Basophils Absolute 0.02 0.00 - 0.10 x10*3/uL   Comprehensive metabolic panel   Result Value Ref Range    Glucose 174 (H) 74 - 99 mg/dL    Sodium 140 136 - 145 mmol/L    Potassium 4.1 3.5 - 5.3 mmol/L    Chloride 103 98 - 107 mmol/L    Bicarbonate 27 21 - 32 mmol/L    Anion Gap 14 mmol/L    Urea Nitrogen 28 (H) 6 - 23 mg/dL    Creatinine 1.16 0.50 - 1.30 mg/dL    eGFR 59 (L) >60 mL/min/1.73m*2    Calcium 10.0 8.6 - 10.3 mg/dL     Albumin 3.8 3.4 - 5.0 g/dL    Alkaline Phosphatase 194 (H) 33 - 136 U/L    Total Protein 7.2 6.4 - 8.2 g/dL    AST 18 9 - 39 U/L    Bilirubin, Total 0.8 0.0 - 1.2 mg/dL    ALT 11 10 - 52 U/L   Magnesium   Result Value Ref Range    Magnesium 2.76 (H) 1.60 - 2.40 mg/dL   Lipase   Result Value Ref Range    Lipase 12 9 - 82 U/L   Troponin I, High Sensitivity   Result Value Ref Range    Troponin I, High Sensitivity 4 0 - 20 ng/L   Lactate   Result Value Ref Range    Lactate 2.9 (H) 0.4 - 2.0 mmol/L   Protime-INR   Result Value Ref Range    Protime 14.7 (H) 9.8 - 12.8 seconds    INR 1.3 (H) 0.9 - 1.1   Sars-CoV-2 PCR, Symptomatic   Result Value Ref Range    Coronavirus 2019, PCR Not Detected Not Detected     *Note: Due to a large number of results and/or encounters for the requested time period, some results have not been displayed. A complete set of results can be found in Results Review.      CT abdomen pelvis w IV contrast    Result Date: 12/1/2024  Interpreted By:  Finkelstein, Evan, STUDY: CT ABDOMEN PELVIS W IV CONTRAST; CT ANGIO CHEST FOR PULMONARY EMBOLISM;  12/1/2024 1:55 am   INDICATION: Signs/Symptoms:Recent surgery, severe abdominal pain nausea vomiting lack of bowel movement; Signs/Symptoms:Shortness breath, recent surgery.     COMPARISON: None.   ACCESSION NUMBER(S): [id number]; [id number]   ORDERING CLINICIAN: ODALYS GARNICA   TECHNIQUE: Contiguous axial CTA images of the chest were obtained with IV contrast using CT angiographic technique. Coronal and sagittal reconstructions were created. MIP images were created and reviewed.   Contiguous axial CT images of the abdomen and pelvis were obtained with IV contrast. Coronal and sagittal reconstructions were created and reviewed.   80 mL Omnipaque 350 IV contrast was used for the above examinations.   FINDINGS: VASCULAR: AORTA: No aortic aneurysm or dissection. Moderate atherosclerotic disease. Pulmonary artery:  Normal caliber. No pulmonary embolus to  the segmental level.   CHEST:   HEART: Enlarged in size. No pericardial effusion. MEDIASTINUM AND BAN: No pathologically enlarged thoracic lymph nodes. LUNG, PLEURA, LARGE AIRWAYS: Thickening of the interlobular septal markings. Mild atelectatic changes throughout the lungs bilaterally. CHEST WALL AND LOWER NECK: Reverse right shoulder arthroplasty hardware is present.   ABDOMEN/PELVIS:   LIVER: Subcentimeter hypodensity in the left hepatic lobe is too small to characterize. BILE DUCTS: Normal caliber. GALLBLADDER: No calcified gallstones. No wall thickening. SPLEEN: Unremarkable. PANCREAS: Unremarkable. ADRENALS:  Unremarkable. KIDNEYS, URETERS AND BLADDER: Symmetric renal enhancement. No hydronephrosis or perinephric fluid collection. Hypodense lesions in the kidneys bilaterally are most compatible with simple cysts. The bladder is decompressed with a Mcqueen catheter. Mild bladder wall thickening. Lucencies in the nondependent portion of the urinary bladder likely related to instrumentation.   REPRODUCTIVE ORGANS: No pelvic masses.   ABDOMINAL WALL: A stimulator is present within the right gluteal soft tissues with leads terminating in the presacral space. Fat containing inguinal hernias bilaterally.   BOWEL: Large colonic stool burden. Pericolonic inflammatory stranding extending from the splenic flexure through the rectum. The appendix is not definitively visualized, without focal pericecal inflammatory stranding. PERITONEUM: No ascites or free air, no fluid collection. RETROPERITONEUM: Within normal limits.   BONES: No acute osseous abnormality. Redemonstrated expansion of the bony structures of the pelvis with cortical and trabecular thickening most compatible with Paget's disease.       No acute pulmonary embolus to the segmental level   Cardiomegaly with thickened interlobular septal markings compatible with pulmonary interstitial edema.   The bladder is decompressed with a Mcqueen catheter, however, there is  mild bladder wall thickening for which cystitis is not excluded. Recommend correlation with urinalysis as clinically warranted.   Large colonic stool burden. Pericolonic inflammatory stranding extending from the splenic flexure through the rectum is most compatible with colitis.   MACRO: None.   Signed by: Evan Finkelstein 12/1/2024 2:15 AM Dictation workstation:   [id number]    CT angio chest for pulmonary embolism    Result Date: 12/1/2024  Interpreted By:  Finkelstein, Evan, STUDY: CT ABDOMEN PELVIS W IV CONTRAST; CT ANGIO CHEST FOR PULMONARY EMBOLISM;  12/1/2024 1:55 am   INDICATION: Signs/Symptoms:Recent surgery, severe abdominal pain nausea vomiting lack of bowel movement; Signs/Symptoms:Shortness breath, recent surgery.     COMPARISON: None.   ACCESSION NUMBER(S): [id number]; [id number]   ORDERING CLINICIAN: ODALYS GARNICA   TECHNIQUE: Contiguous axial CTA images of the chest were obtained with IV contrast using CT angiographic technique. Coronal and sagittal reconstructions were created. MIP images were created and reviewed.   Contiguous axial CT images of the abdomen and pelvis were obtained with IV contrast. Coronal and sagittal reconstructions were created and reviewed.   80 mL Omnipaque 350 IV contrast was used for the above examinations.   FINDINGS: VASCULAR: AORTA: No aortic aneurysm or dissection. Moderate atherosclerotic disease. Pulmonary artery:  Normal caliber. No pulmonary embolus to the segmental level.   CHEST:   HEART: Enlarged in size. No pericardial effusion. MEDIASTINUM AND BAN: No pathologically enlarged thoracic lymph nodes. LUNG, PLEURA, LARGE AIRWAYS: Thickening of the interlobular septal markings. Mild atelectatic changes throughout the lungs bilaterally. CHEST WALL AND LOWER NECK: Reverse right shoulder arthroplasty hardware is present.   ABDOMEN/PELVIS:   LIVER: Subcentimeter hypodensity in the left hepatic lobe is too small to characterize. BILE DUCTS: Normal caliber.  GALLBLADDER: No calcified gallstones. No wall thickening. SPLEEN: Unremarkable. PANCREAS: Unremarkable. ADRENALS:  Unremarkable. KIDNEYS, URETERS AND BLADDER: Symmetric renal enhancement. No hydronephrosis or perinephric fluid collection. Hypodense lesions in the kidneys bilaterally are most compatible with simple cysts. The bladder is decompressed with a Mcqueen catheter. Mild bladder wall thickening. Lucencies in the nondependent portion of the urinary bladder likely related to instrumentation.   REPRODUCTIVE ORGANS: No pelvic masses.   ABDOMINAL WALL: A stimulator is present within the right gluteal soft tissues with leads terminating in the presacral space. Fat containing inguinal hernias bilaterally.   BOWEL: Large colonic stool burden. Pericolonic inflammatory stranding extending from the splenic flexure through the rectum. The appendix is not definitively visualized, without focal pericecal inflammatory stranding. PERITONEUM: No ascites or free air, no fluid collection. RETROPERITONEUM: Within normal limits.   BONES: No acute osseous abnormality. Redemonstrated expansion of the bony structures of the pelvis with cortical and trabecular thickening most compatible with Paget's disease.       No acute pulmonary embolus to the segmental level   Cardiomegaly with thickened interlobular septal markings compatible with pulmonary interstitial edema.   The bladder is decompressed with a Mcqueen catheter, however, there is mild bladder wall thickening for which cystitis is not excluded. Recommend correlation with urinalysis as clinically warranted.   Large colonic stool burden. Pericolonic inflammatory stranding extending from the splenic flexure through the rectum is most compatible with colitis.   MACRO: None.   Signed by: Evan Finkelstein 12/1/2024 2:15 AM Dictation workstation:   [id number]    FL less than 1 hour    Result Date: 11/27/2024  These images are not reportable by radiology and will not be interpreted by   Radiologists.    ECG 12 Lead    Result Date: 11/25/2024  Sinus rhythm with 1st degree AV block Left axis deviation Incomplete right bundle branch block Voltage criteria for left ventricular hypertrophy Abnormal ECG When compared with ECG of 27-MAR-2024 09:34, Nonspecific T wave abnormality no longer evident in Lateral leads Confirmed by Thierry Souza ([id number]) on 11/25/2024 11:08:24 AM       Assessment/Plan  Urinary Retention  - Romo catheter in place with adelfo colored urine in tubing and bag.  - Maintain romo catheter. Recommend keeping romo in place for a few days prior to attempting TOV. If discharging before a couple of days, ok to DC with romo and follow up outpatient for TOV. If patient fails TOV, re-insert romo catheter and discharge with romo. Either way, patient should attempt to schedule a sooner outpatient follow up with Dr. Mckinney once discharged (current appointment 12/20).  - Trend kidney function  - Continue Flomax    I spent 35 minutes in the professional and overall care of this patient.      Genoveva Moreno, APRN-CNP

## 2024-12-02 LAB
ALBUMIN SERPL BCP-MCNC: 2.9 G/DL (ref 3.4–5)
ALP SERPL-CCNC: 126 U/L (ref 33–136)
ALT SERPL W P-5'-P-CCNC: 7 U/L (ref 10–52)
ANION GAP SERPL CALC-SCNC: 10 MMOL/L (ref 10–20)
AST SERPL W P-5'-P-CCNC: 11 U/L (ref 9–39)
BASOPHILS # BLD AUTO: 0.02 X10*3/UL (ref 0–0.1)
BASOPHILS NFR BLD AUTO: 0.2 %
BILIRUB SERPL-MCNC: 0.6 MG/DL (ref 0–1.2)
BUN SERPL-MCNC: 26 MG/DL (ref 6–23)
CALCIUM SERPL-MCNC: 7.7 MG/DL (ref 8.6–10.3)
CHLORIDE SERPL-SCNC: 111 MMOL/L (ref 98–107)
CO2 SERPL-SCNC: 24 MMOL/L (ref 21–32)
CREAT SERPL-MCNC: 1.16 MG/DL (ref 0.5–1.3)
EGFRCR SERPLBLD CKD-EPI 2021: 58 ML/MIN/1.73M*2
EOSINOPHIL # BLD AUTO: 0.14 X10*3/UL (ref 0–0.4)
EOSINOPHIL NFR BLD AUTO: 1.1 %
ERYTHROCYTE [DISTWIDTH] IN BLOOD BY AUTOMATED COUNT: 14.5 % (ref 11.5–14.5)
GLUCOSE SERPL-MCNC: 122 MG/DL (ref 74–99)
HCT VFR BLD AUTO: 33 % (ref 41–52)
HGB BLD-MCNC: 10.5 G/DL (ref 13.5–17.5)
IMM GRANULOCYTES # BLD AUTO: 0.07 X10*3/UL (ref 0–0.5)
IMM GRANULOCYTES NFR BLD AUTO: 0.6 % (ref 0–0.9)
LYMPHOCYTES # BLD AUTO: 1.66 X10*3/UL (ref 0.8–3)
LYMPHOCYTES NFR BLD AUTO: 13.6 %
MAGNESIUM SERPL-MCNC: 2.13 MG/DL (ref 1.6–2.4)
MCH RBC QN AUTO: 28.2 PG (ref 26–34)
MCHC RBC AUTO-ENTMCNC: 31.8 G/DL (ref 32–36)
MCV RBC AUTO: 89 FL (ref 80–100)
MONOCYTES # BLD AUTO: 0.94 X10*3/UL (ref 0.05–0.8)
MONOCYTES NFR BLD AUTO: 7.7 %
NEUTROPHILS # BLD AUTO: 9.35 X10*3/UL (ref 1.6–5.5)
NEUTROPHILS NFR BLD AUTO: 76.8 %
NRBC BLD-RTO: 0 /100 WBCS (ref 0–0)
PLATELET # BLD AUTO: 181 X10*3/UL (ref 150–450)
POTASSIUM SERPL-SCNC: 3.8 MMOL/L (ref 3.5–5.3)
PROT SERPL-MCNC: 5.8 G/DL (ref 6.4–8.2)
RBC # BLD AUTO: 3.73 X10*6/UL (ref 4.5–5.9)
SODIUM SERPL-SCNC: 141 MMOL/L (ref 136–145)
WBC # BLD AUTO: 12.2 X10*3/UL (ref 4.4–11.3)

## 2024-12-02 PROCEDURE — 84075 ASSAY ALKALINE PHOSPHATASE: CPT | Performed by: STUDENT IN AN ORGANIZED HEALTH CARE EDUCATION/TRAINING PROGRAM

## 2024-12-02 PROCEDURE — 2500000004 HC RX 250 GENERAL PHARMACY W/ HCPCS (ALT 636 FOR OP/ED): Mod: JZ | Performed by: INTERNAL MEDICINE

## 2024-12-02 PROCEDURE — 99231 SBSQ HOSP IP/OBS SF/LOW 25: CPT | Performed by: STUDENT IN AN ORGANIZED HEALTH CARE EDUCATION/TRAINING PROGRAM

## 2024-12-02 PROCEDURE — 2500000001 HC RX 250 WO HCPCS SELF ADMINISTERED DRUGS (ALT 637 FOR MEDICARE OP): Performed by: INTERNAL MEDICINE

## 2024-12-02 PROCEDURE — 2500000001 HC RX 250 WO HCPCS SELF ADMINISTERED DRUGS (ALT 637 FOR MEDICARE OP)

## 2024-12-02 PROCEDURE — 2500000002 HC RX 250 W HCPCS SELF ADMINISTERED DRUGS (ALT 637 FOR MEDICARE OP, ALT 636 FOR OP/ED): Performed by: INTERNAL MEDICINE

## 2024-12-02 PROCEDURE — 85025 COMPLETE CBC W/AUTO DIFF WBC: CPT | Performed by: STUDENT IN AN ORGANIZED HEALTH CARE EDUCATION/TRAINING PROGRAM

## 2024-12-02 PROCEDURE — 83735 ASSAY OF MAGNESIUM: CPT | Performed by: STUDENT IN AN ORGANIZED HEALTH CARE EDUCATION/TRAINING PROGRAM

## 2024-12-02 PROCEDURE — 36415 COLL VENOUS BLD VENIPUNCTURE: CPT | Performed by: STUDENT IN AN ORGANIZED HEALTH CARE EDUCATION/TRAINING PROGRAM

## 2024-12-02 PROCEDURE — 2500000004 HC RX 250 GENERAL PHARMACY W/ HCPCS (ALT 636 FOR OP/ED): Performed by: STUDENT IN AN ORGANIZED HEALTH CARE EDUCATION/TRAINING PROGRAM

## 2024-12-02 PROCEDURE — 1100000001 HC PRIVATE ROOM DAILY

## 2024-12-02 RX ORDER — POLYETHYLENE GLYCOL 3350 17 G/17G
17 POWDER, FOR SOLUTION ORAL 2 TIMES DAILY
Status: DISCONTINUED | OUTPATIENT
Start: 2024-12-02 | End: 2024-12-05 | Stop reason: HOSPADM

## 2024-12-02 RX ORDER — AMOXICILLIN 250 MG
1 CAPSULE ORAL 2 TIMES DAILY
Status: DISCONTINUED | OUTPATIENT
Start: 2024-12-02 | End: 2024-12-02

## 2024-12-02 RX ADMIN — OXYBUTYNIN CHLORIDE 5 MG: 5 TABLET ORAL at 20:57

## 2024-12-02 RX ADMIN — METRONIDAZOLE 500 MG: 500 INJECTION, SOLUTION INTRAVENOUS at 17:47

## 2024-12-02 RX ADMIN — POLYETHYLENE GLYCOL 3350 17 G: 17 POWDER, FOR SOLUTION ORAL at 20:57

## 2024-12-02 RX ADMIN — METRONIDAZOLE 500 MG: 500 INJECTION, SOLUTION INTRAVENOUS at 09:34

## 2024-12-02 RX ADMIN — PANTOPRAZOLE SODIUM 40 MG: 40 TABLET, DELAYED RELEASE ORAL at 06:04

## 2024-12-02 RX ADMIN — HEPARIN SODIUM 5000 UNITS: 5000 INJECTION, SOLUTION INTRAVENOUS; SUBCUTANEOUS at 20:57

## 2024-12-02 RX ADMIN — METRONIDAZOLE 500 MG: 500 INJECTION, SOLUTION INTRAVENOUS at 01:40

## 2024-12-02 RX ADMIN — CEFEPIME 1 G: 1 INJECTION, POWDER, FOR SOLUTION INTRAMUSCULAR; INTRAVENOUS at 13:43

## 2024-12-02 RX ADMIN — ATORVASTATIN CALCIUM 40 MG: 40 TABLET, FILM COATED ORAL at 20:57

## 2024-12-02 RX ADMIN — OXYBUTYNIN CHLORIDE 5 MG: 5 TABLET ORAL at 08:14

## 2024-12-02 RX ADMIN — AMLODIPINE BESYLATE 2.5 MG: 2.5 TABLET ORAL at 08:14

## 2024-12-02 RX ADMIN — POLYETHYLENE GLYCOL 3350 17 G: 17 POWDER, FOR SOLUTION ORAL at 08:14

## 2024-12-02 RX ADMIN — CEFEPIME 1 G: 1 INJECTION, POWDER, FOR SOLUTION INTRAMUSCULAR; INTRAVENOUS at 01:41

## 2024-12-02 RX ADMIN — HEPARIN SODIUM 5000 UNITS: 5000 INJECTION, SOLUTION INTRAVENOUS; SUBCUTANEOUS at 06:04

## 2024-12-02 RX ADMIN — TAMSULOSIN HYDROCHLORIDE 0.4 MG: 0.4 CAPSULE ORAL at 08:14

## 2024-12-02 RX ADMIN — HEPARIN SODIUM 5000 UNITS: 5000 INJECTION, SOLUTION INTRAVENOUS; SUBCUTANEOUS at 13:45

## 2024-12-02 ASSESSMENT — COGNITIVE AND FUNCTIONAL STATUS - GENERAL
HELP NEEDED FOR BATHING: A LITTLE
CLIMB 3 TO 5 STEPS WITH RAILING: A LITTLE
DAILY ACTIVITIY SCORE: 24
DAILY ACTIVITIY SCORE: 18
CLIMB 3 TO 5 STEPS WITH RAILING: A LOT
EATING MEALS: A LITTLE
MOVING TO AND FROM BED TO CHAIR: A LOT
MOVING FROM LYING ON BACK TO SITTING ON SIDE OF FLAT BED WITH BEDRAILS: A LITTLE
STANDING UP FROM CHAIR USING ARMS: A LITTLE
DRESSING REGULAR LOWER BODY CLOTHING: A LITTLE
MOVING TO AND FROM BED TO CHAIR: A LITTLE
WALKING IN HOSPITAL ROOM: A LITTLE
MOVING FROM LYING ON BACK TO SITTING ON SIDE OF FLAT BED WITH BEDRAILS: A LITTLE
TOILETING: A LITTLE
TURNING FROM BACK TO SIDE WHILE IN FLAT BAD: A LITTLE
STANDING UP FROM CHAIR USING ARMS: A LOT
WALKING IN HOSPITAL ROOM: A LOT
MOBILITY SCORE: 14
MOBILITY SCORE: 18
TURNING FROM BACK TO SIDE WHILE IN FLAT BAD: A LITTLE
DRESSING REGULAR UPPER BODY CLOTHING: A LITTLE
PERSONAL GROOMING: A LITTLE

## 2024-12-02 ASSESSMENT — ACTIVITIES OF DAILY LIVING (ADL): LACK_OF_TRANSPORTATION: NO

## 2024-12-02 ASSESSMENT — PAIN - FUNCTIONAL ASSESSMENT
PAIN_FUNCTIONAL_ASSESSMENT: 0-10
PAIN_FUNCTIONAL_ASSESSMENT: 0-10

## 2024-12-02 ASSESSMENT — PAIN SCALES - GENERAL
PAINLEVEL_OUTOF10: 2
PAINLEVEL_OUTOF10: 0 - NO PAIN
PAINLEVEL_OUTOF10: 0 - NO PAIN

## 2024-12-02 NOTE — CARE PLAN
The patient's goals for the shift include      The clinical goals for the shift include remain HDS throughout shift

## 2024-12-02 NOTE — PROGRESS NOTES
12/02/24 1002   Discharge Planning   Living Arrangements Spouse/significant other   Support Systems Spouse/significant other   Type of Residence Private residence;Other (Comment)   Number of Stairs to Enter Residence 0   Number of Stairs Within Residence 0   Expected Discharge Disposition Home   Does the patient need discharge transport arranged? Yes   RoundTrip coordination needed? Yes   Has discharge transport been arranged? No   Financial Resource Strain   How hard is it for you to pay for the very basics like food, housing, medical care, and heating? Not hard   Housing Stability   In the last 12 months, was there a time when you were not able to pay the mortgage or rent on time? N   In the past 12 months, how many times have you moved where you were living? 0   At any time in the past 12 months, were you homeless or living in a shelter (including now)? N   Transportation Needs   In the past 12 months, has lack of transportation kept you from medical appointments or from getting medications? no   In the past 12 months, has lack of transportation kept you from meetings, work, or from getting things needed for daily living? No   Intensity of Service   Intensity of Service 0-30 min     TCC spoke with pt wife on the phone. Pt lives in an apartment. Uses a walker if needed. Wife states no falls. Wife declines need for HHC. Will dc [facility] when med ready.     Diagnosis: enteritis, constipation    Plan: IV atbs, stool studies, romo cath    Disposition: new HHC    Barrier: labs, BP    ADOD:  1-2 days

## 2024-12-02 NOTE — CARE PLAN
Problem: Pain - Adult  Goal: Verbalizes/displays adequate comfort level or baseline comfort level  Outcome: Progressing     Problem: Safety - Adult  Goal: Free from fall injury  Outcome: Progressing     Problem: Discharge Planning  Goal: Discharge to home or other facility with appropriate resources  Outcome: Progressing     Problem: Chronic Conditions and Co-morbidities  Goal: Patient's chronic conditions and co-morbidity symptoms are monitored and maintained or improved  Outcome: Progressing     Problem: Skin  Goal: Decreased wound size/increased tissue granulation at next dressing change  Outcome: Progressing  Goal: Participates in plan/prevention/treatment measures  Outcome: Progressing  Goal: Prevent/manage excess moisture  Outcome: Progressing  Goal: Prevent/minimize sheer/friction injuries  Outcome: Progressing  Goal: Promote/optimize nutrition  Outcome: Progressing  Goal: Promote skin healing  Outcome: Progressing   The patient's goals for the shift include      The clinical goals for the shift include Pain management    Over the shift, the patient did not make progress toward the following goals. Barriers to progression include . Recommendations to address these barriers include .

## 2024-12-02 NOTE — PROGRESS NOTES
Elroy Greco is a 89 y.o. male on day 1 of admission presenting with Acute ischemic enteritis (Multi).      Subjective   No acute events overnight. Patient states that he has mild abdominal pain otherwise feels well.        Objective     Last Recorded Vitals  /78 (BP Location: Left arm, Patient Position: Lying)   Pulse 74   Temp 36.8 °C (98.2 °F) (Oral)   Resp 16   Wt 77.1 kg (170 lb)   SpO2 91%   Intake/Output last 3 Shifts:    Intake/Output Summary (Last 24 hours) at 12/2/2024 1411  Last data filed at 12/2/2024 1019  Gross per 24 hour   Intake 2127.67 ml   Output 400 ml   Net 1727.67 ml       Admission Weight  Weight: 77.1 kg (170 lb) (11/30/24 2110)    Daily Weight  11/30/24 : 77.1 kg (170 lb)    Image Results  Electrocardiogram, 12-lead PRN ACS symptoms  Sinus rhythm with Premature atrial complexes  Left axis deviation  Minimal voltage criteria for LVH, may be normal variant ( R in aVL )  Nonspecific T wave abnormality  Abnormal ECG  When compared with ECG of 30-NOV-2024 23:37, (unconfirmed)  Premature atrial complexes are now Present  Nonspecific T wave abnormality now evident in Inferior leads  Nonspecific T wave abnormality, worse in Lateral leads  ECG 12 lead  Sinus tachycardia with 1st degree AV block  Left axis deviation  Minimal voltage criteria for LVH, may be normal variant ( R in aVL )  Abnormal ECG  When compared with ECG of 25-NOV-2024 09:45,  Incomplete right bundle branch block is no longer Present      Physical Exam  Constitutional:       General: He is not in acute distress.     Appearance: Normal appearance.   HENT:      Head: Normocephalic and atraumatic.   Eyes:      Pupils: Pupils are equal, round, and reactive to light.   Cardiovascular:      Rate and Rhythm: Normal rate and regular rhythm.      Heart sounds: Murmur heard.   Pulmonary:      Effort: Pulmonary effort is normal. No respiratory distress.      Breath sounds: Normal breath sounds. No wheezing.   Abdominal:       General: Bowel sounds are normal. There is distension.      Palpations: Abdomen is soft.      Tenderness: There is no abdominal tenderness.      Comments: Mildly distended   Musculoskeletal:         General: Normal range of motion.      Comments: Trace BLE edema    Skin:     General: Skin is warm and dry.      Capillary Refill: Capillary refill takes less than 2 seconds.   Neurological:      Mental Status: He is alert and oriented to person, place, and time.   Psychiatric:         Mood and Affect: Mood normal.         Behavior: Behavior normal.   Relevant Results               Assessment/Plan      This patient has a urinary catheter   Reason for the urinary catheter remaining today? critically ill patient who need accurate urinary output measurements      Assessment & Plan  Acute ischemic enteritis (Multi)    Acute ischemic colitis (Multi)  Elroy Greco is a 89 y.o. male with a PMH of HTN, CKD3, Moderate Aortic Stenosis, BPH, Paget's disease, gout, constipation, CAD, diastolic dysfunction, depression, and syncope who  presented to the ER with urinary retention and constipation. Patient had red streak coming from rectum most likely secondary to chronic straining in the setting of constipation. Will increase bowel regime and continue with antibiotics until stool cultures return     Constipation  Colitis   -worsening lactic acidosis 2.9 -> 3.1 s/p 1500mL  -Additional 1L NS ordered will repeat lactate this afternoon  -Worsening leukocytosis noted 14.1 ->17.2  -blood cultures in process  -c/w cefepime and flagyl   -no scope per GI. They have signed off   -cw Miralax BID       Urinary retention   Hx BPH  -s/p continence control stimulator with Dr. Mckinney 11-27-24  -1L retention s/p Romo placement 12-1-24  -seen by urology, possibly discharge with romo pending length of stay  -c/w Flomax      Chest pain  Hx moderate aortic stenosis, CAD   -trops 4--8-- 3rd ordered  -EKG on admission with ST () with 1 degree    -c/w telemetry  -last Echo 4/2024 with EF 65%, impaired LV diastolic filling, mild to mod aortic stenosis, moderate aortic valve cusp calcifications  -c/w lipitor     Hx HTN  -BP intermittently soft with SBP mid 90s, trend VS closely and adjust home meds as appropriate     Hx CKD3  -renal function stable     Hypokalemia   -Replenished, repeat in AM      Hyponatremia   -134 ->132  -likely 2/2 urinary retention, trend labs      DVT Prophylaxis   Subcutaneous heparin     Discharge Disposition   Return home when medically ready             Aura Wu MD

## 2024-12-02 NOTE — ASSESSMENT & PLAN NOTE
Elroy Greco is a 89 y.o. male with a PMH of HTN, CKD3, Moderate Aortic Stenosis, BPH, Paget's disease, gout, constipation, CAD, diastolic dysfunction, depression, and syncope who  presented to the ER with urinary retention and constipation. Patient had red streak coming from rectum most likely secondary to chronic straining in the setting of constipation. Will increase bowel regime and continue with antibiotics until stool cultures return

## 2024-12-03 PROCEDURE — 2500000004 HC RX 250 GENERAL PHARMACY W/ HCPCS (ALT 636 FOR OP/ED): Performed by: STUDENT IN AN ORGANIZED HEALTH CARE EDUCATION/TRAINING PROGRAM

## 2024-12-03 PROCEDURE — 2500000002 HC RX 250 W HCPCS SELF ADMINISTERED DRUGS (ALT 637 FOR MEDICARE OP, ALT 636 FOR OP/ED): Performed by: INTERNAL MEDICINE

## 2024-12-03 PROCEDURE — 2500000001 HC RX 250 WO HCPCS SELF ADMINISTERED DRUGS (ALT 637 FOR MEDICARE OP): Performed by: INTERNAL MEDICINE

## 2024-12-03 PROCEDURE — 97165 OT EVAL LOW COMPLEX 30 MIN: CPT | Mod: GO

## 2024-12-03 PROCEDURE — 99232 SBSQ HOSP IP/OBS MODERATE 35: CPT | Performed by: STUDENT IN AN ORGANIZED HEALTH CARE EDUCATION/TRAINING PROGRAM

## 2024-12-03 PROCEDURE — 1100000001 HC PRIVATE ROOM DAILY

## 2024-12-03 PROCEDURE — 2500000004 HC RX 250 GENERAL PHARMACY W/ HCPCS (ALT 636 FOR OP/ED): Performed by: INTERNAL MEDICINE

## 2024-12-03 PROCEDURE — 97161 PT EVAL LOW COMPLEX 20 MIN: CPT | Mod: GP

## 2024-12-03 PROCEDURE — 2500000001 HC RX 250 WO HCPCS SELF ADMINISTERED DRUGS (ALT 637 FOR MEDICARE OP)

## 2024-12-03 PROCEDURE — 2500000001 HC RX 250 WO HCPCS SELF ADMINISTERED DRUGS (ALT 637 FOR MEDICARE OP): Performed by: STUDENT IN AN ORGANIZED HEALTH CARE EDUCATION/TRAINING PROGRAM

## 2024-12-03 RX ORDER — COLCHICINE 0.6 MG/1
0.6 TABLET ORAL DAILY PRN
Status: DISCONTINUED | OUTPATIENT
Start: 2024-12-03 | End: 2024-12-05 | Stop reason: HOSPADM

## 2024-12-03 RX ADMIN — ATORVASTATIN CALCIUM 40 MG: 40 TABLET, FILM COATED ORAL at 21:34

## 2024-12-03 RX ADMIN — METRONIDAZOLE 500 MG: 500 INJECTION, SOLUTION INTRAVENOUS at 18:26

## 2024-12-03 RX ADMIN — COLCHICINE 0.6 MG: 0.6 TABLET, FILM COATED ORAL at 12:37

## 2024-12-03 RX ADMIN — METRONIDAZOLE 500 MG: 500 INJECTION, SOLUTION INTRAVENOUS at 02:44

## 2024-12-03 RX ADMIN — TAMSULOSIN HYDROCHLORIDE 0.4 MG: 0.4 CAPSULE ORAL at 09:47

## 2024-12-03 RX ADMIN — HEPARIN SODIUM 5000 UNITS: 5000 INJECTION, SOLUTION INTRAVENOUS; SUBCUTANEOUS at 21:34

## 2024-12-03 RX ADMIN — OXYBUTYNIN CHLORIDE 5 MG: 5 TABLET ORAL at 09:47

## 2024-12-03 RX ADMIN — OXYBUTYNIN CHLORIDE 5 MG: 5 TABLET ORAL at 21:34

## 2024-12-03 RX ADMIN — PANTOPRAZOLE SODIUM 40 MG: 40 TABLET, DELAYED RELEASE ORAL at 06:50

## 2024-12-03 RX ADMIN — CEFEPIME 1 G: 1 INJECTION, POWDER, FOR SOLUTION INTRAMUSCULAR; INTRAVENOUS at 12:37

## 2024-12-03 RX ADMIN — METRONIDAZOLE 500 MG: 500 INJECTION, SOLUTION INTRAVENOUS at 09:48

## 2024-12-03 RX ADMIN — POLYETHYLENE GLYCOL 3350 17 G: 17 POWDER, FOR SOLUTION ORAL at 09:47

## 2024-12-03 RX ADMIN — HEPARIN SODIUM 5000 UNITS: 5000 INJECTION, SOLUTION INTRAVENOUS; SUBCUTANEOUS at 12:37

## 2024-12-03 RX ADMIN — POLYETHYLENE GLYCOL 3350 17 G: 17 POWDER, FOR SOLUTION ORAL at 21:34

## 2024-12-03 RX ADMIN — HEPARIN SODIUM 5000 UNITS: 5000 INJECTION, SOLUTION INTRAVENOUS; SUBCUTANEOUS at 05:21

## 2024-12-03 RX ADMIN — AMLODIPINE BESYLATE 2.5 MG: 2.5 TABLET ORAL at 09:47

## 2024-12-03 RX ADMIN — CEFEPIME 1 G: 1 INJECTION, POWDER, FOR SOLUTION INTRAMUSCULAR; INTRAVENOUS at 01:24

## 2024-12-03 ASSESSMENT — ENCOUNTER SYMPTOMS
WEAKNESS: 1
ABDOMINAL DISTENTION: 0
FEVER: 0
ABDOMINAL PAIN: 0
SHORTNESS OF BREATH: 0
VOMITING: 0

## 2024-12-03 ASSESSMENT — PAIN SCALES - GENERAL
PAINLEVEL_OUTOF10: 0 - NO PAIN
PAINLEVEL_OUTOF10: 0 - NO PAIN
PAINLEVEL_OUTOF10: 4

## 2024-12-03 ASSESSMENT — COGNITIVE AND FUNCTIONAL STATUS - GENERAL
DRESSING REGULAR LOWER BODY CLOTHING: A LOT
MOVING FROM LYING ON BACK TO SITTING ON SIDE OF FLAT BED WITH BEDRAILS: A LITTLE
TOILETING: A LOT
TURNING FROM BACK TO SIDE WHILE IN FLAT BAD: A LITTLE
MOBILITY SCORE: 15
CLIMB 3 TO 5 STEPS WITH RAILING: TOTAL
MOVING TO AND FROM BED TO CHAIR: A LITTLE
WALKING IN HOSPITAL ROOM: A LITTLE
HELP NEEDED FOR BATHING: A LOT
DAILY ACTIVITIY SCORE: 18
STANDING UP FROM CHAIR USING ARMS: A LOT

## 2024-12-03 ASSESSMENT — ACTIVITIES OF DAILY LIVING (ADL)
ADL_ASSISTANCE: INDEPENDENT
ADL_ASSISTANCE: INDEPENDENT
BATHING_ASSISTANCE: MODERATE

## 2024-12-03 ASSESSMENT — PAIN - FUNCTIONAL ASSESSMENT
PAIN_FUNCTIONAL_ASSESSMENT: 0-10

## 2024-12-03 NOTE — PROGRESS NOTES
Occupational Therapy    Evaluation    Patient Name: Elroy Greco  MRN: [id number]  Department: [facility] [id number]  Room: [address] [place] [address]  Today's Date: 12/3/2024  Time Calculation  Start Time: 1029  Stop Time: 1051  Time Calculation (min): 22 min    Assessment  IP OT Assessment  OT Assessment: Pt seen for OT eval. Pt demonstrates with decreasd endurance assist with mobility, and ADLS. Pt would benefit from Mo intensity therapy at d/c  Prognosis: Good  Barriers to Discharge: None  Evaluation/Treatment Tolerance: Patient limited by pain  Medical Staff Made Aware: Yes  End of Session Communication: Bedside nurse  End of Session Patient Position: Up in chair, Alarm on  Plan:  Treatment Interventions: ADL retraining, Functional transfer training, Equipment evaluation/education  OT Frequency: 3 times per week  OT Discharge Recommendations: Moderate intensity level of continued care  Equipment Recommended upon Discharge: Wheeled walker  OT Recommended Transfer Status: Moderate assist, Assist of 1  OT - OK to Discharge: Yes    Subjective   Current Problem:  1. Acute ischemic colitis (Multi)          General:  General  Reason for Referral: Constipation, abdominal pain, lightheadedness  Referred By: LUPIS Wu MD  Past Medical History Relevant to Rehab:   Past Medical History:   Diagnosis Date    Aortic stenosis     CAD (coronary artery disease)     Cardiology follow-up encounter 03/27/2024    JOHNNY Hernandez    CKD (chronic kidney disease)     Gout     H/O cardiovascular stress test 04/04/2016    H/O echocardiogram 04/05/2024    Hyperlipidemia     Hypertension     Nocturia 03/01/2017    Nocturia    Paget's disease of bone     Preoperative clearance     JOHNNY Hernandez 3/27/24   Should his echocardiogram remain stable, he will be cleared for his urologic procedure. He will follow up with us in clinic in one year. He knows to call for any concerns.    Spinal stenosis     Urinary incontinence, urge     Plan: Stage 1 & 2 Insertion  Continence Control Stimulator Lead & Generator 11/27/24       Family/Caregiver Present: Yes  Caregiver Feedback: Wife present and supportive  Co-Treatment: PT  Co-Treatment Reason: Co eval with PT to maximize pt mobility, safety and participation  Prior to Session Communication: Bedside nurse  Patient Position Received: Bed, 3 rail up, Alarm on  Preferred Learning Style: auditory, kinesthetic, verbal, visual, written  General Comment: Pt agreeable to therapy evals  Precautions:  Medical Precautions: Fall precautions    Pain:  Pain Assessment  Pain Assessment: 0-10  0-10 (Numeric) Pain Score: 4  Pain Type: Acute pain  Pain Location: Foot  Pain Orientation: Right, Left  Pain Interventions: Repositioned, Ambulation/increased activity, Distraction (Pt reports feels like when he has had gout. Nursing made aware)    Objective   Cognition:  Overall Cognitive Status: Within Functional Limits  Attention: Within Functional Limits  Memory: Within Funtional Limits  Insight: Within function limits  Impulsive: Within functional limits           Home Living:  Type of Home: Apartment  Lives With: Spouse  Home Adaptive Equipment: Walker rolling or standard, Cane  Home Layout: One level  Home Access: Elevator  Bathroom Shower/Tub: Walk-in shower, Tub/shower unit  Bathroom Toilet: Handicapped height  Bathroom Equipment: Grab bars in shower, Raised toilet seat with rails   Prior Function:  Level of [place]: Independent with ADLs and functional transfers, Independent with homemaking with ambulation  Receives Help From: Family  ADL Assistance: Independent  Homemaking Assistance: Independent  Ambulatory Assistance: Independent (normally no AD lately has been using walker)  Hand Dominance: Right  IADL History:  Homemaking Responsibilities: No  Current License: Yes  Mode of Transportation: Car  Occupation: Part time employment  Type of Occupation: works moving cars for   ADL:  Eating Assistance: Independent  Grooming  Assistance: Independent  Bathing Assistance: Moderate  Bathing Deficit: Left lower leg including foot, Right lower leg including foot, Buttocks  UE Dressing Assistance: Independent  LE Dressing Assistance: Moderate  LE Dressing Deficit: Don/doff R sock, Don/doff L sock, Thread RLE into pants, Thread LLE into pants  Toileting Assistance with Device: Maximal  Toileting Deficit:  (male pure wick, bed pan)  Activity Tolerance:  Endurance: Endurance does not limit participation in activity  Bed Mobility/Transfers: Bed Mobility  Bed Mobility: Yes  Bed Mobility 1  Bed Mobility 1: Supine to sitting  Level of Assistance 1: Close supervision  Bed Mobility Comments 1: increased time to complete    Transfers  Transfer: Yes  Transfer 1  Technique 1: Sit to stand, Stand to sit  Transfer Device 1: Walker  Transfer Level of Assistance 1: Moderate assistance, Minimal verbal cues, Moderate tactile cues  Trials/Comments 1: cues for hand placement to push from bed      Functional Mobility:  Functional Mobility  Functional Mobility Performed: Yes  Functional Mobility 1  Surface 1: Level tile  Device 1: Rolling walker  Assistance 1: Contact guard  Sitting Balance:  Static Sitting Balance  Static Sitting-Balance Support: Feet supported  Static Sitting-Level of Assistance: Close supervision, Independent  Dynamic Sitting Balance  Dynamic Sitting-Balance Support: Feet supported  Dynamic Sitting-Level of Assistance: Close supervision  Dynamic Sitting-Balance: Reaching for objects  Standing Balance:  Static Standing Balance  Static Standing-Balance Support: Bilateral upper extremity supported  Static Standing-Level of Assistance: Contact guard  Dynamic Standing Balance  Dynamic Standing-Balance Support: Bilateral upper extremity supported  Dynamic Standing-Level of Assistance: Contact guard  Dynamic Standing-Balance: Reaching for objects     IADL's:   Homemaking Responsibilities: No  Current License: Yes  Mode of Transportation:  Car  Occupation: Part time employment  Type of Occupation: works moving cars for   Vision: Vision - Basic Assessment  Current Vision: No visual deficits  Sensation:  Light Touch: No apparent deficits  Sharp/Dull: No apparent deficits  Sensation Comment: intact  Strength:  Strength Comments: B UE WFL  Coordination:  Movements are Fluid and Coordinated: Yes  Finger to Nose: Intact  Rapid Alternating Movements: Intact  Finger to Target: Intact  Coordination Comment: intact   Hand Function:  Hand Function  Gross Grasp: Functional  Coordination: Functional  Extremities: RUE   RUE : Within Functional Limits and LUE   LUE: Within Functional Limits    Outcome Measures: [facility] Daily Activity  Putting on and taking off regular lower body clothing: A lot  Bathing (including washing, rinsing, drying): A lot  Putting on and taking off regular upper body clothing: None  Toileting, which includes using toilet, bedpan or urinal: A lot  Taking care of personal grooming such as brushing teeth: None  Eating Meals: None  Daily Activity - Total Score: 18      Education Documentation  Handouts, taught by Saige Roldan OT at 12/3/2024 11:44 AM.  Learner: Significant Other, Patient  Readiness: Acceptance  Method: Explanation  Response: Verbalizes Understanding, Needs Reinforcement    Precautions, taught by Saige Roldan OT at 12/3/2024 11:44 AM.  Learner: Significant Other, Patient  Readiness: Acceptance  Method: Explanation  Response: Verbalizes Understanding, Needs Reinforcement    ADL Training, taught by Saieg Roldan OT at 12/3/2024 11:44 AM.  Learner: Significant Other, Patient  Readiness: Acceptance  Method: Explanation  Response: Verbalizes Understanding, Needs Reinforcement    Education Comments  No comments found.      Goals:   Encounter Problems       Encounter Problems (Active)       ADLs       Patient will perform LB bathing 25% with moderate assist level of assistance and adaptive equipment prn .  (Progressing)       Start:  12/03/24    Expected End:  12/17/24            Patient with complete lower body dressing with moderate assist level of assistance donning and doffing all LE clothes  with reacher, shoe horn, sock-aid, and dressing stick  while supported sitting (Progressing)       Start:  12/03/24    Expected End:  12/17/24            Pt will tolerate 30 min OT tx session w/o subj/obj c/o fatigue/SOB with activity to increase independence  (Progressing)       Start:  12/03/24    Expected End:  12/17/24               MOBILITY       Patient will perform Functional mobility mod  Household distances/Community Distances with stand by assist level of assistance and front wheeled walker in order to improve safety and functional mobility. (Progressing)       Start:  12/03/24    Expected End:  12/17/24               TRANSFERS       Patient will perform bed mobility supervision level of assistance and bed rails in order to improve safety and independence with mobility (Progressing)       Start:  12/03/24    Expected End:  12/17/24            Patient will complete functional transfer to chair with front wheeled walker with supervision level of assistance. (Progressing)       Start:  12/03/24    Expected End:  12/17/24

## 2024-12-03 NOTE — CONSULTS
Nutrition Assessment Note    Reason for Assessment  Reason for Assessment: Admission nursing screening    Pt admitted for:  Acute ischemic enteritis (Multi) [K55.019]  Acute ischemic colitis (Multi) [K55.039]    MST triggered for weight loss and eating poorly due to decreased appetite.  Chart reviewed and pt visited.  Per pt decreased appetite since admission.  Reports good appetite prior to admission.  # 2 weeks ago.  Drinks Premier protein daily at home.    Pt agreeable to supplement while admitted.    Past Medical History:   Diagnosis Date    Aortic stenosis     CAD (coronary artery disease)     Cardiology follow-up encounter 03/27/2024    JOHNNY Hernandez    CKD (chronic kidney disease)     Gout     H/O cardiovascular stress test 04/04/2016    H/O echocardiogram 04/05/2024    Hyperlipidemia     Hypertension     Nocturia 03/01/2017    Nocturia    Paget's disease of bone     Preoperative clearance     JOHNNY Hernandez 3/27/24   Should his echocardiogram remain stable, he will be cleared for his urologic procedure. He will follow up with us in clinic in one year. He knows to call for any concerns.    Spinal stenosis     Urinary incontinence, urge     Plan: Stage 1 & 2 Insertion Continence Control Stimulator Lead & Generator 11/27/24     Results for orders placed or performed during the hospital encounter of 11/30/24 (from the past 24 hours)   CBC and Auto Differential   Result Value Ref Range    WBC 12.2 (H) 4.4 - 11.3 x10*3/uL    nRBC 0.0 0.0 - 0.0 /100 WBCs    RBC 3.73 (L) 4.50 - 5.90 x10*6/uL    Hemoglobin 10.5 (L) 13.5 - 17.5 g/dL    Hematocrit 33.0 (L) 41.0 - 52.0 %    MCV 89 80 - 100 fL    MCH 28.2 26.0 - 34.0 pg    MCHC 31.8 (L) 32.0 - 36.0 g/dL    RDW 14.5 11.5 - 14.5 %    Platelets 181 150 - 450 x10*3/uL    Neutrophils % 76.8 40.0 - 80.0 %    Immature Granulocytes %, Automated 0.6 0.0 - 0.9 %    Lymphocytes % 13.6 13.0 - 44.0 %    Monocytes % 7.7 2.0 - 10.0 %    Eosinophils % 1.1 0.0 - 6.0  %    Basophils % 0.2 0.0 - 2.0 %    Neutrophils Absolute 9.35 (H) 1.60 - 5.50 x10*3/uL    Immature Granulocytes Absolute, Automated 0.07 0.00 - 0.50 x10*3/uL    Lymphocytes Absolute 1.66 0.80 - 3.00 x10*3/uL    Monocytes Absolute 0.94 (H) 0.05 - 0.80 x10*3/uL    Eosinophils Absolute 0.14 0.00 - 0.40 x10*3/uL    Basophils Absolute 0.02 0.00 - 0.10 x10*3/uL   Comprehensive metabolic panel   Result Value Ref Range    Glucose 122 (H) 74 - 99 mg/dL    Sodium 141 136 - 145 mmol/L    Potassium 3.8 3.5 - 5.3 mmol/L    Chloride 111 (H) 98 - 107 mmol/L    Bicarbonate 24 21 - 32 mmol/L    Anion Gap 10 10 - 20 mmol/L    Urea Nitrogen 26 (H) 6 - 23 mg/dL    Creatinine 1.16 0.50 - 1.30 mg/dL    eGFR 58 (L) >60 mL/min/1.73m*2    Calcium 7.7 (L) 8.6 - 10.3 mg/dL    Albumin 2.9 (L) 3.4 - 5.0 g/dL    Alkaline Phosphatase 126 33 - 136 U/L    Total Protein 5.8 (L) 6.4 - 8.2 g/dL    AST 11 9 - 39 U/L    Bilirubin, Total 0.6 0.0 - 1.2 mg/dL    ALT 7 (L) 10 - 52 U/L   Magnesium   Result Value Ref Range    Magnesium 2.13 1.60 - 2.40 mg/dL     *Note: Due to a large number of results and/or encounters for the requested time period, some results have not been displayed. A complete set of results can be found in Results Review.     Scheduled medications  amLODIPine, 2.5 mg, oral, BID  atorvastatin, 40 mg, oral, Nightly  cefepime, 1 g, intravenous, q12h  heparin (porcine), 5,000 Units, subcutaneous, q8h  metroNIDAZOLE, 500 mg, intravenous, q8h  oxybutynin, 5 mg, oral, BID  pantoprazole, 40 mg, oral, Daily before breakfast   Or  pantoprazole, 40 mg, intravenous, Daily before breakfast  polyethylene glycol, 17 g, oral, BID  tamsulosin, 0.4 mg, oral, Daily      Continuous medications     PRN medications  PRN medications: acetaminophen **OR** acetaminophen **OR** acetaminophen, colchicine, ondansetron **OR** ondansetron  Dietary Orders (From admission, onward)       Start     Ordered    12/03/24 1422  Oral nutritional supplements  Until  "discontinued        Question Answer Comment   Deliver with All meals    Select supplement: Ensure Plus High Protein        12/03/24 1421    12/02/24 1240  Adult diet Regular  Diet effective now        Question:  Diet type  Answer:  Regular    12/02/24 1239    12/01/24 1813  May Participate in Room Service  ( ROOM SERVICE MAY PARTICIPATE)  Once        Question:  .  Answer:  Yes    12/01/24 1812                    History:  Food and Nutrient History  Energy Intake: Fair 50-75 %  Food and Nutrient History: per RN report    Anthropometrics:  Height: 170.2 cm (5' 7\")  Weight: 77.1 kg (170 lb)  BMI (Calculated): 26.62    Wt Readings from Last 22 Encounters:   11/30/24 77.1 kg (170 lb)   11/27/24 79 kg (174 lb 2.6 oz)   11/25/24 79 kg (174 lb 2.6 oz)   11/20/24 80.3 kg (177 lb)   10/15/24 79.4 kg (175 lb)   10/01/24 79.4 kg (175 lb)   09/27/24 79.5 kg (175 lb 4.8 oz)   09/25/24 80.4 kg (177 lb 3.2 oz)   09/20/24 79.7 kg (175 lb 11.3 oz)   09/06/24 78.2 kg (172 lb 6.4 oz)   09/04/24 78.2 kg (172 lb 6.4 oz)   08/28/24 77.1 kg (170 lb)   07/22/24 77.1 kg (170 lb)   06/21/24 78.6 kg (173 lb 3.2 oz)   05/23/24 77 kg (169 lb 12.1 oz)   05/14/24 78.8 kg (173 lb 11.6 oz)   04/19/24 78.5 kg (173 lb)   03/27/24 78.9 kg (174 lb)   03/22/24 79.5 kg (175 lb 3.2 oz)   12/20/23 79.8 kg (176 lb)   12/14/23 80.3 kg (177 lb)   12/05/23 77.1 kg (169 lb 15.6 oz)     Weight Change  Significant Weight Loss: No    Estimated Energy Needs  Total Energy Estimated Needs (kCal): 2160 kCal  Total Estimated Energy Need per Day (kCal/kg): 2470 kCal/kg  Method for Estimating Needs: 28-32    Estimated Protein Needs  Total Protein Estimated Needs (g): 75 g  Total Protein Estimated Needs (g/kg): 90 g/kg  Method for Estimating Needs: 1.0-1.2    Estimated Fluid Needs  Method for Estimating Needs: 1ml/kcal or per MD    Nutrition Focused Physical Findings:  Subcutaneous Fat Loss  Orbital Fat Pads: Mild-Moderate (slight dark circles and slight " hollowing)  Buccal Fat Pads: Mild-Moderate (flat cheeks, minimal bounce)    Muscle Wasting  Temporalis: Mild-Moderate (slight depression)    Edema  Edema: none    Physical Findings (Nutrition Deficiency/Toxicity)  Skin: Negative     Nutrition Diagnosis   Malnutrition Diagnosis  Patient has Malnutrition Diagnosis: Yes  Diagnosis Status: New  Malnutrition Diagnosis: Severe malnutrition related to chronic disease or condition  As Evidenced by: mild to moderate subcutaneous fat loss and moderate muscle wasting present      Nutrition Interventions/Recommendations   Food and/or Nutrient Delivery Interventions  Goal: > 75% of meals consumed     Medical Food Supplement: Commercial beverage  Goal: Ensure TID for encouraged intake    Additional Interventions: Consider appetite stimulant    Coordination of Nutrition Care by a Nutrition Professional  Collaboration and Referral of Nutrition Care: Collaboration by nutrition professional with other providers    Education Documentation  No documentation found.      Nutrition Monitoring and Evaluation   Food and Nutrient Related History  Energy Intake: Estimated energy intake    Fluid Intake: Estimated fluid intake    Amount of Food: Estimated amout of food, Medical food intake    Mealtime Behavior: Limited number of accepted foods    Anthropometrics: Body Composition/Growth/Weight History  Weight Change: Weight gain, Weight loss    Biochemical Data, Medical Tests and Procedures  Electrolyte and Renal Panel: Other (Comment)  Criteria: as clinically indicated    Gastrointestinal Profile: Other (Comment)  Criteria: as clinically indicated    Glucose/Endocrine Profile: Other (Comment)  Criteria: as clinically indicated    Nutritional Anemia Profile: Other (Comment)  Criteria: as clinically indicated    Vitamin Profile: Other (Comment)  Criteria: as clinically indicated    Nutrition Focused Physical Findings  Adipose: Loss of subcutaneous fat    Digestive System: Decrease in appetite,  Nausea, Vomiting, Constipation    Muscles: Muscle atrophy    Other: Stool output, Urine volume, Overall appearance    Follow Up  Time Spent (min): 60 minutes  Last Date of Nutrition Visit: 12/03/24  Nutrition Follow-Up Needed?: Dietitian to reassess per policy  Follow up Comment: [facility]

## 2024-12-03 NOTE — CARE PLAN
The patient's goals for the shift include      The clinical goals for the shift include pt will remain comfortable and safe throughout the shift    Over the shift, the patient did not make progress toward the following goals. Barriers to progression include . Recommendations to address these barriers include .

## 2024-12-03 NOTE — PROGRESS NOTES
Pharmacy Medication History Review   Spoke to patients spouse, no med changes    Elroy Greco is a 89 y.o. male admitted for Acute ischemic enteritis (Multi). Pharmacy reviewed the patient's [id number] medications and allergies for accuracy.    The list below reflectives the updated PTA list. Please review each medication in order reconciliation for additional clarification and justification.     Prior to Admission Medications   Prescriptions Last Dose Informant   acetaminophen (Tylenol) 500 mg tablet 11/1/2024 Noon    Sig: Take 2 tablets (1,000 mg) by mouth every 6 hours if needed for mild pain (1 - 3).   amLODIPine (Norvasc) 2.5 mg tablet 11/30/2024 Morning    Sig: TAKE 1 TABLET BY MOUTH ONCE  DAILY   Patient taking differently: Take 1 tablet (2.5 mg) by mouth 2 times a day.   atorvastatin (Lipitor) 40 mg tablet 11/1/2024 Evening    Sig: Take 1 tablet (40 mg) by mouth once daily at bedtime.   azelastine (Astelin) 137 mcg (0.1 %) nasal spray     Sig: Administer 1 spray into each nostril 2 times a day. Use in each nostril as directed   Patient not taking: Reported on 11/25/2024   carboxymethylcellulose (Refresh Plus) 0.5 % ophthalmic solution 11/1/2024 Morning    Sig: Administer 1 drop into both eyes 4 times a day.   colchicine 0.6 mg tablet     Sig: Take 1 tablet (0.6 mg) by mouth once daily as needed.   collagen/biotin/ascorbic acid (COLLAGEN 1500 PLUS C ORAL)     Sig: Take by mouth once daily.   cyanocobalamin (Vitamin B-12) 500 mcg tablet     Sig: Take 1 tablet (500 mcg) by mouth once daily.   diphenhydrAMINE (BENADryl) 50 mg capsule     Sig: Take 1 capsule (50 mg) by mouth as needed at bedtime for itching.   docusate sodium (Colace) 100 mg capsule     Sig: Take 2 capsules (200 mg) by mouth 2 times a day.   mirabegron (Myrbetriq) 25 mg tablet extended release 24 hr 24 hr tablet 11/30/2024 Morning    Sig: Take 1 tablet (25 mg) by mouth once daily.   omega 3-dha-epa-fish oil 1,000 mg (120 mg-180 mg)  capsule     Sig: Take 1 capsule (1,000 mg) by mouth 2 times a day.   psyllium (Metamucil) powder     Sig: Take 1 Dose (5.8 g) by mouth 2 times a day.   vit C-E-zinc cit-lutein-zeaxan ([facility]) 50 mg-15 unit- 4.5 mg-2.5 mg tablet,chewable     Sig: Chew 2 tablets once daily.      Facility-Administered Medications: None       The list below reflectives the updated allergy list. Please review each documented allergy for additional clarification and justification.  Allergies  Reviewed by Harriet Grijalva on 12/3/2024        Severity Reactions Comments    Finasteride Medium Rash     Mirabegron Low Constipation     Penicillins Low Rash             Below are additional concerns with the patient's PTA list.      Harriet Grijalva

## 2024-12-04 ENCOUNTER — APPOINTMENT (OUTPATIENT)
Dept: RADIOLOGY | Facility: HOSPITAL | Age: 89
End: 2024-12-04
Payer: MEDICARE

## 2024-12-04 ENCOUNTER — APPOINTMENT (OUTPATIENT)
Dept: CARDIOLOGY | Facility: CLINIC | Age: 89
End: 2024-12-04
Payer: MEDICARE

## 2024-12-04 LAB
ERYTHROCYTE [DISTWIDTH] IN BLOOD BY AUTOMATED COUNT: 14 % (ref 11.5–14.5)
FLUAV RNA RESP QL NAA+PROBE: NOT DETECTED
FLUBV RNA RESP QL NAA+PROBE: NOT DETECTED
HCT VFR BLD AUTO: 31.6 % (ref 41–52)
HGB BLD-MCNC: 10.1 G/DL (ref 13.5–17.5)
HOLD SPECIMEN: NORMAL
MCH RBC QN AUTO: 28.1 PG (ref 26–34)
MCHC RBC AUTO-ENTMCNC: 32 G/DL (ref 32–36)
MCV RBC AUTO: 88 FL (ref 80–100)
NRBC BLD-RTO: 0 /100 WBCS (ref 0–0)
PLATELET # BLD AUTO: 183 X10*3/UL (ref 150–450)
RBC # BLD AUTO: 3.59 X10*6/UL (ref 4.5–5.9)
SARS-COV-2 RNA RESP QL NAA+PROBE: NOT DETECTED
WBC # BLD AUTO: 10.2 X10*3/UL (ref 4.4–11.3)

## 2024-12-04 PROCEDURE — 85027 COMPLETE CBC AUTOMATED: CPT | Performed by: STUDENT IN AN ORGANIZED HEALTH CARE EDUCATION/TRAINING PROGRAM

## 2024-12-04 PROCEDURE — 2500000004 HC RX 250 GENERAL PHARMACY W/ HCPCS (ALT 636 FOR OP/ED): Mod: JZ | Performed by: INTERNAL MEDICINE

## 2024-12-04 PROCEDURE — 2500000001 HC RX 250 WO HCPCS SELF ADMINISTERED DRUGS (ALT 637 FOR MEDICARE OP): Performed by: INTERNAL MEDICINE

## 2024-12-04 PROCEDURE — 99232 SBSQ HOSP IP/OBS MODERATE 35: CPT | Performed by: STUDENT IN AN ORGANIZED HEALTH CARE EDUCATION/TRAINING PROGRAM

## 2024-12-04 PROCEDURE — 87636 SARSCOV2 & INF A&B AMP PRB: CPT | Performed by: STUDENT IN AN ORGANIZED HEALTH CARE EDUCATION/TRAINING PROGRAM

## 2024-12-04 PROCEDURE — 2500000004 HC RX 250 GENERAL PHARMACY W/ HCPCS (ALT 636 FOR OP/ED): Performed by: STUDENT IN AN ORGANIZED HEALTH CARE EDUCATION/TRAINING PROGRAM

## 2024-12-04 PROCEDURE — 36415 COLL VENOUS BLD VENIPUNCTURE: CPT | Performed by: STUDENT IN AN ORGANIZED HEALTH CARE EDUCATION/TRAINING PROGRAM

## 2024-12-04 PROCEDURE — 71045 X-RAY EXAM CHEST 1 VIEW: CPT | Performed by: STUDENT IN AN ORGANIZED HEALTH CARE EDUCATION/TRAINING PROGRAM

## 2024-12-04 PROCEDURE — 1100000001 HC PRIVATE ROOM DAILY

## 2024-12-04 PROCEDURE — 2500000001 HC RX 250 WO HCPCS SELF ADMINISTERED DRUGS (ALT 637 FOR MEDICARE OP): Performed by: STUDENT IN AN ORGANIZED HEALTH CARE EDUCATION/TRAINING PROGRAM

## 2024-12-04 PROCEDURE — 71045 X-RAY EXAM CHEST 1 VIEW: CPT

## 2024-12-04 PROCEDURE — 2500000001 HC RX 250 WO HCPCS SELF ADMINISTERED DRUGS (ALT 637 FOR MEDICARE OP)

## 2024-12-04 PROCEDURE — 2500000001 HC RX 250 WO HCPCS SELF ADMINISTERED DRUGS (ALT 637 FOR MEDICARE OP): Performed by: PHARMACIST

## 2024-12-04 PROCEDURE — 2500000002 HC RX 250 W HCPCS SELF ADMINISTERED DRUGS (ALT 637 FOR MEDICARE OP, ALT 636 FOR OP/ED): Performed by: INTERNAL MEDICINE

## 2024-12-04 RX ORDER — ACETAMINOPHEN 325 MG/1
975 TABLET ORAL 3 TIMES DAILY
Status: DISCONTINUED | OUTPATIENT
Start: 2024-12-04 | End: 2024-12-05 | Stop reason: HOSPADM

## 2024-12-04 RX ORDER — DICLOFENAC SODIUM 10 MG/G
4 GEL TOPICAL 4 TIMES DAILY PRN
Status: DISCONTINUED | OUTPATIENT
Start: 2024-12-04 | End: 2024-12-05 | Stop reason: HOSPADM

## 2024-12-04 RX ORDER — METRONIDAZOLE 500 MG/1
500 TABLET ORAL EVERY 8 HOURS SCHEDULED
Status: DISCONTINUED | OUTPATIENT
Start: 2024-12-04 | End: 2024-12-05 | Stop reason: HOSPADM

## 2024-12-04 RX ADMIN — TAMSULOSIN HYDROCHLORIDE 0.4 MG: 0.4 CAPSULE ORAL at 08:18

## 2024-12-04 RX ADMIN — CEFEPIME 1 G: 1 INJECTION, POWDER, FOR SOLUTION INTRAMUSCULAR; INTRAVENOUS at 01:25

## 2024-12-04 RX ADMIN — CEFEPIME 1 G: 1 INJECTION, POWDER, FOR SOLUTION INTRAMUSCULAR; INTRAVENOUS at 12:45

## 2024-12-04 RX ADMIN — METRONIDAZOLE 500 MG: 500 TABLET ORAL at 21:23

## 2024-12-04 RX ADMIN — AMLODIPINE BESYLATE 2.5 MG: 2.5 TABLET ORAL at 08:17

## 2024-12-04 RX ADMIN — ACETAMINOPHEN 975 MG: 325 TABLET, FILM COATED ORAL at 21:23

## 2024-12-04 RX ADMIN — PANTOPRAZOLE SODIUM 40 MG: 40 TABLET, DELAYED RELEASE ORAL at 06:37

## 2024-12-04 RX ADMIN — HEPARIN SODIUM 5000 UNITS: 5000 INJECTION, SOLUTION INTRAVENOUS; SUBCUTANEOUS at 04:39

## 2024-12-04 RX ADMIN — ACETAMINOPHEN 975 MG: 325 TABLET, FILM COATED ORAL at 15:49

## 2024-12-04 RX ADMIN — HEPARIN SODIUM 5000 UNITS: 5000 INJECTION, SOLUTION INTRAVENOUS; SUBCUTANEOUS at 21:23

## 2024-12-04 RX ADMIN — METRONIDAZOLE 500 MG: 500 INJECTION, SOLUTION INTRAVENOUS at 08:17

## 2024-12-04 RX ADMIN — COLCHICINE 0.6 MG: 0.6 TABLET, FILM COATED ORAL at 08:17

## 2024-12-04 RX ADMIN — METRONIDAZOLE 500 MG: 500 TABLET ORAL at 15:49

## 2024-12-04 RX ADMIN — OXYBUTYNIN CHLORIDE 5 MG: 5 TABLET ORAL at 21:23

## 2024-12-04 RX ADMIN — AMLODIPINE BESYLATE 2.5 MG: 2.5 TABLET ORAL at 21:23

## 2024-12-04 RX ADMIN — ACETAMINOPHEN 975 MG: 325 TABLET, FILM COATED ORAL at 08:18

## 2024-12-04 RX ADMIN — OXYBUTYNIN CHLORIDE 5 MG: 5 TABLET ORAL at 08:18

## 2024-12-04 RX ADMIN — HEPARIN SODIUM 5000 UNITS: 5000 INJECTION, SOLUTION INTRAVENOUS; SUBCUTANEOUS at 12:45

## 2024-12-04 RX ADMIN — POLYETHYLENE GLYCOL 3350 17 G: 17 POWDER, FOR SOLUTION ORAL at 21:23

## 2024-12-04 RX ADMIN — POLYETHYLENE GLYCOL 3350 17 G: 17 POWDER, FOR SOLUTION ORAL at 08:17

## 2024-12-04 RX ADMIN — ATORVASTATIN CALCIUM 40 MG: 40 TABLET, FILM COATED ORAL at 21:23

## 2024-12-04 ASSESSMENT — COGNITIVE AND FUNCTIONAL STATUS - GENERAL
MOBILITY SCORE: 16
MOBILITY SCORE: 18
MOVING FROM LYING ON BACK TO SITTING ON SIDE OF FLAT BED WITH BEDRAILS: A LITTLE
DRESSING REGULAR UPPER BODY CLOTHING: A LITTLE
WALKING IN HOSPITAL ROOM: A LITTLE
DRESSING REGULAR LOWER BODY CLOTHING: A LITTLE
STANDING UP FROM CHAIR USING ARMS: A LITTLE
TOILETING: A LITTLE
WALKING IN HOSPITAL ROOM: A LOT
STANDING UP FROM CHAIR USING ARMS: A LITTLE
TURNING FROM BACK TO SIDE WHILE IN FLAT BAD: A LITTLE
HELP NEEDED FOR BATHING: A LITTLE
DRESSING REGULAR LOWER BODY CLOTHING: A LITTLE
PERSONAL GROOMING: A LITTLE
DAILY ACTIVITIY SCORE: 18
MOVING FROM LYING ON BACK TO SITTING ON SIDE OF FLAT BED WITH BEDRAILS: A LITTLE
MOVING TO AND FROM BED TO CHAIR: A LITTLE
CLIMB 3 TO 5 STEPS WITH RAILING: A LOT
CLIMB 3 TO 5 STEPS WITH RAILING: A LITTLE
PERSONAL GROOMING: A LITTLE
HELP NEEDED FOR BATHING: A LITTLE
DRESSING REGULAR UPPER BODY CLOTHING: A LITTLE
TOILETING: A LITTLE
EATING MEALS: A LITTLE
TURNING FROM BACK TO SIDE WHILE IN FLAT BAD: A LITTLE
EATING MEALS: A LITTLE
DAILY ACTIVITIY SCORE: 18
MOVING TO AND FROM BED TO CHAIR: A LITTLE

## 2024-12-04 ASSESSMENT — ENCOUNTER SYMPTOMS
VOMITING: 0
SHORTNESS OF BREATH: 0
ABDOMINAL PAIN: 0
ARTHRALGIAS: 1
FEVER: 0
WEAKNESS: 1
ABDOMINAL DISTENTION: 0

## 2024-12-04 ASSESSMENT — PAIN SCALES - GENERAL
PAINLEVEL_OUTOF10: 0 - NO PAIN
PAINLEVEL_OUTOF10: 0 - NO PAIN

## 2024-12-04 ASSESSMENT — PAIN - FUNCTIONAL ASSESSMENT
PAIN_FUNCTIONAL_ASSESSMENT: 0-10
PAIN_FUNCTIONAL_ASSESSMENT: 0-10

## 2024-12-04 NOTE — CARE PLAN
The patient's goals for the shift include      The clinical goals for the shift include Pt will remain HDS throughout shift    Over the shift, the patient did not make progress toward the following goals. Barriers to progression include . Recommendations to address these barriers include .

## 2024-12-04 NOTE — PROGRESS NOTES
12/04/24 1416   Discharge Planning   Expected Discharge Disposition SNF     TCC spoke with pt at bedside and spouse on the phone. Spouse stated she wanted the [facility] SNF as foc. Spouse stated she has been there before to visit other people. Referral sent to [facility]. Facility accpeted. Auth is pend.    Auth approved [facility]    Poss dc tomorrow pend pain control. Wife is aware.

## 2024-12-04 NOTE — CARE PLAN
The clinical goals for the shift include Patient will participate in q2 turns by end of shift today.  Problem: Pain - Adult  Goal: Verbalizes/displays adequate comfort level or baseline comfort level  Outcome: Progressing     Problem: Safety - Adult  Goal: Free from fall injury  Outcome: Progressing     Problem: Discharge Planning  Goal: Discharge to home or other facility with appropriate resources  Outcome: Progressing     Problem: Chronic Conditions and Co-morbidities  Goal: Patient's chronic conditions and co-morbidity symptoms are monitored and maintained or improved  Outcome: Progressing     Problem: Skin  Goal: Decreased wound size/increased tissue granulation at next dressing change  Outcome: Progressing  Flowsheets (Taken 12/4/2024 1441)  Decreased wound size/increased tissue granulation at next dressing change: Promote sleep for wound healing  Goal: Participates in plan/prevention/treatment measures  Outcome: Progressing  Flowsheets (Taken 12/4/2024 1441)  Participates in plan/prevention/treatment measures: Elevate heels  Goal: Prevent/manage excess moisture  Outcome: Progressing  Flowsheets (Taken 12/4/2024 1441)  Prevent/manage excess moisture: Cleanse incontinence/protect with barrier cream  Goal: Prevent/minimize sheer/friction injuries  Outcome: Progressing  Flowsheets (Taken 12/4/2024 1441)  Prevent/minimize sheer/friction injuries:   Use pull sheet   Utilize specialty bed per algorithm  Goal: Promote/optimize nutrition  Outcome: Progressing  Flowsheets (Taken 12/4/2024 1441)  Promote/optimize nutrition: Consume > 50% meals/supplements  Goal: Promote skin healing  Outcome: Progressing  Flowsheets (Taken 12/4/2024 1441)  Promote skin healing: Turn/reposition every 2 hours/use positioning/transfer devices

## 2024-12-04 NOTE — PROGRESS NOTES
[facility] Hospitalist Progress Note        Between 7AM-7PM please message me via Epic Secure Chat.  After 7PM please page Nocturnist on call.        Assessment/Plan     Acute urinary retention/hx BPH  Constipation  Suspected stercoral colitis in setting of above constipation  Recent insertion of continence control stimulator 11/27/24  Leukocytosis - improved  Hx aortic stenosis/hx of HFpEF  HTN/CKD 3  Anemia - stable  Hx gout  Suspected osteoarthritis/probable DDD/hx Paget's disease  Physical deconditioning     - appreciate urology -> maintain romo catheter. He has been started on flomax. Will likely send out with romo can follow up with his urologist in office for voiding trial as well as follow up regarding recent continence control stimulator insertion procedure he underwent  - continue IV cefepime/flagyl will opt to complete 7D course of abx, if discharging can transition to PO cipro/flagyl. His blood cx thus far have shown no growth as well as urine cx  - continue current bowel regimen  - continue pain control efforts, he is utilizing PRN colchicine. We will schedule tylenol today    Fluids: None  Lytes: Replete as needed  Nutrition: Regular  Romo: Yes  Invasive lines: None  Drains: None    DVT Prophylaxis:  Heparin subq      Discharge Planning: may benefit from acute rehab/SNF      I personally examined the patient and reviewed chart.  Plan of care was discussed with patient and family, all questions answered.    Total time spent: At least 38 minutes, providing counseling or in coordination of care. Total time on this day of visit includes record and documentation review before and after visit including documentation and time not explicitly included on EMR time stamp.      Subjective     Elroy Greco is a 89 y.o. male on day 3 of admission presenting with Acute ischemic enteritis (Multi).    NAEON. Overall stable, does not complain of new acute issues. Bilateral foot pain and knee pain present. His biggest  "concern seems to be his decreased strength/ability to ambulate    Review of Systems   Constitutional:  Negative for fever.   Respiratory:  Negative for shortness of breath.    Cardiovascular:  Negative for chest pain.   Gastrointestinal:  Negative for abdominal distention, abdominal pain and vomiting.   Musculoskeletal:  Positive for arthralgias.   Neurological:  Positive for weakness.       Objective     Physical Exam  Constitutional:       General: He is not in acute distress.  Cardiovascular:      Rate and Rhythm: Normal rate and regular rhythm.      Heart sounds: Murmur heard.   Pulmonary:      Effort: Pulmonary effort is normal.      Breath sounds: Normal breath sounds.   Abdominal:      General: There is no distension.      Palpations: Abdomen is soft.   Neurological:      Mental Status: He is alert.      Motor: Weakness present.         Last Recorded Vitals  Blood pressure 131/71, pulse 78, temperature 36.7 °C (98 °F), temperature source Oral, resp. rate 16, height 1.702 m (5' 7\"), weight 77.1 kg (170 lb), SpO2 93%.  Intake/Output last 3 Shifts:  I/O last 3 completed shifts:  In: 720 (9.3 mL/kg) [P.O.:720]  Out: 900 (11.7 mL/kg) [Urine:900 (0.3 mL/kg/hr)]  Weight: 77.1 kg     Relevant Results  Results for orders placed or performed during the hospital encounter of 11/30/24 (from the past 24 hours)   CBC   Result Value Ref Range    WBC 10.2 4.4 - 11.3 x10*3/uL    nRBC 0.0 0.0 - 0.0 /100 WBCs    RBC 3.59 (L) 4.50 - 5.90 x10*6/uL    Hemoglobin 10.1 (L) 13.5 - 17.5 g/dL    Hematocrit 31.6 (L) 41.0 - 52.0 %    MCV 88 80 - 100 fL    MCH 28.1 26.0 - 34.0 pg    MCHC 32.0 32.0 - 36.0 g/dL    RDW 14.0 11.5 - 14.5 %    Platelets 183 150 - 450 x10*3/uL     *Note: Due to a large number of results and/or encounters for the requested time period, some results have not been displayed. A complete set of results can be found in Results Review.       Imaging Results  No results found.     Medications  acetaminophen, 975 mg, " oral, TID  amLODIPine, 2.5 mg, oral, BID  atorvastatin, 40 mg, oral, Nightly  cefepime, 1 g, intravenous, q12h  heparin (porcine), 5,000 Units, subcutaneous, q8h  metroNIDAZOLE, 500 mg, intravenous, q8h  oxybutynin, 5 mg, oral, BID  pantoprazole, 40 mg, oral, Daily before breakfast   Or  pantoprazole, 40 mg, intravenous, Daily before breakfast  polyethylene glycol, 17 g, oral, BID  tamsulosin, 0.4 mg, oral, Daily       PRN medications: colchicine, ondansetron **OR** ondansetron               Lawrence Monroe MD  [facility] Medicine

## 2024-12-05 VITALS
OXYGEN SATURATION: 95 % | DIASTOLIC BLOOD PRESSURE: 72 MMHG | HEART RATE: 75 BPM | TEMPERATURE: 97.2 F | WEIGHT: 170 LBS | HEIGHT: 67 IN | RESPIRATION RATE: 18 BRPM | BODY MASS INDEX: 26.68 KG/M2 | SYSTOLIC BLOOD PRESSURE: 108 MMHG

## 2024-12-05 LAB
ATRIAL RATE: 101 BPM
ATRIAL RATE: 81 BPM
BACTERIA BLD CULT: NORMAL
BACTERIA BLD CULT: NORMAL
P AXIS: 36 DEGREES
P AXIS: 5 DEGREES
P OFFSET: 161 MS
P OFFSET: 166 MS
P ONSET: 106 MS
P ONSET: 95 MS
PR INTERVAL: 206 MS
PR INTERVAL: 234 MS
Q ONSET: 209 MS
Q ONSET: 212 MS
QRS COUNT: 14 BEATS
QRS COUNT: 16 BEATS
QRS DURATION: 100 MS
QRS DURATION: 96 MS
QT INTERVAL: 350 MS
QT INTERVAL: 382 MS
QTC CALCULATION(BAZETT): 443 MS
QTC CALCULATION(BAZETT): 453 MS
QTC FREDERICIA: 416 MS
QTC FREDERICIA: 422 MS
R AXIS: -43 DEGREES
R AXIS: -46 DEGREES
T AXIS: -62 DEGREES
T AXIS: 60 DEGREES
T OFFSET: 387 MS
T OFFSET: 400 MS
VENTRICULAR RATE: 101 BPM
VENTRICULAR RATE: 81 BPM

## 2024-12-05 PROCEDURE — 2500000004 HC RX 250 GENERAL PHARMACY W/ HCPCS (ALT 636 FOR OP/ED): Performed by: STUDENT IN AN ORGANIZED HEALTH CARE EDUCATION/TRAINING PROGRAM

## 2024-12-05 PROCEDURE — 97116 GAIT TRAINING THERAPY: CPT | Mod: GP,CQ

## 2024-12-05 PROCEDURE — 99239 HOSP IP/OBS DSCHRG MGMT >30: CPT | Performed by: STUDENT IN AN ORGANIZED HEALTH CARE EDUCATION/TRAINING PROGRAM

## 2024-12-05 PROCEDURE — 2500000004 HC RX 250 GENERAL PHARMACY W/ HCPCS (ALT 636 FOR OP/ED): Performed by: INTERNAL MEDICINE

## 2024-12-05 PROCEDURE — 2500000002 HC RX 250 W HCPCS SELF ADMINISTERED DRUGS (ALT 637 FOR MEDICARE OP, ALT 636 FOR OP/ED): Performed by: INTERNAL MEDICINE

## 2024-12-05 PROCEDURE — 97530 THERAPEUTIC ACTIVITIES: CPT | Mod: GO,CO

## 2024-12-05 PROCEDURE — 2500000001 HC RX 250 WO HCPCS SELF ADMINISTERED DRUGS (ALT 637 FOR MEDICARE OP): Performed by: STUDENT IN AN ORGANIZED HEALTH CARE EDUCATION/TRAINING PROGRAM

## 2024-12-05 PROCEDURE — 2500000001 HC RX 250 WO HCPCS SELF ADMINISTERED DRUGS (ALT 637 FOR MEDICARE OP)

## 2024-12-05 PROCEDURE — 97530 THERAPEUTIC ACTIVITIES: CPT | Mod: GP,CQ

## 2024-12-05 PROCEDURE — 97535 SELF CARE MNGMENT TRAINING: CPT | Mod: GO,CO

## 2024-12-05 PROCEDURE — 2500000001 HC RX 250 WO HCPCS SELF ADMINISTERED DRUGS (ALT 637 FOR MEDICARE OP): Performed by: PHARMACIST

## 2024-12-05 PROCEDURE — 2500000001 HC RX 250 WO HCPCS SELF ADMINISTERED DRUGS (ALT 637 FOR MEDICARE OP): Performed by: INTERNAL MEDICINE

## 2024-12-05 RX ORDER — CIPROFLOXACIN 500 MG/1
500 TABLET ORAL 2 TIMES DAILY
Start: 2024-12-05 | End: 2024-12-08

## 2024-12-05 RX ORDER — POLYETHYLENE GLYCOL 3350 17 G/17G
17 POWDER, FOR SOLUTION ORAL 2 TIMES DAILY
Start: 2024-12-05 | End: 2024-12-23 | Stop reason: WASHOUT

## 2024-12-05 RX ORDER — TAMSULOSIN HYDROCHLORIDE 0.4 MG/1
0.4 CAPSULE ORAL DAILY
Start: 2024-12-05

## 2024-12-05 RX ORDER — AMLODIPINE BESYLATE 2.5 MG/1
2.5 TABLET ORAL 2 TIMES DAILY
Start: 2024-12-05 | End: 2024-12-23 | Stop reason: WASHOUT

## 2024-12-05 RX ORDER — DICLOFENAC SODIUM 10 MG/G
4 GEL TOPICAL 4 TIMES DAILY PRN
Start: 2024-12-05 | End: 2024-12-23 | Stop reason: WASHOUT

## 2024-12-05 RX ORDER — METRONIDAZOLE 500 MG/1
500 TABLET ORAL 3 TIMES DAILY
Start: 2024-12-05 | End: 2024-12-08

## 2024-12-05 RX ADMIN — CEFEPIME 1 G: 1 INJECTION, POWDER, FOR SOLUTION INTRAMUSCULAR; INTRAVENOUS at 00:34

## 2024-12-05 RX ADMIN — COLCHICINE 0.6 MG: 0.6 TABLET, FILM COATED ORAL at 11:00

## 2024-12-05 RX ADMIN — HEPARIN SODIUM 5000 UNITS: 5000 INJECTION, SOLUTION INTRAVENOUS; SUBCUTANEOUS at 05:15

## 2024-12-05 RX ADMIN — POLYETHYLENE GLYCOL 3350 17 G: 17 POWDER, FOR SOLUTION ORAL at 09:50

## 2024-12-05 RX ADMIN — DICLOFENAC SODIUM 4 G: 10 GEL TOPICAL at 11:00

## 2024-12-05 RX ADMIN — METRONIDAZOLE 500 MG: 500 TABLET ORAL at 06:37

## 2024-12-05 RX ADMIN — ACETAMINOPHEN 975 MG: 325 TABLET, FILM COATED ORAL at 09:50

## 2024-12-05 RX ADMIN — AMLODIPINE BESYLATE 2.5 MG: 2.5 TABLET ORAL at 09:50

## 2024-12-05 RX ADMIN — TAMSULOSIN HYDROCHLORIDE 0.4 MG: 0.4 CAPSULE ORAL at 09:50

## 2024-12-05 RX ADMIN — OXYBUTYNIN CHLORIDE 5 MG: 5 TABLET ORAL at 09:50

## 2024-12-05 RX ADMIN — PANTOPRAZOLE SODIUM 40 MG: 40 TABLET, DELAYED RELEASE ORAL at 06:37

## 2024-12-05 ASSESSMENT — COGNITIVE AND FUNCTIONAL STATUS - GENERAL
DRESSING REGULAR UPPER BODY CLOTHING: A LITTLE
MOBILITY SCORE: 15
STANDING UP FROM CHAIR USING ARMS: A LOT
EATING MEALS: A LITTLE
WALKING IN HOSPITAL ROOM: A LITTLE
STANDING UP FROM CHAIR USING ARMS: A LITTLE
MOVING TO AND FROM BED TO CHAIR: A LITTLE
DAILY ACTIVITIY SCORE: 18
MOVING FROM LYING ON BACK TO SITTING ON SIDE OF FLAT BED WITH BEDRAILS: A LITTLE
CLIMB 3 TO 5 STEPS WITH RAILING: A LOT
PERSONAL GROOMING: A LITTLE
TURNING FROM BACK TO SIDE WHILE IN FLAT BAD: A LITTLE
WALKING IN HOSPITAL ROOM: A LOT
TOILETING: A LITTLE
CLIMB 3 TO 5 STEPS WITH RAILING: TOTAL
HELP NEEDED FOR BATHING: A LITTLE
MOBILITY SCORE: 16
DRESSING REGULAR LOWER BODY CLOTHING: A LITTLE
HELP NEEDED FOR BATHING: A LOT
TOILETING: TOTAL
TURNING FROM BACK TO SIDE WHILE IN FLAT BAD: A LITTLE
MOVING TO AND FROM BED TO CHAIR: A LITTLE
DRESSING REGULAR LOWER BODY CLOTHING: A LOT
MOVING FROM LYING ON BACK TO SITTING ON SIDE OF FLAT BED WITH BEDRAILS: A LITTLE
DAILY ACTIVITIY SCORE: 17

## 2024-12-05 ASSESSMENT — ENCOUNTER SYMPTOMS
VOMITING: 0
SHORTNESS OF BREATH: 0
FEVER: 0
ARTHRALGIAS: 1
WEAKNESS: 1
ABDOMINAL DISTENTION: 0
ABDOMINAL PAIN: 0

## 2024-12-05 ASSESSMENT — PAIN SCALES - GENERAL
PAINLEVEL_OUTOF10: 0 - NO PAIN
PAINLEVEL_OUTOF10: 0 - NO PAIN
PAINLEVEL_OUTOF10: 10 - WORST POSSIBLE PAIN
PAINLEVEL_OUTOF10: 0 - NO PAIN

## 2024-12-05 ASSESSMENT — ACTIVITIES OF DAILY LIVING (ADL)
HOME_MANAGEMENT_TIME_ENTRY: 25
BATHING_EQUIPMENT_NEEDED: LONG-HANDLED SPONGE
BATHING_LEVEL_OF_ASSISTANCE: MAXIMUM ASSISTANCE
BATHING_WHERE_ASSESSED: OTHER (COMMENT)

## 2024-12-05 ASSESSMENT — PAIN - FUNCTIONAL ASSESSMENT
PAIN_FUNCTIONAL_ASSESSMENT: 0-10

## 2024-12-05 NOTE — CARE PLAN
The clinical goals for the shift include pt will remain safe and comfortable throughout the shift    Problem: Skin  Goal: Decreased wound size/increased tissue granulation at next dressing change  Outcome: Progressing  Flowsheets (Taken 12/5/2024 1413)  Decreased wound size/increased tissue granulation at next dressing change: Promote sleep for wound healing  Goal: Participates in plan/prevention/treatment measures  Outcome: Progressing  Flowsheets (Taken 12/5/2024 1413)  Participates in plan/prevention/treatment measures: Elevate heels  Goal: Prevent/manage excess moisture  Outcome: Progressing  Flowsheets (Taken 12/5/2024 1413)  Prevent/manage excess moisture: Moisturize dry skin  Goal: Prevent/minimize sheer/friction injuries  Outcome: Progressing  Flowsheets (Taken 12/5/2024 1413)  Prevent/minimize sheer/friction injuries: HOB 30 degrees or less  Goal: Promote/optimize nutrition  Outcome: Progressing  Flowsheets (Taken 12/5/2024 1413)  Promote/optimize nutrition: Consume > 50% meals/supplements  Goal: Promote skin healing  Outcome: Progressing  Flowsheets (Taken 12/5/2024 1413)  Promote skin healing: Assess skin/pad under line(s)/device(s)

## 2024-12-05 NOTE — PROGRESS NOTES
Physical Therapy    Physical Therapy Treatment    Patient Name: Elroy Greco  MRN: [id number]  Department: [place] [id number]  Room: [address] [place] [address]  Today's Date: 12/5/2024  Time Calculation  Start Time: 0914  Stop Time: 0955  Time Calculation (min): 41 min         Assessment/Plan   PT Assessment  Rehab Prognosis: Good  End of Session Communication: Bedside nurse  Assessment Comment: Pt requires varying physical assist throughout tx session. Unsteadiness noted with ambulation and pt quick to fatigue with ambulation and standing static balance.  End of Session Patient Position: Up in chair, Alarm on (CTA in room)  PT Plan  Inpatient/Swing Bed or Outpatient: Inpatient  PT Plan  Treatment/Interventions: Bed mobility, Transfer training, Gait training  PT Plan: Ongoing PT  PT Frequency: 3 times per week  PT Discharge Recommendations: Moderate intensity level of continued care  PT Recommended Transfer Status: Assist x1  PT - OK to Discharge: Yes (per POC)      General Visit Information:   PT  Visit  PT Received On: 12/05/24  General  Reason for Referral: Constipation, abdominal pain, lightheadedness  Referred By: LUPIS uW MD  Past Medical History Relevant to Rehab:   Past Medical History:   Diagnosis Date    Aortic stenosis     CAD (coronary artery disease)     Cardiology follow-up encounter 03/27/2024    JOHNNY Hernandez    CKD (chronic kidney disease)     Gout     H/O cardiovascular stress test 04/04/2016    H/O echocardiogram 04/05/2024    Hyperlipidemia     Hypertension     Nocturia 03/01/2017    Nocturia    Paget's disease of bone     Preoperative clearance     JOHNNY Hernandez 3/27/24   Should his echocardiogram remain stable, he will be cleared for his urologic procedure. He will follow up with us in clinic in one year. He knows to call for any concerns.    Spinal stenosis     Urinary incontinence, urge     Plan: Stage 1 & 2 Insertion Continence Control Stimulator Lead & Generator 11/27/24       Prior to Session  Communication: Bedside nurse  Patient Position Received: Bed, 3 rail up, Alarm on  General Comment: Pt very talkative and appears anxious. VC throughout tx session for redirection    Subjective   Precautions:  Precautions  Medical Precautions: Fall precautions          Objective   Pain:  Pain Assessment  Pain Assessment: 0-10  0-10 (Numeric) Pain Score: 0 - No pain (pt reports pain in L foot with ambulation)  Cognition:  Cognition  Orientation Level: Oriented X4       Postural Control:  Static Sitting Balance  Static Sitting-Balance Support: Feet supported, Bilateral upper extremity supported  Static Sitting-Level of Assistance: Close supervision  Static Sitting-Comment/Number of Minutes: VC to remain at midline  Static Standing Balance  Static Standing-Balance Support: Bilateral upper extremity supported  Static Standing-Level of Assistance: Contact guard       Treatments:       Therapeutic Activity  Therapeutic Activity Performed: Yes  Therapeutic Activity 1: Extended static standing trials performed, pt required max VC for upright posture and to refrain from leaning on forearms on WW. Soft knees noted. Pt performed x3 trials each ~5 minutes    Bed Mobility  Bed Mobility: Yes  Bed Mobility 1  Bed Mobility 1: Supine to sitting  Level of Assistance 1: Close supervision  Bed Mobility Comments 1: HOB elevated. Pt required VC for correct sequencing, use of bed rail required.    Ambulation/Gait Training  Ambulation/Gait Training Performed: Yes  Ambulation/Gait Training 1  Surface 1: Level tile  Device 1: Rolling walker  Gait Support Devices: Gait belt  Assistance 1: Minimum assistance  Quality of Gait 1: Shuffling gait, Diminished heel strike, Decreased step length, Forward flexed posture, Antalgic, Soft knee(s)  Comments/Distance (ft) 1: 15 (Slow antalgic gait noted 2/2 pain reported at LLE. Pt requires WW management and max VC for safety and sequencing. Pt requires to sit 2/2 pain and fatigue)  Transfers  Transfer:  Yes  Transfer 1  Technique 1: Sit to stand, Stand to sit  Transfer Device 1: Walker, Gait belt  Transfer Level of Assistance 1: Maximum assistance  Trials/Comments 1: MaxA for trunk elevation and eccentric control. Max VC for correct hand placement and BLE placement. x3 trials  Transfers 2  Transfer From 2: Chair with drop arm to  Transfer to 2: Stand  Technique 2: Sit to stand, Stand to sit  Transfer Device 2: Walker, Gait belt  Transfer Level of Assistance 2: Moderate assistance  Trials/Comments 2: x2 trials performed. Pt required modA for trunk elevation and eccentric control. ModA for correct hand placement.    Outcome Measures:  [facility] Basic Mobility  Turning from your back to your side while in a flat bed without using bedrails: A little  Moving from lying on your back to sitting on the side of a flat bed without using bedrails: A little  Moving to and from bed to chair (including a wheelchair): A little  Standing up from a chair using your arms (e.g. wheelchair or bedside chair): A lot  To walk in hospital room: A little  Climbing 3-5 steps with railing: Total  Basic Mobility - Total Score: 15    Education Documentation  Body Mechanics, taught by Emelina Jacobs PTA at 12/5/2024 11:39 AM.  Learner: Patient  Readiness: Acceptance  Method: Explanation  Response: Verbalizes Understanding    Mobility Training, taught by Emelina Jacobs PTA at 12/5/2024 11:39 AM.  Learner: Patient  Readiness: Acceptance  Method: Explanation  Response: Verbalizes Understanding    Education Comments  No comments found.           Encounter Problems       Encounter Problems (Active)       Balance       complete all mobility with normal balance while dual tasking, negotiating in a dynamic environment, carrying items, etc., with proactive and reactive static and dynamic standing and sitting tasks, with mod I and RW, >15 minutes.   (Not Progressing)       Start:  12/03/24    Expected End:  12/17/24               Mobility        STG - Patient will ambulate 150 ft mod I with a RW.  (Not Progressing)       Start:  12/03/24    Expected End:  12/17/24               PT Transfers       STG - Patient will perform bed mobility independently.  (Not Progressing)       Start:  12/03/24    Expected End:  12/17/24            STG - Patient will transfer sit to and from stand mod I with a RW (Not Progressing)       Start:  12/03/24    Expected End:  12/17/24               Pain - Adult

## 2024-12-05 NOTE — PROGRESS NOTES
12/05/24 1232   Discharge Planning   Expected Discharge Disposition SNF   Intensity of Service   Intensity of Service >30 min     Patient is medically ready for discharge  They are being discharged to: SNF  [facility] will pick patient up                           Facility [facility]             Nurse/ family notified wife Kasandra             Transport time 130 pm    Patient denies any other needs

## 2024-12-05 NOTE — CARE PLAN
The patient's goals for the shift include      The clinical goals for the shift include Patient will participate in q2 turns by end of shift today.    Over the shift, the patient did not make progress toward the following goals. Barriers to progression include . Recommendations to address these barriers include .

## 2024-12-05 NOTE — DISCHARGE SUMMARY
[facility] Hospitalist Discharge Summary        Elroy SparksioDOB: 12/2/1930MRN: [id number]    ADMIT DATE: 11/30/2024DISCHARGE DATE: 12/5/2024    PRIMARYCARE PHYSICIAN: Trice Grayson MD    VISIT STATUS: Inpatient    CODE STATUS: Full Code    DISCHARGE DIAGNOSES:    Principal Problem:    Acute ischemic enteritis (Multi)  Active Problems:    Acute ischemic colitis (Multi)      CONSULTANTS:  Urology    HOSPITAL COURSE:    Acute urinary retention/hx BPH  Constipation  Suspected stercoral colitis in setting of above constipation  Recent insertion of continence control stimulator 11/27/24  Leukocytosis - improved  Hx aortic stenosis/hx of HFpEF  HTN/CKD 3  Anemia - stable  Hx gout  Suspected osteoarthritis/probable DDD/hx Paget's disease  Physical deconditioning      - appreciate urology -> maintain romo catheter. He has been started on flomax. Will likely send out with romo can follow up with his urologist in office for voiding trial as well as follow up regarding recent continence control stimulator insertion procedure he underwent  - transition to PO cipro/flagyl to complete 7D course of abx. His blood cx thus far have shown no growth as well as urine cx  - continue current bowel regimen on DC w/ 2 times daily miralax  - continue pain control efforts, he is utilizing PRN colchicine. Can also utilize tylenol and volteran gel    DAY OF DISCHARGE:  Review of Systems   Constitutional:  Negative for fever.   Respiratory:  Negative for shortness of breath.    Cardiovascular:  Negative for chest pain.   Gastrointestinal:  Negative for abdominal distention, abdominal pain and vomiting.   Musculoskeletal:  Positive for arthralgias.   Neurological:  Positive for weakness.       Patient Vitals for the past 24 hrs:   BP Temp Temp src Pulse Resp SpO2   12/05/24 0500 122/71 36.5 °C (97.7 °F) Oral 74 18 93 %   12/04/24 1914 155/70 37.1 °C (98.8 °F) Oral 82 16 94 %   12/04/24 1601 151/71 37.1 °C (98.7 °F) Oral 85 20 94 %       Average,  Min, and Max forlast 24 hours Vitals:  TEMPERATURE: Temp  Av.9 °C (98.4 °F)  Min: 36.5 °C (97.7 °F)  Max: 37.1 °C (98.8 °F)    RESPIRATIONS RANGE: Resp  Av  Min: 16  Max: 20    PULSE RANGE: Pulse  Av.3  Min: 74  Max: 85    BLOOD PRESSURE RANGE: Systolic (24hrs), Av , Min:122 , Max:155   ; Diastolic (24hrs), Av, Min:70, Max:71      PULSE OXIMETRYRANGE: SpO2  Av.7 %  Min: 93 %  Max: 94 %    I/O last 3 completed shifts:  In: 630 (8.2 mL/kg) [P.O.:480; IV Piggyback:150]  Out: 2150 (27.9 mL/kg) [Urine:2150 (0.8 mL/kg/hr)]  Weight: 77.1 kg     Physical Exam  Constitutional:       General: He is not in acute distress.  Neurological:      Mental Status: He is alert.           Discharge Meds       Your medication list        ASK your doctor about these medications        Instructions Last Dose Given Next Dose Due   acetaminophen 500 mg tablet  Commonly known as: Tylenol           amLODIPine 2.5 mg tablet  Commonly known as: Norvasc      TAKE 1 TABLET BY MOUTH ONCE  DAILY       atorvastatin 40 mg tablet  Commonly known as: Lipitor      Take 1 tablet (40 mg) by mouth once daily at bedtime.       azelastine 137 mcg (0.1 %) nasal spray  Commonly known as: Astelin      Administer 1 spray into each nostril 2 times a day. Use in each nostril as directed       carboxymethylcellulose 0.5 % ophthalmic solution  Commonly known as: Refresh Plus           colchicine 0.6 mg tablet           COLLAGEN 1500 PLUS C ORAL           cyanocobalamin 500 mcg tablet  Commonly known as: Vitamin B-12           diphenhydrAMINE 50 mg capsule  Commonly known as: BENADryl           docusate sodium 100 mg capsule  Commonly known as: Colace      Take 2 capsules (200 mg) by mouth 2 times a day.       mirabegron 25 mg tablet extended release 24 hr 24 hr tablet  Commonly known as: Myrbetriq      Take 1 tablet (25 mg) by mouth once daily.       Ocuvite [facility] 50 mg-15 unit- 4.5 mg-2.5 mg tablet,chewable  Generic drug: vit  C-E-zinc cit-lutein-zeaxan           omega 3-dha-epa-fish oil 1,000 (120-180) mg capsule           psyllium powder  Commonly known as: Metamucil                      FOLLOW UP TESTING, PENDING RESULTS OR REFERRALS AT FOLLOW UP VISIT:   [X] Yes as outlined above   [ ] No    DISPOSITION: [facility]    FACILITY NAME: [facility]     Follow up with PCP Trice Grayson MD    Outpatient Follow-Up Appointments  Future Appointments   Date Time Provider Department [place]   12/20/2024 10:00 AM Pantera Mckinney MD [facility]   12/23/2024  9:30 AM Wan Almeida MD MPH [id number] [facility]   2/4/2025  7:00 AM Trice Grayson MD [id number] [facility]   3/12/2025 10:00 AM Trice Grayson MD [id number] [facility] personally examined the patient and reviewed chart.  Plan of care was discussed with patient, all questions answered.    DISCHARGE TIME: > 30 minutes providing counseling or in coordination of care. Total time on this day of visit includes record and documentation review before and after visit including documentation and time not explicitly included on EMR time stamp.    Lawrence Monroe MD  [facility] Medicine

## 2024-12-05 NOTE — PROGRESS NOTES
Occupational Therapy    OT Treatment    Patient Name: Elroy Greco  MRN: [id number]  Department: [facility] [address]  Room: [address] [place] [address]  Today's Date: 12/5/2024  Time Calculation  Start Time: 1139  Stop Time: 1221  Time Calculation (min): 42 min        Assessment:  OT Assessment: Pt  highly motivated social and progressing with established POC.  Will continue to address remaining deficits with skilled OT intervention.  Prognosis: Good  Barriers to Discharge: None  Evaluation/Treatment Tolerance: Patient limited by pain  Medical Staff Made Aware: Yes  End of Session Communication: Bedside nurse (discussion re Pts current discomfort in L LE)  End of Session Patient Position: Up in chair, Alarm on (call light in reach Pt demonstrate use all needs met.)  OT Assessment Results: Decreased ADL status, Decreased functional mobility  Prognosis: Good  Barriers to Discharge: None  Evaluation/Treatment Tolerance: Patient limited by pain  Medical Staff Made Aware: Yes  Strengths: Ability to acquire knowledge, Attitude of self, Coping skills, Living arrangement secure, Premorbid level of function, Support of extended family/friends  Barriers to Participation: Comorbidities  Plan:  Treatment Interventions: ADL retraining, Functional transfer training, Endurance training, Patient/family training, Equipment evaluation/education, Compensatory technique education  OT Frequency: 3 times per week  OT Discharge Recommendations: Moderate intensity level of continued care  Equipment Recommended upon Discharge: Wheeled walker  OT Recommended Transfer Status: Moderate assist, Assist of 1, Assist of 2  OT - OK to Discharge: Yes  Treatment Interventions: ADL retraining, Functional transfer training, Endurance training, Patient/family training, Equipment evaluation/education, Compensatory technique education    Subjective   Previous Visit Info:  OT Last Visit  OT Received On: 12/05/24  General:  General  Reason for Referral: Constipation, abdominal pain,  "lightheadedness  Referred By: LUPIS Wu MD  Prior to Session Communication: Bedside nurse  Patient Position Received: Up in chair, Alarm on  Preferred Learning Style: auditory, kinesthetic, verbal, visual, written  General Comment: Pt cleareed by NSG and is agreeable to skilled OT intervention up in chair upon arrival to room.  Pt very social easily distracted however redirectable  Precautions:  Medical Precautions: Fall precautions    Vital Signs (Past 2hrs)           Pain:  Pain Assessment  Pain Assessment: 0-10  0-10 (Numeric) Pain Score: 10 - Worst possible pain (Pt reports 12/10 pain in L foot stating \"It is my gout but I can do therapy\")  Pain Type: Acute pain  Pain Location: Foot  Pain Orientation: Left  Pain Interventions: Repositioned, Environmental changes, Relaxation technique, Therapeutic presence (Discussed with NSG)  Response to Interventions: Relief    Objective    Cognition:  Cognition  Overall Cognitive Status: Within Functional Limits  Orientation Level: Oriented X4  Insight: Within function limits  Impulsive: Within functional limits  Coordination:  Movements are Fluid and Coordinated: Yes  Activities of Daily Living:      LE Bathing  LE Bathing Adaptive Equipment: Long-handled sponge  LE Bathing Level of Assistance: Maximum assistance  LE Bathing Where Assessed: Other (Comment) (seated BSC and stance phase  at RW  gait belt use 2 person)  LE Bathing Comments: seated BSC instructed Pt with use LH sponge asssit buttocks in stance BUE RW    LE Dressing  LE Dressing: Yes  LE Dressing Adaptive Equipment: Reacher, Sock aide  Pants Level of Assistance: Dependent (Pt thread over B feet assist waist  Mod A x 2 person for balance at RW)  Sock Level of Assistance: Minimum assistance (Pt doff/don use LH reacher sock aid  increased effort d/t discomfort  L LLE)  LE Dressing Where Assessed: Chair, Other (Comment), Bedside commode (stance at RW)  LE Dressing Comments: Pt educated with AE use increased assist " stance phase gait belt 2 person assist    Toileting  Toileting Adaptive Equipment: Cathertization equipment (+Mcqueen)  Toileting Level of Assistance: Dependent  Where Assessed: Bedside commode  Toileting Comments: 2 person assist stance phase Pt partially doff/don brief  Functional Standing Tolerance:  Time: 1 minute  Activity: ADL skills/transfer  Functional Standing Tolerance Comments: Mod A x 2 with Pt c/o light headed  resolving upon sitting  Bed Mobility/Transfers:      Transfers  Transfer: Yes  Transfer 1  Transfer From 1: Chair with arms to  Transfer to 1: Stand  Technique 1: Sit to stand, Stand to sit  Transfer Device 1: Walker, Gait belt  Transfer Level of Assistance 1: Moderate assistance  Trials/Comments 1: x 2 person for safety vc hand placement and controlled descent    Toilet Transfers  Toilet Transfer From: Chair, Rolling walker  Toilet Transfer Type: To and from  Toilet Transfer to: Standard bedside commode  Toilet Transfer Technique: To left, To right  Toilet Transfers: Moderate assistance (x 2 person)  Toilet Transfers Comments: gait belt use vc hand placement and controlled descent    Sitting Balance:  Dynamic Sitting Balance  Dynamic Sitting-Balance Support: Feet supported  Dynamic Sitting-Level of Assistance: Close supervision  Dynamic Sitting-Balance: Reaching for objects, Reaching across midline  Dynamic Sitting-Comments: with LB AD skills vc toavoid extensive forward reaching d/t c/o light headness  Therapy/Activity:    Therapeutic Activity  Therapeutic Activity Performed: Yes  Therapeutic Activity 1: Pt engaged in therapy session 20 minutes continuous requires redirection d/t very social and pleasant  Outcome Measures:[facility] Daily Activity  Putting on and taking off regular lower body clothing: A lot  Bathing (including washing, rinsing, drying): A lot  Putting on and taking off regular upper body clothing: None  Toileting, which includes using toilet, bedpan or urinal: Total  Taking care of  personal grooming such as brushing teeth: None  Eating Meals: None  Daily Activity - Total Score: 17    Education Documentation  Handouts, taught by CHRISTOS Lancaster at 12/5/2024 12:39 PM.  Learner: Patient  Readiness: Eager  Method: Explanation, Demonstration, Teach-back  Response: Verbalizes Understanding, Demonstrated Understanding, Needs Reinforcement  Comment: Instructed Pt with adaptive ADL skills, balance strategies, safety in transfers, body mechanics    Precautions, taught by CHRISTOS Lancaster at 12/5/2024 12:39 PM.  Learner: Patient  Readiness: Eager  Method: Explanation, Demonstration, Teach-back  Response: Verbalizes Understanding, Demonstrated Understanding, Needs Reinforcement  Comment: Instructed Pt with adaptive ADL skills, balance strategies, safety in transfers, body mechanics    ADL Training, taught by CHRISTOS Lancaster at 12/5/2024 12:39 PM.  Learner: Patient  Readiness: Eager  Method: Explanation, Demonstration, Teach-back  Response: Verbalizes Understanding, Demonstrated Understanding, Needs Reinforcement  Comment: Instructed Pt with adaptive ADL skills, balance strategies, safety in transfers, body mechanics    Education Comments  No comments found.        OP EDUCATION:       Goals:  Encounter Problems       Encounter Problems (Active)       ADLs       Patient will perform LB bathing 25% with moderate assist level of assistance and adaptive equipment prn . (Progressing)       Start:  12/03/24    Expected End:  12/17/24            Patient with complete lower body dressing with moderate assist level of assistance donning and doffing all LE clothes  with reacher, shoe horn, sock-aid, and dressing stick  while supported sitting (Progressing)       Start:  12/03/24    Expected End:  12/17/24            Pt will tolerate 30 min OT tx session w/o subj/obj c/o fatigue/SOB with activity to increase independence  (Progressing)       Start:  12/03/24    Expected End:  12/17/24                MOBILITY       Patient will perform Functional mobility mod  Household distances/Community Distances with stand by assist level of assistance and front wheeled walker in order to improve safety and functional mobility. (Progressing)       Start:  12/03/24    Expected End:  12/17/24               TRANSFERS       Patient will perform bed mobility supervision level of assistance and bed rails in order to improve safety and independence with mobility (Progressing)       Start:  12/03/24    Expected End:  12/17/24            Patient will complete functional transfer to chair with front wheeled walker with supervision level of assistance. (Progressing)       Start:  12/03/24    Expected End:  12/17/24

## 2024-12-06 ENCOUNTER — NURSING HOME VISIT (OUTPATIENT)
Dept: POST ACUTE CARE | Facility: EXTERNAL LOCATION | Age: 89
End: 2024-12-06
Payer: MEDICARE

## 2024-12-06 DIAGNOSIS — I25.10 CORONARY ARTERY DISEASE INVOLVING NATIVE HEART WITHOUT ANGINA PECTORIS, UNSPECIFIED VESSEL OR LESION TYPE: Primary | ICD-10-CM

## 2024-12-06 DIAGNOSIS — R42 LIGHTHEADED: ICD-10-CM

## 2024-12-06 PROCEDURE — 99306 1ST NF CARE HIGH MDM 50: CPT | Performed by: INTERNAL MEDICINE

## 2024-12-06 NOTE — PROGRESS NOTES
Subjective   Patient ID: Elroy Greco is a 89 y.o. male who presents for No chief complaint on file..  HPI  Past medical history hypertension CKD stage III depression CAD moderate aortic stenosis BPH Paget's disease syncope right rotator cuff repair  Recent hospital admission urinary retention constipation CT showed colitis constipation CT PE negative hyponatremia    Patient sitting on the bedside commode when I saw the patient he states he feels lightheaded intermittently since Saturday grade 4 out of 10 worse with not eating and drinking as much also worse when he bends over and gets up nothing really makes better  Some associated presyncopal sensation  Spoke with the nurse Raquel she states the patient just had his vitals checked they are normal he had a large bowel movement as well at 1129  Denies chest pain dyspnea nausea vomiting fever chills otalgia otorrhea vision changes diplopia headache hearing loss syncope              Health Maintenance:      Colonoscopy:      Mammogram:      Pelvic/Pap:      Low dose chest CT:      Aorta duplex:      Optho:      Podiatry:        Vaccines:      Refer to Epic Vaccination Log        ROS:      General: denies fever/chills/weight loss      Head: Somewhat lightheaded worse with bending over denies HA/trauma/masses/dizziness      Eyes: denies vision change/loss of vision/blurry vision/diplopia/eye pain      Ears: denies hearing loss/tinnitus/otalgia/otorrhea      Nose: denies nasal drainage/anosmia      Throat: denies dysphagia/odynophagia      Lymphatics: denies lymph node swelling      Breast: denies masses/discharge/dimpling/skin changes      Cardiac: denies CP/palpitations/orthopnea/PND      Pulmonary: denies dyspnea/cough/wheezing      GI: denies abd pain/n/v/diarrhea/melena/hematochezia/hematemesis      : Urine retention status post Mcqueen denies dysuria/hematuria/change frequency      Genital: denies genital discharge/lesions      Skin: denies  "rashes/lesions/masses      MSK: Chronic aching knees over the years had replacements denies weakness/swelling/edema/gait imbalance/pain      Neuro: denies paresthesias/seizures/dysarthria      Psych: denies depression/anxiety/suicidal or homicidal ideations            Objective   There were no vitals taken for this visit.     Physical Exam:     General: AO3, NAD     Head: atraumatic/NC     Eyes: EOMI/PERRLA. Negative APD     Ears: TM pearly gray, EAC clear. No lesions or erythema     Nose: symmetric nares, no discharge     Throat: trachea midline, uvula midline pink mucosa. No thyromegaly     Lymphatics: no cervical/supraclavicular/ant or posterior cervical adenopathy/axillary/inguinal adenopathy     Breast: not examined     Chest: no deformity or tenderness to palpation     Pulm: CTA b/l, no wheeze/rhonchi/rales. nonlabored     Cardiac: RRR +s1s2, no m/r/g.      GI: soft, NT/ND. Normoactive Bsx4. No rebound/guarding.  Gu positive Mcqueen with dark adelfo-colored urine     Rectal: not examined     Genital: not examined     MSK: 5/5 strength UE LE. No edema/clubbing/cyanosis     Skin: no rashes/lesions     Vascular: 2+ palp DP PT radials b/l. Negative carotid bruit     Neuro: CNII-XII intact. No focal deficits. Reflexes 2/4 brachioradialis bicep tricep patellar achilles. Finger to nose intact.     Psych: appropriate mood/affect                    No results found for: \"BMPR1A\", \"CBCDIF\"      Assessment/Plan   Diagnoses and all orders for this visit:  Coronary artery disease involving native heart without angina pectoris, unspecified vessel or lesion type  Lightheaded  Comments:  Suspect dehydration with secondary orthostasis versus post micturition defecation vasovagal type syndrome versus other such as aortic stenosis exacerbat Flomax    Stat labs CBC BMP  Lay patient back in bed discussed with nurse  Drink 12 ounce of water 3 times a day  Orthostatics now  Sent to ER for any persistent lightheaded presyncope or other " severe symptoms  Discontinue Flomax if positive orthostatics    Follow-up with pulmonary as ordered December 23    Orthopedic referral and follow-up    Cardiology referral and follow-up for aortic stenosis    Urology referral for chronic Mcqueen    PT OT    Every Tuesday CBC BMP    Aguilar Vázquez DO, FACOI       Aguilar Vázquez DO

## 2024-12-06 NOTE — LETTER
Patient: Elroy Greco  : 1930    Encounter Date: 2024    Subjective  Patient ID: Elroy Greco is a 89 y.o. male who presents for No chief complaint on file..  HPI  Past medical history hypertension CKD stage III depression CAD moderate aortic stenosis BPH Paget's disease syncope right rotator cuff repair  Recent hospital admission urinary retention constipation CT showed colitis constipation CT PE negative hyponatremia    Patient sitting on the bedside commode when I saw the patient he states he feels lightheaded intermittently since Saturday grade 4 out of 10 worse with not eating and drinking as much also worse when he bends over and gets up nothing really makes better  Some associated presyncopal sensation  Spoke with the nurse Raquel she states the patient just had his vitals checked they are normal he had a large bowel movement as well at 1129  Denies chest pain dyspnea nausea vomiting fever chills otalgia otorrhea vision changes diplopia headache hearing loss syncope              Health Maintenance:      Colonoscopy:      Mammogram:      Pelvic/Pap:      Low dose chest CT:      Aorta duplex:      Optho:      Podiatry:        Vaccines:      Refer to Epic Vaccination Log        ROS:      General: denies fever/chills/weight loss      Head: Somewhat lightheaded worse with bending over denies HA/trauma/masses/dizziness      Eyes: denies vision change/loss of vision/blurry vision/diplopia/eye pain      Ears: denies hearing loss/tinnitus/otalgia/otorrhea      Nose: denies nasal drainage/anosmia      Throat: denies dysphagia/odynophagia      Lymphatics: denies lymph node swelling      Breast: denies masses/discharge/dimpling/skin changes      Cardiac: denies CP/palpitations/orthopnea/PND      Pulmonary: denies dyspnea/cough/wheezing      GI: denies abd pain/n/v/diarrhea/melena/hematochezia/hematemesis      : Urine retention status post Mcqueen denies dysuria/hematuria/change frequency       "Genital: denies genital discharge/lesions      Skin: denies rashes/lesions/masses      MSK: Chronic aching knees over the years had replacements denies weakness/swelling/edema/gait imbalance/pain      Neuro: denies paresthesias/seizures/dysarthria      Psych: denies depression/anxiety/suicidal or homicidal ideations            Objective  There were no vitals taken for this visit.     Physical Exam:     General: AO3, NAD     Head: atraumatic/NC     Eyes: EOMI/PERRLA. Negative APD     Ears: TM pearly gray, EAC clear. No lesions or erythema     Nose: symmetric nares, no discharge     Throat: trachea midline, uvula midline pink mucosa. No thyromegaly     Lymphatics: no cervical/supraclavicular/ant or posterior cervical adenopathy/axillary/inguinal adenopathy     Breast: not examined     Chest: no deformity or tenderness to palpation     Pulm: CTA b/l, no wheeze/rhonchi/rales. nonlabored     Cardiac: RRR +s1s2, no m/r/g.      GI: soft, NT/ND. Normoactive Bsx4. No rebound/guarding.  Gu positive Mcqueen with dark adelfo-colored urine     Rectal: not examined     Genital: not examined     MSK: 5/5 strength UE LE. No edema/clubbing/cyanosis     Skin: no rashes/lesions     Vascular: 2+ palp DP PT radials b/l. Negative carotid bruit     Neuro: CNII-XII intact. No focal deficits. Reflexes 2/4 brachioradialis bicep tricep patellar achilles. Finger to nose intact.     Psych: appropriate mood/affect                    No results found for: \"BMPR1A\", \"CBCDIF\"      Assessment/Plan  Diagnoses and all orders for this visit:  Coronary artery disease involving native heart without angina pectoris, unspecified vessel or lesion type  Lightheaded  Comments:  Suspect dehydration with secondary orthostasis versus post micturition defecation vasovagal type syndrome versus other such as aortic stenosis exacerbat Flomax    Stat labs CBC BMP  Lay patient back in bed discussed with nurse  Drink 12 ounce of water 3 times a day  Orthostatics now  Sent " to ER for any persistent lightheaded presyncope or other severe symptoms  Discontinue Flomax if positive orthostatics    Follow-up with pulmonary as ordered December 23    Orthopedic referral and follow-up    Cardiology referral and follow-up for aortic stenosis    Urology referral for chronic Mcqueen    PT OT    Every Tuesday CBC BMP    Aguilar Vázquez DO, FACJASMIN Vázquez DO      Electronically Signed By: Aguilar Vázquez DO   12/6/24 12:40 PM

## 2024-12-10 ENCOUNTER — TELEPHONE (OUTPATIENT)
Dept: PRIMARY CARE | Facility: CLINIC | Age: 89
End: 2024-12-10
Payer: MEDICARE

## 2024-12-10 NOTE — TELEPHONE ENCOUNTER
Pt wife called, alcides, pt is at [facility]. Pt has a catheter in going on 10 days which its starting to hurt him. [facility] needs order for them to remove catheter. Please advise.

## 2024-12-11 ENCOUNTER — NURSING HOME VISIT (OUTPATIENT)
Dept: POST ACUTE CARE | Facility: EXTERNAL LOCATION | Age: 89
End: 2024-12-11
Payer: MEDICARE

## 2024-12-11 DIAGNOSIS — N40.0 BENIGN PROSTATIC HYPERPLASIA, UNSPECIFIED WHETHER LOWER URINARY TRACT SYMPTOMS PRESENT: ICD-10-CM

## 2024-12-11 DIAGNOSIS — R33.9 URINARY RETENTION: Primary | ICD-10-CM

## 2024-12-11 DIAGNOSIS — K59.00 CONSTIPATION, UNSPECIFIED CONSTIPATION TYPE: ICD-10-CM

## 2024-12-11 DIAGNOSIS — Z74.09 IMPAIRED FUNCTIONAL MOBILITY, BALANCE, AND ENDURANCE: ICD-10-CM

## 2024-12-11 DIAGNOSIS — K55.039 ACUTE ISCHEMIC COLITIS (MULTI): ICD-10-CM

## 2024-12-11 PROCEDURE — 99310 SBSQ NF CARE HIGH MDM 45: CPT | Performed by: PHYSICIAN ASSISTANT

## 2024-12-11 NOTE — LETTER
"Patient: Elroy Greco  : 1930    Encounter Date: 2024  Name: Elroy Greco  YOB: 1930    Chief complaint: Urinary retention. Colitis.     HPI: This is a 89 year old  male who has a medical history remarkable for hypertension, CKD3, moderate aortic Stenosis, BPH, constipation, CAD, diastolic dysfunction, depression, and syncope. Patient presented to the ER with complaint urinary retention and constipation. Patient had a continence stimulator implanted on 24 with Dr. Mckinney and l developed lower abdominal pain and was unable to urinate. Mcqueen catheter was placed in the ER and returned > 1000 ml of urine. A CT abdomen was completed which revealed a large stool burden and pericolonic inflammatory stranding concerning for colitis. Lab work showed WBC of 14.1, potassium of 2.9, with lactate of 2.9. Troponin negative. . He was started on Cefepime and Flagyl for concerns of colitis. He improved and was discharged to SNF for rehab with Mcqueen catheter. Antibiotics were changed to po Ciprofloxacin and Flagyl at discharge to SNF.     Difficulty Urinating   The current episode started 1 to 4 weeks ago. The problem has been gradually improving. The patient is experiencing no pain. There has been no fever. Pertinent negatives include no chills, discharge or hematuria. Treatments tried: Mcqueen catheer. The treatment provided significant relief. His past medical history is significant for catheterization.     Review of systems:   ROS negative except were noted in HPI.    Code Status: full code    BP (!) 141/91   Pulse 74   Temp 36.6 °C (97.9 °F)   Resp 15   Ht 1.753 m (5' 9\")   Wt 74.8 kg (165 lb)   SpO2 93%   BMI 24.37 kg/m²      Physical Exam  Constitutional:       General: He is not in acute distress.  HENT:      Head: Normocephalic.      Nose: Nose normal.      Mouth/Throat:      Mouth: Mucous membranes are moist.   Eyes:      Extraocular Movements: " Extraocular movements intact.      Pupils: Pupils are equal, round, and reactive to light.   Cardiovascular:      Rate and Rhythm: Normal rate and regular rhythm.   Pulmonary:      Effort: Pulmonary effort is normal.      Breath sounds: No wheezing or rales.   Abdominal:      General: Bowel sounds are normal. There is no distension.      Palpations: Abdomen is soft.      Tenderness: There is no abdominal tenderness. There is no guarding.   Genitourinary:     Comments: Mcqueen catheter.  Musculoskeletal:         General: Normal range of motion.      Cervical back: Normal range of motion.   Skin:     General: Skin is warm and dry.      Capillary Refill: Capillary refill takes less than 2 seconds.   Neurological:      General: No focal deficit present.      Mental Status: He is alert and oriented to person, place, and time.   Psychiatric:         Mood and Affect: Mood normal.         Behavior: Behavior normal.        Medications reviewed during visit at facility.  Polyethylene Glycol 17 grams po daily  Diphenhydramine HCl Oral Tablet 50 mg po daily prn  Tamsulosin 0.4 mg po daily  Tylenol 1000 mg po q 8 hours  Psyllium Husk Powder one packet po bid  Fish oil 1000 mg po daily  Carboxymethylcellulose Sod PF Ophthalmic Solution 0.5 % one drop both eyes qid  Vitamin B 12 500 mcg po daily  Atorvastatin 40 mg po daily  Collagen 1500/C Oral Capsule 500-50-0.8 mg one capsule daily  Amlodipine 2.5 mg po daily  Diclofenac Sodium External Gel 1 % to affected areas q 6 hours prn  Ocuvite Adult 50+ Oral Capsule two tablet po daily  Colchicine Oral Tablet 0.6 mg po daily  Aspirin 81 mg po daily    Labs reviewed at facility:     Laboratory Service Report [id number]   Patient Name   HARDEEP CHWO  Patient ID   [id number]  Age   89 Y  Gender   M  Order #      Ordering JOHANN Cervantes  Patient Telephone #   [phone]     1930  AKA      Client Order #   [id number]   Collection Date and Time   12/10/2024 07:51   Print  Date and Time   12/13/2024 11:22  Account Information   [facility]     Report Notes                  Test Results   Reference Perform  Unit  Value Site*             CBC and Differential  REPORTED 12/10/2024 10:00  White Blood Cell Count   9.53 k/uL 3.70-11.00    RBC L 3.92 m/uL 4.20-6.00    Hemoglobin L 10.8 g/dL 13.0-17.0    Hematocrit L 34.7 % 39.0-51.0    MCV   88.5 fL 80.0-100.0    MCH   27.6 pg 26.0-34.0    MCHC   31.1 g/dL 30.5-36.0    RDW-CV   14.7 % 11.5-15.0    Platelet Count   385 k/uL 150-400    MPV   10.2 fL 9.0-12.7    Neut%   58.2 %    Abs Neut   5.55 k/uL 1.45-7.50    Lymph%   29.1 %    Abs Lymph   2.77 k/uL 1.00-4.00    Mono%   8.2 %    Abs Mono   0.78 k/uL <0.87    Eosin%   2.8 %    Abs Eosin   0.27 k/uL <0.46    Baso%   0.4 %    Abs Baso   0.04 k/uL <0.11    Immature Gran %%   1.3 %    Abs Immature Gran H 0.12 k/uL <0.10    NRBC   0.0 /100 WBC    Absolute nRBC   <0.01 k/uL <0.01    Diff Type   Auto               Basic Metabolic Panl  REPORTED 12/10/2024 10:26  Glucose H 104 mg/dL 74-99    BUN   19 mg/dL 9-24  Creatinine   1.02 mg/dL 0.73-1.22    Sodium   141 mmol/L 136-144    Potassium L 3.6 mmol/L 3.7-5.1    Chloride   106 mmol/L     CO2   23 mmol/L 22-30    Anion Gap   12 mmol/L 8-15    Calcium, Total   8.7 mg/dL 8.5-10.2    Estimated Glomerular Filtration Rate   68 mL/min/1.73 meters squared >=60   Assessment/Plan   Problem List Items Addressed This Visit       BPH (benign prostatic hyperplasia)     Tamsulosin 0.4 mg po daily         Acute ischemic colitis (Multi)     Completed antibiotic course. Afebrile. BMP CBC with diff q Tuesday.         Urinary retention - Primary     Mcqueen catheter. Continue with Flomax. Consider voiding trial.          Impaired functional mobility, balance, and endurance     PT and OT to assess and treat.         Constipation     Psyllium Husk Powder one packet po bid, and Polyethylene Glycol 17 grams po daily            Time:  I spent 45 minutes or  greater with the patient. Greater than 50% of this time was spent in counseling and or coordination of care. The time includes prep time of reviewing vital signs, report from direct nursing staff and or therapists, hospital documentation, reviewing labs, radiographs, diagnostic tests and or consultations, time directly spent with the patient interviewing, examining, and education regarding diagnosis, treatments, and medications, as well as documentation in the electronic medical record, and reviewing the plan of care and any new orders with the patient, nursing staff and other staff directly related to the patients care.      Osiel Katz PA-C       Electronically Signed By: Osiel Katz PA-C   12/13/24 11:35 AM

## 2024-12-13 VITALS
SYSTOLIC BLOOD PRESSURE: 141 MMHG | HEART RATE: 74 BPM | OXYGEN SATURATION: 93 % | RESPIRATION RATE: 15 BRPM | BODY MASS INDEX: 24.44 KG/M2 | WEIGHT: 165 LBS | DIASTOLIC BLOOD PRESSURE: 91 MMHG | TEMPERATURE: 97.9 F | HEIGHT: 69 IN

## 2024-12-13 PROBLEM — Z74.09 IMPAIRED FUNCTIONAL MOBILITY, BALANCE, AND ENDURANCE: Status: ACTIVE | Noted: 2024-12-13

## 2024-12-13 PROBLEM — K59.00 CONSTIPATION: Status: ACTIVE | Noted: 2024-12-13

## 2024-12-13 PROBLEM — R33.9 URINARY RETENTION: Status: ACTIVE | Noted: 2024-12-13

## 2024-12-13 ASSESSMENT — ENCOUNTER SYMPTOMS
DYSURIA: 1
HEMATURIA: 0
CHILLS: 0

## 2024-12-13 NOTE — PROGRESS NOTES
"12/11/2024  Name: Elroy Greco  YOB: 1930    Chief complaint: Urinary retention. Colitis.     HPI: This is a 89 year old  male who has a medical history remarkable for hypertension, CKD3, moderate aortic Stenosis, BPH, constipation, CAD, diastolic dysfunction, depression, and syncope. Patient presented to the ER with complaint urinary retention and constipation. Patient had a continence stimulator implanted on 11-27-24 with Dr. Mckinney and l developed lower abdominal pain and was unable to urinate. Mcqueen catheter was placed in the ER and returned > 1000 ml of urine. A CT abdomen was completed which revealed a large stool burden and pericolonic inflammatory stranding concerning for colitis. Lab work showed WBC of 14.1, potassium of 2.9, with lactate of 2.9. Troponin negative. . He was started on Cefepime and Flagyl for concerns of colitis. He improved and was discharged to SNF for rehab with Mcqueen catheter. Antibiotics were changed to po Ciprofloxacin and Flagyl at discharge to SNF.     Difficulty Urinating   The current episode started 1 to 4 weeks ago. The problem has been gradually improving. The patient is experiencing no pain. There has been no fever. Pertinent negatives include no chills, discharge or hematuria. Treatments tried: Mcqueen catheer. The treatment provided significant relief. His past medical history is significant for catheterization.     Review of systems:   ROS negative except were noted in HPI.    Code Status: full code    BP (!) 141/91   Pulse 74   Temp 36.6 °C (97.9 °F)   Resp 15   Ht 1.753 m (5' 9\")   Wt 74.8 kg (165 lb)   SpO2 93%   BMI 24.37 kg/m²      Physical Exam  Constitutional:       General: He is not in acute distress.  HENT:      Head: Normocephalic.      Nose: Nose normal.      Mouth/Throat:      Mouth: Mucous membranes are moist.   Eyes:      Extraocular Movements: Extraocular movements intact.      Pupils: Pupils are equal, round, and " reactive to light.   Cardiovascular:      Rate and Rhythm: Normal rate and regular rhythm.   Pulmonary:      Effort: Pulmonary effort is normal.      Breath sounds: No wheezing or rales.   Abdominal:      General: Bowel sounds are normal. There is no distension.      Palpations: Abdomen is soft.      Tenderness: There is no abdominal tenderness. There is no guarding.   Genitourinary:     Comments: Mcqueen catheter.  Musculoskeletal:         General: Normal range of motion.      Cervical back: Normal range of motion.   Skin:     General: Skin is warm and dry.      Capillary Refill: Capillary refill takes less than 2 seconds.   Neurological:      General: No focal deficit present.      Mental Status: He is alert and oriented to person, place, and time.   Psychiatric:         Mood and Affect: Mood normal.         Behavior: Behavior normal.        Medications reviewed during visit at facility.  Polyethylene Glycol 17 grams po daily  Diphenhydramine HCl Oral Tablet 50 mg po daily prn  Tamsulosin 0.4 mg po daily  Tylenol 1000 mg po q 8 hours  Psyllium Husk Powder one packet po bid  Fish oil 1000 mg po daily  Carboxymethylcellulose Sod PF Ophthalmic Solution 0.5 % one drop both eyes qid  Vitamin B 12 500 mcg po daily  Atorvastatin 40 mg po daily  Collagen 1500/C Oral Capsule 500-50-0.8 mg one capsule daily  Amlodipine 2.5 mg po daily  Diclofenac Sodium External Gel 1 % to affected areas q 6 hours prn  Ocuvite Adult 50+ Oral Capsule two tablet po daily  Colchicine Oral Tablet 0.6 mg po daily  Aspirin 81 mg po daily    Labs reviewed at facility:     Laboratory Service Report [id number]   Patient Name   HARDEEP CHOW  Patient ID   [id number]  Age   89 Y  Gender   M  Order #      Ordering JOHANN Cervantes  Patient Telephone #   [phone]     1930  AKA      Client Order #   [id number]   Collection Date and Time   12/10/2024 07:51   Print Date and Time   2024 11:22  Account Information   [facility]  - [address]     Report Notes                  Test Results   Reference Perform  Unit  Value Site*             CBC and Differential  REPORTED 12/10/2024 10:00  White Blood Cell Count   9.53 k/uL 3.70-11.00    RBC L 3.92 m/uL 4.20-6.00    Hemoglobin L 10.8 g/dL 13.0-17.0    Hematocrit L 34.7 % 39.0-51.0    MCV   88.5 fL 80.0-100.0    MCH   27.6 pg 26.0-34.0    MCHC   31.1 g/dL 30.5-36.0    RDW-CV   14.7 % 11.5-15.0    Platelet Count   385 k/uL 150-400    MPV   10.2 fL 9.0-12.7    Neut%   58.2 %    Abs Neut   5.55 k/uL 1.45-7.50    Lymph%   29.1 %    Abs Lymph   2.77 k/uL 1.00-4.00    Mono%   8.2 %    Abs Mono   0.78 k/uL <0.87    Eosin%   2.8 %    Abs Eosin   0.27 k/uL <0.46    Baso%   0.4 %    Abs Baso   0.04 k/uL <0.11    Immature Gran %%   1.3 %    Abs Immature Gran H 0.12 k/uL <0.10    NRBC   0.0 /100 WBC    Absolute nRBC   <0.01 k/uL <0.01    Diff Type   Auto               Basic Metabolic Panl  REPORTED 12/10/2024 10:26  Glucose H 104 mg/dL 74-99    BUN   19 mg/dL 9-24  Creatinine   1.02 mg/dL 0.73-1.22    Sodium   141 mmol/L 136-144    Potassium L 3.6 mmol/L 3.7-5.1    Chloride   106 mmol/L     CO2   23 mmol/L 22-30    Anion Gap   12 mmol/L 8-15    Calcium, Total   8.7 mg/dL 8.5-10.2    Estimated Glomerular Filtration Rate   68 mL/min/1.73 meters squared >=60   Assessment/Plan    Problem List Items Addressed This Visit       BPH (benign prostatic hyperplasia)     Tamsulosin 0.4 mg po daily         Acute ischemic colitis (Multi)     Completed antibiotic course. Afebrile. BMP CBC with diff q Tuesday.         Urinary retention - Primary     Mcqueen catheter. Continue with Flomax. Consider voiding trial.          Impaired functional mobility, balance, and endurance     PT and OT to assess and treat.         Constipation     Psyllium Husk Powder one packet po bid, and Polyethylene Glycol 17 grams po daily            Time:  I spent 45 minutes or greater with the patient. Greater than 50% of this time was spent in  counseling and or coordination of care. The time includes prep time of reviewing vital signs, report from direct nursing staff and or therapists, hospital documentation, reviewing labs, radiographs, diagnostic tests and or consultations, time directly spent with the patient interviewing, examining, and education regarding diagnosis, treatments, and medications, as well as documentation in the electronic medical record, and reviewing the plan of care and any new orders with the patient, nursing staff and other staff directly related to the patients care.      Osiel Katz PA-C

## 2024-12-16 ENCOUNTER — PATIENT OUTREACH (OUTPATIENT)
Dept: PRIMARY CARE | Facility: CLINIC | Age: 89
End: 2024-12-16
Payer: MEDICARE

## 2024-12-16 NOTE — PROGRESS NOTES
Discharge Facility: [facility]   Discharge Diagnosis: Noninfective gastroenteritis and colitis, unspecified  Admission Date: 12-5-24  Discharge Date:  12-13-24    PCP Appointment Date: Message routed to office for scheduling     Specialist Appointment Date:  12-20-24 Urology, remove cath.   12-23-24-   Hospital Encounter and Summary Linked: Yes  See discharge assessment below for further details    Engagement  Call Start Time: 1027 (Call completed with Elroy) (12/16/2024 10:27 AM)    Medications  Medications reviewed with patient/caregiver?: Yes (12/16/2024 10:27 AM)  Is the patient having any side effects they believe may be caused by any medication additions or changes?: No (12/16/2024 10:27 AM)  Does the patient have all medications ordered at discharge?: Yes (12/16/2024 10:27 AM)  Care Management Interventions: No intervention needed (12/16/2024 10:27 AM)  Prescription Comments: Request of medication from [facility] (12/16/2024 10:27 AM)  Is the patient taking all medications as directed (includes completed medication regime)?: Yes (12/16/2024 10:27 AM)  Care Management Interventions: Provided patient education (12/16/2024 10:27 AM)  Medication Comments: Pt. denies medication needs- He has a good amoiut of all medicaitons. (12/16/2024 10:27 AM)    Appointments  Does the patient have a primary care provider?: Yes (12/16/2024 10:27 AM)  Care Management Interventions: Advised patient to make appointment (12/16/2024 10:27 AM)  Has the patient kept scheduled appointments due by today?: Not applicable (12/16/2024 10:27 AM)    Self Management  What is the home health agency?: NO Home Health Services (12/16/2024 10:27 AM)  Has home health visited the patient within 72 hours of discharge?: Not applicable (12/16/2024 10:27 AM)  What Durable Medical Equipment (DME) was ordered?: [employer] (12/16/2024 10:27 AM)    Patient Teaching  Does the patient have access to their discharge instructions?: Yes (12/16/2024 10:27  AM)  Care Management Interventions: Reviewed instructions with patient (12/16/2024 10:27 AM)  What is the patient's perception of their health status since discharge?: Improving (12/16/2024 10:27 AM)  Is the patient/caregiver able to teach back the hierarchy of who to call/visit for symptoms/problems? PCP, Specialist, Home Health nurse, Urgent Care, ED, 911: Yes (12/16/2024 10:27 AM)  Patient/Caregiver Education Comments: Pt. reports that he is doing well back at home. He has urology on 12-20-24 he is hopeful that his cath will be removed at this time. (12/16/2024 10:27 AM)

## 2024-12-17 NOTE — DOCUMENTATION CLARIFICATION NOTE
"    PATIENT:               HARDEEP CHOW  ACCT #:                  [id number]  MRN:                       [id number]  :                       1930  ADMIT DATE:       2024 9:01 PM  DISCH DATE:        2024 2:42 PM  RESPONDING PROVIDER #:        [id number]          PROVIDER RESPONSE TEXT:    I agree with dietician diagnosis of severe malnutrition on 12/3/24.    CDI QUERY TEXT:    Clarification    Instruction:    Based on your assessment of the patient and the clinical information, please provide the requested documentation by clicking on the appropriate radio button and enter any additional information if prompted.    Question: Please further clarify this patient nutritional status as    When answering this query, please exercise your independent professional judgment. The fact that a question is being asked, does not imply that any particular answer is desired or expected.    The patient's clinical indicators include:  Clinical Information: 89 y/o male presented with abdominal pain, n/v, urinary retention, constipation. Patient with decreased appetite, poor intake.    Clinical Indicators: 12/3/24 Dietician Consult: \"Severe malnutrition related to chronic disease or condition  As Evidenced by: mild to moderate subcutaneous fat loss and moderate muscle wasting present\"    BMI 26.63    Treatment: Ensure TID, daily weights    Risk Factors: age, constipation  Options provided:  -- I agree with dietician diagnosis of severe malnutrition on 12/3/24.  -- Other - I will add my own diagnosis  -- Refer to Clinical Documentation Reviewer    Query created by: Ivania May on 2024 7:39 AM      Electronically signed by:  RALPH POSADAS MD 2024 11:37 AM          "

## 2024-12-17 NOTE — DOCUMENTATION CLARIFICATION NOTE
"    PATIENT:               HARDEEP CHOW  ACCT #:                  [id number]  MRN:                       [id number]  :                       1930  ADMIT DATE:       2024 9:01 PM  DISCH DATE:        2024 2:42 PM  RESPONDING PROVIDER #:        [id number]          PROVIDER RESPONSE TEXT:    Stercoral colitis only    CDI QUERY TEXT:    Clarification    Instruction:    Based on your assessment of the patient and the clinical information, please provide the requested documentation by clicking on the appropriate radio button and enter any additional information if prompted.    Question: Based on your medical judgment, can you please clarify which of these conditions is the most clinically supported    When answering this query, please exercise your independent professional judgment. The fact that a question is being asked, does not imply that any particular answer is desired or expected.    The patient's clinical indicators include:  Clinical Information: There is conflicting documentation in the medical record which requires clarification.    -The diagnosis of \"suspected stercoral colitis\" was documented on 24 by Scout Chaney MD    -The diagnosis of \"acute ischemic colitis\" was documented on 24 by Lawrence Monroe MD    Clinical Indicators: 89 y/o male presented with abdominal pain, n/v, urinary retention, constipation.    24 GI consult:  ?A) Constipation  B) Left colonic wall thickening on CT scan  Likely stercoral colitis. Other D/d include ischemic vs. Infective colitis  -Stool pathogen panel, C. difficile if patient has loose stools or diarrhea  -Considering patient's age, and other comorbidities, I recommend noninvasive management. I will hold off on any endoscopic intervention at this point?    24 IM Progress note: ?Constipation.  Suspected stercoral colitis in the setting of above?    DISCHARGE DIAGNOSES:  Principal Problem:  \"Acute ischemic enteritis (Multi) Active " "Problems:  Acute ischemic colitis (Multi)  DS- Assessment/Plan  1. Acute urinary retention/hx BPH  2. Constipation  3. Suspected stercoral colitis in setting of above constipation\"    Treatment: Cefepime 1 g IV q 12 hrs 12/1 - 12/5; Flagyl 500  mg IV q 8 hrs 12/1 - 12/4; Flagyl 500 mg po q 8 hrs 12/4 - 12/5; Miralx po BID 12/2 - 12/5    Risk Factors: age, constipation  Options provided:  -- Stercoral colitis only  -- Acute ischemic colitis only  -- Both stercoral colitis and acute ischemic colitis were present  -- Other - I will add my own diagnosis  -- Refer to Clinical Documentation Reviewer    Query created by: Ivania May on 12/11/2024 7:50 AM      Electronically signed by:  RALPH POSADAS MD 12/17/2024 11:37 AM          "

## 2024-12-18 ENCOUNTER — OFFICE VISIT (OUTPATIENT)
Dept: CARDIOLOGY | Facility: CLINIC | Age: 89
End: 2024-12-18
Payer: MEDICARE

## 2024-12-18 VITALS
DIASTOLIC BLOOD PRESSURE: 67 MMHG | BODY MASS INDEX: 26.06 KG/M2 | SYSTOLIC BLOOD PRESSURE: 102 MMHG | WEIGHT: 166 LBS | OXYGEN SATURATION: 98 % | HEIGHT: 67 IN

## 2024-12-18 DIAGNOSIS — E78.5 DYSLIPIDEMIA: ICD-10-CM

## 2024-12-18 DIAGNOSIS — I25.10 CORONARY ARTERY DISEASE INVOLVING NATIVE HEART WITHOUT ANGINA PECTORIS, UNSPECIFIED VESSEL OR LESION TYPE: Primary | ICD-10-CM

## 2024-12-18 DIAGNOSIS — I35.0 AORTIC STENOSIS, MILD: ICD-10-CM

## 2024-12-18 DIAGNOSIS — I10 PRIMARY HYPERTENSION: ICD-10-CM

## 2024-12-18 PROCEDURE — 1111F DSCHRG MED/CURRENT MED MERGE: CPT | Performed by: NURSE PRACTITIONER

## 2024-12-18 PROCEDURE — 1159F MED LIST DOCD IN RCRD: CPT | Performed by: NURSE PRACTITIONER

## 2024-12-18 PROCEDURE — 3074F SYST BP LT 130 MM HG: CPT | Performed by: NURSE PRACTITIONER

## 2024-12-18 PROCEDURE — 3078F DIAST BP <80 MM HG: CPT | Performed by: NURSE PRACTITIONER

## 2024-12-18 PROCEDURE — 1036F TOBACCO NON-USER: CPT | Performed by: NURSE PRACTITIONER

## 2024-12-18 PROCEDURE — 1126F AMNT PAIN NOTED NONE PRSNT: CPT | Performed by: NURSE PRACTITIONER

## 2024-12-18 PROCEDURE — 1123F ACP DISCUSS/DSCN MKR DOCD: CPT | Performed by: NURSE PRACTITIONER

## 2024-12-18 PROCEDURE — 99214 OFFICE O/P EST MOD 30 MIN: CPT | Performed by: NURSE PRACTITIONER

## 2024-12-18 PROCEDURE — 1157F ADVNC CARE PLAN IN RCRD: CPT | Performed by: NURSE PRACTITIONER

## 2024-12-18 PROCEDURE — 1160F RVW MEDS BY RX/DR IN RCRD: CPT | Performed by: NURSE PRACTITIONER

## 2024-12-18 ASSESSMENT — PAIN SCALES - GENERAL: PAINLEVEL_OUTOF10: 0-NO PAIN

## 2024-12-18 NOTE — PROGRESS NOTES
Subjective   Elroy Greco is a 89 y.o. male.    Chief Complaint:  Hypertension, Hyperlipidemia, and Coronary Artery Disease    Mr. Greco returns for a follow up. He unfortunately has been struggling with increased fatigue, shortness of breath and dizziness. He was recently discharged from the hospital for urinary retention, constipation and colitis. He had a continence stimulator implanted on 11/27/24 with Dr. Mckinney, and presented with abdominal pain, nausea and vomiting. He was treated with antibiotics for colitis and had a romo catheter placed which he still has. He is hoping to have the romo catheter removed this week. He was in a SNF for a short time, though is back home now. He is trying to stay active with walking at home, though again feels more fatigued, short of breath and dizzy. He has remained compliant with his medications. He offers no other cardiovascular complaints or concerns today. He denies any complaints of chest pain, palpitations, syncope, orthopnea, paroxysmal nocturnal dyspnea, lower extremity swelling or bleeding concerns.          Review of Systems   All other systems reviewed and are negative.      Objective   Physical Exam  Constitutional:       Appearance: Healthy appearance. In no distress  Pulmonary:      Effort: Pulmonary effort is normal.      Breath sounds: Normal breath sounds.   Cardiovascular:      Normal rate. Regular rhythm. Normal S1. Normal S2.       Murmurs: There is 2/6 systolic murmur.      Carotids: right carotid pulse +2, no bruit heard over the right carotid. left carotid pulse +2, no bruit heard over the left carotid.  Edema:     Peripheral edema absent.   Abdominal:      Palpations: Abdomen is soft.   Musculoskeletal:       Cervical back: Normal range of motion.   Skin:     General: Skin is warm and dry. Normal color and pigmentation   Neurological:      Mental Status: Alert and oriented to person, place and time.   Psychiatric:     Mood and Affect:  appropriate mood and appropriate affect.       Lab Review:   Lab Results   Component Value Date     12/02/2024    K 3.8 12/02/2024     (H) 12/02/2024    CO2 24 12/02/2024    BUN 26 (H) 12/02/2024    CREATININE 1.16 12/02/2024    GLUCOSE 122 (H) 12/02/2024    CALCIUM 7.7 (L) 12/02/2024     Lab Results   Component Value Date    WBC 10.2 12/04/2024    HGB 10.1 (L) 12/04/2024    HCT 31.6 (L) 12/04/2024    MCV 88 12/04/2024     12/04/2024     Lab Results   Component Value Date    CHOL 147 11/20/2023    TRIG 105 11/20/2023    HDL 56.4 11/20/2023       Assessment/Plan   Mr. Greco is a very pleasant 89 year old  male with a past medical history significant for hypertension, CKD, anemia, severe 2 vessel CAD by coronary CTA in 2018, hyperlipidemia and spinal stenosis. Echocardiogram 4/2024 showed an EF of 65% with low normal RV systolic function and mild-moderate AS, stable and unchanged from prior studies. He presents today for routine follow up and unfortunately is struggling a little. Given his low BP, I will have him stop amlodipine. I will also have him schedule an echocardiogram to reassess his LV function and aortic stenosis. I will call him with his results once available and make further recommendations at that time. He will follow up with me in clinic in one month. He knows to call for any concerns.

## 2024-12-18 NOTE — PATIENT INSTRUCTIONS
Schedule an echocardiogram    Stop amlodipine     Follow up in one month     Call for any concerns

## 2024-12-20 ENCOUNTER — OFFICE VISIT (OUTPATIENT)
Dept: UROLOGY | Facility: HOSPITAL | Age: 89
End: 2024-12-20
Payer: MEDICARE

## 2024-12-20 DIAGNOSIS — R39.15 URGENCY OF URINATION: ICD-10-CM

## 2024-12-20 DIAGNOSIS — R33.9 URINARY RETENTION: Primary | ICD-10-CM

## 2024-12-20 PROCEDURE — 1123F ACP DISCUSS/DSCN MKR DOCD: CPT | Performed by: UROLOGY

## 2024-12-20 PROCEDURE — 99214 OFFICE O/P EST MOD 30 MIN: CPT | Performed by: UROLOGY

## 2024-12-20 PROCEDURE — G2211 COMPLEX E/M VISIT ADD ON: HCPCS | Performed by: UROLOGY

## 2024-12-20 PROCEDURE — 1111F DSCHRG MED/CURRENT MED MERGE: CPT | Performed by: UROLOGY

## 2024-12-20 PROCEDURE — 1157F ADVNC CARE PLAN IN RCRD: CPT | Performed by: UROLOGY

## 2024-12-20 NOTE — PROGRESS NOTES
FUV     HISTORY OF PRESENT ILLNESS:   Elroy Greco is a 89 y.o. male who is being seen today for fuv    Dr Wilkerson has done HOLEP and botox. Has been taking myrbetriq.  Has tried anti-cholinergics in past.  Has terrible urge, UI, frequency. Gets up 3-4x    Pt axonics PNE in office on 11/8/24 - Doing well.  Urge and UI much improved.  Daytime and night time frequency improved    Pt had Stage 1/2 in OR on 11/27/24    Pt admitted to [facility] on 11/30[facility]12/5 with urinary retention and bowel issues.  Discharged to rehab with romo    12/20/24 - Been feeling better.  Romo removed today    PAST MEDICAL HISTORY:  Past Medical History:   Diagnosis Date    Aortic stenosis     CAD (coronary artery disease)     Cardiology follow-up encounter 03/27/2024    JOHNNY Hernandez    CKD (chronic kidney disease)     Gout     H/O cardiovascular stress test 04/04/2016    H/O echocardiogram 04/05/2024    Hyperlipidemia     Hypertension     Nocturia 03/01/2017    Nocturia    Paget's disease of bone     Preoperative clearance     JOHNNY Hernandez 3/27/24   Should his echocardiogram remain stable, he will be cleared for his urologic procedure. He will follow up with us in clinic in one year. He knows to call for any concerns.    Spinal stenosis     Urinary incontinence, urge     Plan: Stage 1 & 2 Insertion Continence Control Stimulator Lead & Generator 11/27/24       PAST SURGICAL HISTORY:  Past Surgical History:   Procedure Laterality Date    CT ANGIO CORONARY ART WITH HEARTFLOW IF SCORE >30%  11/12/2018    CT HEART CORONARY ANGIOGRAM 11/12/2018 [facility] ANCILLARY LEGACY    HERNIA REPAIR  07/18/2012    Hernia Repair    OTHER SURGICAL HISTORY  07/18/2012    Nerve Block Transforaminal Epidural    OTHER SURGICAL HISTORY  07/18/2012    Nerve Ablation    OTHER SURGICAL HISTORY      shoulder replacement    OTHER SURGICAL HISTORY      blockage in abdomen-part of intenstine removed    TOTAL KNEE ARTHROPLASTY  07/18/2012    Knee Replacement     TOTAL KNEE ARTHROPLASTY  07/18/2012    Knee Replacement        ALLERGIES:   Allergies   Allergen Reactions    Finasteride Rash    Mirabegron Constipation    Penicillins Rash        MEDICATIONS:   Current Outpatient Medications   Medication Instructions    acetaminophen (TYLENOL) 1,000 mg, Every 6 hours PRN    atorvastatin (LIPITOR) 40 mg, oral, Nightly    carboxymethylcellulose (Refresh Plus) 0.5 % ophthalmic solution 1 drop, 4 times daily    colchicine 0.6 mg tablet 1 tablet, Daily PRN    docusate sodium (STOOL SOFTENER ORAL) 2 tablets, 2 times daily    mirabegron (MYRBETRIQ) 25 mg, oral, Daily    psyllium (Metamucil) powder 1 Dose, 2 times daily    tamsulosin (FLOMAX) 0.4 mg, oral, Daily        PHYSICAL EXAM:  There were no vitals taken for this visit.  Constitutional: Patient appears well-developed and well-nourished. No distress.    Pulmonary/Chest: Effort normal. No respiratory distress.   Musculoskeletal: Normal range of motion.    Neurological: Alert and oriented to person, place, and time.  Psychiatric: Normal mood and affect. Behavior is normal. Thought content normal.    Back - incision well healed, IPG seated nicely    Labs  Lab Results   Component Value Date    GFRMALE 53 (A) 07/18/2023     Lab Results   Component Value Date    CREATININE 1.16 12/02/2024     Lab Results   Component Value Date    CHOL 147 11/20/2023     Lab Results   Component Value Date    HDL 56.4 11/20/2023     Lab Results   Component Value Date    CHHDL 2.6 11/20/2023     Lab Results   Component Value Date    LDLF 77 11/18/2022     Lab Results   Component Value Date    VLDL 21 11/20/2023     Lab Results   Component Value Date    TRIG 105 11/20/2023     Lab Results   Component Value Date    HGBA1C 5.9 (H) 06/21/2024     Lab Results   Component Value Date    HCT 31.6 (L) 12/04/2024     PVR 5cc      Assessment:      1. Urgency of urination        2. Urinary retention            Elroy Greco is a 89 y.o. male here for fuv     Plan:    Reprogrammed today  Passed TOV    Conservative measures    Fu in 6m    Lower Urinary Tract Symptoms (LUTS)    I educated the patient on relevant lower urinary tract anatomy and physiology with emphasis on differential diagnosis as well as contributing factors to the patient's lower urinary tract symptoms. I educated the patient on dietary and behavioral modifications pertinent to the patient's complaints. I recommended to avoid caffeine, alcohol, spicy and acidic oral intake and to regulate fluid intake and voiding (timed voiding, double voiding). I stressed the importance of avoiding constipation and recommended stool softeners unless diarrhea present. We discussed limiting fluid intake at night and elevating legs prior to bedtime.     The mechanism of action as well as the risks, benefits, common side effects, and alternatives to all prescribed medications were discussed with the patient at length. The patient had the opportunity to ask questions and all questions were answered. I primarily discussed alpha blockers, 5ARIs, PDE5i, anticholinergics, and beta 3 agonist (mirabegron).  I explained that alpha blockers help decrease bladder outlet resistance with potential side effects to include retrograde ejaculation, rhinitis, and light headedness. I explained that 5 alpha reductase inhibitors can shrink the prostate up to 30%, can artificially decrease their PSA value by 50%, but take approximately 6-9 months to reach full efficacy and have potential side effects to include decreased libido, impotence and breast tenderness.

## 2024-12-23 ENCOUNTER — OFFICE VISIT (OUTPATIENT)
Dept: PULMONOLOGY | Facility: CLINIC | Age: 89
End: 2024-12-23
Payer: MEDICARE

## 2024-12-23 VITALS
OXYGEN SATURATION: 93 % | SYSTOLIC BLOOD PRESSURE: 125 MMHG | BODY MASS INDEX: 26 KG/M2 | TEMPERATURE: 97.3 F | RESPIRATION RATE: 16 BRPM | HEART RATE: 84 BPM | WEIGHT: 166 LBS | DIASTOLIC BLOOD PRESSURE: 82 MMHG

## 2024-12-23 DIAGNOSIS — R06.09 DOE (DYSPNEA ON EXERTION): Primary | ICD-10-CM

## 2024-12-23 DIAGNOSIS — J84.10 PULMONARY FIBROSIS (MULTI): ICD-10-CM

## 2024-12-23 PROCEDURE — 1036F TOBACCO NON-USER: CPT | Performed by: INTERNAL MEDICINE

## 2024-12-23 PROCEDURE — 99215 OFFICE O/P EST HI 40 MIN: CPT | Performed by: INTERNAL MEDICINE

## 2024-12-23 PROCEDURE — 1123F ACP DISCUSS/DSCN MKR DOCD: CPT | Performed by: INTERNAL MEDICINE

## 2024-12-23 PROCEDURE — 1160F RVW MEDS BY RX/DR IN RCRD: CPT | Performed by: INTERNAL MEDICINE

## 2024-12-23 PROCEDURE — 3074F SYST BP LT 130 MM HG: CPT | Performed by: INTERNAL MEDICINE

## 2024-12-23 PROCEDURE — 1159F MED LIST DOCD IN RCRD: CPT | Performed by: INTERNAL MEDICINE

## 2024-12-23 PROCEDURE — 99205 OFFICE O/P NEW HI 60 MIN: CPT | Performed by: INTERNAL MEDICINE

## 2024-12-23 PROCEDURE — 3079F DIAST BP 80-89 MM HG: CPT | Performed by: INTERNAL MEDICINE

## 2024-12-23 PROCEDURE — 1111F DSCHRG MED/CURRENT MED MERGE: CPT | Performed by: INTERNAL MEDICINE

## 2024-12-23 PROCEDURE — 1157F ADVNC CARE PLAN IN RCRD: CPT | Performed by: INTERNAL MEDICINE

## 2024-12-23 RX ORDER — ALBUTEROL SULFATE 0.83 MG/ML
3 SOLUTION RESPIRATORY (INHALATION) ONCE
OUTPATIENT
Start: 2024-12-23 | End: 2024-12-23

## 2024-12-23 RX ORDER — ALBUTEROL SULFATE 90 UG/1
1 INHALANT RESPIRATORY (INHALATION) ONCE
OUTPATIENT
Start: 2024-12-23

## 2024-12-23 ASSESSMENT — ENCOUNTER SYMPTOMS
FATIGUE: 0
HEADACHES: 0
FEVER: 0
CONSTIPATION: 0
FREQUENCY: 0
COUGH: 1
BRUISES/BLEEDS EASILY: 0
HEMATURIA: 0
WEAKNESS: 0
ARTHRALGIAS: 0
DIFFICULTY URINATING: 0
ABDOMINAL DISTENTION: 0
CHOKING: 0
SPEECH DIFFICULTY: 0
WHEEZING: 0
TREMORS: 0
PALPITATIONS: 0
SLEEP DISTURBANCE: 0
UNEXPECTED WEIGHT CHANGE: 0
AGITATION: 0
SINUS PAIN: 0
APNEA: 0
LIGHT-HEADEDNESS: 1
ADENOPATHY: 0
DYSURIA: 0
FACIAL SWELLING: 0
NAUSEA: 0
EYE DISCHARGE: 0
NERVOUS/ANXIOUS: 0
NUMBNESS: 0
SHORTNESS OF BREATH: 1
STRIDOR: 0
JOINT SWELLING: 0
EYE REDNESS: 0
ABDOMINAL PAIN: 1
SINUS PRESSURE: 0
RHINORRHEA: 0

## 2024-12-23 NOTE — LETTER
December 23, 2024     Trice Grayson MD  [address]  [facility], [address]  [facility] [address]    Patient: Elroy Greco   YOB: 1930   Date of Visit: 12/23/2024       Dear Dr. Trice Grayson MD:    Thank you for referring Elroy Greco to me for evaluation. Below are my notes for this consultation.  If you have questions, please do not hesitate to call me. I look forward to following your patient along with you.       Sincerely,     Wan Almeida MD MPH      CC: No Recipients  ______________________________________________________________________________________    @PULMONARY CONSULTATION@         Reason for Consult:  lung nodularity and SANCHEZ    ASSESSMENT:   The patient is a 89-year-old with a history of hypertension, chronic kidney disease, anemia, severe two-vessel coronary disease by coronary catheterization in 2018, hyperlipidemia and spinal stenosis.  He does have mild to moderate aortic stenosis.  He has had pulmonary nodularity and is a never smoker.  In addition, he has been complaining of some shortness of breath which may be multifactorial related to age and deconditioning and potentially his aortic stenosis; a repeat echocardiogram has been ordered by cardiology.  In addition, a question of interstitial lung disease has been raised.  For completeness because of his complaints of dyspnea on exertion he should have complete pulmonary function tests and a 6-minute walk.      PLAN:   I am going to order complete pulmonary function tests and a 6-minute walk along with an HRCT scan of the chest.      HISTORY OF PRESENT ILLNESS:       The patient is a 89-year-old with urinary incontinence and colitis who was admitted from November 30, 2024 to December 5, 2024 with ischemic colitis.  She has a history of chronic kidney disease and hypertension anemia and gout, Paget's disease and physical deconditioning.  He also has two-vessel coronary artery disease with ejection fraction  around 65%.  He has mild to moderate aortic stenosis    A CT angiogram from December 1, 2024 revealed the following  No acute pulmonary embolus to the segmental level      Cardiomegaly with thickened interlobular septal markings compatible  with pulmonary interstitial edema.      The bladder is decompressed with a Mcqueen catheter, however, there is  mild bladder wall thickening for which cystitis is not excluded.  Recommend correlation with urinalysis as clinically warranted.      Large colonic stool burden. Pericolonic inflammatory stranding  extending from the splenic flexure through the rectum is most  compatible with colitis.    The echocardiogram from April 5, 2024 revealed the following   1. Left ventricular systolic function is normal with a 65% estimated ejection fraction.   2. Spectral Doppler shows an impaired relaxation pattern of left ventricular diastolic filling.   3. There is low normal right ventricular systolic function.   4. Mild to moderate aortic valve stenosis.   5. There is aortic valve annular calcification.   6. There is moderate aortic valve cusp calcification.   7. Compared with study from 4/21/2023, no significant change.    Is noted that he was seen by pulmonary at [facility] on January 28, 2022   postoperatively for coughing felt to have mild airway reactivity.  He had a small bowel resection at that time.  He was noted to have some atelectasis on the CT scan.  He was treated with DuoNeb and was said to be a never smoker.    [facility] CT scan from June 7, 2022 revealed the following  1.  Resolved pleural effusions and improved chest expansion and aeration.    No new acute inflammatory findings.   2.  Few unchanged small solid pulmonary nodules in addition to small   chronic postinflammatory calcified nodules.       Currently, the patient reports that he was hospitalized for gastrointestinal issues with what was felt to be ischemic colitis at [facility];  thereafter went to assisted living.  He lost around  5 pounds but is doing better.  He does have a a slight chronic cough but nothing major.  He has spinal stenosis and urinary retention issues treated with Botox having been injected into his bladder as well as having had a urinary tract stimulator insertion.  He has been followed by urology.  He has been noted to have nodularity in his lungs in the past followed at the [facility].  He has no history of asthma or COPD and is a never smoker.  He had no significant secondhand smoke exposure over the years.  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  He has slight seasonal allergies at times.  He has noted some shortness of breath over the last 5 or 6 months at times.  He states that if he walks around 40 feet he gets some dyspnea.  He has no chest pains or pressures at the current time.  He has no definite ischemic heart disease.  He has been said to have mild to moderate aortic stenosis.        Allergies   Allergen Reactions   • Finasteride Rash   • Mirabegron Constipation   • Penicillins Rash        PAST MEDICAL HISTORY:   urinary incontinence and colitis who was admitted from November 30, 2024 to December 5, 2024 with ischemic colitis.  She has a history of chronic kidney disease and hypertension anemia and gout, Paget's disease and physical deconditioning.  He also has two-vessel coronary artery disease with ejection fraction around 65%.  He has mild to moderate aortic stenosis    Current Outpatient Medications:   •  acetaminophen (Tylenol) 500 mg tablet, Take 2 tablets (1,000 mg) by mouth every 6 hours if needed for mild pain (1 - 3)., Disp: , Rfl:   •  atorvastatin (Lipitor) 40 mg tablet, Take 1 tablet (40 mg) by mouth once daily at bedtime., Disp: 90 tablet, Rfl: 3  •  carboxymethylcellulose (Refresh Plus) 0.5 % ophthalmic solution, Administer 1 drop into both eyes 4 times a day., Disp: , Rfl:   •  colchicine 0.6 mg tablet, Take 1 tablet (0.6 mg) by mouth once daily  as needed., Disp: , Rfl:   •  psyllium (Metamucil) powder, Take 1 Dose (5.8 g) by mouth 2 times a day., Disp: , Rfl:   •  tamsulosin (Flomax) 0.4 mg 24 hr capsule, Take 1 capsule (0.4 mg) by mouth once daily., Disp: , Rfl:   •  docusate sodium (STOOL SOFTENER ORAL), Take 2 tablets by mouth 2 times a day., Disp: , Rfl:      FAMILY HISTORY:   Noncontributory  SOCIAL HISTORY:  Never smoker without any significant secondhand smoke exposure.  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  He traveled all around the world.  He never had any occupational environmental exposures.    Review of Systems   Constitutional:  Negative for fatigue, fever and unexpected weight change.   HENT:  Negative for congestion, facial swelling, nosebleeds, postnasal drip, rhinorrhea, sinus pressure and sinus pain.    Eyes:  Negative for discharge, redness and visual disturbance.   Respiratory:  Positive for cough and shortness of breath. Negative for apnea, choking, wheezing and stridor.    Cardiovascular:  Negative for chest pain, palpitations and leg swelling.   Gastrointestinal:  Positive for abdominal pain. Negative for abdominal distention, constipation and nausea.   Endocrine: Negative for cold intolerance and heat intolerance.   Genitourinary:  Negative for difficulty urinating, dysuria, frequency and hematuria.   Musculoskeletal:  Negative for arthralgias, gait problem and joint swelling.   Allergic/Immunologic: Negative for environmental allergies, food allergies and immunocompromised state.   Neurological:  Positive for light-headedness. Negative for tremors, syncope, speech difficulty, weakness, numbness and headaches.   Hematological:  Negative for adenopathy. Does not bruise/bleed easily.   Psychiatric/Behavioral:  Negative for agitation, behavioral problems and sleep disturbance. The patient is not nervous/anxious.         Vitals:    12/23/24 0900   BP: 125/82   Pulse: 84   Resp: 16   Temp: 36.3  °C (97.3 °F)   SpO2: 93%        Physical Exam  Vitals reviewed.   Constitutional:       Appearance: Normal appearance.   HENT:      Head: Normocephalic and atraumatic.   Eyes:      Extraocular Movements: Extraocular movements intact.   Cardiovascular:      Rate and Rhythm: Normal rate and regular rhythm.      Heart sounds: No murmur heard.     No friction rub. No gallop.   Pulmonary:      Effort: Pulmonary effort is normal. No respiratory distress.      Breath sounds: Normal breath sounds. No stridor. No wheezing, rhonchi or rales.   Chest:      Chest wall: No tenderness.   Abdominal:      General: Abdomen is flat. There is no distension.      Palpations: Abdomen is soft. There is no mass.      Tenderness: There is no abdominal tenderness.   Musculoskeletal:         General: Normal range of motion.      Cervical back: Normal range of motion.      Right lower leg: No edema.      Left lower leg: No edema.   Skin:     General: Skin is warm and dry.   Neurological:      Mental Status: He is alert and oriented to person, place, and time.   Psychiatric:         Mood and Affect: Mood normal.         Behavior: Behavior normal.

## 2024-12-23 NOTE — PATIENT INSTRUCTIONS
I am going to obtain an HRCT scan of the chest by calling     Also, will obtain complete pulmonary functions and a 6-minute walk.  These test can be scheduled by calling

## 2024-12-23 NOTE — PROGRESS NOTES
@PULMONARY CONSULTATION@         Reason for Consult:  lung nodularity and SANCHEZ    ASSESSMENT:   The patient is a 89-year-old with a history of hypertension, chronic kidney disease, anemia, severe two-vessel coronary disease by coronary catheterization in 2018, hyperlipidemia and spinal stenosis.  He does have mild to moderate aortic stenosis.  He has had pulmonary nodularity and is a never smoker.  In addition, he has been complaining of some shortness of breath which may be multifactorial related to age and deconditioning and potentially his aortic stenosis; a repeat echocardiogram has been ordered by cardiology.  In addition, a question of interstitial lung disease has been raised.  For completeness because of his complaints of dyspnea on exertion he should have complete pulmonary function tests and a 6-minute walk.      PLAN:   I am going to order complete pulmonary function tests and a 6-minute walk along with an HRCT scan of the chest.      HISTORY OF PRESENT ILLNESS:       The patient is a 89-year-old with urinary incontinence and colitis who was admitted from November 30, 2024 to December 5, 2024 with ischemic colitis.  She has a history of chronic kidney disease and hypertension anemia and gout, Paget's disease and physical deconditioning.  He also has two-vessel coronary artery disease with ejection fraction around 65%.  He has mild to moderate aortic stenosis    A CT angiogram from December 1, 2024 revealed the following  No acute pulmonary embolus to the segmental level      Cardiomegaly with thickened interlobular septal markings compatible  with pulmonary interstitial edema.      The bladder is decompressed with a Mcqueen catheter, however, there is  mild bladder wall thickening for which cystitis is not excluded.  Recommend correlation with urinalysis as clinically warranted.      Large colonic stool burden. Pericolonic inflammatory stranding  extending from the splenic flexure through the rectum is  most  compatible with colitis.    The echocardiogram from April 5, 2024 revealed the following   1. Left ventricular systolic function is normal with a 65% estimated ejection fraction.   2. Spectral Doppler shows an impaired relaxation pattern of left ventricular diastolic filling.   3. There is low normal right ventricular systolic function.   4. Mild to moderate aortic valve stenosis.   5. There is aortic valve annular calcification.   6. There is moderate aortic valve cusp calcification.   7. Compared with study from 4/21/2023, no significant change.    Is noted that [facility]   postoperatively for coughing felt to have mild airway reactivity.  He had a small bowel resection at that time.  He was noted to have some atelectasis on the CT scan.  He was treated with DuoNeb and was said to be a never smoker.    [facility] CT scan from June 7, 2022 revealed the following  1.  Resolved pleural effusions and improved chest expansion and aeration.    No new acute inflammatory findings.   2.  Few unchanged small solid pulmonary nodules in addition to small   chronic postinflammatory calcified nodules.       Currently, the patient reports that he was hospitalized for gastrointestinal issues with what was felt to be ischemic colitis at [facility];  thereafter went to assisted living.  He lost around 5 pounds but is doing better.  He does have a a slight chronic cough but nothing major.  He has spinal stenosis and urinary retention issues treated with Botox having been injected into his bladder as well as having had a urinary tract stimulator insertion.  He has been followed by urology.  He has been noted to have nodularity in his lungs in the past followed at the [facility].  He has no history of asthma or COPD and is a never smoker.  He had no significant secondhand smoke exposure over the years.  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  He has  slight seasonal allergies at times.  He has noted some shortness of breath over the last 5 or 6 months at times.  He states that if he walks around 40 feet he gets some dyspnea.  He has no chest pains or pressures at the current time.  He has no definite ischemic heart disease.  He has been said to have mild to moderate aortic stenosis.        Allergies   Allergen Reactions    Finasteride Rash    Mirabegron Constipation    Penicillins Rash        PAST MEDICAL HISTORY:   urinary incontinence and colitis who was admitted from November 30, 2024 to December 5, 2024 with ischemic colitis.  She has a history of chronic kidney disease and hypertension anemia and gout, Paget's disease and physical deconditioning.  He also has two-vessel coronary artery disease with ejection fraction around 65%.  He has mild to moderate aortic stenosis    Current Outpatient Medications:     acetaminophen (Tylenol) 500 mg tablet, Take 2 tablets (1,000 mg) by mouth every 6 hours if needed for mild pain (1 - 3)., Disp: , Rfl:     atorvastatin (Lipitor) 40 mg tablet, Take 1 tablet (40 mg) by mouth once daily at bedtime., Disp: 90 tablet, Rfl: 3    carboxymethylcellulose (Refresh Plus) 0.5 % ophthalmic solution, Administer 1 drop into both eyes 4 times a day., Disp: , Rfl:     colchicine 0.6 mg tablet, Take 1 tablet (0.6 mg) by mouth once daily as needed., Disp: , Rfl:     psyllium (Metamucil) powder, Take 1 Dose (5.8 g) by mouth 2 times a day., Disp: , Rfl:     tamsulosin (Flomax) 0.4 mg 24 hr capsule, Take 1 capsule (0.4 mg) by mouth once daily., Disp: , Rfl:     docusate sodium (STOOL SOFTENER ORAL), Take 2 tablets by mouth 2 times a day., Disp: , Rfl:      FAMILY HISTORY:   Noncontributory  SOCIAL HISTORY:  Never smoker without any significant secondhand smoke exposure.  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  He traveled all around the world.  He never had any occupational environmental  exposures.    Review of Systems   Constitutional:  Negative for fatigue, fever and unexpected weight change.   HENT:  Negative for congestion, facial swelling, nosebleeds, postnasal drip, rhinorrhea, sinus pressure and sinus pain.    Eyes:  Negative for discharge, redness and visual disturbance.   Respiratory:  Positive for cough and shortness of breath. Negative for apnea, choking, wheezing and stridor.    Cardiovascular:  Negative for chest pain, palpitations and leg swelling.   Gastrointestinal:  Positive for abdominal pain. Negative for abdominal distention, constipation and nausea.   Endocrine: Negative for cold intolerance and heat intolerance.   Genitourinary:  Negative for difficulty urinating, dysuria, frequency and hematuria.   Musculoskeletal:  Negative for arthralgias, gait problem and joint swelling.   Allergic/Immunologic: Negative for environmental allergies, food allergies and immunocompromised state.   Neurological:  Positive for light-headedness. Negative for tremors, syncope, speech difficulty, weakness, numbness and headaches.   Hematological:  Negative for adenopathy. Does not bruise/bleed easily.   Psychiatric/Behavioral:  Negative for agitation, behavioral problems and sleep disturbance. The patient is not nervous/anxious.         Vitals:    12/23/24 0900   BP: 125/82   Pulse: 84   Resp: 16   Temp: 36.3 °C (97.3 °F)   SpO2: 93%        Physical Exam  Vitals reviewed.   Constitutional:       Appearance: Normal appearance.   HENT:      Head: Normocephalic and atraumatic.   Eyes:      Extraocular Movements: Extraocular movements intact.   Cardiovascular:      Rate and Rhythm: Normal rate and regular rhythm.      Heart sounds: No murmur heard.     No friction rub. No gallop.   Pulmonary:      Effort: Pulmonary effort is normal. No respiratory distress.      Breath sounds: Normal breath sounds. No stridor. No wheezing, rhonchi or rales.   Chest:      Chest wall: No tenderness.   Abdominal:       General: Abdomen is flat. There is no distension.      Palpations: Abdomen is soft. There is no mass.      Tenderness: There is no abdominal tenderness.   Musculoskeletal:         General: Normal range of motion.      Cervical back: Normal range of motion.      Right lower leg: No edema.      Left lower leg: No edema.   Skin:     General: Skin is warm and dry.   Neurological:      Mental Status: He is alert and oriented to person, place, and time.   Psychiatric:         Mood and Affect: Mood normal.         Behavior: Behavior normal.

## 2024-12-29 ENCOUNTER — OFFICE VISIT (OUTPATIENT)
Dept: URGENT CARE | Age: 89
End: 2024-12-29
Payer: MEDICARE

## 2024-12-29 VITALS
WEIGHT: 167 LBS | HEART RATE: 76 BPM | SYSTOLIC BLOOD PRESSURE: 132 MMHG | BODY MASS INDEX: 26.16 KG/M2 | OXYGEN SATURATION: 97 % | DIASTOLIC BLOOD PRESSURE: 77 MMHG

## 2024-12-29 DIAGNOSIS — H00.012 HORDEOLUM EXTERNUM OF RIGHT LOWER EYELID: Primary | ICD-10-CM

## 2024-12-29 PROCEDURE — 3075F SYST BP GE 130 - 139MM HG: CPT | Performed by: NURSE PRACTITIONER

## 2024-12-29 PROCEDURE — 99214 OFFICE O/P EST MOD 30 MIN: CPT | Performed by: NURSE PRACTITIONER

## 2024-12-29 PROCEDURE — 1123F ACP DISCUSS/DSCN MKR DOCD: CPT | Performed by: NURSE PRACTITIONER

## 2024-12-29 PROCEDURE — 1111F DSCHRG MED/CURRENT MED MERGE: CPT | Performed by: NURSE PRACTITIONER

## 2024-12-29 PROCEDURE — 1036F TOBACCO NON-USER: CPT | Performed by: NURSE PRACTITIONER

## 2024-12-29 PROCEDURE — 1159F MED LIST DOCD IN RCRD: CPT | Performed by: NURSE PRACTITIONER

## 2024-12-29 PROCEDURE — 3078F DIAST BP <80 MM HG: CPT | Performed by: NURSE PRACTITIONER

## 2024-12-29 PROCEDURE — 1157F ADVNC CARE PLAN IN RCRD: CPT | Performed by: NURSE PRACTITIONER

## 2024-12-29 RX ORDER — CEPHALEXIN 500 MG/1
500 CAPSULE ORAL 2 TIMES DAILY
Qty: 14 CAPSULE | Refills: 0 | Status: SHIPPED | OUTPATIENT
Start: 2024-12-29 | End: 2025-01-05

## 2024-12-29 RX ORDER — POLYMYXIN B SULFATE AND TRIMETHOPRIM 1; 10000 MG/ML; [USP'U]/ML
1 SOLUTION OPHTHALMIC 4 TIMES DAILY
Qty: 10 ML | Refills: 0 | Status: SHIPPED | OUTPATIENT
Start: 2024-12-29 | End: 2025-01-05

## 2024-12-29 NOTE — PROGRESS NOTES
Subjective   Patient ID: Elroy Greco is a 89 y.o. male. They present today with a chief complaint of Eye Problem (Right eye, red, swelling, x1day ).    History of Present Illness  89 yo male coming in for redness and swelling under the right eye with a bump to the right lower eyelid. He states he noticed this all this am. He denies any pain. He denies any crusting. He denies any vision changes.     Past Medical History  Allergies as of 12/29/2024 - Reviewed 12/29/2024   Allergen Reaction Noted    Finasteride Rash 02/06/2012    Mirabegron Constipation 03/22/2024    Penicillins Rash 02/13/2023       (Not in a hospital admission)       Past Medical History:   Diagnosis Date    Aortic stenosis     CAD (coronary artery disease)     Cardiology follow-up encounter 03/27/2024    JOHNNY Hernandez    CKD (chronic kidney disease)     Gout     H/O cardiovascular stress test 04/04/2016    H/O echocardiogram 04/05/2024    Hyperlipidemia     Hypertension     Nocturia 03/01/2017    Nocturia    Paget's disease of bone     Preoperative clearance     JOHNNY Hernandez 3/27/24   Should his echocardiogram remain stable, he will be cleared for his urologic procedure. He will follow up with us in clinic in one year. He knows to call for any concerns.    Spinal stenosis     Urinary incontinence, urge     Plan: Stage 1 & 2 Insertion Continence Control Stimulator Lead & Generator 11/27/24       Past Surgical History:   Procedure Laterality Date    CT ANGIO CORONARY ART WITH HEARTFLOW IF SCORE >30%  11/12/2018    CT HEART CORONARY ANGIOGRAM 11/12/2018 [facility] ANCILLARY LEGACY    HERNIA REPAIR  07/18/2012    Hernia Repair    OTHER SURGICAL HISTORY  07/18/2012    Nerve Block Transforaminal Epidural    OTHER SURGICAL HISTORY  07/18/2012    Nerve Ablation    OTHER SURGICAL HISTORY      shoulder replacement    OTHER SURGICAL HISTORY      blockage in abdomen-part of intenstine removed    TOTAL KNEE ARTHROPLASTY  07/18/2012    Knee  Replacement    TOTAL KNEE ARTHROPLASTY  07/18/2012    Knee Replacement        reports that he has never smoked. He has never used smokeless tobacco. He reports that he does not drink alcohol and does not use drugs.    Review of Systems  Review of Systems:  General: No weight loss, fatigue, anorexia, insomnia, fever, chills.  Eyes: No vision loss, double vision, blurred vision, drainage, or eye pain. Positive bump to the right lower eyelid and redness and swelling under the eye.  ENT: No pharyngitis, dry mouth, nasal congestion, ear pain  Cardiac: No chest pain, palpitations, syncope, near syncope.  Pulmonary:  No shortness of breath, cough, hemoptysis  Heme/lymph: No swollen glands, fever, bleeding  Musculoskeletal: No limb pain, joint pain, joint swelling.  Skin: No rashes  Neuro: No numbness, tingling, headaches                                 Objective    Vitals:    12/29/24 1157   BP: 132/77   Pulse: 76   SpO2: 97%   Weight: 75.8 kg (167 lb)     No LMP for male patient.    Physical Exam  Physical Exam:  General: Vital noted, no distress. Afebrile  EENT: Eyes unremarkable, Pupils PERRLA, EOMs intact. External hordeolum noted to the right lower eyelid. Erythema and edema noted to the skin under the right eye. TMs unremarkable. Posterior oropharynx unremarkable. Uvula in the midline and non-edematous. No PTA. No retropharyngeal mass. No Earl's angina.  Cardiac: Regular rate and rhythm, no murmur  Pulmonary: Lungs clear bilaterally with good aeration. No adventitious breath sounds.  Skin: No rashes      Procedures    Point of Care Test & Imaging Results from this visit  No results found for this visit on 12/29/24.   No results found.    Diagnostic study results (if any) were reviewed by JOHNNY Motley.    Assessment/Plan   Allergies, medications, history, and pertinent labs/EKGs/Imaging reviewed by JOHNNY Motley.     Medical Decision Making  Treatment: Keflex and polytrim  prescribed  Differential: 1) hordeolum, 2) cellulitis, 3) conjunctivitis  Plan: Patient will follow up with the PCP in the next 2-3 days. Return for any worsening symptoms or go to the ER for further evaluation. Patient understands return precautions and discharge insturctions.  Impression:   1) hordeolum        Orders and Diagnoses  Diagnoses and all orders for this visit:  Hordeolum externum of right lower eyelid  -     cephalexin (Keflex) 500 mg capsule; Take 1 capsule (500 mg) by mouth 2 times a day for 7 days.  -     polymyxin B sulf-trimethoprim (Polytrim) ophthalmic solution; Administer 1 drop into the right eye 4 times a day for 7 days.      Medical Admin Record      Patient disposition: Home    Electronically signed by JOHNNY Motley  12:27 PM

## 2024-12-30 ENCOUNTER — PATIENT OUTREACH (OUTPATIENT)
Dept: PRIMARY CARE | Facility: CLINIC | Age: 89
End: 2024-12-30
Payer: MEDICARE

## 2024-12-30 NOTE — PROGRESS NOTES
Call regarding appt. with PCP on (   n/a  ) after hospitalization.  At time of outreach call the patient feels as if their condition has improved since hospital stay.  Reviewed the PCP appointment with the pt and addressed any questions or concerns.    Pt. Denies questions/concerns at this time.

## 2025-01-03 ENCOUNTER — APPOINTMENT (OUTPATIENT)
Dept: CARDIOLOGY | Facility: CLINIC | Age: OVER 89
End: 2025-01-03
Payer: MEDICARE

## 2025-01-07 ENCOUNTER — HOSPITAL ENCOUNTER (OUTPATIENT)
Dept: RADIOLOGY | Facility: CLINIC | Age: OVER 89
Discharge: HOME | End: 2025-01-07
Payer: MEDICARE

## 2025-01-07 ENCOUNTER — HOSPITAL ENCOUNTER (OUTPATIENT)
Dept: CARDIOLOGY | Facility: CLINIC | Age: OVER 89
Discharge: HOME | End: 2025-01-07
Payer: MEDICARE

## 2025-01-07 DIAGNOSIS — I25.10 CORONARY ARTERY DISEASE INVOLVING NATIVE HEART WITHOUT ANGINA PECTORIS, UNSPECIFIED VESSEL OR LESION TYPE: ICD-10-CM

## 2025-01-07 DIAGNOSIS — I35.0 AORTIC STENOSIS, MILD: ICD-10-CM

## 2025-01-07 DIAGNOSIS — J84.10 PULMONARY FIBROSIS (MULTI): ICD-10-CM

## 2025-01-07 PROCEDURE — 93306 TTE W/DOPPLER COMPLETE: CPT

## 2025-01-07 PROCEDURE — 71250 CT THORAX DX C-: CPT

## 2025-01-07 PROCEDURE — 71250 CT THORAX DX C-: CPT | Performed by: RADIOLOGY

## 2025-01-07 PROCEDURE — 93306 TTE W/DOPPLER COMPLETE: CPT | Performed by: INTERNAL MEDICINE

## 2025-01-08 LAB
AORTIC VALVE MEAN GRADIENT: 16 MMHG
AORTIC VALVE PEAK VELOCITY: 2.74 M/S
AV PEAK GRADIENT: 30 MMHG
AVA (PEAK VEL): 0.98 CM2
AVA (VTI): 0.9 CM2
EJECTION FRACTION APICAL 4 CHAMBER: 59.5
EJECTION FRACTION: 63 %
LEFT ATRIUM VOLUME AREA LENGTH INDEX BSA: 41.4 ML/M2
LEFT VENTRICLE INTERNAL DIMENSION DIASTOLE: 3.44 CM (ref 3.5–6)
LEFT VENTRICULAR OUTFLOW TRACT DIAMETER: 2.15 CM
MITRAL VALVE E/A RATIO: 0.65
RIGHT VENTRICLE FREE WALL PEAK S': 6.11 CM/S
RIGHT VENTRICLE PEAK SYSTOLIC PRESSURE: 25.2 MMHG
TRICUSPID ANNULAR PLANE SYSTOLIC EXCURSION: 1.6 CM

## 2025-01-14 ENCOUNTER — OFFICE VISIT (OUTPATIENT)
Dept: URGENT CARE | Age: OVER 89
End: 2025-01-14
Payer: MEDICARE

## 2025-01-14 VITALS — OXYGEN SATURATION: 94 % | TEMPERATURE: 97.7 F | HEART RATE: 71 BPM

## 2025-01-14 DIAGNOSIS — H01.005 BLEPHARITIS OF LOWER EYELIDS OF BOTH EYES, UNSPECIFIED TYPE: Primary | ICD-10-CM

## 2025-01-14 DIAGNOSIS — H01.002 BLEPHARITIS OF LOWER EYELIDS OF BOTH EYES, UNSPECIFIED TYPE: Primary | ICD-10-CM

## 2025-01-14 PROCEDURE — 1036F TOBACCO NON-USER: CPT | Performed by: NURSE PRACTITIONER

## 2025-01-14 PROCEDURE — 1157F ADVNC CARE PLAN IN RCRD: CPT | Performed by: NURSE PRACTITIONER

## 2025-01-14 PROCEDURE — 99213 OFFICE O/P EST LOW 20 MIN: CPT | Performed by: NURSE PRACTITIONER

## 2025-01-14 PROCEDURE — 1123F ACP DISCUSS/DSCN MKR DOCD: CPT | Performed by: NURSE PRACTITIONER

## 2025-01-14 PROCEDURE — 1159F MED LIST DOCD IN RCRD: CPT | Performed by: NURSE PRACTITIONER

## 2025-01-14 RX ORDER — ERYTHROMYCIN 5 MG/G
OINTMENT OPHTHALMIC EVERY 6 HOURS
Qty: 3.5 G | Refills: 1 | Status: SHIPPED | OUTPATIENT
Start: 2025-01-14 | End: 2025-01-21

## 2025-01-14 NOTE — PROGRESS NOTES
Subjective   Patient ID: Elroy Greco is a 89 y.o. male. They present today with a chief complaint of Eye Problem (Left eye irritation for 4 days).    History of Present Illness  89-year-old male coming with complaints of irritation to the left lower eyelid.  He states he started with irritation to the right eye and was placed on Polytrim.  He states the pain at this time unclear the irritation and drainage up completely from the right eye.  The left eye is doing the same thing.  He states he has been using Polytrim in the left eye for the last 4 days with no improvement.  He denies any eye pain.  He denies any vision changes.    Past Medical History  Allergies as of 01/14/2025 - Reviewed 01/14/2025   Allergen Reaction Noted    Finasteride Rash 02/06/2012    Mirabegron Constipation 03/22/2024    Penicillins Rash 02/13/2023       (Not in a hospital admission)       Past Medical History:   Diagnosis Date    Aortic stenosis     CAD (coronary artery disease)     Cardiology follow-up encounter 03/27/2024    JOHNNY Hernandez    CKD (chronic kidney disease)     Gout     H/O cardiovascular stress test 04/04/2016    H/O echocardiogram 04/05/2024    Hyperlipidemia     Hypertension     Nocturia 03/01/2017    Nocturia    Paget's disease of bone     Preoperative clearance     JOHNNY Hernandez 3/27/24   Should his echocardiogram remain stable, he will be cleared for his urologic procedure. He will follow up with us in clinic in one year. He knows to call for any concerns.    Spinal stenosis     Urinary incontinence, urge     Plan: Stage 1 & 2 Insertion Continence Control Stimulator Lead & Generator 11/27/24       Past Surgical History:   Procedure Laterality Date    CT ANGIO CORONARY ART WITH HEARTFLOW IF SCORE >30%  11/12/2018    CT HEART CORONARY ANGIOGRAM 11/12/2018 [facility] [place] ANCILLARY LEGACY    HERNIA REPAIR  07/18/2012    Hernia Repair    OTHER SURGICAL HISTORY  07/18/2012    Nerve Block Transforaminal Epidural     OTHER SURGICAL HISTORY  07/18/2012    Nerve Ablation    OTHER SURGICAL HISTORY      shoulder replacement    OTHER SURGICAL HISTORY      blockage in abdomen-part of intenstine removed    TOTAL KNEE ARTHROPLASTY  07/18/2012    Knee Replacement    TOTAL KNEE ARTHROPLASTY  07/18/2012    Knee Replacement        reports that he has never smoked. He has never used smokeless tobacco. He reports that he does not drink alcohol and does not use drugs.    Review of Systems  Review of Systems:  General: No weight loss, fatigue, anorexia, insomnia, fever, chills.  Eyes: No vision loss, double vision, blurred vision, positive redness and drainage from the bilateral eyes, no eye pain.  ENT: No pharyngitis, dry mouth, nasal congestion, ear pain  Cardiac: No chest pain, palpitations, syncope, near syncope.  Pulmonary:  No shortness of breath, cough, hemoptysis  Heme/lymph: No swollen glands, fever, bleeding                                 Objective    Vitals:    01/14/25 1003   Pulse: 71   Temp: 36.5 °C (97.7 °F)   SpO2: 94%     No LMP for male patient.    Physical Exam  Physical Exam:  General: Vital noted, no distress. Afebrile  EENT: Right Eye unremarkable, Left Eye with conjunctival injection, bilateral lower eyelids with crusting around the eyelashes.  Pupils PERRLA, EOMs intact. Cardiac: Regular rate and rhythm, no murmur  Pulmonary: Lungs clear bilaterally with good aeration. No adventitious breath sounds.      Procedures    Point of Care Test & Imaging Results from this visit  No results found for this visit on 01/14/25.   No results found.    Diagnostic study results (if any) were reviewed by JOHNNY Motley.    Assessment/Plan   Allergies, medications, history, and pertinent labs/EKGs/Imaging reviewed by JOHNNY Motley.     Medical Decision Making  Treatment: Erythromycin ointment prescribed  Differential: 1) blepharitis, 2) conjunctivitis, 3) stye  Plan: Patient will follow up with the PCP in the next  2-3 days. Return for any worsening symptoms or go to the ER for further evaluation. Patient understands return precautions and discharge insturctions.  Impression:   1) blepharitis      Orders and Diagnoses  Diagnoses and all orders for this visit:  Blepharitis of lower eyelids of both eyes, unspecified type  -     erythromycin (Romycin) 5 mg/gram (0.5 %) ophthalmic ointment; Apply to both eyes every 6 hours for 7 days. Apply Amount per Dose: 0.25 inch (~0.5 cm) per dose.      Medical Admin Record      Patient disposition: Home    Electronically signed by JOHNNY Motley  10:12 AM

## 2025-01-16 ENCOUNTER — PATIENT OUTREACH (OUTPATIENT)
Dept: PRIMARY CARE | Facility: CLINIC | Age: OVER 89
End: 2025-01-16
Payer: MEDICARE

## 2025-01-22 ENCOUNTER — OFFICE VISIT (OUTPATIENT)
Dept: CARDIOLOGY | Facility: CLINIC | Age: OVER 89
End: 2025-01-22
Payer: MEDICARE

## 2025-01-22 VITALS
WEIGHT: 172 LBS | HEIGHT: 68 IN | BODY MASS INDEX: 26.07 KG/M2 | SYSTOLIC BLOOD PRESSURE: 146 MMHG | HEART RATE: 80 BPM | OXYGEN SATURATION: 97 % | DIASTOLIC BLOOD PRESSURE: 77 MMHG

## 2025-01-22 DIAGNOSIS — E78.5 DYSLIPIDEMIA: ICD-10-CM

## 2025-01-22 DIAGNOSIS — I35.0 AORTIC STENOSIS, MILD: Primary | ICD-10-CM

## 2025-01-22 DIAGNOSIS — I25.10 CORONARY ARTERY DISEASE INVOLVING NATIVE HEART WITHOUT ANGINA PECTORIS, UNSPECIFIED VESSEL OR LESION TYPE: ICD-10-CM

## 2025-01-22 PROCEDURE — 1157F ADVNC CARE PLAN IN RCRD: CPT | Performed by: NURSE PRACTITIONER

## 2025-01-22 PROCEDURE — 1160F RVW MEDS BY RX/DR IN RCRD: CPT | Performed by: NURSE PRACTITIONER

## 2025-01-22 PROCEDURE — 1036F TOBACCO NON-USER: CPT | Performed by: NURSE PRACTITIONER

## 2025-01-22 PROCEDURE — 99214 OFFICE O/P EST MOD 30 MIN: CPT | Performed by: NURSE PRACTITIONER

## 2025-01-22 PROCEDURE — 1126F AMNT PAIN NOTED NONE PRSNT: CPT | Performed by: NURSE PRACTITIONER

## 2025-01-22 PROCEDURE — 3078F DIAST BP <80 MM HG: CPT | Performed by: NURSE PRACTITIONER

## 2025-01-22 PROCEDURE — 1123F ACP DISCUSS/DSCN MKR DOCD: CPT | Performed by: NURSE PRACTITIONER

## 2025-01-22 PROCEDURE — 3077F SYST BP >= 140 MM HG: CPT | Performed by: NURSE PRACTITIONER

## 2025-01-22 PROCEDURE — 1159F MED LIST DOCD IN RCRD: CPT | Performed by: NURSE PRACTITIONER

## 2025-01-22 RX ORDER — ASPIRIN 81 MG/1
81 TABLET ORAL DAILY
COMMUNITY

## 2025-01-22 ASSESSMENT — ENCOUNTER SYMPTOMS: HYPERTENSION: 1

## 2025-01-22 ASSESSMENT — PAIN SCALES - GENERAL: PAINLEVEL_OUTOF10: 0-NO PAIN

## 2025-01-22 NOTE — PROGRESS NOTES
Subjective   Elroy Greco is a 89 y.o. male.    Chief Complaint:  Valve Disorder, Hypertension, Hyperlipidemia, and Coronary Artery Disease    Mr. Greco returns for a routine one month follow up. At his last visit I stopped amlodipine due to low BP and complaints of fatigue/dizziness. He is still somewhat fatigued, though not as dizzy. He was also complaining of dyspnea. His echocardiogram showed an EF of 63% with low normal RV systolic function and moderate aortic stenosis with a peak gradient of 30 mmHg, mean 16 mmHg, stable and unchanged from prior studies. He unfortunately continues to struggle with rather limiting dyspnea on exertion. He also had an appointment with pulmonology and is scheduled for PFT's and a 6 minute walk test tomorrow. He knows his knee pain and age is also a factor, though his dyspnea remains bothersome. He offers no other cardiovascular complaints or concerns today. He denies any complaints of chest pain, lightheadedness, dizziness, palpitations, syncope, orthopnea, paroxysmal nocturnal dyspnea, lower extremity swelling or bleeding concerns.         Hypertension    Hyperlipidemia    Coronary Artery Disease  Risk factors include hyperlipidemia.       Review of Systems   All other systems reviewed and are negative.      Objective   Physical Exam  Constitutional:       Appearance: Healthy appearance. In no distress  Pulmonary:      Effort: Pulmonary effort is normal.      Breath sounds: Normal breath sounds.   Cardiovascular:      Normal rate. Regular rhythm. Normal S1. Normal S2.       Murmurs: There is 2/6 systolic murmur.      Carotids: right carotid pulse +2, no bruit heard over the right carotid. left carotid pulse +2, no bruit heard over the left carotid.  Edema:     Peripheral edema absent.   Abdominal:      Palpations: Abdomen is soft.   Musculoskeletal:       Cervical back: Normal range of motion.   Skin:     General: Skin is warm and dry. Normal color and pigmentation    Neurological:      Mental Status: Alert and oriented to person, place and time.   Psychiatric:     Mood and Affect: appropriate mood and appropriate affect.     Lab Review:   Lab Results   Component Value Date     12/02/2024    K 3.8 12/02/2024     (H) 12/02/2024    CO2 24 12/02/2024    BUN 26 (H) 12/02/2024    CREATININE 1.16 12/02/2024    GLUCOSE 122 (H) 12/02/2024    CALCIUM 7.7 (L) 12/02/2024     Lab Results   Component Value Date    WBC 10.2 12/04/2024    HGB 10.1 (L) 12/04/2024    HCT 31.6 (L) 12/04/2024    MCV 88 12/04/2024     12/04/2024     Lab Results   Component Value Date    CHOL 147 11/20/2023    TRIG 105 11/20/2023    HDL 56.4 11/20/2023       Assessment/Plan   Mr. Greco is a very pleasant 89 year old  male with a past medical history significant for hypertension, CKD, anemia, severe 2 vessel CAD by coronary CTA in 2018, hyperlipidemia and spinal stenosis. Echocardiogram 1/7/2025 showed an EF of 63% with low normal RV systolic function and moderate aortic stenosis with a peak gradient of 30 mmHg, mean 16 mmHg, stable and unchanged from prior studies. He presents today for routine follow up fairly stable from a cardiac standpoint. His VS remain stable. He unfortunately continues to struggle with rather limiting dyspnea on exertion. Given his complaints, which may represent an anginal equivalent, I will set him up for a right and left heart catheterization Tuesday 2/4/25 at [facility]. I will have him continue all medications unchanged. He will obtain pre-procedural blood work a few days prior. He knows to call for any concerns.

## 2025-01-23 ENCOUNTER — DOCUMENTATION (OUTPATIENT)
Dept: PULMONOLOGY | Facility: HOSPITAL | Age: OVER 89
End: 2025-01-23

## 2025-01-23 ENCOUNTER — HOSPITAL ENCOUNTER (OUTPATIENT)
Dept: RESPIRATORY THERAPY | Facility: HOSPITAL | Age: OVER 89
Discharge: HOME | End: 2025-01-23
Payer: MEDICARE

## 2025-01-23 ENCOUNTER — APPOINTMENT (OUTPATIENT)
Dept: RESPIRATORY THERAPY | Facility: HOSPITAL | Age: OVER 89
End: 2025-01-23
Payer: MEDICARE

## 2025-01-23 DIAGNOSIS — R06.09 DOE (DYSPNEA ON EXERTION): ICD-10-CM

## 2025-01-23 DIAGNOSIS — J84.10 PULMONARY FIBROSIS (MULTI): ICD-10-CM

## 2025-01-23 PROCEDURE — 94729 DIFFUSING CAPACITY: CPT | Performed by: INTERNAL MEDICINE

## 2025-01-23 PROCEDURE — 94726 PLETHYSMOGRAPHY LUNG VOLUMES: CPT | Performed by: INTERNAL MEDICINE

## 2025-01-23 PROCEDURE — 94618 PULMONARY STRESS TESTING: CPT

## 2025-01-23 PROCEDURE — 94060 EVALUATION OF WHEEZING: CPT | Performed by: INTERNAL MEDICINE

## 2025-01-23 PROCEDURE — 94618 PULMONARY STRESS TESTING: CPT | Performed by: INTERNAL MEDICINE

## 2025-01-23 NOTE — PROGRESS NOTES
PFTs outlined no obstruction and no bronchial response.  There is mild restriction with a normal DLCO at 75% predicted..  In addition he has no desaturation with exercise.  There is nothing from a pulmonary perspective accounting for his dyspnea.

## 2025-01-28 ENCOUNTER — APPOINTMENT (OUTPATIENT)
Dept: RADIOLOGY | Facility: HOSPITAL | Age: OVER 89
End: 2025-01-28
Payer: MEDICARE

## 2025-01-28 ENCOUNTER — HOSPITAL ENCOUNTER (EMERGENCY)
Facility: HOSPITAL | Age: OVER 89
Discharge: HOME | End: 2025-01-28
Payer: MEDICARE

## 2025-01-28 VITALS
TEMPERATURE: 97.6 F | BODY MASS INDEX: 25.11 KG/M2 | OXYGEN SATURATION: 96 % | HEIGHT: 67 IN | RESPIRATION RATE: 17 BRPM | SYSTOLIC BLOOD PRESSURE: 144 MMHG | DIASTOLIC BLOOD PRESSURE: 81 MMHG | HEART RATE: 72 BPM | WEIGHT: 160 LBS

## 2025-01-28 DIAGNOSIS — S09.90XA CLOSED HEAD INJURY, INITIAL ENCOUNTER: ICD-10-CM

## 2025-01-28 DIAGNOSIS — S00.511A LIP ABRASION, INITIAL ENCOUNTER: ICD-10-CM
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
## 2025-01-28 PROCEDURE — 72125 CT NECK SPINE W/O DYE: CPT

## 2025-01-28 PROCEDURE — 99284 EMERGENCY DEPT VISIT MOD MDM: CPT | Mod: 25

## 2025-01-28 PROCEDURE — 2500000004 HC RX 250 GENERAL PHARMACY W/ HCPCS (ALT 636 FOR OP/ED): Performed by: PHYSICIAN ASSISTANT

## 2025-01-28 PROCEDURE — 90471 IMMUNIZATION ADMIN: CPT | Performed by: PHYSICIAN ASSISTANT

## 2025-01-28 PROCEDURE — 72125 CT NECK SPINE W/O DYE: CPT | Performed by: RADIOLOGY

## 2025-01-28 PROCEDURE — 70450 CT HEAD/BRAIN W/O DYE: CPT | Performed by: RADIOLOGY

## 2025-01-28 PROCEDURE — 70450 CT HEAD/BRAIN W/O DYE: CPT

## 2025-01-28 PROCEDURE — 90715 TDAP VACCINE 7 YRS/> IM: CPT | Performed by: PHYSICIAN ASSISTANT

## 2025-01-28 RX ADMIN — TETANUS TOXOID, REDUCED DIPHTHERIA TOXOID AND ACELLULAR PERTUSSIS VACCINE, ADSORBED 0.5 ML: 5; 2.5; 8; 8; 2.5 SUSPENSION INTRAMUSCULAR at 20:53

## 2025-01-28 ASSESSMENT — PAIN - FUNCTIONAL ASSESSMENT: PAIN_FUNCTIONAL_ASSESSMENT: 0-10

## 2025-01-28 ASSESSMENT — PAIN DESCRIPTION - PROGRESSION: CLINICAL_PROGRESSION: NOT CHANGED

## 2025-01-28 ASSESSMENT — PAIN SCALES - GENERAL
PAINLEVEL_OUTOF10: 0 - NO PAIN
PAINLEVEL_OUTOF10: 0 - NO PAIN
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
## 2025-01-31 ENCOUNTER — OFFICE VISIT (OUTPATIENT)
Dept: UROLOGY | Facility: HOSPITAL | Age: OVER 89
End: 2025-01-31
Payer: MEDICARE

## 2025-01-31 DIAGNOSIS — N39.41 URGE INCONTINENCE: Primary | ICD-10-CM

## 2025-01-31 LAB
POC APPEARANCE, URINE: CLEAR
POC BILIRUBIN, URINE: NEGATIVE
POC BLOOD, URINE: ABNORMAL
POC COLOR, URINE: YELLOW
POC GLUCOSE, URINE: NEGATIVE MG/DL
POC KETONES, URINE: NEGATIVE MG/DL
POC LEUKOCYTES, URINE: NEGATIVE
POC NITRITE,URINE: NEGATIVE
POC PH, URINE: 6 PH
POC PROTEIN, URINE: NEGATIVE MG/DL
POC SPECIFIC GRAVITY, URINE: >=1.03
POC UROBILINOGEN, URINE: 0.2 EU/DL

## 2025-01-31 PROCEDURE — 1123F ACP DISCUSS/DSCN MKR DOCD: CPT | Performed by: UROLOGY

## 2025-01-31 PROCEDURE — 81003 URINALYSIS AUTO W/O SCOPE: CPT | Performed by: UROLOGY

## 2025-01-31 PROCEDURE — 99213 OFFICE O/P EST LOW 20 MIN: CPT | Performed by: UROLOGY

## 2025-01-31 PROCEDURE — G2211 COMPLEX E/M VISIT ADD ON: HCPCS | Performed by: UROLOGY

## 2025-01-31 PROCEDURE — 1159F MED LIST DOCD IN RCRD: CPT | Performed by: UROLOGY

## 2025-01-31 PROCEDURE — 1157F ADVNC CARE PLAN IN RCRD: CPT | Performed by: UROLOGY

## 2025-01-31 NOTE — PROGRESS NOTES
FUV    Last seen 12/20/24     HISTORY OF PRESENT ILLNESS:   Elroy Gerco is a 89 y.o. male who is being seen today for fuv    Dr Wilkerson has done HOLEP and botox. Has been taking myrbetriq.  Has tried anti-cholinergics in past.  Has terrible urge, UI, frequency. Gets up 3-4x    Pt axonics PNE in office on 11/8/24 - Doing well.  Urge and UI much improved.  Daytime and night time frequency improved    Pt had Stage 1/2 in OR on 11/27/24    Pt admitted to [facility] on 11/30-12/5 with urinary retention and bowel issues.  Discharged to rehab with romo    12/20/24 - Been feeling better.  Romo removed and axonics reprogrammed    1/31/25 - Voiding small amounts.  Had episode of urge, UI last week, has improved    PAST MEDICAL HISTORY:  Past Medical History:   Diagnosis Date    Aortic stenosis     CAD (coronary artery disease)     Cardiology follow-up encounter 03/27/2024    JOHNNY Hernandez    CKD (chronic kidney disease)     Gout     H/O cardiovascular stress test 04/04/2016    H/O echocardiogram 04/05/2024    Hyperlipidemia     Hypertension     Nocturia 03/01/2017    Nocturia    Paget's disease of bone     Preoperative clearance     JOHNNY Hernandez 3/27/24   Should his echocardiogram remain stable, he will be cleared for his urologic procedure. He will follow up with us in clinic in one year. He knows to call for any concerns.    Spinal stenosis     Urinary incontinence, urge     Plan: Stage 1 & 2 Insertion Continence Control Stimulator Lead & Generator 11/27/24       PAST SURGICAL HISTORY:  Past Surgical History:   Procedure Laterality Date    CT ANGIO CORONARY ART WITH HEARTFLOW IF SCORE >30%  11/12/2018    CT HEART CORONARY ANGIOGRAM 11/12/2018 [facility] ANCILLARY LEGACY    HERNIA REPAIR  07/18/2012    Hernia Repair    OTHER SURGICAL HISTORY  07/18/2012    Nerve Block Transforaminal Epidural    OTHER SURGICAL HISTORY  07/18/2012    Nerve Ablation    OTHER SURGICAL HISTORY      shoulder replacement    OTHER  SURGICAL HISTORY      blockage in abdomen-part of intenstine removed    TOTAL KNEE ARTHROPLASTY  07/18/2012    Knee Replacement    TOTAL KNEE ARTHROPLASTY  07/18/2012    Knee Replacement        ALLERGIES:   Allergies   Allergen Reactions    Finasteride Rash    Mirabegron Constipation    Penicillins Rash        MEDICATIONS:   Current Outpatient Medications   Medication Instructions    acetaminophen (TYLENOL) 1,000 mg, Every 6 hours PRN    aspirin 81 mg, oral, Daily    atorvastatin (LIPITOR) 40 mg, oral, Nightly    carboxymethylcellulose (Refresh Plus) 0.5 % ophthalmic solution 1 drop, 4 times daily    colchicine 0.6 mg tablet 1 tablet, Daily PRN    docusate sodium (STOOL SOFTENER ORAL) 2 tablets, 2 times daily    psyllium (Metamucil) powder 1 Dose, 2 times daily    tamsulosin (FLOMAX) 0.4 mg, oral, Daily        PHYSICAL EXAM:  There were no vitals taken for this visit.  Constitutional: Patient appears well-developed and well-nourished. No distress.    Pulmonary/Chest: Effort normal. No respiratory distress.   Musculoskeletal: Normal range of motion.    Neurological: Alert and oriented to person, place, and time.  Psychiatric: Normal mood and affect. Behavior is normal. Thought content normal.    Back - incision well healed, IPG seated nicely    Labs  Lab Results   Component Value Date    GFRMALE 53 (A) 07/18/2023     Lab Results   Component Value Date    CREATININE 1.16 12/02/2024     Lab Results   Component Value Date    CHOL 147 11/20/2023     Lab Results   Component Value Date    HDL 56.4 11/20/2023     Lab Results   Component Value Date    CHHDL 2.6 11/20/2023     Lab Results   Component Value Date    LDLF 77 11/18/2022     Lab Results   Component Value Date    VLDL 21 11/20/2023     Lab Results   Component Value Date    TRIG 105 11/20/2023     Lab Results   Component Value Date    HGBA1C 5.9 (H) 06/21/2024     Lab Results   Component Value Date    HCT 31.6 (L) 12/04/2024     PVR 1cc      Assessment:      1. Urge  incontinence            Elroy Greco is a 89 y.o. male here for fuv     Plan:   Increased program today  Otherwise doing well    Conservative measures    Fu in 6m    Lower Urinary Tract Symptoms (LUTS)    I educated the patient on relevant lower urinary tract anatomy and physiology with emphasis on differential diagnosis as well as contributing factors to the patient's lower urinary tract symptoms. I educated the patient on dietary and behavioral modifications pertinent to the patient's complaints. I recommended to avoid caffeine, alcohol, spicy and acidic oral intake and to regulate fluid intake and voiding (timed voiding, double voiding). I stressed the importance of avoiding constipation and recommended stool softeners unless diarrhea present. We discussed limiting fluid intake at night and elevating legs prior to bedtime.     The mechanism of action as well as the risks, benefits, common side effects, and alternatives to all prescribed medications were discussed with the patient at length. The patient had the opportunity to ask questions and all questions were answered. I primarily discussed alpha blockers, 5ARIs, PDE5i, anticholinergics, and beta 3 agonist (mirabegron).  I explained that alpha blockers help decrease bladder outlet resistance with potential side effects to include retrograde ejaculation, rhinitis, and light headedness. I explained that 5 alpha reductase inhibitors can shrink the prostate up to 30%, can artificially decrease their PSA value by 50%, but take approximately 6-9 months to reach full efficacy and have potential side effects to include decreased libido, impotence and breast tenderness.

## 2025-02-04 ENCOUNTER — APPOINTMENT (OUTPATIENT)
Dept: PRIMARY CARE | Facility: CLINIC | Age: OVER 89
End: 2025-02-04
Payer: MEDICARE

## 2025-02-04 ENCOUNTER — HOSPITAL ENCOUNTER (OUTPATIENT)
Facility: HOSPITAL | Age: OVER 89
Setting detail: OUTPATIENT SURGERY
Discharge: HOME | End: 2025-02-04
Attending: INTERNAL MEDICINE | Admitting: INTERNAL MEDICINE
Payer: MEDICARE

## 2025-02-04 VITALS
TEMPERATURE: 97.5 F | HEART RATE: 70 BPM | BODY MASS INDEX: 26.06 KG/M2 | DIASTOLIC BLOOD PRESSURE: 72 MMHG | HEIGHT: 67 IN | SYSTOLIC BLOOD PRESSURE: 139 MMHG | OXYGEN SATURATION: 100 % | RESPIRATION RATE: 16 BRPM | WEIGHT: 166 LBS

## 2025-02-04 DIAGNOSIS — I35.0 NONRHEUMATIC AORTIC (VALVE) STENOSIS: Primary | ICD-10-CM

## 2025-02-04 DIAGNOSIS — N18.30 CKD (CHRONIC KIDNEY DISEASE) STAGE 3, GFR 30-59 ML/MIN (MULTI): ICD-10-CM

## 2025-02-04 DIAGNOSIS — I25.10 ATHEROSCLEROTIC HEART DISEASE OF NATIVE CORONARY ARTERY WITHOUT ANGINA PECTORIS: ICD-10-CM

## 2025-02-04 DIAGNOSIS — M47.816 LUMBAR SPONDYLOSIS: ICD-10-CM

## 2025-02-04 LAB
ANION GAP SERPL CALC-SCNC: 12 MMOL/L (ref 10–20)
ANION GAP SERPL CALCULATED.4IONS-SCNC: 11 MMOL/L (CALC) (ref 7–17)
BUN SERPL-MCNC: 29 MG/DL (ref 7–25)
BUN SERPL-MCNC: 35 MG/DL (ref 6–23)
BUN/CREAT SERPL: 17 (CALC) (ref 6–22)
CALCIUM SERPL-MCNC: 9 MG/DL (ref 8.6–10.3)
CALCIUM SERPL-MCNC: 9.2 MG/DL (ref 8.6–10.3)
CHLORIDE SERPL-SCNC: 102 MMOL/L (ref 98–110)
CHLORIDE SERPL-SCNC: 103 MMOL/L (ref 98–107)
CO2 SERPL-SCNC: 24 MMOL/L (ref 20–32)
CO2 SERPL-SCNC: 24 MMOL/L (ref 21–32)
CREAT SERPL-MCNC: 1.68 MG/DL (ref 0.7–1.22)
CREAT SERPL-MCNC: 1.9 MG/DL (ref 0.5–1.3)
EGFRCR SERPLBLD CKD-EPI 2021: 32 ML/MIN/1.73M*2
EGFRCR SERPLBLD CKD-EPI 2021: 37 ML/MIN/1.73M2
ERYTHROCYTE [DISTWIDTH] IN BLOOD BY AUTOMATED COUNT: 12.8 % (ref 11–15)
ERYTHROCYTE [DISTWIDTH] IN BLOOD BY AUTOMATED COUNT: 14.5 % (ref 11.5–14.5)
GLUCOSE SERPL-MCNC: 86 MG/DL (ref 65–139)
GLUCOSE SERPL-MCNC: 99 MG/DL (ref 74–99)
HCT VFR BLD AUTO: 33.7 % (ref 41–52)
HCT VFR BLD AUTO: 35.4 % (ref 38.5–50)
HGB BLD-MCNC: 10.9 G/DL (ref 13.5–17.5)
HGB BLD-MCNC: 11.1 G/DL (ref 13.2–17.1)
INR PPP: 1.2
MCH RBC QN AUTO: 27.6 PG (ref 27–33)
MCH RBC QN AUTO: 27.9 PG (ref 26–34)
MCHC RBC AUTO-ENTMCNC: 31.4 G/DL (ref 32–36)
MCHC RBC AUTO-ENTMCNC: 32.3 G/DL (ref 32–36)
MCV RBC AUTO: 86 FL (ref 80–100)
MCV RBC AUTO: 88.1 FL (ref 80–100)
NRBC BLD-RTO: 0 /100 WBCS (ref 0–0)
PLATELET # BLD AUTO: 246 X10*3/UL (ref 150–450)
PLATELET # BLD AUTO: 282 THOUSAND/UL (ref 140–400)
PMV BLD REES-ECKER: 11.1 FL (ref 7.5–12.5)
POTASSIUM SERPL-SCNC: 4.9 MMOL/L (ref 3.5–5.3)
POTASSIUM SERPL-SCNC: 5.5 MMOL/L (ref 3.5–5.3)
PROTHROMBIN TIME: 12.4 SEC (ref 9–11.5)
RBC # BLD AUTO: 3.91 X10*6/UL (ref 4.5–5.9)
RBC # BLD AUTO: 4.02 MILLION/UL (ref 4.2–5.8)
SODIUM SERPL-SCNC: 134 MMOL/L (ref 136–145)
SODIUM SERPL-SCNC: 137 MMOL/L (ref 135–146)
WBC # BLD AUTO: 7.1 THOUSAND/UL (ref 3.8–10.8)
WBC # BLD AUTO: 7.3 X10*3/UL (ref 4.4–11.3)

## 2025-02-04 PROCEDURE — 99223 1ST HOSP IP/OBS HIGH 75: CPT | Performed by: NURSE PRACTITIONER

## 2025-02-04 PROCEDURE — 36415 COLL VENOUS BLD VENIPUNCTURE: CPT | Performed by: NURSE PRACTITIONER

## 2025-02-04 PROCEDURE — 85027 COMPLETE CBC AUTOMATED: CPT | Performed by: NURSE PRACTITIONER

## 2025-02-04 PROCEDURE — 80048 BASIC METABOLIC PNL TOTAL CA: CPT | Performed by: NURSE PRACTITIONER

## 2025-02-04 RX ORDER — NAPROXEN SODIUM 220 MG/1
81 TABLET, FILM COATED ORAL ONCE
Status: DISCONTINUED | OUTPATIENT
Start: 2025-02-04 | End: 2025-02-04 | Stop reason: HOSPADM

## 2025-02-04 RX ORDER — SODIUM CHLORIDE 9 MG/ML
150 INJECTION, SOLUTION INTRAVENOUS CONTINUOUS
Status: DISCONTINUED | OUTPATIENT
Start: 2025-02-04 | End: 2025-02-04 | Stop reason: HOSPADM

## 2025-02-04 ASSESSMENT — ENCOUNTER SYMPTOMS
SHORTNESS OF BREATH: 1
CARDIOVASCULAR NEGATIVE: 1
GASTROINTESTINAL NEGATIVE: 1
MUSCULOSKELETAL NEGATIVE: 1
HEMATOLOGIC/LYMPHATIC NEGATIVE: 1
PSYCHIATRIC NEGATIVE: 1
ALLERGIC/IMMUNOLOGIC NEGATIVE: 1
EYES NEGATIVE: 1
WEAKNESS: 1
FATIGUE: 1
ENDOCRINE NEGATIVE: 1

## 2025-02-04 ASSESSMENT — PAIN - FUNCTIONAL ASSESSMENT: PAIN_FUNCTIONAL_ASSESSMENT: 0-10

## 2025-02-04 ASSESSMENT — PAIN SCALES - GENERAL: PAINLEVEL_OUTOF10: 0 - NO PAIN

## 2025-02-04 NOTE — H&P
History Of Present Illness  Elroy Greco is a 89 y.o. male presenting with SANCHEZ, here for [facility]. PMH includes HTN, CKD, anemia, severe 2 vessel CAD on CCTA in 2018, HLD, spinal stenosis    Past Medical History:  Past Medical History:   Diagnosis Date    Aortic stenosis     CAD (coronary artery disease)     Cardiology follow-up encounter 03/27/2024    SABINA Hernandez-CNP    CKD (chronic kidney disease)     Gout     H/O cardiovascular stress test 04/04/2016    H/O echocardiogram 04/05/2024    Hyperlipidemia     Hypertension     Nocturia 03/01/2017    Nocturia    Paget's disease of bone     Preoperative clearance     SABINA Hernandez-CNP 3/27/24   Should his echocardiogram remain stable, he will be cleared for his urologic procedure. He will follow up with us in clinic in one year. He knows to call for any concerns.    Spinal stenosis     Urinary incontinence, urge     Plan: Stage 1 & 2 Insertion Continence Control Stimulator Lead & Generator 11/27/24        Past Surgical History:  Past Surgical History:   Procedure Laterality Date    CT ANGIO CORONARY ART WITH HEARTFLOW IF SCORE >30%  11/12/2018    CT HEART CORONARY ANGIOGRAM 11/12/2018 [facility] [place] ANCILLARY LEGACY    HERNIA REPAIR  07/18/2012    Hernia Repair    OTHER SURGICAL HISTORY  07/18/2012    Nerve Block Transforaminal Epidural    OTHER SURGICAL HISTORY  07/18/2012    Nerve Ablation    OTHER SURGICAL HISTORY      shoulder replacement    OTHER SURGICAL HISTORY      blockage in abdomen-part of intenstine removed    TOTAL KNEE ARTHROPLASTY  07/18/2012    Knee Replacement    TOTAL KNEE ARTHROPLASTY  07/18/2012    Knee Replacement          Social History:   reports that he has never smoked. He has never used smokeless tobacco. He reports that he does not drink alcohol and does not use drugs.     Family History:  Family History   Problem Relation Name Age of Onset    Cancer Mother      Cancer Father          Allergies:  Allergies   Allergen Reactions    Finasteride  Rash    Mirabegron Constipation    Penicillins Rash        Home Medications:  Current Outpatient Medications   Medication Instructions    acetaminophen (TYLENOL) 1,000 mg, Every 6 hours PRN    aspirin 81 mg, Daily    atorvastatin (LIPITOR) 40 mg, oral, Nightly    carboxymethylcellulose (Refresh Plus) 0.5 % ophthalmic solution 1 drop, 4 times daily    colchicine 0.6 mg tablet 1 tablet, Daily PRN    docusate sodium (STOOL SOFTENER ORAL) 2 tablets, 2 times daily    psyllium (Metamucil) powder 1 Dose, 2 times daily    tamsulosin (FLOMAX) 0.4 mg, oral, Daily       Inpatient Medications:  Scheduled medications   Medication Dose Route Frequency    aspirin  81 mg oral Once     PRN medications   Medication     Continuous Medications   Medication Dose Last Rate         Review of Systems   Constitutional:  Positive for fatigue.   HENT: Negative.     Eyes: Negative.    Respiratory:  Positive for shortness of breath (on exertion).    Cardiovascular: Negative.    Gastrointestinal: Negative.    Endocrine: Negative.    Genitourinary: Negative.    Musculoskeletal: Negative.    Skin: Negative.    Allergic/Immunologic: Negative.    Neurological:  Positive for weakness.   Hematological: Negative.    Psychiatric/Behavioral: Negative.            Physical Exam  Constitutional:       General: He is awake. He is not in acute distress.     Appearance: He is not ill-appearing.   Cardiovascular:      Rate and Rhythm: Normal rate and regular rhythm.      Pulses: Normal pulses.           Radial pulses are 2+ on the right side and 2+ on the left side.        Dorsalis pedis pulses are 2+ on the right side and 2+ on the left side.      Heart sounds: Murmur heard.      Systolic murmur is present.   Pulmonary:      Effort: Pulmonary effort is normal.      Breath sounds: Normal breath sounds and air entry.   Abdominal:      General: Bowel sounds are normal.      Palpations: Abdomen is soft.      Tenderness: There is no abdominal tenderness.  "  Musculoskeletal:      Right lower leg: No edema.      Left lower leg: No edema.   Skin:     General: Skin is warm and dry.   Neurological:      General: No focal deficit present.      Mental Status: He is alert and oriented to person, place, and time.      GCS: GCS eye subscore is 4. GCS verbal subscore is 5. GCS motor subscore is 6.   Psychiatric:         Mood and Affect: Mood normal.         Behavior: Behavior is cooperative.        Sedation Plan    ASA 3     Mallampati class: III.           NPO since last night around 1900    Last Recorded Vitals  Blood pressure 139/72, pulse 70, temperature 36.4 °C (97.5 °F), temperature source Temporal, resp. rate 16, height 1.702 m (5' 7\"), weight 75.3 kg (166 lb), SpO2 100%.         Vitals from the Past 24 Hours  Heart Rate:  [70]   Temp:  [36.4 °C (97.5 °F)]   Resp:  [16]   BP: (139)/(72)   Height:  [170.2 cm (5' 7\")]   Weight:  [75.3 kg (166 lb)]   SpO2:  [100 %]          Relevant Results    Labs      CBC:   Recent Labs     02/04/25  0959 12/04/24  0631 12/02/24  1615 12/01/24  0518 11/30/24  2343 11/25/24  0936   WBC 7.3 10.2 12.2* 17.2* 14.1* 6.3   HGB 10.9* 10.1* 10.5* 12.4* 12.6* 12.0*   HCT 33.7* 31.6* 33.0* 38.5* 40.0* 38.8*    183 181 208 222 195   MCV 86 88 89 89 87 90     BMP/CMP:   Recent Labs     02/04/25  0959 12/02/24  1615 12/01/24  0625 12/01/24  0518 11/30/24  2343 11/25/24  0936 05/08/24  0938 11/20/23  0910 07/18/23  0846 11/18/22  1020 04/07/22  0905 07/21/20  0909 06/10/20  1246 08/01/18  0804 07/17/18  1133 05/21/18  0553 05/20/18  0605   * 141 132* 134* 140 141   < > 141 141 144 139   < > 142   < > 140 139 137   K 4.9 3.8 3.4* 2.9* 4.1 4.0   < > 4.1 4.5 4.1 4.0   < > 5.0   < > 3.6 3.9 3.7    111* 99 105 103 106   < > 105 106 106 105   < > 105   < > 101 106 104   BUN 35* 26* 27* 23 28* 27*   < > 30* 26* 26* 22   < > 26*   < > 28* 25* 20   CREATININE 1.90* 1.16 1.13 0.91 1.16 1.10   < > 1.10 1.27 1.15 1.10   < > 1.17   < > 1.34* 1.09 " "1.26   CO2 24 24 25 21 27 29   < > 30 27 30 28   < > 27   < > 29 25 25   CALCIUM 9.0 7.7* 7.9* 6.4* 10.0 9.1   < > 9.1 9.1 9.3 8.9   < > 10.0   < > 9.5 8.5* 8.7   PROT  --  5.8*  --   --  7.2  --   --   --   --   --   --   --  7.4  --  7.1 7.0 6.7   BILITOT  --  0.6  --   --  0.8  --   --   --   --   --   --   --  0.7  --  0.5 0.6 0.8   ALKPHOS  --  126  --   --  194*  --   --   --   --   --   --   --  94  --  94 69 66   ALT  --  7*  --   --  11  --   --  10 24 12 9*   < > 13  --  12 13 8*   AST  --  11  --   --  18  --   --   --   --   --   --   --  19  --  16 15 10   GLUCOSE 99 122* 167* 147* 174* 89   < > 92 89 118* 96   < > 93   < > 96 141* 144*    < > = values in this interval not displayed.      Magnesium:   Recent Labs     12/02/24  1615 11/30/24  2343 07/11/21  1719   MG 2.13 2.76* 2.07     Lipid Panel:   Recent Labs     11/20/23  0910 11/18/22  1020 06/01/21  0844 06/10/20  1246 04/15/19  0735 02/26/18  0915   CHOL 147 157 114 131 166 148   HDL 56.4 58.9 56.4 65.3 49.4 51.6   CHHDL 2.6 2.7 2.0 2.0 3.4 2.9   VLDL 21 21 12 17 16 21   TRIG 105 104 61 84 80 104   NHDL 91  --   --   --   --   --      Cardiac       No lab exists for component: \"CK\", \"CKMBP\"   Hemoglobin A1C:   Recent Labs     06/21/24  1331 11/20/23  0910 07/18/23  0846 11/18/22  1020 11/01/19  0912 04/15/19  0735   HGBA1C 5.9* 6.1* 6.2* 6.2* 6.0 5.9     TSH/ Free T4:   Recent Labs     06/21/24  1331 11/18/22  1020 04/07/22  0905 01/13/22  1155 12/02/20  1433 06/10/20  1246 04/15/19  0735 02/26/18  0915   TSH 1.72 1.44 1.89 2.33 2.43 2.29 2.75 1.67     Iron:   Recent Labs     12/01/24  0518 06/21/24  1331 01/13/22  1155 07/11/21  1719 01/04/21  1351   FERRITIN  --  238 247  --  216   TIBC  --  288 279  --  309   IRONSAT  --  16* 27  --  15*   *  --   --  302*  --      Coag:     ABO: No results found for: \"ABO\"    Past Cardiology Tests (Last 3 Years):    EKG:  Recent Labs     12/01/24  1255 11/30/24  2342 11/25/24  0825   ATRRATE 81 101 69 "   VENTRATE 81 101 69   PRINT 206 234 232   QRSDUR 96 100 96   QTCFRED 422 416 419   QTCCALCB 443 453 428     Encounter Date: 11/30/24   Electrocardiogram, 12-lead PRN ACS symptoms   Result Value    Ventricular Rate 81    Atrial Rate 81    WV Interval 206    QRS Duration 96    QT Interval 382    QTC Calculation(Bazett) 443    P Axis 5    R Axis -43    T Axis -62    QRS Count 14    Q Onset 209    P Onset 106    P Offset 166    T Offset 400    QTC Fredericia 422    Narrative    Sinus rhythm with Premature atrial complexes  Left axis deviation  Minimal voltage criteria for LVH, may be normal variant ( R in aVL )  Nonspecific T wave abnormality  Abnormal ECG  When compared with ECG of 30-NOV-2024 23:37, (unconfirmed)  Premature atrial complexes are now Present  Nonspecific T wave abnormality now evident in Inferior leads  Nonspecific T wave abnormality, worse in Lateral leads  See ED provider note for full interpretation and clinical correlation  Confirmed by Radha Alas ([id number]) on 12/5/2024 2:02:12 PM     Echo:  Echocardiogram:   Transthoracic Echo (TTE) Complete 01/07/2025    PHYSICIAN INTERPRETATION:  Left Ventricle: Left ventricular ejection fraction is normal, calculated by Celestin's biplane at 63%. There are no regional left ventricular wall motion abnormalities. The left ventricular cavity size is normal. There is mildly increased septal and mildly increased posterior left ventricular wall thickness. There is left ventricular concentric remodeling. Spectral Doppler shows a Grade I (impaired relaxation pattern) of left ventricular diastolic filling with normal left atrial filling pressure.  Left Atrium: The left atrium is mild to moderately dilated.  Right Ventricle: The right ventricle is upper limits of normal in size. There is low normal right ventricular systolic function.  Right Atrium: The right atrium is normal in size.  Aortic Valve: The aortic valve is probably trileaflet. There is mild aortic  valve cusp calcification. There is evidence of moderate aortic valve stenosis. The aortic valve dimensionless index is 0.25. The peak aortic velocity was obtained from the apical view. There is trace aortic valve regurgitation. The peak instantaneous gradient of the aortic valve is 30 mmHg. The mean gradient of the aortic valve is 16 mmHg.  Mitral Valve: The mitral valve is normal in structure. There is mild mitral annular calcification. There is mild mitral valve regurgitation.  Tricuspid Valve: The tricuspid valve is structurally normal. There is mild tricuspid regurgitation.  Pulmonic Valve: The pulmonic valve is structurally normal. There is mild pulmonic valve regurgitation.  Pericardium: There is no pericardial effusion noted.  Aorta: The aortic root is normal. The Ao Sinus is 3.70 cm. The Asc Ao is 3.50 cm. There is upper limits of normal dilatation of the ascending aorta. The aortic root is at the upper limits of normal size.  Pulmonary Artery: The tricuspid regurgitant velocity is 2.36 m/s, and with an estimated right atrial pressure of 3 mmHg, the estimated pulmonary artery pressure is normal with the RVSP at 25.2 mmHg.  In comparison to the previous echocardiogram(s): Compared with study dated 4/5/2024, no significant change.      CONCLUSIONS:  1. Poorly visualized anatomical structures due to suboptimal image quality.  2. Left ventricular ejection fraction is normal, calculated by Celestin's biplane at 63%.  3. Spectral Doppler shows a Grade I (impaired relaxation pattern) of left ventricular diastolic filling with normal left atrial filling pressure.  4. There is low normal right ventricular systolic function.  5. The left atrium is mild to moderately dilated.  6. Moderate aortic valve stenosis.      Ejection Fractions:  LV EF   Date/Time Value Ref Range Status   01/07/2025 04:00 PM 63 %      Cath:  Coronary Angiography: No results found for this or any previous visit from the past 1800 days.    Right  Heart Cath: No results found for this or any previous visit from the past 1800 days.    Stress Test:  Nuclear:No results found for this or any previous visit from the past 1800 days.    Metabolic Stress: No results found for this or any previous visit from the past 1800 days.    Cardiac Imaging:  Cardiac Scoring: No results found for this or any previous visit from the past 1800 days.    Cardiac MRI: No results found for this or any previous visit from the past 1800 days.         Assessment/Plan  Assessment/Plan   Assessment & Plan      SANCHEZ  -C with Dr. Bhat on 2/4/25  -asa prior to procedure       NP discussed with Dr. Bhat regarding plan of care/ discharge plan      I spent 30 minutes in the professional and overall care of this patient.      Brian Basurto, APRN-CNP, DNP

## 2025-02-04 NOTE — SIGNIFICANT EVENT
Patient's Cr resulted at 1.9. NS@150ml/hr started. NP discussed with Dr. Bhat, who decided to cancel procedure today. Patient will be rescheduled.

## 2025-02-10 ENCOUNTER — TELEPHONE (OUTPATIENT)
Dept: CARDIOLOGY | Facility: CLINIC | Age: OVER 89
End: 2025-02-10
Payer: MEDICARE

## 2025-02-10 DIAGNOSIS — N18.30 CKD (CHRONIC KIDNEY DISEASE) STAGE 3, GFR 30-59 ML/MIN (MULTI): ICD-10-CM

## 2025-02-11 LAB
ANION GAP SERPL CALCULATED.4IONS-SCNC: 9 MMOL/L (CALC) (ref 7–17)
BUN SERPL-MCNC: 39 MG/DL (ref 7–25)
BUN/CREAT SERPL: 35 (CALC) (ref 6–22)
CALCIUM SERPL-MCNC: 9.3 MG/DL (ref 8.6–10.3)
CHLORIDE SERPL-SCNC: 106 MMOL/L (ref 98–110)
CO2 SERPL-SCNC: 24 MMOL/L (ref 20–32)
CREAT SERPL-MCNC: 1.11 MG/DL (ref 0.7–1.22)
EGFRCR SERPLBLD CKD-EPI 2021: 62 ML/MIN/1.73M2
ERYTHROCYTE [DISTWIDTH] IN BLOOD BY AUTOMATED COUNT: 13 % (ref 11–15)
GLUCOSE SERPL-MCNC: 92 MG/DL (ref 65–99)
HCT VFR BLD AUTO: 35.6 % (ref 38.5–50)
HGB BLD-MCNC: 11 G/DL (ref 13.2–17.1)
MAGNESIUM SERPL-MCNC: 2.1 MG/DL (ref 1.5–2.5)
MCH RBC QN AUTO: 27.8 PG (ref 27–33)
MCHC RBC AUTO-ENTMCNC: 30.9 G/DL (ref 32–36)
MCV RBC AUTO: 89.9 FL (ref 80–100)
PLATELET # BLD AUTO: 270 THOUSAND/UL (ref 140–400)
PMV BLD REES-ECKER: 10.6 FL (ref 7.5–12.5)
POTASSIUM SERPL-SCNC: 4.7 MMOL/L (ref 3.5–5.3)
RBC # BLD AUTO: 3.96 MILLION/UL (ref 4.2–5.8)
SODIUM SERPL-SCNC: 139 MMOL/L (ref 135–146)
WBC # BLD AUTO: 7.2 THOUSAND/UL (ref 3.8–10.8)

## 2025-02-18 ENCOUNTER — HOSPITAL ENCOUNTER (OUTPATIENT)
Facility: HOSPITAL | Age: OVER 89
Setting detail: OUTPATIENT SURGERY
Discharge: HOME | End: 2025-02-18
Attending: INTERNAL MEDICINE | Admitting: INTERNAL MEDICINE
Payer: MEDICARE

## 2025-02-18 VITALS
DIASTOLIC BLOOD PRESSURE: 75 MMHG | TEMPERATURE: 97.2 F | HEIGHT: 67 IN | OXYGEN SATURATION: 99 % | HEART RATE: 71 BPM | BODY MASS INDEX: 26.37 KG/M2 | WEIGHT: 168 LBS | RESPIRATION RATE: 16 BRPM | SYSTOLIC BLOOD PRESSURE: 148 MMHG

## 2025-02-18 DIAGNOSIS — I35.0 NONRHEUMATIC AORTIC (VALVE) STENOSIS: Primary | ICD-10-CM

## 2025-02-18 DIAGNOSIS — I20.0 UNSTABLE ANGINA (MULTI): ICD-10-CM

## 2025-02-18 DIAGNOSIS — I25.10 ATHEROSCLEROTIC HEART DISEASE OF NATIVE CORONARY ARTERY WITHOUT ANGINA PECTORIS: ICD-10-CM

## 2025-02-18 DIAGNOSIS — I20.89 OTHER FORMS OF ANGINA PECTORIS: ICD-10-CM

## 2025-02-18 PROCEDURE — 2550000001 HC RX 255 CONTRASTS: Performed by: INTERNAL MEDICINE

## 2025-02-18 PROCEDURE — 99152 MOD SED SAME PHYS/QHP 5/>YRS: CPT | Performed by: INTERNAL MEDICINE

## 2025-02-18 PROCEDURE — 93460 R&L HRT ART/VENTRICLE ANGIO: CPT | Performed by: INTERNAL MEDICINE

## 2025-02-18 PROCEDURE — 7100000010 HC PHASE TWO TIME - EACH INCREMENTAL 1 MINUTE: Performed by: INTERNAL MEDICINE

## 2025-02-18 PROCEDURE — 99153 MOD SED SAME PHYS/QHP EA: CPT | Performed by: INTERNAL MEDICINE

## 2025-02-18 PROCEDURE — 7100000009 HC PHASE TWO TIME - INITIAL BASE CHARGE: Performed by: INTERNAL MEDICINE

## 2025-02-18 PROCEDURE — 2720000007 HC OR 272 NO HCPCS: Performed by: INTERNAL MEDICINE

## 2025-02-18 PROCEDURE — 2500000005 HC RX 250 GENERAL PHARMACY W/O HCPCS: Performed by: INTERNAL MEDICINE

## 2025-02-18 PROCEDURE — C1887 CATHETER, GUIDING: HCPCS | Performed by: INTERNAL MEDICINE

## 2025-02-18 PROCEDURE — 99223 1ST HOSP IP/OBS HIGH 75: CPT | Performed by: NURSE PRACTITIONER

## 2025-02-18 PROCEDURE — C1894 INTRO/SHEATH, NON-LASER: HCPCS | Performed by: INTERNAL MEDICINE

## 2025-02-18 PROCEDURE — C1769 GUIDE WIRE: HCPCS | Performed by: INTERNAL MEDICINE

## 2025-02-18 PROCEDURE — 2500000004 HC RX 250 GENERAL PHARMACY W/ HCPCS (ALT 636 FOR OP/ED): Performed by: INTERNAL MEDICINE

## 2025-02-18 RX ORDER — NAPROXEN SODIUM 220 MG/1
81 TABLET, FILM COATED ORAL ONCE
Status: DISCONTINUED | OUTPATIENT
Start: 2025-02-18 | End: 2025-02-24 | Stop reason: HOSPADM

## 2025-02-18 RX ORDER — MIDAZOLAM HYDROCHLORIDE 1 MG/ML
INJECTION, SOLUTION INTRAMUSCULAR; INTRAVENOUS AS NEEDED
Status: DISCONTINUED | OUTPATIENT
Start: 2025-02-18 | End: 2025-02-18 | Stop reason: HOSPADM

## 2025-02-18 RX ORDER — FENTANYL CITRATE 50 UG/ML
INJECTION, SOLUTION INTRAMUSCULAR; INTRAVENOUS AS NEEDED
Status: DISCONTINUED | OUTPATIENT
Start: 2025-02-18 | End: 2025-02-18 | Stop reason: HOSPADM

## 2025-02-18 RX ORDER — ACETAMINOPHEN 325 MG/1
650 TABLET ORAL EVERY 6 HOURS PRN
Status: DISCONTINUED | OUTPATIENT
Start: 2025-02-18 | End: 2025-02-24 | Stop reason: HOSPADM

## 2025-02-18 RX ORDER — HYDRALAZINE HYDROCHLORIDE 20 MG/ML
INJECTION INTRAMUSCULAR; INTRAVENOUS AS NEEDED
Status: DISCONTINUED | OUTPATIENT
Start: 2025-02-18 | End: 2025-02-18 | Stop reason: HOSPADM

## 2025-02-18 RX ORDER — LIDOCAINE HYDROCHLORIDE 10 MG/ML
INJECTION, SOLUTION EPIDURAL; INFILTRATION; INTRACAUDAL; PERINEURAL AS NEEDED
Status: DISCONTINUED | OUTPATIENT
Start: 2025-02-18 | End: 2025-02-18 | Stop reason: HOSPADM

## 2025-02-18 RX ORDER — VITAMIN B COMPLEX
1 CAPSULE ORAL DAILY
COMMUNITY

## 2025-02-18 RX ORDER — CALCIUM CARBONATE 300MG(750)
200 TABLET,CHEWABLE ORAL DAILY
COMMUNITY

## 2025-02-18 ASSESSMENT — PAIN - FUNCTIONAL ASSESSMENT
PAIN_FUNCTIONAL_ASSESSMENT: 0-10

## 2025-02-18 ASSESSMENT — ENCOUNTER SYMPTOMS
RESPIRATORY NEGATIVE: 1
ENDOCRINE NEGATIVE: 1
PSYCHIATRIC NEGATIVE: 1
EYES NEGATIVE: 1
GASTROINTESTINAL NEGATIVE: 1
NEUROLOGICAL NEGATIVE: 1
MUSCULOSKELETAL NEGATIVE: 1
CONSTITUTIONAL NEGATIVE: 1
ALLERGIC/IMMUNOLOGIC NEGATIVE: 1
CARDIOVASCULAR NEGATIVE: 1
HEMATOLOGIC/LYMPHATIC NEGATIVE: 1

## 2025-02-18 ASSESSMENT — PAIN SCALES - GENERAL

## 2025-02-18 NOTE — POST-PROCEDURE NOTE
Physician Transition of Care Summary  Invasive Cardiovascular Lab    Procedure Date: 2/18/2025  Attending:    * Osiel Bhat - Primary  Resident/Fellow/Other Assistant: Surgeons and Role:  * No surgeons found with a matching role *    Indications:   Pre-op Diagnosis      * Nonrheumatic aortic (valve) stenosis [I35.0]     * Atherosclerotic heart disease of native coronary artery without angina pectoris [I25.10]     * Other forms of angina pectoris [I20.89]    Post-procedure diagnosis:   Post-op Diagnosis     * Nonrheumatic aortic (valve) stenosis [I35.0]     * Atherosclerotic heart disease of native coronary artery without angina pectoris [I25.10]     * Other forms of angina pectoris [I20.89]    Procedure(s):   Left & Right Heart Cath w Angiography & LV  60281 - MT R & L HRT CATH WINJX HRT ART& L VENTR IMG        Procedure Findings:   50% LAD  60% RAMUS  70% mid RCA    Mild    Mod Pulm HTN  Description of the Procedure: R/LHC via RBV/RFA      Complications:   NONE    Stents/Implants:   Implants       No implant documentation for this case.            Anticoagulation/Antiplatelet Plan:   NONE    Estimated Blood Loss:   10 mL    Anesthesia: Moderate Sedation Anesthesia Staff: No anesthesia staff entered.    Any Specimen(s) Removed:   No specimens collected during this procedure.    Disposition:   Escalate med RX for mod CAD      Electronically signed by: Osiel Bhat MD, 2/18/2025 5:34 PM

## 2025-02-18 NOTE — H&P
History Of Present Illness  Elroy Greco is a 89 y.o. male presenting with SANCHEZ, here for [facility]. PMH includes HTN, CKD, anemia, severe 2 vessel CAD on CCTA in 2018, HLD, spinal stenosis. Patient was rescheduled from his first appointment for [facility] d/t SURINDER. Cr has returned to baseline.    Past Medical History:  Past Medical History:   Diagnosis Date    Aortic stenosis     CAD (coronary artery disease)     Cardiology follow-up encounter 03/27/2024    JOHNNY Hernandez    CKD (chronic kidney disease)     Gout     H/O cardiovascular stress test 04/04/2016    H/O echocardiogram 04/05/2024    Hyperlipidemia     Hypertension     Nocturia 03/01/2017    Nocturia    Paget's disease of bone     Preoperative clearance     JOHNNY Hernandez 3/27/24   Should his echocardiogram remain stable, he will be cleared for his urologic procedure. He will follow up with us in clinic in one year. He knows to call for any concerns.    Spinal stenosis     Urinary incontinence, urge     Plan: Stage 1 & 2 Insertion Continence Control Stimulator Lead & Generator 11/27/24        Past Surgical History:  Past Surgical History:   Procedure Laterality Date    CT ANGIO CORONARY ART WITH HEARTFLOW IF SCORE >30%  11/12/2018    CT HEART CORONARY ANGIOGRAM 11/12/2018 [facility]    HERNIA REPAIR  07/18/2012    Hernia Repair    OTHER SURGICAL HISTORY  07/18/2012    Nerve Block Transforaminal Epidural    OTHER SURGICAL HISTORY  07/18/2012    Nerve Ablation    OTHER SURGICAL HISTORY      shoulder replacement    OTHER SURGICAL HISTORY      blockage in abdomen-part of intenstine removed    TOTAL KNEE ARTHROPLASTY  07/18/2012    Knee Replacement    TOTAL KNEE ARTHROPLASTY  07/18/2012    Knee Replacement          Social History:  Social History     Tobacco Use    Smoking status: Never    Smokeless tobacco: Never   Vaping Use    Vaping status: Never Used   Substance Use Topics    Alcohol use: Never    Drug use: Never       Family  History:  Family History   Problem Relation Name Age of Onset    Cancer Mother      Cancer Father          Allergies:  Allergies   Allergen Reactions    Finasteride Rash    Mirabegron Constipation    Penicillins Rash        Home Medications:  Current Outpatient Medications   Medication Instructions    acetaminophen (TYLENOL) 1,000 mg, Every 6 hours PRN    aspirin 81 mg, Daily    atorvastatin (LIPITOR) 40 mg, oral, Nightly    carboxymethylcellulose (Refresh Plus) 0.5 % ophthalmic solution 1 drop, 4 times daily    colchicine 0.6 mg tablet 1 tablet, Daily PRN    docusate sodium (STOOL SOFTENER ORAL) 2 tablets, 2 times daily    psyllium (Metamucil) powder 1 Dose, 2 times daily    tamsulosin (FLOMAX) 0.4 mg, oral, Daily       Inpatient Medications:  Scheduled medications   Medication Dose Route Frequency    aspirin  81 mg oral Once     PRN medications   Medication     Continuous Medications   Medication Dose Last Rate         Review of Systems   Constitutional: Negative.    HENT: Negative.     Eyes: Negative.    Respiratory: Negative.     Cardiovascular: Negative.    Gastrointestinal: Negative.    Endocrine: Negative.    Genitourinary: Negative.    Musculoskeletal: Negative.    Skin: Negative.    Allergic/Immunologic: Negative.    Neurological: Negative.    Hematological: Negative.    Psychiatric/Behavioral: Negative.            Physical Exam  Constitutional:       General: He is awake. He is not in acute distress.     Appearance: He is not ill-appearing.   Cardiovascular:      Rate and Rhythm: Normal rate and regular rhythm.      Pulses: Normal pulses.           Radial pulses are 2+ on the right side and 2+ on the left side.        Dorsalis pedis pulses are 2+ on the right side and 2+ on the left side.      Heart sounds: Murmur heard.   Pulmonary:      Effort: Pulmonary effort is normal.      Breath sounds: Normal breath sounds and air entry.   Abdominal:      General: Bowel sounds are normal.      Palpations: Abdomen  "is soft.      Tenderness: There is no abdominal tenderness.   Musculoskeletal:      Right lower leg: No edema.      Left lower leg: No edema.   Skin:     General: Skin is warm and dry.   Neurological:      General: No focal deficit present.      Mental Status: He is alert and oriented to person, place, and time.      GCS: GCS eye subscore is 4. GCS verbal subscore is 5. GCS motor subscore is 6.   Psychiatric:         Mood and Affect: Mood normal.         Behavior: Behavior is cooperative.        Sedation Plan    ASA 3     Mallampati class: II.           NPO since last night around 2000    Last Recorded Vitals  Blood pressure 158/84, pulse 63, temperature 36.4 °C (97.5 °F), temperature source Temporal, resp. rate 16, height 1.702 m (5' 7\"), weight 76.2 kg (168 lb), SpO2 99%.         Vitals from the Past 24 Hours  Heart Rate:  [63]   Temp:  [36.4 °C (97.5 °F)]   Resp:  [16]   BP: (158)/(84)   Height:  [170.2 cm (5' 7\")]   Weight:  [76.2 kg (168 lb)]   SpO2:  [99 %]          Relevant Results    Labs      CBC:   Recent Labs     02/10/25  0737 02/04/25  0959 02/03/25  1218 12/04/24  0631 12/02/24  1615 12/01/24  0518   WBC 7.2 7.3 7.1 10.2 12.2* 17.2*   HGB 11.0* 10.9* 11.1* 10.1* 10.5* 12.4*   HCT 35.6* 33.7* 35.4* 31.6* 33.0* 38.5*    246 282 183 181 208   MCV 89.9 86 88.1 88 89 89     BMP/CMP:   Recent Labs     02/10/25  0737 02/04/25  0959 02/03/25  1218 12/02/24  1615 12/01/24  0625 12/01/24  0518 11/30/24  2343 05/08/24  0938 11/20/23  0910 07/18/23  0846 11/18/22  1020 04/07/22  0905 07/21/20  0909 06/10/20  1246 08/01/18  0804 07/17/18  1133 05/21/18  0553 05/20/18  0605    134* 137 141 132* 134* 140   < > 141 141 144 139   < > 142   < > 140 139 137   K 4.7 4.9 5.5* 3.8 3.4* 2.9* 4.1   < > 4.1 4.5 4.1 4.0   < > 5.0   < > 3.6 3.9 3.7    103 102 111* 99 105 103   < > 105 106 106 105   < > 105   < > 101 106 104   BUN 39* 35* 29* 26* 27* 23 28*   < > 30* 26* 26* 22   < > 26*   < > 28* 25* 20 " "  CREATININE 1.11 1.90* 1.68* 1.16 1.13 0.91 1.16   < > 1.10 1.27 1.15 1.10   < > 1.17   < > 1.34* 1.09 1.26   CO2 24 24 24 24 25 21 27   < > 30 27 30 28   < > 27   < > 29 25 25   CALCIUM 9.3 9.0 9.2 7.7* 7.9* 6.4* 10.0   < > 9.1 9.1 9.3 8.9   < > 10.0   < > 9.5 8.5* 8.7   PROT  --   --   --  5.8*  --   --  7.2  --   --   --   --   --   --  7.4  --  7.1 7.0 6.7   BILITOT  --   --   --  0.6  --   --  0.8  --   --   --   --   --   --  0.7  --  0.5 0.6 0.8   ALKPHOS  --   --   --  126  --   --  194*  --   --   --   --   --   --  94  --  94 69 66   ALT  --   --   --  7*  --   --  11  --  10 24 12 9*   < > 13  --  12 13 8*   AST  --   --   --  11  --   --  18  --   --   --   --   --   --  19  --  16 15 10   GLUCOSE 92 99 86 122* 167* 147* 174*   < > 92 89 118* 96   < > 93   < > 96 141* 144*    < > = values in this interval not displayed.      Magnesium:   Recent Labs     02/10/25  0737 12/02/24  1615 11/30/24  2343 07/11/21  1719   MG 2.1 2.13 2.76* 2.07     Lipid Panel:   Recent Labs     11/20/23  0910 11/18/22  1020 06/01/21  0844 06/10/20  1246 04/15/19  0735 02/26/18  0915   CHOL 147 157 114 131 166 148   HDL 56.4 58.9 56.4 65.3 49.4 51.6   CHHDL 2.6 2.7 2.0 2.0 3.4 2.9   VLDL 21 21 12 17 16 21   TRIG 105 104 61 84 80 104   NHDL 91  --   --   --   --   --      Cardiac       No lab exists for component: \"CK\", \"CKMBP\"   Hemoglobin A1C:   Recent Labs     06/21/24  1331 11/20/23  0910 07/18/23  0846 11/18/22  1020 11/01/19  0912 04/15/19  0735   HGBA1C 5.9* 6.1* 6.2* 6.2* 6.0 5.9     TSH/ Free T4:   Recent Labs     06/21/24  1331 11/18/22  1020 04/07/22  0905 01/13/22  1155 12/02/20  1433 06/10/20  1246 04/15/19  0735 02/26/18  0915   TSH 1.72 1.44 1.89 2.33 2.43 2.29 2.75 1.67     Iron:   Recent Labs     12/01/24  0518 06/21/24  1331 01/13/22  1155 07/11/21  1719 01/04/21  1351   FERRITIN  --  238 247  --  216   TIBC  --  288 279  --  309   IRONSAT  --  16* 27  --  15*   *  --   --  302*  --      Coag:     ABO: " "No results found for: \"ABO\"    Past Cardiology Tests (Last 3 Years):    EKG:  Recent Labs     12/01/24  1255 11/30/24  2342 11/25/24  0825   ATRRATE 81 101 69   VENTRATE 81 101 69   PRINT 206 234 232   QRSDUR 96 100 96   QTCFRED 422 416 419   QTCCALCB 443 453 428     Encounter Date: 11/30/24   Electrocardiogram, 12-lead PRN ACS symptoms   Result Value    Ventricular Rate 81    Atrial Rate 81    AZ Interval 206    QRS Duration 96    QT Interval 382    QTC Calculation(Bazett) 443    P Axis 5    R Axis -43    T Axis -62    QRS Count 14    Q Onset 209    P Onset 106    P Offset 166    T Offset 400    QTC Fredericia 422    Narrative    Sinus rhythm with Premature atrial complexes  Left axis deviation  Minimal voltage criteria for LVH, may be normal variant ( R in aVL )  Nonspecific T wave abnormality  Abnormal ECG  When compared with ECG of 30-NOV-2024 23:37, (unconfirmed)  Premature atrial complexes are now Present  Nonspecific T wave abnormality now evident in Inferior leads  Nonspecific T wave abnormality, worse in Lateral leads  See ED provider note for full interpretation and clinical correlation  Confirmed by Radha Alas ([id number]) on 12/5/2024 2:02:12 PM     Echo:  Echocardiogram:   Transthoracic Echo (TTE) Complete 01/07/2025    PHYSICIAN INTERPRETATION:  Left Ventricle: Left ventricular ejection fraction is normal, calculated by Celestin's biplane at 63%. There are no regional left ventricular wall motion abnormalities. The left ventricular cavity size is normal. There is mildly increased septal and mildly increased posterior left ventricular wall thickness. There is left ventricular concentric remodeling. Spectral Doppler shows a Grade I (impaired relaxation pattern) of left ventricular diastolic filling with normal left atrial filling pressure.  Left Atrium: The left atrium is mild to moderately dilated.  Right Ventricle: The right ventricle is upper limits of normal in size. There is low normal right " ventricular systolic function.  Right Atrium: The right atrium is normal in size.  Aortic Valve: The aortic valve is probably trileaflet. There is mild aortic valve cusp calcification. There is evidence of moderate aortic valve stenosis. The aortic valve dimensionless index is 0.25. The peak aortic velocity was obtained from the apical view. There is trace aortic valve regurgitation. The peak instantaneous gradient of the aortic valve is 30 mmHg. The mean gradient of the aortic valve is 16 mmHg.  Mitral Valve: The mitral valve is normal in structure. There is mild mitral annular calcification. There is mild mitral valve regurgitation.  Tricuspid Valve: The tricuspid valve is structurally normal. There is mild tricuspid regurgitation.  Pulmonic Valve: The pulmonic valve is structurally normal. There is mild pulmonic valve regurgitation.  Pericardium: There is no pericardial effusion noted.  Aorta: The aortic root is normal. The Ao Sinus is 3.70 cm. The Asc Ao is 3.50 cm. There is upper limits of normal dilatation of the ascending aorta. The aortic root is at the upper limits of normal size.  Pulmonary Artery: The tricuspid regurgitant velocity is 2.36 m/s, and with an estimated right atrial pressure of 3 mmHg, the estimated pulmonary artery pressure is normal with the RVSP at 25.2 mmHg.  In comparison to the previous echocardiogram(s): Compared with study dated 4/5/2024, no significant change.      CONCLUSIONS:  1. Poorly visualized anatomical structures due to suboptimal image quality.  2. Left ventricular ejection fraction is normal, calculated by Celestin's biplane at 63%.  3. Spectral Doppler shows a Grade I (impaired relaxation pattern) of left ventricular diastolic filling with normal left atrial filling pressure.  4. There is low normal right ventricular systolic function.  5. The left atrium is mild to moderately dilated.  6. Moderate aortic valve stenosis.      Ejection Fractions:  LV EF   Date/Time Value Ref  Range Status   01/07/2025 04:00 PM 63 %      Cath:  Coronary Angiography: No results found for this or any previous visit from the past 1800 days.    Right Heart Cath: No results found for this or any previous visit from the past 1800 days.    Stress Test:  Nuclear:No results found for this or any previous visit from the past 1800 days.    Metabolic Stress: No results found for this or any previous visit from the past 1800 days.    Cardiac Imaging:  Cardiac Scoring: No results found for this or any previous visit from the past 1800 days.    Cardiac MRI: No results found for this or any previous visit from the past 1800 days.         Assessment/Plan  Assessment/Plan   Assessment & Plan      SANCHEZ  -C with Dr. Bhat on 2/4/25  -asa prior to procedure       NP discussed with Dr. Bhat regarding plan of care/ discharge plan      I spent 30 minutes in the professional and overall care of this patient.      Brian Basurto, APRN-CNP, DNP

## 2025-03-04 ENCOUNTER — OFFICE VISIT (OUTPATIENT)
Dept: CARDIOLOGY | Facility: HOSPITAL | Age: OVER 89
End: 2025-03-04
Payer: MEDICARE

## 2025-03-04 VITALS
HEIGHT: 67 IN | SYSTOLIC BLOOD PRESSURE: 135 MMHG | BODY MASS INDEX: 26.68 KG/M2 | DIASTOLIC BLOOD PRESSURE: 80 MMHG | WEIGHT: 170 LBS | HEART RATE: 66 BPM

## 2025-03-04 DIAGNOSIS — Z98.890 STATUS POST LEFT HEART CATHETERIZATION: Primary | ICD-10-CM

## 2025-03-04 PROCEDURE — 99214 OFFICE O/P EST MOD 30 MIN: CPT | Mod: 25 | Performed by: INTERNAL MEDICINE

## 2025-03-04 PROCEDURE — 1123F ACP DISCUSS/DSCN MKR DOCD: CPT | Performed by: INTERNAL MEDICINE

## 2025-03-04 PROCEDURE — 3075F SYST BP GE 130 - 139MM HG: CPT | Performed by: INTERNAL MEDICINE

## 2025-03-04 PROCEDURE — 1036F TOBACCO NON-USER: CPT | Performed by: INTERNAL MEDICINE

## 2025-03-04 PROCEDURE — 93005 ELECTROCARDIOGRAM TRACING: CPT | Performed by: INTERNAL MEDICINE

## 2025-03-04 PROCEDURE — 99214 OFFICE O/P EST MOD 30 MIN: CPT | Performed by: INTERNAL MEDICINE

## 2025-03-04 PROCEDURE — 93010 ELECTROCARDIOGRAM REPORT: CPT | Performed by: INTERNAL MEDICINE

## 2025-03-04 PROCEDURE — 1159F MED LIST DOCD IN RCRD: CPT | Performed by: INTERNAL MEDICINE

## 2025-03-04 PROCEDURE — 3079F DIAST BP 80-89 MM HG: CPT | Performed by: INTERNAL MEDICINE

## 2025-03-04 PROCEDURE — 1160F RVW MEDS BY RX/DR IN RCRD: CPT | Performed by: INTERNAL MEDICINE

## 2025-03-04 PROCEDURE — 1157F ADVNC CARE PLAN IN RCRD: CPT | Performed by: INTERNAL MEDICINE

## 2025-03-04 NOTE — PROGRESS NOTES
Subjective:  Patient returns for a post cath follow-up.  He underwent a recent catheterization for some concerning exertional symptomatology.  His catheterization showed only mild to moderate aortic stenosis.  His filling pressures were somewhat elevated but his cardiac index was normal.    He did have moderate LAD and ramus branch disease.  The most significant lesion was a 70% mid RCA lesion which might be his culprit.  I was pleased to see that his right femoral cath site has healed quite well.  He has not gotten back to his usual exercise program.  He comes in today for further evaluation.    Objective:  General: Alert, usual pleasant self.  HEENT: Unchanged.  Lungs: Generally clear without crackles.  Cardiac: Unchanged 2/6 systolic murmur.  Abdomen: Nontender.  Extremities: No edema.  Skin: No rash.  Neuro: Grossly unchanged.    EKG: Sinus rhythm with first AV block.  LVH.  No acute changes.    Lipid panel: Cholesterol-147, HDL-56, LDL-70, TG-105.    Impression/plan:  Elroy is doing well at this time following his recent catheterization.  He did not have any extremely severe coronary disease but I do suspect his mid RCA lesion potentially could be a culprit for some symptomatology.  Given his advanced age, I did elect to hold off on acute intervention.  I was pleased to see that his aortic valve disease is certainly not critical at this time.    I did not want to initiate beta-blocker therapy given his baseline bradycardia.  I was also hesitant to initiate amlodipine or long-acting nitrates due to fear of precipitating symptomatic hypotension in this gentleman with some aortic stenosis.    I instructed him to to continue to escalate his activity level slowly and to keep me apprised of how his symptomatology is doing.  Should he continue to be bothered by anginal type symptoms, I do think we could proceed with mid RCA stenting.    His lipid panel looks excellent on mid dose atorvastatin so we will continue  this unchanged.    He will call me with an update in 3 weeks, and I will see him back for routine follow-up in 3 months.  He knows to call for any intercurrent problems before then.    Patient instructions:    Escalate your activity level as directed.    Call with an update in 3 weeks.    Return to clinic in 3 months.

## 2025-03-05 LAB
ATRIAL RATE: 66 BPM
P AXIS: 12 DEGREES
P OFFSET: 156 MS
P ONSET: 93 MS
PR INTERVAL: 236 MS
Q ONSET: 211 MS
QRS COUNT: 10 BEATS
QRS DURATION: 96 MS
QT INTERVAL: 400 MS
QTC CALCULATION(BAZETT): 419 MS
QTC FREDERICIA: 413 MS
R AXIS: -40 DEGREES
T AXIS: -3 DEGREES
T OFFSET: 411 MS
VENTRICULAR RATE: 66 BPM

## 2025-03-09 ENCOUNTER — APPOINTMENT (OUTPATIENT)
Dept: CARDIOLOGY | Facility: HOSPITAL | Age: OVER 89
End: 2025-03-09
Payer: MEDICARE

## 2025-03-09 ENCOUNTER — HOSPITAL ENCOUNTER (OUTPATIENT)
Facility: HOSPITAL | Age: OVER 89
Setting detail: OBSERVATION
End: 2025-03-09
Attending: EMERGENCY MEDICINE | Admitting: STUDENT IN AN ORGANIZED HEALTH CARE EDUCATION/TRAINING PROGRAM
Payer: MEDICARE

## 2025-03-09 ENCOUNTER — APPOINTMENT (OUTPATIENT)
Dept: RADIOLOGY | Facility: HOSPITAL | Age: OVER 89
End: 2025-03-09
Payer: MEDICARE

## 2025-03-09 VITALS
WEIGHT: 170 LBS | TEMPERATURE: 98.6 F | DIASTOLIC BLOOD PRESSURE: 88 MMHG | RESPIRATION RATE: 18 BRPM | OXYGEN SATURATION: 98 % | BODY MASS INDEX: 26.68 KG/M2 | HEART RATE: 72 BPM | HEIGHT: 67 IN | SYSTOLIC BLOOD PRESSURE: 152 MMHG

## 2025-03-09 DIAGNOSIS — K92.0 HEMATEMESIS, UNSPECIFIED WHETHER NAUSEA PRESENT: ICD-10-CM

## 2025-03-09 DIAGNOSIS — M10.9 ACUTE GOUT OF LEFT FOOT, UNSPECIFIED CAUSE: ICD-10-CM

## 2025-03-09 DIAGNOSIS — R07.9 CHEST PAIN, UNSPECIFIED TYPE: Primary | ICD-10-CM

## 2025-03-09 LAB
ALBUMIN SERPL BCP-MCNC: 4 G/DL (ref 3.4–5)
ALP SERPL-CCNC: 190 U/L (ref 33–136)
ALT SERPL W P-5'-P-CCNC: 10 U/L (ref 10–52)
ANION GAP SERPL CALC-SCNC: 12 MMOL/L (ref 10–20)
APPEARANCE UR: CLEAR
APTT PPP: 27 SECONDS (ref 26–36)
AST SERPL W P-5'-P-CCNC: 18 U/L (ref 9–39)
BASOPHILS # BLD AUTO: 0.01 X10*3/UL (ref 0–0.1)
BASOPHILS NFR BLD AUTO: 0.1 %
BILIRUB SERPL-MCNC: 0.4 MG/DL (ref 0–1.2)
BILIRUB UR STRIP.AUTO-MCNC: NEGATIVE MG/DL
BNP SERPL-MCNC: 246 PG/ML (ref 0–99)
BUN SERPL-MCNC: 29 MG/DL (ref 6–23)
CALCIUM SERPL-MCNC: 9.3 MG/DL (ref 8.6–10.3)
CARDIAC TROPONIN I PNL SERPL HS: 5 NG/L (ref 0–20)
CARDIAC TROPONIN I PNL SERPL HS: 5 NG/L (ref 0–20)
CHLORIDE SERPL-SCNC: 106 MMOL/L (ref 98–107)
CO2 SERPL-SCNC: 26 MMOL/L (ref 21–32)
COLOR UR: COLORLESS
CREAT SERPL-MCNC: 1.1 MG/DL (ref 0.5–1.3)
EGFRCR SERPLBLD CKD-EPI 2021: 62 ML/MIN/1.73M*2
EOSINOPHIL # BLD AUTO: 0.01 X10*3/UL (ref 0–0.4)
EOSINOPHIL NFR BLD AUTO: 0.1 %
ERYTHROCYTE [DISTWIDTH] IN BLOOD BY AUTOMATED COUNT: 14.6 % (ref 11.5–14.5)
FLUAV RNA RESP QL NAA+PROBE: NOT DETECTED
FLUBV RNA RESP QL NAA+PROBE: NOT DETECTED
GLUCOSE SERPL-MCNC: 154 MG/DL (ref 74–99)
GLUCOSE UR STRIP.AUTO-MCNC: NORMAL MG/DL
HCT VFR BLD AUTO: 36.3 % (ref 41–52)
HGB BLD-MCNC: 11.3 G/DL (ref 13.5–17.5)
IMM GRANULOCYTES # BLD AUTO: 0.04 X10*3/UL (ref 0–0.5)
IMM GRANULOCYTES NFR BLD AUTO: 0.4 % (ref 0–0.9)
INR PPP: 1.3 (ref 0.9–1.1)
KETONES UR STRIP.AUTO-MCNC: NEGATIVE MG/DL
LEUKOCYTE ESTERASE UR QL STRIP.AUTO: NEGATIVE
LIPASE SERPL-CCNC: 20 U/L (ref 9–82)
LYMPHOCYTES # BLD AUTO: 0.92 X10*3/UL (ref 0.8–3)
LYMPHOCYTES NFR BLD AUTO: 9.3 %
MAGNESIUM SERPL-MCNC: 1.84 MG/DL (ref 1.6–2.4)
MCH RBC QN AUTO: 27.8 PG (ref 26–34)
MCHC RBC AUTO-ENTMCNC: 31.1 G/DL (ref 32–36)
MCV RBC AUTO: 89 FL (ref 80–100)
MONOCYTES # BLD AUTO: 0.39 X10*3/UL (ref 0.05–0.8)
MONOCYTES NFR BLD AUTO: 3.9 %
MUCOUS THREADS #/AREA URNS AUTO: NORMAL /LPF
NEUTROPHILS # BLD AUTO: 8.57 X10*3/UL (ref 1.6–5.5)
NEUTROPHILS NFR BLD AUTO: 86.2 %
NITRITE UR QL STRIP.AUTO: NEGATIVE
NRBC BLD-RTO: 0 /100 WBCS (ref 0–0)
PH UR STRIP.AUTO: 7 [PH]
PLATELET # BLD AUTO: 185 X10*3/UL (ref 150–450)
POTASSIUM SERPL-SCNC: 4 MMOL/L (ref 3.5–5.3)
PROT SERPL-MCNC: 7.4 G/DL (ref 6.4–8.2)
PROT UR STRIP.AUTO-MCNC: ABNORMAL MG/DL
PROTHROMBIN TIME: 14.1 SECONDS (ref 9.8–12.4)
RBC # BLD AUTO: 4.07 X10*6/UL (ref 4.5–5.9)
RBC # UR STRIP.AUTO: NEGATIVE MG/DL
RBC #/AREA URNS AUTO: NORMAL /HPF
SARS-COV-2 RNA RESP QL NAA+PROBE: NOT DETECTED
SODIUM SERPL-SCNC: 140 MMOL/L (ref 136–145)
SP GR UR STRIP.AUTO: 1.01
UROBILINOGEN UR STRIP.AUTO-MCNC: NORMAL MG/DL
WBC # BLD AUTO: 9.9 X10*3/UL (ref 4.4–11.3)
WBC #/AREA URNS AUTO: NORMAL /HPF

## 2025-03-09 PROCEDURE — 71045 X-RAY EXAM CHEST 1 VIEW: CPT

## 2025-03-09 PROCEDURE — 36415 COLL VENOUS BLD VENIPUNCTURE: CPT | Performed by: EMERGENCY MEDICINE

## 2025-03-09 PROCEDURE — 83690 ASSAY OF LIPASE: CPT | Performed by: EMERGENCY MEDICINE

## 2025-03-09 PROCEDURE — 84484 ASSAY OF TROPONIN QUANT: CPT | Performed by: EMERGENCY MEDICINE

## 2025-03-09 PROCEDURE — 93005 ELECTROCARDIOGRAM TRACING: CPT

## 2025-03-09 PROCEDURE — G0378 HOSPITAL OBSERVATION PER HR: HCPCS

## 2025-03-09 PROCEDURE — 2500000001 HC RX 250 WO HCPCS SELF ADMINISTERED DRUGS (ALT 637 FOR MEDICARE OP): Performed by: STUDENT IN AN ORGANIZED HEALTH CARE EDUCATION/TRAINING PROGRAM

## 2025-03-09 PROCEDURE — 85610 PROTHROMBIN TIME: CPT | Performed by: EMERGENCY MEDICINE

## 2025-03-09 PROCEDURE — 81001 URINALYSIS AUTO W/SCOPE: CPT | Performed by: EMERGENCY MEDICINE

## 2025-03-09 PROCEDURE — 80053 COMPREHEN METABOLIC PANEL: CPT | Performed by: EMERGENCY MEDICINE

## 2025-03-09 PROCEDURE — 2500000004 HC RX 250 GENERAL PHARMACY W/ HCPCS (ALT 636 FOR OP/ED): Performed by: EMERGENCY MEDICINE

## 2025-03-09 PROCEDURE — 2500000002 HC RX 250 W HCPCS SELF ADMINISTERED DRUGS (ALT 637 FOR MEDICARE OP, ALT 636 FOR OP/ED): Performed by: STUDENT IN AN ORGANIZED HEALTH CARE EDUCATION/TRAINING PROGRAM

## 2025-03-09 PROCEDURE — 83880 ASSAY OF NATRIURETIC PEPTIDE: CPT | Performed by: EMERGENCY MEDICINE

## 2025-03-09 PROCEDURE — 2500000004 HC RX 250 GENERAL PHARMACY W/ HCPCS (ALT 636 FOR OP/ED): Performed by: STUDENT IN AN ORGANIZED HEALTH CARE EDUCATION/TRAINING PROGRAM

## 2025-03-09 PROCEDURE — 96374 THER/PROPH/DIAG INJ IV PUSH: CPT

## 2025-03-09 PROCEDURE — 85025 COMPLETE CBC W/AUTO DIFF WBC: CPT | Performed by: EMERGENCY MEDICINE

## 2025-03-09 PROCEDURE — 85730 THROMBOPLASTIN TIME PARTIAL: CPT | Performed by: EMERGENCY MEDICINE

## 2025-03-09 PROCEDURE — 83735 ASSAY OF MAGNESIUM: CPT | Performed by: EMERGENCY MEDICINE

## 2025-03-09 PROCEDURE — 96375 TX/PRO/DX INJ NEW DRUG ADDON: CPT

## 2025-03-09 PROCEDURE — 99285 EMERGENCY DEPT VISIT HI MDM: CPT | Mod: 25 | Performed by: EMERGENCY MEDICINE

## 2025-03-09 PROCEDURE — 71045 X-RAY EXAM CHEST 1 VIEW: CPT | Mod: FOREIGN READ | Performed by: RADIOLOGY

## 2025-03-09 PROCEDURE — 87636 SARSCOV2 & INF A&B AMP PRB: CPT | Performed by: EMERGENCY MEDICINE

## 2025-03-09 RX ORDER — PANTOPRAZOLE SODIUM 40 MG/10ML
40 INJECTION, POWDER, LYOPHILIZED, FOR SOLUTION INTRAVENOUS DAILY
Status: DISCONTINUED | OUTPATIENT
Start: 2025-03-09 | End: 2025-03-09

## 2025-03-09 RX ORDER — ENOXAPARIN SODIUM 100 MG/ML
40 INJECTION SUBCUTANEOUS EVERY 24 HOURS
Status: DISPENSED | OUTPATIENT
Start: 2025-03-09

## 2025-03-09 RX ORDER — ACETAMINOPHEN 325 MG/1
650 TABLET ORAL EVERY 6 HOURS PRN
Status: ACTIVE | OUTPATIENT
Start: 2025-03-09

## 2025-03-09 RX ORDER — ATORVASTATIN CALCIUM 40 MG/1
40 TABLET, FILM COATED ORAL NIGHTLY
Status: DISPENSED | OUTPATIENT
Start: 2025-03-09

## 2025-03-09 RX ORDER — ASPIRIN 81 MG/1
81 TABLET ORAL DAILY
Status: DISPENSED | OUTPATIENT
Start: 2025-03-09

## 2025-03-09 RX ORDER — COLCHICINE 0.6 MG/1
0.6 TABLET ORAL DAILY PRN
Status: DISPENSED | OUTPATIENT
Start: 2025-03-09

## 2025-03-09 RX ORDER — ONDANSETRON HYDROCHLORIDE 2 MG/ML
4 INJECTION, SOLUTION INTRAVENOUS ONCE
Status: COMPLETED | OUTPATIENT
Start: 2025-03-09 | End: 2025-03-09

## 2025-03-09 RX ORDER — TAMSULOSIN HYDROCHLORIDE 0.4 MG/1
0.4 CAPSULE ORAL DAILY
Status: DISPENSED | OUTPATIENT
Start: 2025-03-09

## 2025-03-09 RX ORDER — PANTOPRAZOLE SODIUM 40 MG/10ML
40 INJECTION, POWDER, LYOPHILIZED, FOR SOLUTION INTRAVENOUS DAILY
Status: DISPENSED | OUTPATIENT
Start: 2025-03-09

## 2025-03-09 RX ADMIN — TAMSULOSIN HYDROCHLORIDE 0.4 MG: 0.4 CAPSULE ORAL at 11:11

## 2025-03-09 RX ADMIN — ASPIRIN 81 MG: 81 TABLET, COATED ORAL at 11:11

## 2025-03-09 RX ADMIN — ONDANSETRON 4 MG: 2 INJECTION INTRAMUSCULAR; INTRAVENOUS at 08:31

## 2025-03-09 RX ADMIN — ENOXAPARIN SODIUM 40 MG: 100 INJECTION SUBCUTANEOUS at 08:23

## 2025-03-09 RX ADMIN — PANTOPRAZOLE SODIUM 40 MG: 40 INJECTION, POWDER, FOR SOLUTION INTRAVENOUS at 08:48

## 2025-03-09 RX ADMIN — ATORVASTATIN CALCIUM 40 MG: 40 TABLET, FILM COATED ORAL at 20:13

## 2025-03-09 SDOH — ECONOMIC STABILITY: INCOME INSECURITY: IN THE PAST 12 MONTHS HAS THE ELECTRIC, GAS, OIL, OR WATER COMPANY THREATENED TO SHUT OFF SERVICES IN YOUR HOME?: NO

## 2025-03-09 SDOH — SOCIAL STABILITY: SOCIAL INSECURITY: DOES ANYONE TRY TO KEEP YOU FROM HAVING/CONTACTING OTHER FRIENDS OR DOING THINGS OUTSIDE YOUR HOME?: NO

## 2025-03-09 SDOH — SOCIAL STABILITY: SOCIAL INSECURITY: WITHIN THE LAST YEAR, HAVE YOU BEEN AFRAID OF YOUR PARTNER OR EX-PARTNER?: NO

## 2025-03-09 SDOH — SOCIAL STABILITY: SOCIAL INSECURITY: WITHIN THE LAST YEAR, HAVE YOU BEEN HUMILIATED OR EMOTIONALLY ABUSED IN OTHER WAYS BY YOUR PARTNER OR EX-PARTNER?: NO

## 2025-03-09 SDOH — ECONOMIC STABILITY: FOOD INSECURITY: WITHIN THE PAST 12 MONTHS, YOU WORRIED THAT YOUR FOOD WOULD RUN OUT BEFORE YOU GOT THE MONEY TO BUY MORE.: NEVER TRUE

## 2025-03-09 SDOH — SOCIAL STABILITY: SOCIAL INSECURITY: HAVE YOU HAD ANY THOUGHTS OF HARMING ANYONE ELSE?: NO

## 2025-03-09 SDOH — SOCIAL STABILITY: SOCIAL INSECURITY: ARE YOU OR HAVE YOU BEEN THREATENED OR ABUSED PHYSICALLY, EMOTIONALLY, OR SEXUALLY BY ANYONE?: NO

## 2025-03-09 SDOH — SOCIAL STABILITY: SOCIAL INSECURITY: WERE YOU ABLE TO COMPLETE ALL THE BEHAVIORAL HEALTH SCREENINGS?: YES

## 2025-03-09 SDOH — SOCIAL STABILITY: SOCIAL INSECURITY: HAS ANYONE EVER THREATENED TO HURT YOUR FAMILY OR YOUR PETS?: NO

## 2025-03-09 SDOH — ECONOMIC STABILITY: FOOD INSECURITY: WITHIN THE PAST 12 MONTHS, THE FOOD YOU BOUGHT JUST DIDN'T LAST AND YOU DIDN'T HAVE MONEY TO GET MORE.: NEVER TRUE

## 2025-03-09 SDOH — SOCIAL STABILITY: SOCIAL INSECURITY: HAVE YOU HAD THOUGHTS OF HARMING ANYONE ELSE?: NO

## 2025-03-09 SDOH — SOCIAL STABILITY: SOCIAL INSECURITY: DO YOU FEEL UNSAFE GOING BACK TO THE PLACE WHERE YOU ARE LIVING?: NO

## 2025-03-09 SDOH — SOCIAL STABILITY: SOCIAL INSECURITY: DO YOU FEEL ANYONE HAS EXPLOITED OR TAKEN ADVANTAGE OF YOU FINANCIALLY OR OF YOUR PERSONAL PROPERTY?: NO

## 2025-03-09 SDOH — SOCIAL STABILITY: SOCIAL INSECURITY: ARE THERE ANY APPARENT SIGNS OF INJURIES/BEHAVIORS THAT COULD BE RELATED TO ABUSE/NEGLECT?: NO

## 2025-03-09 SDOH — SOCIAL STABILITY: SOCIAL INSECURITY: ABUSE: ADULT

## 2025-03-09 ASSESSMENT — ACTIVITIES OF DAILY LIVING (ADL)
DRESSING YOURSELF: NEEDS ASSISTANCE
LACK_OF_TRANSPORTATION: NO
LACK_OF_TRANSPORTATION: NO
GROOMING: NEEDS ASSISTANCE
BATHING: NEEDS ASSISTANCE
PATIENT'S MEMORY ADEQUATE TO SAFELY COMPLETE DAILY ACTIVITIES?: YES
WALKS IN HOME: NEEDS ASSISTANCE
HEARING - RIGHT EAR: FUNCTIONAL
TOILETING: NEEDS ASSISTANCE
HEARING - LEFT EAR: FUNCTIONAL
ADEQUATE_TO_COMPLETE_ADL: YES
LACK_OF_TRANSPORTATION: NO
JUDGMENT_ADEQUATE_SAFELY_COMPLETE_DAILY_ACTIVITIES: YES
FEEDING YOURSELF: INDEPENDENT

## 2025-03-09 ASSESSMENT — PAIN - FUNCTIONAL ASSESSMENT
PAIN_FUNCTIONAL_ASSESSMENT: 0-10

## 2025-03-09 ASSESSMENT — COGNITIVE AND FUNCTIONAL STATUS - GENERAL
MOBILITY SCORE: 18
MOVING TO AND FROM BED TO CHAIR: A LITTLE
TURNING FROM BACK TO SIDE WHILE IN FLAT BAD: A LITTLE
DRESSING REGULAR UPPER BODY CLOTHING: A LITTLE
PATIENT BASELINE BEDBOUND: NO
HELP NEEDED FOR BATHING: A LITTLE
DRESSING REGULAR LOWER BODY CLOTHING: A LITTLE
HELP NEEDED FOR BATHING: A LITTLE
PERSONAL GROOMING: A LITTLE
HELP NEEDED FOR BATHING: A LITTLE
DRESSING REGULAR LOWER BODY CLOTHING: A LITTLE
WALKING IN HOSPITAL ROOM: A LITTLE
PERSONAL GROOMING: A LITTLE
MOBILITY SCORE: 18
MOBILITY SCORE: 18
PATIENT BASELINE BEDBOUND: NO
TOILETING: A LITTLE
MOVING TO AND FROM BED TO CHAIR: A LITTLE
DAILY ACTIVITIY SCORE: 19
STANDING UP FROM CHAIR USING ARMS: A LITTLE
MOVING FROM LYING ON BACK TO SITTING ON SIDE OF FLAT BED WITH BEDRAILS: A LITTLE
WALKING IN HOSPITAL ROOM: A LITTLE
CLIMB 3 TO 5 STEPS WITH RAILING: A LITTLE
EATING MEALS: A LITTLE
MOVING TO AND FROM BED TO CHAIR: A LITTLE
TOILETING: A LITTLE
DRESSING REGULAR UPPER BODY CLOTHING: A LITTLE
WALKING IN HOSPITAL ROOM: A LITTLE
CLIMB 3 TO 5 STEPS WITH RAILING: A LITTLE
DRESSING REGULAR LOWER BODY CLOTHING: A LITTLE
STANDING UP FROM CHAIR USING ARMS: A LITTLE
TURNING FROM BACK TO SIDE WHILE IN FLAT BAD: A LITTLE
PERSONAL GROOMING: A LITTLE
CLIMB 3 TO 5 STEPS WITH RAILING: A LITTLE
TOILETING: A LITTLE
MOVING FROM LYING ON BACK TO SITTING ON SIDE OF FLAT BED WITH BEDRAILS: A LITTLE
TURNING FROM BACK TO SIDE WHILE IN FLAT BAD: A LITTLE
DRESSING REGULAR UPPER BODY CLOTHING: A LITTLE
MOVING FROM LYING ON BACK TO SITTING ON SIDE OF FLAT BED WITH BEDRAILS: A LITTLE
DAILY ACTIVITIY SCORE: 18
STANDING UP FROM CHAIR USING ARMS: A LITTLE

## 2025-03-09 ASSESSMENT — LIFESTYLE VARIABLES
HOW OFTEN DO YOU HAVE 6 OR MORE DRINKS ON ONE OCCASION: NEVER
SKIP TO QUESTIONS 9-10: 1
AUDIT-C TOTAL SCORE: 0
HOW MANY STANDARD DRINKS CONTAINING ALCOHOL DO YOU HAVE ON A TYPICAL DAY: PATIENT DOES NOT DRINK
HOW OFTEN DO YOU HAVE A DRINK CONTAINING ALCOHOL: NEVER
AUDIT-C TOTAL SCORE: 0

## 2025-03-09 ASSESSMENT — PAIN DESCRIPTION - FREQUENCY: FREQUENCY: CONSTANT/CONTINUOUS

## 2025-03-09 ASSESSMENT — PAIN DESCRIPTION - DESCRIPTORS
DESCRIPTORS: PRESSURE
DESCRIPTORS: PRESSURE;ACHING
DESCRIPTORS: ACHING
DESCRIPTORS: PRESSURE
DESCRIPTORS: ACHING

## 2025-03-09 ASSESSMENT — PATIENT HEALTH QUESTIONNAIRE - PHQ9
2. FEELING DOWN, DEPRESSED OR HOPELESS: NOT AT ALL
1. LITTLE INTEREST OR PLEASURE IN DOING THINGS: NOT AT ALL
SUM OF ALL RESPONSES TO PHQ9 QUESTIONS 1 & 2: 0

## 2025-03-09 ASSESSMENT — PAIN SCALES - GENERAL
PAINLEVEL_OUTOF10: 5 - MODERATE PAIN
PAINLEVEL_OUTOF10: 1
PAINLEVEL_OUTOF10: 4
PAINLEVEL_OUTOF10: 4
PAINLEVEL_OUTOF10: 0 - NO PAIN

## 2025-03-09 ASSESSMENT — PAIN DESCRIPTION - PAIN TYPE
TYPE: ACUTE PAIN
TYPE: ACUTE PAIN

## 2025-03-09 ASSESSMENT — PAIN DESCRIPTION - LOCATION
LOCATION: CHEST
LOCATION: CHEST

## 2025-03-09 NOTE — PROGRESS NOTES
"   This nurse received a phone call from the patient's wife this shift. The patient's wife, Mrs Greco stated, \"My  had a heart cath procedure done before his admission to [facility]. On that same day he left his heart cath and was told not to do any heavy lifting. 20 to 30 mins after his cath he saw an over 300lb man on the ground in the parking lot. My  and 2 other guys lifted the andreea into his car. Not too long after this he started to experience his pains.\" MD has been notified of this conversation. Patient is now resting in bed watching television, call light is within reach.  "

## 2025-03-09 NOTE — PROGRESS NOTES
Pharmacy Medication History     Source of Information: PATIENT AND SPOUSE    Additional concerns with the patient's PTA list.     The following updates were made to the Prior to Admission medication list:     Medications ADDED:   N/A  Medications CHANGED:  N/A  Medications REMOVED:   B COMPLETE VITAMIN  MAG- MG  REFRESH PLUS 0.5 % OPTH SOL  Medications NOT TAKING:   B COMPLETE VITAMIN  MAG- MG  REFRESH PLUS 0.5 % OPTH SOL    Allergy reviewed : Yes    Meds 2 Beds : No    Outpatient pharmacy confirmed and updated in chart : Yes    Ph[facility] name: [facility]    The list below reflectives the updated PTA list. Please review each medication in order reconciliation for additional clarification and justification.    Prior to Admission Medications   Prescriptions Last Dose      acetaminophen (Tylenol) 500 mg tablet       Sig: Take 2 tablets (1,000 mg) by mouth every 6 hours if needed for mild pain (1 - 3).   aspirin 81 mg EC tablet 3/8/2025 Morning      Sig: Take 1 tablet (81 mg) by mouth once daily.   atorvastatin (Lipitor) 40 mg tablet 3/8/2025 Bedtime      Sig: Take 1 tablet (40 mg) by mouth once daily at bedtime.                             colchicine 0.6 mg tablet       Sig: Take 1 tablet (0.6 mg) by mouth once daily as needed.   docusate sodium (STOOL SOFTENER ORAL) 3/8/2025 Evening      Sig: Take 2 tablets by mouth 2 times a day.                psyllium (Metamucil) powder Past Week      Sig: Take 1 Dose (5.8 g) by mouth 2 times a day.   tamsulosin (Flomax) 0.4 mg 24 hr capsule Past Month      Sig: Take 1 capsule (0.4 mg) by mouth once daily.      Facility-Administered Medications: None       The list below reflectives the updated allergy list. Please review each documented allergy for additional clarification and justification.    Allergies   Allergen Reactions    Finasteride Rash    Mirabegron Constipation    Penicillins Rash          03/09/25 at 1:47 PM - Erika Rodriguez

## 2025-03-09 NOTE — H&P
History Of Present Illness  Elroy Greco is a 89 y.o. male presenting with chest pain.  He reports the chest pain occurred last evening.  He reports he normally is fairly active and does exercise, however given he had a cardiac cath about 3 weeks ago he has not exercised since then.  He reports he did have an exercise band and was doing some upper arm exercises and did about 4-5 reps when he felt the need to stop.  He was then getting ready for bed when he developed the chest pain.  He reports it was more throughout his whole chest rather than midsternal or one-sided.  He best describes it as an ache, almost as if someone was sitting on his chest.  He endorses shortness of breath and nausea during that time.  He does endorse 1 episode of emesis.  He denies any radiating arm pain or jaw pain.  He reports that the chest pain never resolved only diminished a little bit and he is still experiencing some slight discomfort at this time.       PMHX: CAD, HTN, depression    Past Medical History  Past Medical History:   Diagnosis Date    Aortic stenosis     CAD (coronary artery disease)     Cardiology follow-up encounter 03/27/2024    JOHNNY Hernandez    CKD (chronic kidney disease)     Gout     H/O cardiovascular stress test 04/04/2016    H/O echocardiogram 04/05/2024    Hyperlipidemia     Hypertension     Nocturia 03/01/2017    Nocturia    Paget's disease of bone     Preoperative clearance     JOHNNY Hernandez 3/27/24   Should his echocardiogram remain stable, he will be cleared for his urologic procedure. He will follow up with us in clinic in one year. He knows to call for any concerns.    Spinal stenosis     Urinary incontinence, urge     Plan: Stage 1 & 2 Insertion Continence Control Stimulator Lead & Generator 11/27/24       Surgical History  Past Surgical History:   Procedure Laterality Date    CARDIAC CATHETERIZATION N/A 2/18/2025    Procedure: Left & Right Heart Cath w Angiography & LV;  Surgeon:  "Osiel Dickens MD;  Location: [facility] Cardiac Cath Lab;  Service: Cardiovascular;  Laterality: N/A;  LEFT AND RIGHT HEART CATH TUES FEB 18TH, 2025 AT 11:00 AM PT WILL ARRIVE AT 9:30 AM @ [facility] - DR. DICKENS    CT ANGIO CORONARY ART WITH HEARTFLOW IF SCORE >30%  11/12/2018    CT HEART CORONARY ANGIOGRAM 11/12/2018 [facility] ANCILLARY LEGACY    HERNIA REPAIR  07/18/2012    Hernia Repair    OTHER SURGICAL HISTORY  07/18/2012    Nerve Block Transforaminal Epidural    OTHER SURGICAL HISTORY  07/18/2012    Nerve Ablation    OTHER SURGICAL HISTORY      shoulder replacement    OTHER SURGICAL HISTORY      blockage in abdomen-part of intenstine removed    TOTAL KNEE ARTHROPLASTY  07/18/2012    Knee Replacement    TOTAL KNEE ARTHROPLASTY  07/18/2012    Knee Replacement        Social History  He reports that he has never smoked. He has never used smokeless tobacco. He reports that he does not drink alcohol and does not use drugs.    Family History  Family History   Problem Relation Name Age of Onset    Cancer Mother      Cancer Father          Allergies  Finasteride, Mirabegron, and Penicillins    Review of Systems     Physical Exam     Last Recorded Vitals  Blood pressure (!) 169/97, pulse 83, temperature 36.8 °C (98.3 °F), resp. rate 17, height 1.702 m (5' 7\"), weight 77.1 kg (170 lb), SpO2 95%.    Relevant Results  Scheduled medications  aspirin, 81 mg, oral, Daily  atorvastatin, 40 mg, oral, Nightly  enoxaparin, 40 mg, subcutaneous, q24h  pantoprazole, 40 mg, intravenous, Daily  tamsulosin, 0.4 mg, oral, Daily      Continuous medications     PRN medications  PRN medications: colchicine    Results for orders placed or performed during the hospital encounter of 03/09/25 (from the past 24 hours)   Sars-CoV-2 and Influenza A/B PCR   Result Value Ref Range    Flu A Result Not Detected Not Detected    Flu B Result Not Detected Not Detected    Coronavirus 2019, PCR Not Detected Not Detected   CBC and Auto Differential   Result " Value Ref Range    WBC 9.9 4.4 - 11.3 x10*3/uL    nRBC 0.0 0.0 - 0.0 /100 WBCs    RBC 4.07 (L) 4.50 - 5.90 x10*6/uL    Hemoglobin 11.3 (L) 13.5 - 17.5 g/dL    Hematocrit 36.3 (L) 41.0 - 52.0 %    MCV 89 80 - 100 fL    MCH 27.8 26.0 - 34.0 pg    MCHC 31.1 (L) 32.0 - 36.0 g/dL    RDW 14.6 (H) 11.5 - 14.5 %    Platelets 185 150 - 450 x10*3/uL    Neutrophils % 86.2 40.0 - 80.0 %    Immature Granulocytes %, Automated 0.4 0.0 - 0.9 %    Lymphocytes % 9.3 13.0 - 44.0 %    Monocytes % 3.9 2.0 - 10.0 %    Eosinophils % 0.1 0.0 - 6.0 %    Basophils % 0.1 0.0 - 2.0 %    Neutrophils Absolute 8.57 (H) 1.60 - 5.50 x10*3/uL    Immature Granulocytes Absolute, Automated 0.04 0.00 - 0.50 x10*3/uL    Lymphocytes Absolute 0.92 0.80 - 3.00 x10*3/uL    Monocytes Absolute 0.39 0.05 - 0.80 x10*3/uL    Eosinophils Absolute 0.01 0.00 - 0.40 x10*3/uL    Basophils Absolute 0.01 0.00 - 0.10 x10*3/uL   Comprehensive Metabolic Panel   Result Value Ref Range    Glucose 154 (H) 74 - 99 mg/dL    Sodium 140 136 - 145 mmol/L    Potassium 4.0 3.5 - 5.3 mmol/L    Chloride 106 98 - 107 mmol/L    Bicarbonate 26 21 - 32 mmol/L    Anion Gap 12 10 - 20 mmol/L    Urea Nitrogen 29 (H) 6 - 23 mg/dL    Creatinine 1.10 0.50 - 1.30 mg/dL    eGFR 62 >60 mL/min/1.73m*2    Calcium 9.3 8.6 - 10.3 mg/dL    Albumin 4.0 3.4 - 5.0 g/dL    Alkaline Phosphatase 190 (H) 33 - 136 U/L    Total Protein 7.4 6.4 - 8.2 g/dL    AST 18 9 - 39 U/L    Bilirubin, Total 0.4 0.0 - 1.2 mg/dL    ALT 10 10 - 52 U/L   Magnesium   Result Value Ref Range    Magnesium 1.84 1.60 - 2.40 mg/dL   aPTT   Result Value Ref Range    aPTT 27 26 - 36 seconds   Protime-INR   Result Value Ref Range    Protime 14.1 (H) 9.8 - 12.4 seconds    INR 1.3 (H) 0.9 - 1.1   B-Type Natriuretic Peptide   Result Value Ref Range     (H) 0 - 99 pg/mL   Lipase   Result Value Ref Range    Lipase 20 9 - 82 U/L   Troponin I, High Sensitivity, Initial   Result Value Ref Range    Troponin I, High Sensitivity 5 0 - 20  ng/L   Troponin, High Sensitivity, 1 Hour   Result Value Ref Range    Troponin I, High Sensitivity 5 0 - 20 ng/L   Urinalysis with Reflex Culture and Microscopic   Result Value Ref Range    Color, Urine Colorless (N) Light-Yellow, Yellow, Dark-Yellow    Appearance, Urine Clear Clear    Specific Gravity, Urine 1.013 1.005 - 1.035    pH, Urine 7.0 5.0, 5.5, 6.0, 6.5, 7.0, 7.5, 8.0    Protein, Urine 30 (1+) (A) NEGATIVE, 10 (TRACE), 20 (TRACE) mg/dL    Glucose, Urine Normal Normal mg/dL    Blood, Urine NEGATIVE NEGATIVE mg/dL    Ketones, Urine NEGATIVE NEGATIVE mg/dL    Bilirubin, Urine NEGATIVE NEGATIVE mg/dL    Urobilinogen, Urine Normal Normal mg/dL    Nitrite, Urine NEGATIVE NEGATIVE    Leukocyte Esterase, Urine NEGATIVE NEGATIVE   Urinalysis Microscopic   Result Value Ref Range    WBC, Urine NONE 1-5, NONE /HPF    RBC, Urine NONE NONE, 1-2, 3-5 /HPF    Mucus, Urine FEW Reference range not established. /LPF     *Note: Due to a large number of results and/or encounters for the requested time period, some results have not been displayed. A complete set of results can be found in Results Review.       XR chest 1 view    Result Date: 3/9/2025  STUDY: Chest Radiograph;  3/9/2025 at 5:35am INDICATION: Chest pain. COMPARISON: None Available ACCESSION NUMBER(S): [id number] ORDERING CLINICIAN: ESTHER EVANS TECHNIQUE:  Frontal chest was obtained at 5:35 hours. FINDINGS: CARDIOMEDIASTINAL SILHOUETTE: Cardiomediastinal silhouette is mildly enlarged in size and configuration.  LUNGS: Lungs are clear.  ABDOMEN: No remarkable upper abdominal findings.  BONES: No acute osseous changes.  Right shoulder arthroplasty is noted.    No acute cardiopulmonary disease. Signed by Bob Barriga    ECG 12 lead (Clinic Performed)    Result Date: 3/5/2025  Sinus rhythm with 1st degree AV block Left axis deviation Voltage criteria for left ventricular hypertrophy Abnormal ECG Confirmed by Osiel Bhat ([id number]) on 3/5/2025 12:31:34  PM    Cardiac Catheterization Procedure    Result Date: 2/19/2025   [facility], Cath Lab, [address] [place] [address] Cardiovascular Catheterization Report Patient Name:      HARDEEP CHOW   Performing Physician:  Albert Dickens MD Study Date:        2/18/2025            Verifying Physician:   Albert Dickens MD MRN/PID:           [id number]             Cardiologist/Co-Scrub: Accession#:        [id number]         Ordering Provider:     Albert DICKENS Date of Birth/Age: 12/2/1930 / 89 years Cardiologist: Gender:            M                    Fellow: Encounter#:        [id number]           Surgeon:  Study:            Left Ventriculogram Additional Study: Right and Left Heart Cath Additional Study: Coronary Arteriogram  Indications: HARDEEP CHOW is a 89 year old male who presents with unstable angina. HARDEEP CHOW is a 89 year old male who presents with dyslipidemia, hypertension, never a smoker, aortic stenosis and a chest pain assessment of typical angina unstable angina. Worsening angina.  Appropriate Use Criteria: Unstable angina with intermediate risk score; AUC score = 8. Medical History: Stress test performed: No. Agatston accessed: No. LVEF Assessed: Yes. LVEF = 63%.  Procedure Description: After infiltration with 2% Lidocaine, the right femoral artery was cannulated with a modified Seldinger technique. Subsequently a 4 Togolese sheath was placed retrograde in the right femoral artery. Selective coronary catheterization was performed using a 4 Fr catheter(s) exchanged over a guide wire to cannulate the coronary arteries. A JL 4.5 tip catheter was used for left coronary injections. A JR 4 tip catheter was used for right coronary injections. Additional  catheter(s) used to visualize the coronary arteries were: AR Mod. Multiple injections of contrast were made into the left and right coronary arteries with angiograms recorded in multiple projections. Retrograde left heart catheterizion was accomplished with a 4 Fr. pigtail catheter. A single plane left ventriculogram was recorded in the 30 degree RAMIREZ projection. The contrast dose was 20 ml injected at 10 ml/sec. The catheter was then withdrawn across the aortic valve under continuous pressure monitoring and removed. After completion of the procedure, the arterial sheath was pulled and pressure was applied to the site. Post-procedure, the venous sheath was pulled and pressure was applied to the site.  Procedure Description Comments: Due to patient's advanced age, left heart catheterization was performed from a right femoral arterial approach. The right femoral artery was cannulated, and a 4F sheath was placed. There was marked tortuosity noted with guidewire advancement. A 4F x 45 cm sheath was then advanced to assist with catheter manipulation for coronary arteriography. Initial attempts to engage the right coronary artery with a 4F AR mod catheter were unsuccessful. An exchange was made for a 4F JR4 catheter which did engage the RCA well and allowed for optimal angiography.  Coronary Angiography: The coronary circulation is right dominant.  Coronary Angiography Comments: There is single-vessel coronary disease in this right dominant system with diffuse coronary artery calcification. The left main is unremarkable. The LAD has diffuse proximal to mid vessel disease and calcification. There are focal areas of narrowing up to 50% but no higher grade stenoses. The more distal LAD has only mild disease. The large ramus branch has a 60% proximal stenosis. The LCx and OM branches are unremarkable. The large dominant RCA is diffusely calcified. There is a 30% proximal lesion and a hazy 70% mid lesion just past a small acute  marginal branch. The more distal RCA and its branches have only mild disease.  Left Main Coronary Artery: The left main coronary artery is a normal caliber vessel. The left main arises normally from the left coronary sinus of Valsalva and bifurcates into the LAD and circumflex coronary arteries. The left main coronary artery showed no significant disease or stenosis greater than 30%.  Left Anterior Descending Coronary Artery Distribution: The LAD demonstrated calcification. The proximal to mid left anterior descending coronary artery showed 50% stenosis.  Circumflex Coronary Artery Distribution: The circumflex coronary artery is a normal caliber vessel. The circumflex arises normally from the left main coronary artery and terminates in the AV groove. The circumflex revealed no significant disease or stenosis greater than 30%.  Ramus Intermedius: The proximal ramus intermedius showed 60% stenosis.  Right Coronary Artery Distribution: The proximal right coronary artery showed 30% stenosis. An additional RCA lesion, located at the mid right coronary artery, revealed 70% stenosis.  Left Ventriculography: Left ventriculography revealed normal wall motion and a normal ejection fraction of 60%. There is 1+ mitral regurgitation.  Valve Findings: Mild to moderate aortic valve stenosis is visualized. 1+ mitral valve insufficiency was noted.  Coronary Lesion Summary: Vessel   Stenosis   Vessel Segment LAD    50% stenosis proximal to mid Ramus  60% stenosis    proximal RCA    30% stenosis    proximal RCA    70% stenosis       mid  Hemo Personnel: +----------------------+---------+ Name                  Duty      +----------------------+---------+ Osiel Bhat MD 1 +----------------------+---------+  Hemodynamic Pressures: Resting hemodynamics showed moderately elevated right atrial and PCW pressures with a normal Cristiane cardiac index of 2.8 L/min/mï¿½. There is a 17 mmHg peak to peak aortic valve gradient  consistent with mild to moderate aortic stenosis. The PVR is mildly elevated at 211. +----+----------+---------+-------------+-------------+---+----+-------+-------+ SiteDate Time   Phase  Systolic mmHg  Diastolic  ED MeanA-Wave V-Wave                  Name                    mmHg     mmHmmHg mmHg   mmHg                                                     g                    +----+----------+---------+-------------+-------------+---+----+-------+-------+   LV 2/18/2025 AIR REST          164              20                         12:44:59                                                                     PM                                                         +----+----------+---------+-------------+-------------+---+----+-------+-------+  AOp 2/18/2025 AIR REST          147           64    100                     12:45:22                                                                     PM                                                         +----+----------+---------+-------------+-------------+---+----+-------+-------+   RA 2/18/2025 AIR REST                               13     15     15       12:51:05                                                                     PM                                                         +----+----------+---------+-------------+-------------+---+----+-------+-------+   RV 2/18/2025 AIR REST           48              15                         12:51:29                                                                     PM                                                         +----+----------+---------+-------------+-------------+---+----+-------+-------+   PW 2/18/2025 AIR REST                               15     20     20       12:51:59                                                                      PM                                                         +----+----------+---------+-------------+-------------+---+----+-------+-------+   PA 2/18/2025 AIR REST           48           18     29                     12:52:24                                                                     PM                                                         +----+----------+---------+-------------+-------------+---+----+-------+-------+  Oxygen Saturation %: +-----------+----------+------------+ Sample SiteO2 Sat (%)HB (g/100ml) +-----------+----------+------------+          FA        93        11.0 +-----------+----------+------------+          PA        67        11.0 +-----------+----------+------------+          FA        93        11.0 +-----------+----------+------------+          PA        67        11.0 +-----------+----------+------------+  Cardiac Outputs: +----+---+---+     CI CO  +----+---+---+ [id number] +----+---+---+  Vascular Resistance Calculated Values (Wood Units): +---+----+ [id number] +---+----+ [id number]  +---+----+  Complications: No in-lab complications observed.  Cardiac Cath Post Procedure Notes: Post Procedure Diagnosis: Single-vessel coronary artery disease in a right                           dominant system. Blood Loss:               Estimated blood loss during the procedure was 30 mls. Specimens Removed:        Number of specimen(s) removed: none. ____________________________________________________________________________________ CONCLUSIONS:  1. Single-vessel coronary artery disease in a right dominant system.  2. Normal left ventricular systolic function.  3. Mild to moderate aortic valve stenosis. ICD 10 Codes: Unstable angina-I20.0  CPT Codes: Right & Left Heart Cath w/ventriculography/Coronary angio (RHC)([facility])-45038; Moderate Sedation Services 1st additional 15 minutes patient >5 years-10139; Moderate Sedation  Services 2nd additional 15 minutes patient >5 years-[id number]  [id number] Osiel Bhat MD Performing Physician Electronically signed by [id number] Osiel Bhat MD on 2/19/2025 at 1:10:44 PM  ** Final **         Assessment/Plan   Assessment & Plan  Chest pain, unspecified type    Elroy Greco is a 89 y.o. male presenting with chest pain.  He reports the chest pain occurred last evening.  He reports he normally is fairly active and does exercise, however given he had a cardiac cath about 3 weeks ago he has not exercised since then.  He reports he did have an exercise band and was doing some upper arm exercises and did about 4-5 reps when he felt the need to stop.  He was then getting ready for bed when he developed the chest pain.  He reports it was more throughout his whole chest rather than midsternal or one-sided.  He best describes it as an ache, almost as if someone was sitting on his chest.  He endorses shortness of breath and nausea during that time.  He does endorse 1 episode of emesis.  He denies any radiating arm pain or jaw pain.  He reports that the chest pain never resolved only diminished a little bit and he is still experiencing some slight discomfort at this time.       PMHX: CAD,  HTN, depression    Chest pain   -cath 2/18/25-> did show 70% to mid RCA, Dr. Laureano did discuss if symptoms continue re-evaluate proceeding with stenting  - LUBNA 5-> 5: neg  -   -tele  -cardiology consulted, appreciate recs   -continue statin and aspirin      DC plan:  DC home when medically stable    DVT prophylaxis:  Lovenox, SCD    Labs/Testing reviewed    Interdisciplinary team rounding completed with hospitalist, nurse, TCC    NP discussed plan and lab/testing results with Dr. Wu       I spent 45 minutes in the professional and overall care of this patient.      Elo Barrios, APRN-CNP

## 2025-03-09 NOTE — ED PROVIDER NOTES
HPI   No chief complaint on file.      The patient presents with chest pain and episode of emesis with streaks of blood.  He states that he has not vomited for 30 years.  Of note he had a catheterization 3 weeks ago which showed 70% mid RCA lesion that was not stented at the time.  He states that he was having chest pain for several hours before the vomiting began.  He states that his chest pain is now improved to 4-10 and his nausea has entirely resolved.  He denies any abdominal pain.  Denies any smoking, drinking, street drugs or marijuana.  He does have urine frequency ongoing for years.              Patient History   Past Medical History:   Diagnosis Date    Aortic stenosis     CAD (coronary artery disease)     Cardiology follow-up encounter 03/27/2024    JOHNNY Hernandez    CKD (chronic kidney disease)     Gout     H/O cardiovascular stress test 04/04/2016    H/O echocardiogram 04/05/2024    Hyperlipidemia     Hypertension     Nocturia 03/01/2017    Nocturia    Paget's disease of bone     Preoperative clearance     JOHNNY Hernandez 3/27/24   Should his echocardiogram remain stable, he will be cleared for his urologic procedure. He will follow up with us in clinic in one year. He knows to call for any concerns.    Spinal stenosis     Urinary incontinence, urge     Plan: Stage 1 & 2 Insertion Continence Control Stimulator Lead & Generator 11/27/24     Past Surgical History:   Procedure Laterality Date    CARDIAC CATHETERIZATION N/A 2/18/2025    Procedure: Left & Right Heart Cath w Angiography & LV;  Surgeon: Osiel Dickens MD;  Location: [facility] Cardiac Cath Lab;  Service: Cardiovascular;  Laterality: N/A;  LEFT AND RIGHT HEART CATH TUES FEB 18TH, 2025 AT 11:00 AM PT WILL ARRIVE AT 9:30 AM @ [facility] - DR. DICKENS    CT ANGIO CORONARY ART WITH HEARTFLOW IF SCORE >30%  11/12/2018    CT HEART CORONARY ANGIOGRAM 11/12/2018 [facility] [place] [facility]CILLARY LEGACY    HERNIA REPAIR  07/18/2012    Hernia Repair    OTHER  SURGICAL HISTORY  07/18/2012    Nerve Block Transforaminal Epidural    OTHER SURGICAL HISTORY  07/18/2012    Nerve Ablation    OTHER SURGICAL HISTORY      shoulder replacement    OTHER SURGICAL HISTORY      blockage in abdomen-part of intenstine removed    TOTAL KNEE ARTHROPLASTY  07/18/2012    Knee Replacement    TOTAL KNEE ARTHROPLASTY  07/18/2012    Knee Replacement     Family History   Problem Relation Name Age of Onset    Cancer Mother      Cancer Father       Social History     Tobacco Use    Smoking status: Never    Smokeless tobacco: Never   Vaping Use    Vaping status: Never Used   Substance Use Topics    Alcohol use: Never    Drug use: Never       Physical Exam   ED Triage Vitals   Temp Pulse Resp BP   -- -- -- --      SpO2 Temp src Heart Rate Source Patient Position   -- -- -- --      BP Location FiO2 (%)     -- --       Physical Exam  Vitals and nursing note reviewed.   Constitutional:       General: He is not in acute distress.     Appearance: He is well-developed.   HENT:      Head: Normocephalic and atraumatic.   Eyes:      Conjunctiva/sclera: Conjunctivae normal.   Cardiovascular:      Rate and Rhythm: Normal rate and regular rhythm.      Heart sounds: No murmur heard.  Pulmonary:      Effort: Pulmonary effort is normal. No respiratory distress.      Breath sounds: Normal breath sounds.   Abdominal:      Palpations: Abdomen is soft.      Tenderness: There is no abdominal tenderness.   Musculoskeletal:         General: No swelling.      Cervical back: Neck supple.   Skin:     General: Skin is warm and dry.      Capillary Refill: Capillary refill takes less than 2 seconds.   Neurological:      Mental Status: He is alert.   Psychiatric:         Mood and Affect: Mood normal.           ED Course & MDM   Diagnoses as of 03/09/25 0430   Chest pain, unspecified type   Hematemesis, unspecified whether nausea present                 No data recorded     [place] Coma Scale Score: 15 (03/09/25 0423 : Danette  ERIN Arenas)                           Medical Decision Making  The patient takes no blood thinners.  Not on any kind of antiacids.  Did give him a dose of Protonix.  Feel that a variceal bleed or esophageal rupture is less likely.  Considered Parris-Saba tear after vomiting.  I more concerned with his potential coronary disease.  Given his age I feel the patient meets hospital evaluation criteria.      EKG interpreted by myself.  Normal sinus rhythm at a rate of 60 bpm.  Normal intervals.  Normal axis.  No signs of acute ischemia.      Procedure  Procedures     Kieran Fernandes MD  03/09/25 0434

## 2025-03-09 NOTE — ED TRIAGE NOTES
Patient presents via EMS to ED. Patient/wife reports patient began feeling unwell and then 'vomited blood'. EMS witnessed streak of blood in toilet. Patient reports 5 out 10 chest pain. Since vomiting patient no longer feels 'sick'.

## 2025-03-10 ENCOUNTER — APPOINTMENT (OUTPATIENT)
Dept: CARDIOLOGY | Facility: HOSPITAL | Age: OVER 89
End: 2025-03-10
Payer: MEDICARE

## 2025-03-10 LAB
ANION GAP SERPL CALC-SCNC: 12 MMOL/L (ref 10–20)
ATRIAL RATE: 60 BPM
ATRIAL RATE: 74 BPM
BASOPHILS # BLD AUTO: 0.02 X10*3/UL (ref 0–0.1)
BASOPHILS NFR BLD AUTO: 0.1 %
BUN SERPL-MCNC: 26 MG/DL (ref 6–23)
CALCIUM SERPL-MCNC: 8.7 MG/DL (ref 8.6–10.3)
CHLORIDE SERPL-SCNC: 106 MMOL/L (ref 98–107)
CO2 SERPL-SCNC: 25 MMOL/L (ref 21–32)
CREAT SERPL-MCNC: 1.05 MG/DL (ref 0.5–1.3)
EGFRCR SERPLBLD CKD-EPI 2021: 66 ML/MIN/1.73M*2
EOSINOPHIL # BLD AUTO: 0.01 X10*3/UL (ref 0–0.4)
EOSINOPHIL NFR BLD AUTO: 0.1 %
ERYTHROCYTE [DISTWIDTH] IN BLOOD BY AUTOMATED COUNT: 15 % (ref 11.5–14.5)
ERYTHROCYTE [DISTWIDTH] IN BLOOD BY AUTOMATED COUNT: 15 % (ref 11.5–14.5)
GLUCOSE SERPL-MCNC: 125 MG/DL (ref 74–99)
HCT VFR BLD AUTO: 35.6 % (ref 41–52)
HCT VFR BLD AUTO: 38.4 % (ref 41–52)
HGB BLD-MCNC: 11.3 G/DL (ref 13.5–17.5)
HGB BLD-MCNC: 12.2 G/DL (ref 13.5–17.5)
IMM GRANULOCYTES # BLD AUTO: 0.17 X10*3/UL (ref 0–0.5)
IMM GRANULOCYTES NFR BLD AUTO: 1 % (ref 0–0.9)
LYMPHOCYTES # BLD AUTO: 1.76 X10*3/UL (ref 0.8–3)
LYMPHOCYTES NFR BLD AUTO: 10.8 %
MCH RBC QN AUTO: 28 PG (ref 26–34)
MCH RBC QN AUTO: 28 PG (ref 26–34)
MCHC RBC AUTO-ENTMCNC: 31.7 G/DL (ref 32–36)
MCHC RBC AUTO-ENTMCNC: 31.8 G/DL (ref 32–36)
MCV RBC AUTO: 88 FL (ref 80–100)
MCV RBC AUTO: 88 FL (ref 80–100)
MONOCYTES # BLD AUTO: 1.65 X10*3/UL (ref 0.05–0.8)
MONOCYTES NFR BLD AUTO: 10.1 %
NEUTROPHILS # BLD AUTO: 12.76 X10*3/UL (ref 1.6–5.5)
NEUTROPHILS NFR BLD AUTO: 77.9 %
NRBC BLD-RTO: 0 /100 WBCS (ref 0–0)
NRBC BLD-RTO: 0 /100 WBCS (ref 0–0)
P AXIS: 31 DEGREES
P AXIS: 39 DEGREES
P OFFSET: 156 MS
P OFFSET: 160 MS
P ONSET: 83 MS
P ONSET: 92 MS
PLATELET # BLD AUTO: 175 X10*3/UL (ref 150–450)
PLATELET # BLD AUTO: 183 X10*3/UL (ref 150–450)
POTASSIUM SERPL-SCNC: 3.8 MMOL/L (ref 3.5–5.3)
PR INTERVAL: 236 MS
PR INTERVAL: 252 MS
Q ONSET: 209 MS
Q ONSET: 210 MS
QRS COUNT: 10 BEATS
QRS COUNT: 13 BEATS
QRS DURATION: 118 MS
QRS DURATION: 98 MS
QT INTERVAL: 406 MS
QT INTERVAL: 438 MS
QTC CALCULATION(BAZETT): 438 MS
QTC CALCULATION(BAZETT): 450 MS
QTC FREDERICIA: 435 MS
QTC FREDERICIA: 438 MS
R AXIS: -32 DEGREES
R AXIS: -40 DEGREES
RBC # BLD AUTO: 4.04 X10*6/UL (ref 4.5–5.9)
RBC # BLD AUTO: 4.36 X10*6/UL (ref 4.5–5.9)
SODIUM SERPL-SCNC: 139 MMOL/L (ref 136–145)
T AXIS: -17 DEGREES
T AXIS: -26 DEGREES
T OFFSET: 413 MS
T OFFSET: 428 MS
VENTRICULAR RATE: 60 BPM
VENTRICULAR RATE: 74 BPM
WBC # BLD AUTO: 16.4 X10*3/UL (ref 4.4–11.3)
WBC # BLD AUTO: 20.5 X10*3/UL (ref 4.4–11.3)

## 2025-03-10 PROCEDURE — 96376 TX/PRO/DX INJ SAME DRUG ADON: CPT

## 2025-03-10 PROCEDURE — G0378 HOSPITAL OBSERVATION PER HR: HCPCS

## 2025-03-10 PROCEDURE — 2500000004 HC RX 250 GENERAL PHARMACY W/ HCPCS (ALT 636 FOR OP/ED): Performed by: STUDENT IN AN ORGANIZED HEALTH CARE EDUCATION/TRAINING PROGRAM

## 2025-03-10 PROCEDURE — 85025 COMPLETE CBC W/AUTO DIFF WBC: CPT | Performed by: NURSE PRACTITIONER

## 2025-03-10 PROCEDURE — 93005 ELECTROCARDIOGRAM TRACING: CPT

## 2025-03-10 PROCEDURE — 87040 BLOOD CULTURE FOR BACTERIA: CPT | Mod: AHULAB

## 2025-03-10 PROCEDURE — 2500000001 HC RX 250 WO HCPCS SELF ADMINISTERED DRUGS (ALT 637 FOR MEDICARE OP): Performed by: STUDENT IN AN ORGANIZED HEALTH CARE EDUCATION/TRAINING PROGRAM

## 2025-03-10 PROCEDURE — 80048 BASIC METABOLIC PNL TOTAL CA: CPT | Performed by: NURSE PRACTITIONER

## 2025-03-10 PROCEDURE — 36415 COLL VENOUS BLD VENIPUNCTURE: CPT | Performed by: NURSE PRACTITIONER

## 2025-03-10 PROCEDURE — 96372 THER/PROPH/DIAG INJ SC/IM: CPT | Performed by: STUDENT IN AN ORGANIZED HEALTH CARE EDUCATION/TRAINING PROGRAM

## 2025-03-10 PROCEDURE — 2500000001 HC RX 250 WO HCPCS SELF ADMINISTERED DRUGS (ALT 637 FOR MEDICARE OP): Performed by: NURSE PRACTITIONER

## 2025-03-10 PROCEDURE — 2500000002 HC RX 250 W HCPCS SELF ADMINISTERED DRUGS (ALT 637 FOR MEDICARE OP, ALT 636 FOR OP/ED): Mod: MUE | Performed by: STUDENT IN AN ORGANIZED HEALTH CARE EDUCATION/TRAINING PROGRAM

## 2025-03-10 PROCEDURE — 99222 1ST HOSP IP/OBS MODERATE 55: CPT | Performed by: INTERNAL MEDICINE

## 2025-03-10 PROCEDURE — 85027 COMPLETE CBC AUTOMATED: CPT | Mod: 59

## 2025-03-10 PROCEDURE — 2500000001 HC RX 250 WO HCPCS SELF ADMINISTERED DRUGS (ALT 637 FOR MEDICARE OP): Performed by: INTERNAL MEDICINE

## 2025-03-10 PROCEDURE — 99232 SBSQ HOSP IP/OBS MODERATE 35: CPT

## 2025-03-10 PROCEDURE — 36415 COLL VENOUS BLD VENIPUNCTURE: CPT

## 2025-03-10 RX ORDER — PANTOPRAZOLE SODIUM 40 MG/1
40 TABLET, DELAYED RELEASE ORAL
Status: DISCONTINUED | OUTPATIENT
Start: 2025-03-11 | End: 2025-03-11 | Stop reason: HOSPADM

## 2025-03-10 RX ORDER — METOPROLOL SUCCINATE 25 MG/1
12.5 TABLET, EXTENDED RELEASE ORAL DAILY
Status: DISCONTINUED | OUTPATIENT
Start: 2025-03-10 | End: 2025-03-11 | Stop reason: HOSPADM

## 2025-03-10 RX ADMIN — ASPIRIN 81 MG: 81 TABLET, COATED ORAL at 08:45

## 2025-03-10 RX ADMIN — METOPROLOL SUCCINATE 12.5 MG: 25 TABLET, EXTENDED RELEASE ORAL at 11:11

## 2025-03-10 RX ADMIN — PANTOPRAZOLE SODIUM 40 MG: 40 INJECTION, POWDER, FOR SOLUTION INTRAVENOUS at 08:45

## 2025-03-10 RX ADMIN — ATORVASTATIN CALCIUM 40 MG: 40 TABLET, FILM COATED ORAL at 21:17

## 2025-03-10 RX ADMIN — ACETAMINOPHEN 650 MG: 325 TABLET, FILM COATED ORAL at 17:03

## 2025-03-10 RX ADMIN — ACETAMINOPHEN 650 MG: 325 TABLET, FILM COATED ORAL at 00:28

## 2025-03-10 RX ADMIN — COLCHICINE 0.6 MG: 0.6 TABLET, FILM COATED ORAL at 15:47

## 2025-03-10 RX ADMIN — TAMSULOSIN HYDROCHLORIDE 0.4 MG: 0.4 CAPSULE ORAL at 08:45

## 2025-03-10 RX ADMIN — ENOXAPARIN SODIUM 40 MG: 100 INJECTION SUBCUTANEOUS at 08:45

## 2025-03-10 ASSESSMENT — PAIN SCALES - GENERAL
PAINLEVEL_OUTOF10: 0 - NO PAIN
PAINLEVEL_OUTOF10: 3
PAINLEVEL_OUTOF10: 3
PAINLEVEL_OUTOF10: 1
PAINLEVEL_OUTOF10: 4

## 2025-03-10 ASSESSMENT — COGNITIVE AND FUNCTIONAL STATUS - GENERAL
MOVING FROM LYING ON BACK TO SITTING ON SIDE OF FLAT BED WITH BEDRAILS: A LITTLE
DRESSING REGULAR LOWER BODY CLOTHING: A LITTLE
WALKING IN HOSPITAL ROOM: A LITTLE
HELP NEEDED FOR BATHING: A LITTLE
STANDING UP FROM CHAIR USING ARMS: A LITTLE
DAILY ACTIVITIY SCORE: 19
DRESSING REGULAR LOWER BODY CLOTHING: A LITTLE
PERSONAL GROOMING: A LITTLE
CLIMB 3 TO 5 STEPS WITH RAILING: A LITTLE
DAILY ACTIVITIY SCORE: 20
STANDING UP FROM CHAIR USING ARMS: A LITTLE
MOBILITY SCORE: 18
CLIMB 3 TO 5 STEPS WITH RAILING: A LITTLE
TOILETING: A LITTLE
HELP NEEDED FOR BATHING: A LITTLE
MOBILITY SCORE: 19
TURNING FROM BACK TO SIDE WHILE IN FLAT BAD: A LITTLE
WALKING IN HOSPITAL ROOM: A LITTLE
TURNING FROM BACK TO SIDE WHILE IN FLAT BAD: A LITTLE
DRESSING REGULAR UPPER BODY CLOTHING: A LITTLE
TOILETING: A LITTLE
MOVING TO AND FROM BED TO CHAIR: A LITTLE
DRESSING REGULAR UPPER BODY CLOTHING: A LITTLE
MOVING TO AND FROM BED TO CHAIR: A LITTLE

## 2025-03-10 ASSESSMENT — PAIN DESCRIPTION - ORIENTATION
ORIENTATION: MID
ORIENTATION: LEFT

## 2025-03-10 ASSESSMENT — ACTIVITIES OF DAILY LIVING (ADL): LACK_OF_TRANSPORTATION: NO

## 2025-03-10 ASSESSMENT — PAIN DESCRIPTION - LOCATION
LOCATION: NECK
LOCATION: FOOT
LOCATION: SHOULDER

## 2025-03-10 NOTE — CARE PLAN
The patient's goals for the shift include  laquita rest    The clinical goals for the shift include to remain HDS      Problem: Pain - Adult  Goal: Verbalizes/displays adequate comfort level or baseline comfort level  Outcome: Progressing     Problem: Safety - Adult  Goal: Free from fall injury  Outcome: Progressing     Problem: Discharge Planning  Goal: Discharge to home or other facility with appropriate resources  Outcome: Progressing     Problem: Chronic Conditions and Co-morbidities  Goal: Patient's chronic conditions and co-morbidity symptoms are monitored and maintained or improved  Outcome: Progressing     Problem: Nutrition  Goal: Nutrient intake appropriate for maintaining nutritional needs  Outcome: Progressing     Problem: Fall/Injury  Goal: Not fall by end of shift  Outcome: Progressing  Goal: Be free from injury by end of the shift  Outcome: Progressing  Goal: Verbalize understanding of personal risk factors for fall in the hospital  Outcome: Progressing  Goal: Verbalize understanding of risk factor reduction measures to prevent injury from fall in the home  Outcome: Progressing  Goal: Use assistive devices by end of the shift  Outcome: Progressing  Goal: Pace activities to prevent fatigue by end of the shift  Outcome: Progressing

## 2025-03-10 NOTE — CONSULTS
Inpatient consult to Cardiology  Consult performed by: SABINA Noble-CNP  Consult ordered by: SABINA Lisa-CNP  Reason for consult: chest pain      History Of Present Illness:    Elroy Greco is a 89 y.o. male with past medical history of CAD ([facility] 2/18/2028 with 70% mid RCA lesion - no intervention), mild to moderate aortic stenosis,  HTN, CKD, anemia, HLD, spinal stenosis who presented to [facility] ED with chest pain. Cardiology consulted for chest pain.     States he was using exercise bands with his arms on Saturday night.  He then developed severe chest pressure, about 9/10 on pain scale.  Pain was from top of his sternum to the bottom.  He became nauseated and vomited, with some associated diaphoresis.  No arm or jaw pain.  He has been very tired lately, which is unusual for him.  No arm or leg swelling.       ED work up showing /73.  HR 78.  HS troponin neg x 2.  Creatinine is 1.05.    Home CV medications:  ASA 81mg daily  Atorvastatin 40mg daily    He follows with Dr. Bhat as outpatient.     Past Cardiology Tests (Last 3 Years):    EKG:        Echo:  TTE 1/7/2025  1. Poorly visualized anatomical structures due to suboptimal image quality.  2. Left ventricular ejection fraction is normal, calculated by Celestin's biplane at 63%.  3. Spectral Doppler shows a Grade I (impaired relaxation pattern) of left ventricular diastolic filling with normal left atrial filling pressure.  4. There is low normal right ventricular systolic function.  5. The left atrium is mild to moderately dilated.  6. Moderate aortic valve stenosis.    Cath:  [facility] 2/18/2025  CONCLUSIONS:   1. Single-vessel coronary artery disease in a right dominant system.   2. Normal left ventricular systolic function.   3. Mild to moderate aortic valve stenosis.      Past Medical History:  He has a past medical history of Aortic stenosis, CAD (coronary artery disease), Cardiology follow-up encounter (03/27/2024), CKD  (chronic kidney disease), Gout, H/O cardiovascular stress test (04/04/2016), H/O echocardiogram (04/05/2024), Hyperlipidemia, Hypertension, Nocturia (03/01/2017), Paget's disease of bone, Preoperative clearance, Spinal stenosis, and Urinary incontinence, urge.    He has no past medical history of Asthma, Awareness under anesthesia, Delayed emergence from general anesthesia, Diabetes mellitus type I (Multi), Hard to intubate, Hearing aid worn, HL (hearing loss), Hyperthyroidism, Hypothyroidism, Malignant hyperthermia, PONV (postoperative nausea and vomiting), Refusal of blood product, Sleep apnea, or Type 2 diabetes mellitus.    Past Surgical History:  He has a past surgical history that includes Other surgical history (07/18/2012); Other surgical history (07/18/2012); Total knee arthroplasty (07/18/2012); Total knee arthroplasty (07/18/2012); Hernia repair (07/18/2012); CT angio coronary art with heartflow if score >30% (11/12/2018); Other surgical history; Other surgical history; and Cardiac catheterization (N/A, 2/18/2025).      Social History:  He reports that he has never smoked. He has never used smokeless tobacco. He reports that he does not drink alcohol and does not use drugs.    Family History:  Family History   Problem Relation Name Age of Onset    Cancer Mother      Cancer Father          Allergies:  Finasteride, Mirabegron, and Penicillins    ROS:  10 point review of systems including (Constitutional, Eyes, ENMT, Respiratory, Cardiac, Gastrointestinal, Neurological, Psychiatric, and Hematologic) was performed and is otherwise negative.    Objective Data:  Last Recorded Vitals:  Vitals:    03/09/25 1500 03/09/25 2007 03/10/25 0021 03/10/25 0357   BP: 153/79 152/88 155/72 143/78   BP Location: Left arm   Right arm   Patient Position: Sitting   Lying   Pulse: 88 72 75 78   Resp: 18 18 18 18   Temp: 36.7 °C (98.1 °F) 37 °C (98.6 °F) 36.8 °C (98.2 °F) 36 °C (96.8 °F)   TempSrc: Temporal   Temporal   SpO2: 98%  "98% 98% 97%   Weight:       Height:         Medical Gas Therapy: None (Room air)  Weight  Av.1 kg (170 lb)  Min: 77.1 kg (170 lb)  Max: 77.1 kg (170 lb)    LABS:  CMP:  Results from last 7 days   Lab Units 03/10/25  0423 25  0456   SODIUM mmol/L 139 140   POTASSIUM mmol/L 3.8 4.0   CHLORIDE mmol/L 106 106   CO2 mmol/L 25 26   ANION GAP mmol/L 12 12   BUN mg/dL 26* 29*   CREATININE mg/dL 1.05 1.10   EGFR mL/min/1.73m*2 66 62   MAGNESIUM mg/dL  --  1.84   ALBUMIN g/dL  --  4.0   ALT U/L  --  10   AST U/L  --  18   BILIRUBIN TOTAL mg/dL  --  0.4   LIPASE U/L  --  20     CBC:  Results from last 7 days   Lab Units 03/10/25  0423 03/09/25  0456   WBC AUTO x10*3/uL 16.4* 9.9   HEMOGLOBIN g/dL 11.3* 11.3*   HEMATOCRIT % 35.6* 36.3*   PLATELETS AUTO x10*3/uL 175 185   MCV fL 88 89     COAG:   Results from last 7 days   Lab Units 25  0456   INR  1.3*     ABO: No results found for: \"ABO\"  HEME/ENDO:     CARDIAC:   Results from last 7 days   Lab Units 25  0526 25  0456   TROPHS ng/L 5 5   BNP pg/mL  --  246*             Last I/O:    Intake/Output Summary (Last 24 hours) at 3/10/2025 0821  Last data filed at 3/10/2025 0600  Gross per 24 hour   Intake --   Output 500 ml   Net -500 ml     Net IO Since Admission: -500 mL [03/10/25 0821]      Imaging Results:      Inpatient Medications:  Scheduled medications   Medication Dose Route Frequency    aspirin  81 mg oral Daily    atorvastatin  40 mg oral Nightly    enoxaparin  40 mg subcutaneous q24h    pantoprazole  40 mg intravenous Daily    tamsulosin  0.4 mg oral Daily     PRN medications   Medication    acetaminophen    colchicine     Continuous Medications   Medication Dose Last Rate       Outpatient Medications:  Current Outpatient Medications   Medication Instructions    acetaminophen (TYLENOL) 1,000 mg, Every 6 hours PRN    aspirin 81 mg, Daily    atorvastatin (LIPITOR) 40 mg, oral, Nightly    b complex vitamins capsule 1 capsule, Daily    " carboxymethylcellulose (Refresh Plus) 0.5 % ophthalmic solution 1 drop, 4 times daily    colchicine 0.6 mg tablet 1 tablet, Daily PRN    docusate sodium (STOOL SOFTENER ORAL) 2 tablets, 2 times daily    magnesium oxide (MAG-OX) 200 mg, Daily    psyllium (Metamucil) powder 1 Dose, 2 times daily    tamsulosin (FLOMAX) 0.4 mg, oral, Daily       Physical Exam:    General:  Patient is awake, alert, and oriented.  Patient is in no acute distress.  HEENT:  Normocephalic.  Moist mucosa.    Neck:  Normal Jugular Venous Pressure.  Cardiovascular:  Regular rate and rhythm.  Normal S1 and S2, no murmurs, rubs or gallops  Pulmonary:  Clear to auscultation bilaterally.  Abdomen:  Soft. Non-tender.   Non-distended.  Positive bowel sounds.  Lower Extremities:  2+ pedal pulses. No LE edema.  Neurologic:  Alert and oriented x3. No focal deficit.   Skin: Skin warm and dry, normal skin turgor.   Psychiatric: Normal affect.       Assessment/Plan     Elroy Greco is a 89 y.o. male with past medical history of CAD ([facility] 2/18/2028 with 70% mid RCA lesion - no intervention), mild to moderate aortic stenosis,  HTN, CKD, anemia, HLD, spinal stenosis who presented to [facility] ED with chest pain. Cardiology consulted for chest pain.     States he was using exercise bands with his arms on Saturday night.  He then developed severe chest pressure, about 9/10 on pain scale.  Pain was from top of his sternum to the bottom.  He became nauseated and vomited, with some associated diaphoresis.  No arm or jaw pain.  He has been very tired lately, which is unusual for him.  No arm or leg swelling.       ED work up showing /73.  HR 78.  HS troponin neg x 2.  Creatinine is 1.05.    Home CV medications:  ASA 81mg daily  Atorvastatin 40mg daily    Assessment:    # Chest pain, resolved  # CAD - [facility] 2/18/2028 with 70% mid RCA lesion - no intervention   - There are no significant clinical signs / symptoms or ischemic changes consistent with ACS.   - HS  troponin neg x 2.    - chest pain free   - currently not on beta blocker at home    # Mild to moderate aortic stenosis    # Hyperlipidemia on statin      Plan:  - Adding on Toprol 12.5mg daily  - Continue ASA / statin    He will follow up with Dr. Bhat / Jill Mayo, on 3/25/2025 at 1pm.   OK to discharge home from cardiology standpoint.     Code Status:  Full Code      Maria Alejandra Ramirez, APRN-CNP

## 2025-03-10 NOTE — PROGRESS NOTES
03/10/25 1516   Discharge Planning   Living Arrangements Spouse/significant other   Support Systems Spouse/significant other   Assistance Needed None   Type of Residence Private residence   Home or Post Acute Services None   Expected Discharge Disposition Home   Does the patient need discharge transport arranged? Yes   Financial Resource Strain   How hard is it for you to pay for the very basics like food, housing, medical care, and heating? Not hard   Housing Stability   In the last 12 months, was there a time when you were not able to pay the mortgage or rent on time? N   At any time in the past 12 months, were you homeless or living in a shelter (including now)? N   Transportation Needs   In the past 12 months, has lack of transportation kept you from medical appointments or from getting medications? no   In the past 12 months, has lack of transportation kept you from meetings, work, or from getting things needed for daily living? No     Plan per Medical/Surgical team: cardio consult  Status: observation  Payor source: [employer] Medicare  Discharge disposition: Home-no home going needs identified at this time--patient very active  Potential Barriers: further testing  ADOD: 3/11/25

## 2025-03-10 NOTE — PROGRESS NOTES
Medicine PA-C follow up note    Subjective:  Seen earlier this AM. Pt had no complaints. He was cleared for discharge from cardiology, however was noted to have jump in WBC count from 9.9 -> 16.4. CBC repeat showed WBC 20.5. Given significant increase in WBC count will keep pt overnight and monitor. No signs/symptoms of infection currently     Additional information:      Vitals (Last 24 Hours):  Heart Rate:  [72-85]   Temp:  [36 °C (96.8 °F)-37 °C (98.6 °F)]   Resp:  [18]   BP: (125-166)/(72-89)   SpO2:  [95 %-98 %]       I have reviewed all imaging reports and labs pertinent to this visit / presenting problem    PHYSICAL EXAM:  Constitutional: NAD, alert and cooperative  Eyes: no icterus  ENMT: mucous membranes moist, no lesions  Head/Neck: supple  Respiratory/Thorax: CTA bilaterally, non-labored breathing, no cough, on RA  Cardiovascular: RRR, no murmurs heard  Gastrointestinal: ND/S/NT  : no Mcqueen, no SP/flank discomfort  Musculoskeletal: no joint swelling, ROM intact  Extremities: no edema  Neurological: non-focal  Skin: warm and dry  Psych: calm, stable mood     MEDS:  Scheduled meds  aspirin, 81 mg, oral, Daily  atorvastatin, 40 mg, oral, Nightly  enoxaparin, 40 mg, subcutaneous, q24h  metoprolol succinate XL, 12.5 mg, oral, Daily  [START ON 3/11/2025] pantoprazole, 40 mg, oral, Daily before breakfast  tamsulosin, 0.4 mg, oral, Daily        Continuous meds       PRN meds  PRN medications: acetaminophen, colchicine      ASSESSMENT/PLAN:  Elroy Greco is a 89 y.o. male presenting with chest pain.  He reports the chest pain occurred last evening.  He reports he normally is fairly active and does exercise, however given he had a cardiac cath about 3 weeks ago he has not exercised since then.  He reports he did have an exercise band and was doing some upper arm exercises and did about 4-5 reps when he felt the need to stop.  He was then getting ready for bed when he developed the chest pain.  He reports it  was more throughout his whole chest rather than midsternal or one-sided.  He best describes it as an ache, almost as if someone was sitting on his chest.  He endorses shortness of breath and nausea during that time.  He does endorse 1 episode of emesis.  He denies any radiating arm pain or jaw pain.  He reports that the chest pain never resolved only diminished a little bit and he is still experiencing some slight discomfort at this time.    Leukocytosis   - WBC 9.9 -> 16.4 -> 20.5  - CXR - no acute cardiopulmonary disease   - UA negative   - No pt complaints, signs/symptoms of infection   - Afebrile   - Pt did not receive steroids  - BC x 2 ordered   - Consider ID consult if leukocytosis increases/persists     Chest pain, resolved   - Seen and cleared for dc by cardiology. Pt started on 12.5 Toprol daily   - Pt has follow up appt scheduled for 3/25/25 at 1p.m.   - Continue statin/ASA     Other comorbidities as above  - Continue medications as ordered and adjust based on clinical course     VTE / GI prophylaxis   - Subcutaneous Lovenox, PPI     Discharge planning  - HNN     Discussed with Dr. Wu and the interdisciplinary team     Amalia Morales PA-C

## 2025-03-10 NOTE — DISCHARGE INSTRUCTIONS
[facility] Observation Unit Discharge Instructions  You came to the hospital with chest pain and were admitted for observation and further care.   You were treated with cardiac monitoring, labs, and cardiology consult.   A cardiology specialist saw you while admitted and helped manage your care.  Your cardiac enzymes remained stable.  You were cleared for discharge with recommendation to keep appointment already scheduled with Dr. Bhat.  Cardiology did add 12.5 mg of metoprolol once daily.    Also while you were here you were noted to have a white blood cell count.  All of your imaging was negative for any infection.  Blood cultures were also done to rule out any bloodstream infection.  This could be secondary to gout flare, as you were complaining of left foot pain which is consistent with your normal gout flares.  You responded well to colchicine so you will be discharged on a short course of prednisone and colchicine to treat gout flare.     Your discharge plan is to go home to recover.    Please see your primary care doctor in 1 week for follow-up.   An appointment has been requested for you but may you need to call your doctors office to schedule.  Please keep your already scheduled appointment with Dr. Bhat for cardiology      For any issues or concerns with appointments, call the  scheduling line at [phone] or the providers office directly.       See the attached information for education about any new medications and the condition(s) you were treated for.  Your medications may have changed so pay close attention to the list given to you at discharge and ask any questions you have before leaving the hospital.

## 2025-03-10 NOTE — CARE PLAN
The patient's goals for the shift include      The clinical goals for the shift include to remain HDS

## 2025-03-11 ENCOUNTER — APPOINTMENT (OUTPATIENT)
Dept: RADIOLOGY | Facility: HOSPITAL | Age: OVER 89
End: 2025-03-11
Payer: MEDICARE

## 2025-03-11 ENCOUNTER — PHARMACY VISIT (OUTPATIENT)
Dept: PHARMACY | Facility: CLINIC | Age: OVER 89
End: 2025-03-11
Payer: COMMERCIAL

## 2025-03-11 VITALS
TEMPERATURE: 98.8 F | WEIGHT: 170 LBS | HEART RATE: 82 BPM | BODY MASS INDEX: 26.68 KG/M2 | DIASTOLIC BLOOD PRESSURE: 68 MMHG | HEIGHT: 67 IN | RESPIRATION RATE: 18 BRPM | SYSTOLIC BLOOD PRESSURE: 127 MMHG | OXYGEN SATURATION: 93 %

## 2025-03-11 LAB
ANION GAP SERPL CALC-SCNC: 11 MMOL/L (ref 10–20)
BUN SERPL-MCNC: 30 MG/DL (ref 6–23)
CALCIUM SERPL-MCNC: 8.1 MG/DL (ref 8.6–10.3)
CHLORIDE SERPL-SCNC: 106 MMOL/L (ref 98–107)
CO2 SERPL-SCNC: 25 MMOL/L (ref 21–32)
CREAT SERPL-MCNC: 1.19 MG/DL (ref 0.5–1.3)
CRP SERPL-MCNC: 27.85 MG/DL
EGFRCR SERPLBLD CKD-EPI 2021: 57 ML/MIN/1.73M*2
ERYTHROCYTE [DISTWIDTH] IN BLOOD BY AUTOMATED COUNT: 15.2 % (ref 11.5–14.5)
ERYTHROCYTE [SEDIMENTATION RATE] IN BLOOD BY WESTERGREN METHOD: 113 MM/H (ref 0–20)
GLUCOSE SERPL-MCNC: 120 MG/DL (ref 74–99)
HCT VFR BLD AUTO: 32.4 % (ref 41–52)
HGB BLD-MCNC: 10.5 G/DL (ref 13.5–17.5)
MCH RBC QN AUTO: 27.9 PG (ref 26–34)
MCHC RBC AUTO-ENTMCNC: 32.4 G/DL (ref 32–36)
MCV RBC AUTO: 86 FL (ref 80–100)
NRBC BLD-RTO: 0 /100 WBCS (ref 0–0)
PLATELET # BLD AUTO: 156 X10*3/UL (ref 150–450)
POTASSIUM SERPL-SCNC: 3.7 MMOL/L (ref 3.5–5.3)
RBC # BLD AUTO: 3.77 X10*6/UL (ref 4.5–5.9)
SODIUM SERPL-SCNC: 138 MMOL/L (ref 136–145)
URATE SERPL-MCNC: 7.4 MG/DL (ref 4–7.5)
WBC # BLD AUTO: 17.1 X10*3/UL (ref 4.4–11.3)

## 2025-03-11 PROCEDURE — G0378 HOSPITAL OBSERVATION PER HR: HCPCS

## 2025-03-11 PROCEDURE — 2500000001 HC RX 250 WO HCPCS SELF ADMINISTERED DRUGS (ALT 637 FOR MEDICARE OP): Performed by: NURSE PRACTITIONER

## 2025-03-11 PROCEDURE — 86140 C-REACTIVE PROTEIN: CPT | Performed by: INTERNAL MEDICINE

## 2025-03-11 PROCEDURE — 2500000004 HC RX 250 GENERAL PHARMACY W/ HCPCS (ALT 636 FOR OP/ED): Performed by: STUDENT IN AN ORGANIZED HEALTH CARE EDUCATION/TRAINING PROGRAM

## 2025-03-11 PROCEDURE — 73630 X-RAY EXAM OF FOOT: CPT | Mod: LEFT SIDE | Performed by: RADIOLOGY

## 2025-03-11 PROCEDURE — 85652 RBC SED RATE AUTOMATED: CPT | Performed by: INTERNAL MEDICINE

## 2025-03-11 PROCEDURE — 2500000002 HC RX 250 W HCPCS SELF ADMINISTERED DRUGS (ALT 637 FOR MEDICARE OP, ALT 636 FOR OP/ED): Performed by: STUDENT IN AN ORGANIZED HEALTH CARE EDUCATION/TRAINING PROGRAM

## 2025-03-11 PROCEDURE — 96375 TX/PRO/DX INJ NEW DRUG ADDON: CPT

## 2025-03-11 PROCEDURE — 2500000001 HC RX 250 WO HCPCS SELF ADMINISTERED DRUGS (ALT 637 FOR MEDICARE OP)

## 2025-03-11 PROCEDURE — RXMED WILLOW AMBULATORY MEDICATION CHARGE

## 2025-03-11 PROCEDURE — 2500000001 HC RX 250 WO HCPCS SELF ADMINISTERED DRUGS (ALT 637 FOR MEDICARE OP): Performed by: STUDENT IN AN ORGANIZED HEALTH CARE EDUCATION/TRAINING PROGRAM

## 2025-03-11 PROCEDURE — 80048 BASIC METABOLIC PNL TOTAL CA: CPT

## 2025-03-11 PROCEDURE — 96372 THER/PROPH/DIAG INJ SC/IM: CPT | Performed by: STUDENT IN AN ORGANIZED HEALTH CARE EDUCATION/TRAINING PROGRAM

## 2025-03-11 PROCEDURE — 36415 COLL VENOUS BLD VENIPUNCTURE: CPT

## 2025-03-11 PROCEDURE — 84550 ASSAY OF BLOOD/URIC ACID: CPT | Performed by: INTERNAL MEDICINE

## 2025-03-11 PROCEDURE — 85027 COMPLETE CBC AUTOMATED: CPT

## 2025-03-11 PROCEDURE — 73630 X-RAY EXAM OF FOOT: CPT | Mod: LT

## 2025-03-11 PROCEDURE — 2500000004 HC RX 250 GENERAL PHARMACY W/ HCPCS (ALT 636 FOR OP/ED): Performed by: INTERNAL MEDICINE

## 2025-03-11 RX ORDER — COLCHICINE 0.6 MG/1
0.6 TABLET ORAL DAILY
Qty: 5 TABLET | Refills: 0 | Status: SHIPPED | OUTPATIENT
Start: 2025-03-11

## 2025-03-11 RX ORDER — PREDNISONE 20 MG/1
30 TABLET ORAL DAILY
Qty: 11 TABLET | Refills: 0 | Status: SHIPPED | OUTPATIENT
Start: 2025-03-11 | End: 2025-03-18

## 2025-03-11 RX ORDER — COLCHICINE 0.6 MG/1
0.6 TABLET ORAL DAILY
Status: DISCONTINUED | OUTPATIENT
Start: 2025-03-11 | End: 2025-03-11 | Stop reason: HOSPADM

## 2025-03-11 RX ORDER — METOPROLOL SUCCINATE 25 MG/1
12.5 TABLET, EXTENDED RELEASE ORAL DAILY
Qty: 15 TABLET | Refills: 0 | Status: SHIPPED | OUTPATIENT
Start: 2025-03-11 | End: 2025-04-10

## 2025-03-11 RX ADMIN — METHYLPREDNISOLONE SODIUM SUCCINATE 40 MG: 125 INJECTION, POWDER, FOR SOLUTION INTRAMUSCULAR; INTRAVENOUS at 09:55

## 2025-03-11 RX ADMIN — METOPROLOL SUCCINATE 12.5 MG: 25 TABLET, EXTENDED RELEASE ORAL at 08:06

## 2025-03-11 RX ADMIN — ENOXAPARIN SODIUM 40 MG: 100 INJECTION SUBCUTANEOUS at 08:06

## 2025-03-11 RX ADMIN — PANTOPRAZOLE SODIUM 40 MG: 40 TABLET, DELAYED RELEASE ORAL at 06:22

## 2025-03-11 RX ADMIN — ASPIRIN 81 MG: 81 TABLET, COATED ORAL at 08:06

## 2025-03-11 RX ADMIN — TAMSULOSIN HYDROCHLORIDE 0.4 MG: 0.4 CAPSULE ORAL at 08:06

## 2025-03-11 RX ADMIN — COLCHICINE 0.6 MG: 0.6 TABLET, FILM COATED ORAL at 08:06

## 2025-03-11 ASSESSMENT — COGNITIVE AND FUNCTIONAL STATUS - GENERAL
WALKING IN HOSPITAL ROOM: A LITTLE
MOVING TO AND FROM BED TO CHAIR: A LITTLE
DRESSING REGULAR LOWER BODY CLOTHING: A LITTLE
TURNING FROM BACK TO SIDE WHILE IN FLAT BAD: A LITTLE
HELP NEEDED FOR BATHING: A LITTLE
DAILY ACTIVITIY SCORE: 21
STANDING UP FROM CHAIR USING ARMS: A LITTLE
MOBILITY SCORE: 19
TOILETING: A LITTLE
CLIMB 3 TO 5 STEPS WITH RAILING: A LITTLE

## 2025-03-11 ASSESSMENT — PAIN SCALES - GENERAL: PAINLEVEL_OUTOF10: 0 - NO PAIN

## 2025-03-11 NOTE — CARE PLAN
The patient's goals for the shift include  remain free from injury    The clinical goals for the shift include remian injury and fall free

## 2025-03-11 NOTE — DISCHARGE SUMMARY
Discharge Diagnosis  Chest pain, unspecified type    Issues Requiring Follow-Up  PCP  Cardiology    Discharge Meds     Medication List      START taking these medications     metoprolol succinate XL 25 mg 24 hr tablet; Commonly known as:   Toprol-XL; Take 0.5 tablets (12.5 mg) by mouth once daily. Do not crush or   chew.   predniSONE 20 mg tablet; Commonly known as: Deltasone; Take 1.5 tablets   (30 mg) by mouth once daily for 7 days.     CHANGE how you take these medications     colchicine 0.6 mg tablet; Take 1 tablet (0.6 mg) by mouth once daily.;   What changed: when to take this, reasons to take this     CONTINUE taking these medications     acetaminophen 500 mg tablet; Commonly known as: Tylenol   aspirin 81 mg EC tablet   atorvastatin 40 mg tablet; Commonly known as: Lipitor; Take 1 tablet (40   mg) by mouth once daily at bedtime.   psyllium powder; Commonly known as: Metamucil   STOOL SOFTENER ORAL   tamsulosin 0.4 mg 24 hr capsule; Commonly known as: Flomax; Take 1   capsule (0.4 mg) by mouth once daily.       Test Results Pending At Discharge  Pending Labs       Order Current Status    Extra Urine Gray Tube Collected (03/09/25 0527)    Urinalysis with Reflex Culture and Microscopic In process    Blood Culture Preliminary result    Blood Culture Preliminary result            Hospital Course  Elroy Greco is a 89 y.o. male presenting with chest pain. He reports the chest pain occurred last evening. He reports he normally is fairly active and does exercise, however given he had a cardiac cath about 3 weeks ago he has not exercised since then. He reports he did have an exercise band and was doing some upper arm exercises and did about 4-5 reps when he felt the need to stop. He was then getting ready for bed when he developed the chest pain. He reports it was more throughout his whole chest rather than midsternal or one-sided. He best describes it as an ache, almost as if someone was sitting on his chest.  He endorses shortness of breath and nausea during that time. He does endorse 1 episode of emesis. He denies any radiating arm pain or jaw pain. He reports that the chest pain never resolved only diminished a little bit and he is still experiencing some slight discomfort at this time.     He was admitted to observation for further monitoring and workup.  While he was here his cardiac enzymes remain negative.  He was evaluated by cardiology here who added 12.5 mg daily of metoprolol succinate, but otherwise cleared for discharge.  He is recommended to follow-up with Dr. Bhat outpatient to which she already has an appointment scheduled.  Did educate patient to not cancel and to attend appointment to which she verbalized understanding.      He was kept here for further workup of the leukocytosis that developed while he was here.  It is noted that he did have some leukocytosis in December 2024 as well.  No clear source of infection, imaging was negative for any pneumonia.  He remained afebrile.  No urinary symptoms.  And preliminary blood cultures were negative.  His only complaint was some left foot pain which she reports is consistent with his gout flares.  His CRP was elevated at 27.85..  An x-ray of his foot was done which was questioning gout.  He does endorse yesterday he was given colchicine with positive response.  This could be secondary to gout flare.  Overall he looks very well and would like to go home.  He is hemodynamically stable.  Will DC on colchicine and prednisone for gout flare.  He can follow-up further with PCP.  He already has appointment with Dr. Bhat which she was instructed to keep.    Labs/Testing reviewed    Interdisciplinary team rounding completed with hospitalist, nurse, TCC    NP discussed plan and lab/testing results with Dr. Cat         Pertinent Physical Exam At Time of Discharge  Physical Exam    Outpatient Follow-Up  Future Appointments   Date Time Provider Department  [place]   3/12/2025 10:00 AM Trice Grayson MD [id number] [facility]   3/25/2025  1:00 PM JOHNNY Hernandez UCR1 [facility]   6/25/2025 10:15 AM Osiel Bhat MD [id number] [facility]   7/22/2025  8:40 AM Pantera Mckinney MD [facility]         SABINA Pearson-CNP

## 2025-03-11 NOTE — HOSPITAL COURSE
Elroy Greco is a 89 y.o. male presenting with chest pain. He reports the chest pain occurred last evening. He reports he normally is fairly active and does exercise, however given he had a cardiac cath about 3 weeks ago he has not exercised since then. He reports he did have an exercise band and was doing some upper arm exercises and did about 4-5 reps when he felt the need to stop. He was then getting ready for bed when he developed the chest pain. He reports it was more throughout his whole chest rather than midsternal or one-sided. He best describes it as an ache, almost as if someone was sitting on his chest. He endorses shortness of breath and nausea during that time. He does endorse 1 episode of emesis. He denies any radiating arm pain or jaw pain. He reports that the chest pain never resolved only diminished a little bit and he is still experiencing some slight discomfort at this time.     He was admitted to observation for further monitoring and workup.  While he was here his cardiac enzymes remain negative.  He was evaluated by cardiology here who added 12.5 mg daily of metoprolol succinate, but otherwise cleared for discharge.  He is recommended to follow-up with Dr. Bhat outpatient to which she already has an appointment scheduled.  Did educate patient to not cancel and to attend appointment to which she verbalized understanding.      He was kept here for further workup of the leukocytosis that developed while he was here.  It is noted that he did have some leukocytosis in December 2024 as well.  No clear source of infection, imaging was negative for any pneumonia.  He remained afebrile.  No urinary symptoms.  And preliminary blood cultures were negative.  His only complaint was some left foot pain which she reports is consistent with his gout flares.  His CRP was elevated at 27.85..  An x-ray of his foot was done which was questioning gout.  He does endorse yesterday he was given colchicine  with positive response.  This could be secondary to gout flare.  Overall he looks very well and would like to go home.  He is hemodynamically stable.  Will DC on colchicine and prednisone for gout flare.  He can follow-up further with PCP.  He already has appointment with Dr. Bhat which she was instructed to keep.    Labs/Testing reviewed    Interdisciplinary team rounding completed with hospitalist, nurse, TCC    NP discussed plan and lab/testing results with Dr. Cat

## 2025-03-11 NOTE — PROGRESS NOTES
03/11/25 0945   Discharge Planning   Home or Post Acute Services None   Expected Discharge Disposition Home     Cardio signed off.  Patient's WBC is down trending from 20.5 to 17.1 on this morning's labs.  Plan remains for patient to return home upon discharge.  No home going needs identified at this time.  Will continue to follow for discharge planning needs.  Possible discharge today.

## 2025-03-11 NOTE — CARE PLAN
The patient's goals for the shift include  going home    The clinical goals for the shift include pain management    Problem: Safety - Adult  Goal: Free from fall injury  Outcome: Progressing     Problem: Pain - Adult  Goal: Verbalizes/displays adequate comfort level or baseline comfort level  Outcome: Progressing     Patient has discharge orders in

## 2025-03-11 NOTE — PROGRESS NOTES
"Elroy Greco is a 89 y.o. male on day 1 of admission presenting with Chest pain, unspecified type.    Subjective   Awake in bed, endorses Left foot pain consistent with his gout flares, endorses improvement with colchicine          Objective     Physical Exam  Constitutional:       Appearance: Normal appearance.   Cardiovascular:      Rate and Rhythm: Normal rate and regular rhythm.      Pulses: Normal pulses.      Heart sounds: Murmur heard.      No gallop.   Pulmonary:      Effort: Pulmonary effort is normal. No respiratory distress.      Breath sounds: Normal breath sounds. No wheezing or rhonchi.   Abdominal:      General: Abdomen is flat. Bowel sounds are normal. There is no distension.      Palpations: Abdomen is soft.      Tenderness: There is no abdominal tenderness. There is no guarding.   Musculoskeletal:         General: Normal range of motion.   Skin:     General: Skin is warm.      Capillary Refill: Capillary refill takes less than 2 seconds.   Neurological:      Mental Status: He is alert and oriented to person, place, and time.   Psychiatric:         Mood and Affect: Mood normal.         Thought Content: Thought content normal.         Judgment: Judgment normal.         Last Recorded Vitals  Blood pressure 131/72, pulse 72, temperature 36.2 °C (97.2 °F), temperature source Temporal, resp. rate 18, height 1.702 m (5' 7\"), weight 77.1 kg (170 lb), SpO2 91%.  Intake/Output last 3 Shifts:  I/O last 3 completed shifts:  In: - (0 mL/kg)   Out: 1550 (20.1 mL/kg) [Urine:1550 (0.6 mL/kg/hr)]  Weight: 77.1 kg     Relevant Results  Scheduled medications  aspirin, 81 mg, oral, Daily  atorvastatin, 40 mg, oral, Nightly  enoxaparin, 40 mg, subcutaneous, q24h  metoprolol succinate XL, 12.5 mg, oral, Daily  pantoprazole, 40 mg, oral, Daily before breakfast  tamsulosin, 0.4 mg, oral, Daily      Continuous medications     PRN medications  PRN medications: acetaminophen, colchicine    Results for orders placed or " performed during the hospital encounter of 03/09/25 (from the past 24 hours)   CBC   Result Value Ref Range    WBC 20.5 (H) 4.4 - 11.3 x10*3/uL    nRBC 0.0 0.0 - 0.0 /100 WBCs    RBC 4.36 (L) 4.50 - 5.90 x10*6/uL    Hemoglobin 12.2 (L) 13.5 - 17.5 g/dL    Hematocrit 38.4 (L) 41.0 - 52.0 %    MCV 88 80 - 100 fL    MCH 28.0 26.0 - 34.0 pg    MCHC 31.8 (L) 32.0 - 36.0 g/dL    RDW 15.0 (H) 11.5 - 14.5 %    Platelets 183 150 - 450 x10*3/uL   Blood Culture    Specimen: Peripheral Venipuncture; Blood culture   Result Value Ref Range    Blood Culture Loaded on Instrument - Culture in progress    Blood Culture    Specimen: Peripheral Venipuncture; Blood culture   Result Value Ref Range    Blood Culture Loaded on Instrument - Culture in progress    Basic metabolic panel   Result Value Ref Range    Glucose 120 (H) 74 - 99 mg/dL    Sodium 138 136 - 145 mmol/L    Potassium 3.7 3.5 - 5.3 mmol/L    Chloride 106 98 - 107 mmol/L    Bicarbonate 25 21 - 32 mmol/L    Anion Gap 11 10 - 20 mmol/L    Urea Nitrogen 30 (H) 6 - 23 mg/dL    Creatinine 1.19 0.50 - 1.30 mg/dL    eGFR 57 (L) >60 mL/min/1.73m*2    Calcium 8.1 (L) 8.6 - 10.3 mg/dL   CBC   Result Value Ref Range    WBC 17.1 (H) 4.4 - 11.3 x10*3/uL    nRBC 0.0 0.0 - 0.0 /100 WBCs    RBC 3.77 (L) 4.50 - 5.90 x10*6/uL    Hemoglobin 10.5 (L) 13.5 - 17.5 g/dL    Hematocrit 32.4 (L) 41.0 - 52.0 %    MCV 86 80 - 100 fL    MCH 27.9 26.0 - 34.0 pg    MCHC 32.4 32.0 - 36.0 g/dL    RDW 15.2 (H) 11.5 - 14.5 %    Platelets 156 150 - 450 x10*3/uL     *Note: Due to a large number of results and/or encounters for the requested time period, some results have not been displayed. A complete set of results can be found in Results Review.       ECG 12 lead    Result Date: 3/10/2025  Sinus rhythm with 1st degree AV block Left axis deviation Left ventricular hypertrophy ( R in aVL , Gerson product , Romhilt-Garcia ) Abnormal ECG When compared with ECG of 09-MAR-2025 04:23, (unconfirmed) No significant  change was found See ED provider note for full interpretation and clinical correlation Confirmed by Radha Reynoso ([id number]) on 3/10/2025 10:16:55 PM    ECG 12 lead    Result Date: 3/10/2025  Sinus rhythm with 1st degree AV block Left axis deviation Left ventricular hypertrophy with QRS widening ( R in aVL , Gerson product , Romhilt-Garcia ) Nonspecific ST and T wave abnormality Abnormal ECG When compared with ECG of 04-MAR-2025 13:36, Nonspecific T wave abnormality now evident in Lateral leads See ED provider note for full interpretation and clinical correlation Confirmed by Radha Reynoso ([id number]) on 3/10/2025 10:16:41 PM    XR chest 1 view    Result Date: 3/9/2025  STUDY: Chest Radiograph;  3/9/2025 at 5:35am INDICATION: Chest pain. COMPARISON: None Available ACCESSION NUMBER(S): [id number] ORDERING CLINICIAN: ESTHER EVANS TECHNIQUE:  Frontal chest was obtained at 5:35 hours. FINDINGS: CARDIOMEDIASTINAL SILHOUETTE: Cardiomediastinal silhouette is mildly enlarged in size and configuration.  LUNGS: Lungs are clear.  ABDOMEN: No remarkable upper abdominal findings.  BONES: No acute osseous changes.  Right shoulder arthroplasty is noted.    No acute cardiopulmonary disease. Signed by Bob Barriga    ECG 12 lead (Clinic Performed)    Result Date: 3/5/2025  Sinus rhythm with 1st degree AV block Left axis deviation Voltage criteria for left ventricular hypertrophy Abnormal ECG Confirmed by Osiel Bhat ([id number]) on 3/5/2025 12:31:34 PM    Cardiac Catheterization Procedure    Result Date: 2/19/2025   [facility], Cath Lab, [address] [place] [address] Cardiovascular Catheterization Report Patient Name:      HARDEEP GARCIA GERALDO   Performing Physician:  [id number] Bhat MD Study Date:        2/18/2025            Verifying Physician:   Albert Cartagena                                                                 Home [facility]MA[facility] MRN/PID:           [id number]             Cardiologist/Co-Scrub: Accession#:        [id number]         Ordering Provider:     [id number] VIVIANE DICKENS Date of Birth/Age: 12/2/1930 / 89 years Cardiologist: Gender:            M                    Fellow: Encounter#:        [id number]           Surgeon:  Study:            Left Ventriculogram Additional Study: Right and Left Heart Cath Additional Study: Coronary Arteriogram  Indications: HARDEEP CHOW is a 89 year old male who presents with unstable angina. HARDEEP CHOW is a 89 year old male who presents with dyslipidemia, hypertension, never a smoker, aortic stenosis and a chest pain assessment of typical angina unstable angina. Worsening angina.  Appropriate Use Criteria: Unstable angina with intermediate risk score; AUC score = 8. Medical History: Stress test performed: No. Agatston accessed: No. LVEF Assessed: Yes. LVEF = 63%.  Procedure Description: After infiltration with 2% Lidocaine, the right femoral artery was cannulated with a modified Seldinger technique. Subsequently a 4 Japanese sheath was placed retrograde in the right femoral artery. Selective coronary catheterization was performed using a 4 Fr catheter(s) exchanged over a guide wire to cannulate the coronary arteries. A JL 4.5 tip catheter was used for left coronary injections. A JR 4 tip catheter was used for right coronary injections. Additional catheter(s) used to visualize the coronary arteries were: AR Mod. Multiple injections of contrast were made into the left and right coronary arteries with angiograms recorded in multiple projections. Retrograde left heart catheterizion was accomplished with a 4 Fr. pigtail catheter. A single plane left ventriculogram was recorded in the 30 degree RAMIREZ projection. The contrast dose was 20 ml injected at 10 ml/sec. The catheter was then withdrawn across the aortic valve under  continuous pressure monitoring and removed. After completion of the procedure, the arterial sheath was pulled and pressure was applied to the site. Post-procedure, the venous sheath was pulled and pressure was applied to the site.  Procedure Description Comments: Due to patient's advanced age, left heart catheterization was performed from a right femoral arterial approach. The right femoral artery was cannulated, and a 4F sheath was placed. There was marked tortuosity noted with guidewire advancement. A 4F x 45 cm sheath was then advanced to assist with catheter manipulation for coronary arteriography. Initial attempts to engage the right coronary artery with a 4F AR mod catheter were unsuccessful. An exchange was made for a 4F JR4 catheter which did engage the RCA well and allowed for optimal angiography.  Coronary Angiography: The coronary circulation is right dominant.  Coronary Angiography Comments: There is single-vessel coronary disease in this right dominant system with diffuse coronary artery calcification. The left main is unremarkable. The LAD has diffuse proximal to mid vessel disease and calcification. There are focal areas of narrowing up to 50% but no higher grade stenoses. The more distal LAD has only mild disease. The large ramus branch has a 60% proximal stenosis. The LCx and OM branches are unremarkable. The large dominant RCA is diffusely calcified. There is a 30% proximal lesion and a hazy 70% mid lesion just past a small acute marginal branch. The more distal RCA and its branches have only mild disease.  Left Main Coronary Artery: The left main coronary artery is a normal caliber vessel. The left main arises normally from the left coronary sinus of Valsalva and bifurcates into the LAD and circumflex coronary arteries. The left main coronary artery showed no significant disease or stenosis greater than 30%.  Left Anterior Descending Coronary Artery Distribution: The LAD demonstrated  calcification. The proximal to mid left anterior descending coronary artery showed 50% stenosis.  Circumflex Coronary Artery Distribution: The circumflex coronary artery is a normal caliber vessel. The circumflex arises normally from the left main coronary artery and terminates in the AV groove. The circumflex revealed no significant disease or stenosis greater than 30%.  Ramus Intermedius: The proximal ramus intermedius showed 60% stenosis.  Right Coronary Artery Distribution: The proximal right coronary artery showed 30% stenosis. An additional RCA lesion, located at the mid right coronary artery, revealed 70% stenosis.  Left Ventriculography: Left ventriculography revealed normal wall motion and a normal ejection fraction of 60%. There is 1+ mitral regurgitation.  Valve Findings: Mild to moderate aortic valve stenosis is visualized. 1+ mitral valve insufficiency was noted.  Coronary Lesion Summary: Vessel   Stenosis   Vessel Segment LAD    50% stenosis proximal to mid Ramus  60% stenosis    proximal RCA    30% stenosis    proximal RCA    70% stenosis       mid  Hemo Personnel: +----------------------+---------+ Name                  Duty      +----------------------+---------+ Osiel Bhat MD 1 +----------------------+---------+  Hemodynamic Pressures: Resting hemodynamics showed moderately elevated right atrial and PCW pressures with a normal Cristiane cardiac index of 2.8 L/min/mï¿½. There is a 17 mmHg peak to peak aortic valve gradient consistent with mild to moderate aortic stenosis. The PVR is mildly elevated at 211. +----+----------+---------+-------------+-------------+---+----+-------+-------+ SiteDate Time   Phase  Systolic mmHg  Diastolic  ED MeanA-Wave V-Wave                  Name                    mmHg     mmHmmHg mmHg   mmHg                                                     g                     +----+----------+---------+-------------+-------------+---+----+-------+-------+   LV 2/18/2025 AIR REST          164              20                         12:44:59                                                                     PM                                                         +----+----------+---------+-------------+-------------+---+----+-------+-------+  AOp 2/18/2025 AIR REST          147           64    100                     12:45:22                                                                     PM                                                         +----+----------+---------+-------------+-------------+---+----+-------+-------+   RA 2/18/2025 AIR REST                               13     15     15       12:51:05                                                                     PM                                                         +----+----------+---------+-------------+-------------+---+----+-------+-------+   RV 2/18/2025 AIR REST           48              15                         12:51:29                                                                     PM                                                         +----+----------+---------+-------------+-------------+---+----+-------+-------+   PW 2/18/2025 AIR REST                               15     20     20       12:51:59                                                                     PM                                                         +----+----------+---------+-------------+-------------+---+----+-------+-------+   PA 2/18/2025 AIR REST           48           18     29                     12:52:24                                                                     PM                                                          +----+----------+---------+-------------+-------------+---+----+-------+-------+  Oxygen Saturation %: +-----------+----------+------------+ Sample SiteO2 Sat (%)HB (g/100ml) +-----------+----------+------------+          FA        93        11.0 +-----------+----------+------------+          PA        67        11.0 +-----------+----------+------------+          FA        93        11.0 +-----------+----------+------------+          PA        67        11.0 +-----------+----------+------------+  Cardiac Outputs: +----+---+---+     CI CO  +----+---+---+ FICK2.85.3 +----+---+---+  Vascular Resistance Calculated Values (Wood Units): +---+----+ [id number] +---+----+ [id number]  +---+----+  Complications: No in-lab complications observed.  Cardiac Cath Post Procedure Notes: Post Procedure Diagnosis: Single-vessel coronary artery disease in a right                           dominant system. Blood Loss:               Estimated blood loss during the procedure was 30 mls. Specimens Removed:        Number of specimen(s) removed: none. ____________________________________________________________________________________ CONCLUSIONS:  1. Single-vessel coronary artery disease in a right dominant system.  2. Normal left ventricular systolic function.  3. Mild to moderate aortic valve stenosis. ICD 10 Codes: Unstable angina-I20.0  CPT Codes: Right & Left Heart Cath w/ventriculography/Coronary angio ([facility])([facility])-98785; Moderate Sedation Services 1st additional 15 minutes patient >5 years-19574; Moderate Sedation Services 2nd additional 15 minutes patient >5 years-[id number]  [id number] Osiel Bhat MD Performing Physician Electronically signed by [id number] Osiel Bhat MD on 2/19/2025 at 1:10:44 PM  ** Final **                   Assessment/Plan   Assessment & Plan  Chest pain, unspecified type    Chest pain    Elroy Greco is a 89 y.o. male presenting with chest pain.  He reports the chest pain occurred  last evening.  He reports he normally is fairly active and does exercise, however given he had a cardiac cath about 3 weeks ago he has not exercised since then.  He reports he did have an exercise band and was doing some upper arm exercises and did about 4-5 reps when he felt the need to stop.  He was then getting ready for bed when he developed the chest pain.  He reports it was more throughout his whole chest rather than midsternal or one-sided.  He best describes it as an ache, almost as if someone was sitting on his chest.  He endorses shortness of breath and nausea during that time.  He does endorse 1 episode of emesis.  He denies any radiating arm pain or jaw pain.  He reports that the chest pain never resolved only diminished a little bit and he is still experiencing some slight discomfort at this time.     Leukocytosis   - WBC 9.9 -> 16.4 -> 20.5-> 17.1   -was also elevated in Dec 2024   - CXR - no acute cardiopulmonary disease   - UA negative   - No pt complaints, signs/symptoms of infection   - Afebrile   - Pt did not receive steroids with onset of leukocytosis   - BC x 2 ordered   - Consider ID consult if leukocytosis increases/persists   -CRP 27.85  -await Xray      Chest pain, resolved   -cath 2/18/25-> did show 70% to mid RCA, Dr. Bhat did discuss if symptoms continue re-evaluate proceeding with stenting  - LUBNA 5-> 5: neg  -   -tele  - Seen and cleared for dc by cardiology. Pt started on 12.5 Toprol daily   - Pt has follow up appt scheduled for 3/25/25 at 1p.m-> keep appt  - Continue statin/ASA      Other comorbidities as above  - Continue medications as ordered and adjust based on clinical course      VTE / GI prophylaxis   - Subcutaneous Lovenox, PPI      Discharge planning  - HNN      Labs/Testing reviewed    Interdisciplinary team rounding completed with hospitalist, nurse, TCC    NP discussed plan and lab/testing results with Dr. Cat         I spent 45 minutes in the professional  and overall care of this patient.      Elo Barrios, APRN-CNP

## 2025-03-11 NOTE — NURSING NOTE
Patient has been discharged. Discharge instructions provided using teach back method. Pt's health related  risk factors discussed with pt. pt educated to look for any worsening sign and symptoms. Pt educated to seek medical attention if experience any medical emergency. Pt aware to follow up with outpatient clinics as scheduled. Home going meds reviewed with pt. Pt verbalized understanding of disposition and discharge instructions. All questions answered to patient's satisfaction and within nursing scope of practice. Vitals stable, IVs removed.

## 2025-03-12 ENCOUNTER — APPOINTMENT (OUTPATIENT)
Dept: PRIMARY CARE | Facility: CLINIC | Age: OVER 89
End: 2025-03-12
Payer: MEDICARE

## 2025-03-12 ENCOUNTER — PATIENT OUTREACH (OUTPATIENT)
Dept: PRIMARY CARE | Facility: CLINIC | Age: OVER 89
End: 2025-03-12

## 2025-03-12 NOTE — PROGRESS NOTES
Discharge Facility: [facility]   Discharge Diagnosis  Chest pain, unspecified type     Issues Requiring Follow-Up  PCP  Cardiology  Admission Date: 3/9/25  Discharge Date: 3/11/25    PCP Appointment Date: 3/24/25  Specialist Appointment Date: 3/25/25- Cardiology   Hospital Encounter and Summary Linked: ED to Hosp-Admission (Discharged) with Aura Wu MD; Kieran Fernandes MD (03/09/2025)   Discharge Summary by Elo Khan APRN-CNP (03/11/2025 12:27)   See discharge assessment below for further details  Wrap Up  Wrap Up Additional Comments: Follow up appt. scheduled for pt. He voiced understanding. Also has cardiology follow up scheculed. Pt. states he is doing well. He denies chest pain. He verbalized understanding of new medications. Denies questions/concners further at this time. (3/12/2025  8:47 AM)    Engagement  Call Start Time: 0847 (Call completed with Elroy) (3/12/2025  8:47 AM)    Medications  Medications reviewed with patient/caregiver?: Yes (3/12/2025  8:47 AM)  Is the patient having any side effects they believe may be caused by any medication additions or changes?: No (3/12/2025  8:47 AM)  Does the patient have all medications ordered at discharge?: Yes (3/12/2025  8:47 AM)  Care Management Interventions: No intervention needed (3/12/2025  8:47 AM)  Prescription Comments: START taking: metoprolol succinate XL (Toprol-XL) predniSONE (Deltasone)  CHANGE how you take: colchicine (3/12/2025  8:47 AM)  Is the patient taking all medications as directed (includes completed medication regime)?: Yes (3/12/2025  8:47 AM)  Care Management Interventions: Provided patient education (3/12/2025  8:47 AM)  Medication Comments: Denies medicaiton questions/concners (3/12/2025  8:47 AM)    Appointments  Does the patient have a primary care provider?: Yes (3/12/2025  8:47 AM)  Care Management Interventions: Advised patient to make appointment (3/12/2025  8:47 AM)  Has the patient kept scheduled appointments  due by today?: Yes (3/12/2025  8:47 AM)  Care Management Interventions: Advised patient to keep appointment (3/12/2025  8:47 AM)    Self Management  What is the home health agency?: n/a (3/12/2025  8:47 AM)  Has home health visited the patient within 72 hours of discharge?: Not applicable (3/12/2025  8:47 AM)  What Durable Medical Equipment (DME) was ordered?: Patient uses a walker for Ambulation (3/12/2025  8:47 AM)    Patient Teaching  Does the patient have access to their discharge instructions?: Yes (3/12/2025  8:47 AM)  Care Management Interventions: Reviewed instructions with patient (3/12/2025  8:47 AM)  What is the patient's perception of their health status since discharge?: Improving (3/12/2025  8:47 AM)  Is the patient/caregiver able to teach back the hierarchy of who to call/visit for symptoms/problems? PCP, Specialist, Home Health nurse, Urgent Care, ED, 911: Yes (3/12/2025  8:47 AM)  Patient/Caregiver Education Comments: Pt. states he is recovering. He is a little weak, but did not use his walker to ambulate this morning. (3/12/2025  8:47 AM)

## 2025-03-14 ENCOUNTER — OFFICE VISIT (OUTPATIENT)
Dept: UROLOGY | Facility: HOSPITAL | Age: OVER 89
End: 2025-03-14
Payer: MEDICARE

## 2025-03-14 DIAGNOSIS — R39.15 URGENCY OF URINATION: ICD-10-CM

## 2025-03-14 DIAGNOSIS — R39.9 LOWER URINARY TRACT SYMPTOMS (LUTS): ICD-10-CM

## 2025-03-14 DIAGNOSIS — N39.41 URINARY INCONTINENCE, URGE: Primary | ICD-10-CM

## 2025-03-14 LAB
BACTERIA BLD CULT: NORMAL
BACTERIA BLD CULT: NORMAL
POC APPEARANCE, URINE: CLEAR
POC BILIRUBIN, URINE: NEGATIVE
POC BLOOD, URINE: ABNORMAL
POC COLOR, URINE: YELLOW
POC GLUCOSE, URINE: NEGATIVE MG/DL
POC KETONES, URINE: NEGATIVE MG/DL
POC LEUKOCYTES, URINE: NEGATIVE
POC NITRITE,URINE: NEGATIVE
POC PH, URINE: 6 PH
POC PROTEIN, URINE: ABNORMAL MG/DL
POC SPECIFIC GRAVITY, URINE: 1.02
POC UROBILINOGEN, URINE: 0.2 EU/DL

## 2025-03-14 PROCEDURE — G2211 COMPLEX E/M VISIT ADD ON: HCPCS | Performed by: UROLOGY

## 2025-03-14 PROCEDURE — 1123F ACP DISCUSS/DSCN MKR DOCD: CPT | Performed by: UROLOGY

## 2025-03-14 PROCEDURE — 99213 OFFICE O/P EST LOW 20 MIN: CPT | Performed by: UROLOGY

## 2025-03-14 PROCEDURE — 81003 URINALYSIS AUTO W/O SCOPE: CPT | Performed by: UROLOGY

## 2025-03-14 PROCEDURE — 1159F MED LIST DOCD IN RCRD: CPT | Performed by: UROLOGY

## 2025-03-14 PROCEDURE — 1111F DSCHRG MED/CURRENT MED MERGE: CPT | Performed by: UROLOGY

## 2025-03-14 PROCEDURE — 95972 ALYS CPLX SP/PN NPGT W/PRGRM: CPT | Performed by: UROLOGY

## 2025-03-14 PROCEDURE — 1157F ADVNC CARE PLAN IN RCRD: CPT | Performed by: UROLOGY

## 2025-03-14 NOTE — PROGRESS NOTES
FUV    Last seen 1/31/25     HISTORY OF PRESENT ILLNESS:   Elroy Greco is a 89 y.o. male who is being seen today for fuv    In recap:  Dr Wilkerson has done HOLEP and botox. Has been taking myrbetriq.  Has tried anti-cholinergics in past.  Has terrible urge, UI, frequency. Gets up 3-4x    Pt axonics PNE in office on 11/8/24 - Doing well.  Urge and UI much improved.  Daytime and night time frequency improved    Pt had Stage 1/2 in OR on 11/27/24    Pt admitted to [facility] on 11/30-12/5 with urinary retention and bowel issues.  Discharged to rehab with romo    12/20/24 - Been feeling better.  Romo removed and axonics reprogrammed    1/31/25 - Voiding small amounts.  Had episode of urge, UI last week, has improved    Pt has been admitted twice to hospital since last visit with cardiac issues    3/14/25 - Reports over the past month been getting up multiple times per night, urgency, urge incontinence    PAST MEDICAL HISTORY:  Past Medical History:   Diagnosis Date    Aortic stenosis     CAD (coronary artery disease)     Cardiology follow-up encounter 03/27/2024    JOHNNY Hernandez    CKD (chronic kidney disease)     Gout     H/O cardiovascular stress test 04/04/2016    H/O echocardiogram 04/05/2024    Hyperlipidemia     Hypertension     Nocturia 03/01/2017    Nocturia    Paget's disease of bone     Preoperative clearance     JOHNNY Hernandez 3/27/24   Should his echocardiogram remain stable, he will be cleared for his urologic procedure. He will follow up with us in clinic in one year. He knows to call for any concerns.    Spinal stenosis     Urinary incontinence, urge     Plan: Stage 1 & 2 Insertion Continence Control Stimulator Lead & Generator 11/27/24       PAST SURGICAL HISTORY:  Past Surgical History:   Procedure Laterality Date    CARDIAC CATHETERIZATION N/A 2/18/2025    Procedure: Left & Right Heart Cath w Angiography & LV;  Surgeon: Osiel Bhat MD;  Location: [facility] Cardiac Cath Lab;   Service: Cardiovascular;  Laterality: N/A;  LEFT AND RIGHT HEART CATH TUES FEB 18TH, 2025 AT 11:00 AM PT WILL ARRIVE AT 9:30 AM @ [facility] - DR. IDCKENS    CT ANGIO CORONARY ART WITH HEARTFLOW IF SCORE >30%  11/12/2018    CT HEART CORONARY ANGIOGRAM 11/12/2018 [facility] ANCILLARY LEGACY    HERNIA REPAIR  07/18/2012    Hernia Repair    OTHER SURGICAL HISTORY  07/18/2012    Nerve Block Transforaminal Epidural    OTHER SURGICAL HISTORY  07/18/2012    Nerve Ablation    OTHER SURGICAL HISTORY      shoulder replacement    OTHER SURGICAL HISTORY      blockage in abdomen-part of intenstine removed    TOTAL KNEE ARTHROPLASTY  07/18/2012    Knee Replacement    TOTAL KNEE ARTHROPLASTY  07/18/2012    Knee Replacement        ALLERGIES:   Allergies   Allergen Reactions    Finasteride Rash    Mirabegron Constipation    Penicillins Rash     Over 70 years ago--itchy. Has tolerated cephalexin        MEDICATIONS:   Current Outpatient Medications   Medication Instructions    acetaminophen (TYLENOL) 1,000 mg, Every 6 hours PRN    aspirin 81 mg, Daily    atorvastatin (LIPITOR) 40 mg, oral, Nightly    colchicine 0.6 mg, oral, Daily    docusate sodium (STOOL SOFTENER ORAL) 2 tablets, 2 times daily    metoprolol succinate XL (TOPROL-XL) 12.5 mg, oral, Daily, Do not crush or chew.    predniSONE (DELTASONE) 30 mg, oral, Daily    psyllium (Metamucil) powder 1 Dose, 2 times daily    tamsulosin (FLOMAX) 0.4 mg, oral, Daily        PHYSICAL EXAM:  There were no vitals taken for this visit.  Constitutional: Patient appears well-developed and well-nourished. No distress.    Pulmonary/Chest: Effort normal. No respiratory distress.   Musculoskeletal: Normal range of motion.    Neurological: Alert and oriented to person, place, and time.  Psychiatric: Normal mood and affect. Behavior is normal. Thought content normal.    Back - incision well healed, IPG seated nicely    Labs  Lab Results   Component Value Date    GFRMALE 53 (A) 07/18/2023     Lab Results    Component Value Date    CREATININE 1.19 03/11/2025     Lab Results   Component Value Date    CHOL 147 11/20/2023     Lab Results   Component Value Date    HDL 56.4 11/20/2023     Lab Results   Component Value Date    CHHDL 2.6 11/20/2023     Lab Results   Component Value Date    LDLF 77 11/18/2022     Lab Results   Component Value Date    VLDL 21 11/20/2023     Lab Results   Component Value Date    TRIG 105 11/20/2023     Lab Results   Component Value Date    HGBA1C 5.9 (H) 06/21/2024     Lab Results   Component Value Date    HCT 32.4 (L) 03/11/2025     UA trace blood, no nit/LE  PVR 0cc      Assessment:      1. Urinary incontinence, urge        2. Lower urinary tract symptoms (LUTS)  Measure post void residual    POCT UA Automated manually resulted      3. Urgency of urination          Elroy Greco is a 89 y.o. male here for fuv    UA neg, PVR 0     Plan:   Changed programs today  Conservative measures    Fu in 6m    Lower Urinary Tract Symptoms (LUTS)    I educated the patient on relevant lower urinary tract anatomy and physiology with emphasis on differential diagnosis as well as contributing factors to the patient's lower urinary tract symptoms. I educated the patient on dietary and behavioral modifications pertinent to the patient's complaints. I recommended to avoid caffeine, alcohol, spicy and acidic oral intake and to regulate fluid intake and voiding (timed voiding, double voiding). I stressed the importance of avoiding constipation and recommended stool softeners unless diarrhea present. We discussed limiting fluid intake at night and elevating legs prior to bedtime.     OAB    We discussed the pathophysiology of overactive bladder. We discussed possible treatment options including doing conservative voiding techniques, medications, and surgical options.. He was counseled regarding bladder retraining, diet choices, and fluid restriction.  A handout about this given as well as a voiding  diary    Patient was informed that first line treatment is behavioral therapy.  This includes:   -Fluid balancing and sometimes restriction   -Reducing caffeine or other bladder stimulants   -Bladder retraining  -Delayed voiding to help defer the overwhelming urge

## 2025-03-24 ENCOUNTER — APPOINTMENT (OUTPATIENT)
Dept: PRIMARY CARE | Facility: CLINIC | Age: OVER 89
End: 2025-03-24
Payer: MEDICARE

## 2025-03-24 VITALS
TEMPERATURE: 97.8 F | DIASTOLIC BLOOD PRESSURE: 76 MMHG | WEIGHT: 170.2 LBS | SYSTOLIC BLOOD PRESSURE: 120 MMHG | BODY MASS INDEX: 26.66 KG/M2

## 2025-03-24 DIAGNOSIS — R79.82 ELEVATED C-REACTIVE PROTEIN (CRP): ICD-10-CM

## 2025-03-24 DIAGNOSIS — D72.829 LEUKOCYTOSIS, UNSPECIFIED TYPE: ICD-10-CM

## 2025-03-24 DIAGNOSIS — R33.9 URINARY RETENTION: ICD-10-CM

## 2025-03-24 DIAGNOSIS — R07.9 CHEST PAIN, UNSPECIFIED TYPE: Primary | ICD-10-CM

## 2025-03-24 DIAGNOSIS — R63.4 WEIGHT LOSS: ICD-10-CM

## 2025-03-24 LAB
BASOPHILS # BLD AUTO: 22 CELLS/UL (ref 0–200)
BASOPHILS NFR BLD AUTO: 0.3 %
EOSINOPHIL # BLD AUTO: 118 CELLS/UL (ref 15–500)
EOSINOPHIL NFR BLD AUTO: 1.6 %
ERYTHROCYTE [DISTWIDTH] IN BLOOD BY AUTOMATED COUNT: 13.1 % (ref 11–15)
ERYTHROCYTE [SEDIMENTATION RATE] IN BLOOD BY WESTERGREN METHOD: 58 MM/H
HCT VFR BLD AUTO: 35.4 % (ref 38.5–50)
HGB BLD-MCNC: 11.1 G/DL (ref 13.2–17.1)
LYMPHOCYTES # BLD AUTO: 2420 CELLS/UL (ref 850–3900)
LYMPHOCYTES NFR BLD AUTO: 32.7 %
MCH RBC QN AUTO: 27.6 PG (ref 27–33)
MCHC RBC AUTO-ENTMCNC: 31.4 G/DL (ref 32–36)
MCV RBC AUTO: 88.1 FL (ref 80–100)
MONOCYTES # BLD AUTO: 518 CELLS/UL (ref 200–950)
MONOCYTES NFR BLD AUTO: 7 %
NEUTROPHILS # BLD AUTO: 4322 CELLS/UL (ref 1500–7800)
NEUTROPHILS NFR BLD AUTO: 58.4 %
PLATELET # BLD AUTO: 298 THOUSAND/UL (ref 140–400)
PMV BLD REES-ECKER: 10.7 FL (ref 7.5–12.5)
RBC # BLD AUTO: 4.02 MILLION/UL (ref 4.2–5.8)
WBC # BLD AUTO: 7.4 THOUSAND/UL (ref 3.8–10.8)

## 2025-03-24 PROCEDURE — 1111F DSCHRG MED/CURRENT MED MERGE: CPT | Performed by: INTERNAL MEDICINE

## 2025-03-24 PROCEDURE — 1159F MED LIST DOCD IN RCRD: CPT | Performed by: INTERNAL MEDICINE

## 2025-03-24 PROCEDURE — 1123F ACP DISCUSS/DSCN MKR DOCD: CPT | Performed by: INTERNAL MEDICINE

## 2025-03-24 PROCEDURE — 1036F TOBACCO NON-USER: CPT | Performed by: INTERNAL MEDICINE

## 2025-03-24 PROCEDURE — 3074F SYST BP LT 130 MM HG: CPT | Performed by: INTERNAL MEDICINE

## 2025-03-24 PROCEDURE — 99495 TRANSJ CARE MGMT MOD F2F 14D: CPT | Performed by: INTERNAL MEDICINE

## 2025-03-24 PROCEDURE — 3078F DIAST BP <80 MM HG: CPT | Performed by: INTERNAL MEDICINE

## 2025-03-24 PROCEDURE — 1160F RVW MEDS BY RX/DR IN RCRD: CPT | Performed by: INTERNAL MEDICINE

## 2025-03-24 PROCEDURE — 1157F ADVNC CARE PLAN IN RCRD: CPT | Performed by: INTERNAL MEDICINE

## 2025-03-24 RX ORDER — TAMSULOSIN HYDROCHLORIDE 0.4 MG/1
0.4 CAPSULE ORAL DAILY
Qty: 90 CAPSULE | Refills: 3 | Status: SHIPPED | OUTPATIENT
Start: 2025-03-24 | End: 2026-03-24

## 2025-03-24 NOTE — PROGRESS NOTES
"Patient: Elroy Greco  : 1930  PCP: Trice Grayson MD  MRN: [id number]  Program: Transitional Care Management  Status: Enrolled  Effective Dates: 3/12/2025 - present  Responsible Staff: Mike Kelley LPN  Social Drivers to be Addressed: No information to display         Elroy Greco is a 89 y.o. male presenting today for follow-up after being discharged from the hospital 12 days ago. The main problem requiring admission was chest pain. The discharge summary and/or Transitional Care Management documentation was reviewed. Medication reconciliation was performed as indicated via the \"Cipriano as Reviewed\" timestamp.     Elroy Greco was contacted by Transitional Care Management services two days after his discharge. This encounter and supporting documentation was reviewed.    Review of Systems    /76 (BP Location: Left arm, Patient Position: Sitting)   Temp 36.6 °C (97.8 °F) (Temporal)   Wt 77.2 kg (170 lb 3.2 oz)   BMI 26.66 kg/m²     Physical Exam    The complexity of medical decision making for this patient's transitional care is high.            Lab Results   Component Value Date    WBC 17.1 (H) 2025    HGB 10.5 (L) 2025    HCT 32.4 (L) 2025     2025    CHOL 147 2023    TRIG 105 2023    HDL 56.4 2023    ALT 10 2025    AST 18 2025     2025    K 3.7 2025     2025    CREATININE 1.19 2025    BUN 30 (H) 2025    CO2 25 2025    TSH 1.72 2024    INR 1.3 (H) 2025    HGBA1C 5.9 (H) 2024       Assessment/Plan         -Atypical CP-resolved.  Follow-up cardiology pending  -Increased WBC's-noted in hospital.  Retest.  Clinically quite stable  -Increased ESR/CRP-significant elevation.  Negative evaluation.  Had lab work, blood culture, CT abdomen chest pelvis over the past few months.  Clinically doing well.  Retest.  -?gout-clinically resolved.  Perhaps caused elevation " of ESR/CRP/WBC.  Follow.  -CAD - [facility] 2/18/2028 with 70% mid RCA lesion - no intervention.  Follow-up cardiology pending  -Mild to moderate aortic stenosis  -Hyperlipidemia on statin    Wt loss-temporally related to hospitalization.  Seems stable now.  Continue Ensure twice daily.  Follow over the next few weeks.    From previous notes:      #1 hypertension- on target. Continue to follow  #2 ckd3- stable. Check labs next visit  #3 back pain- increased. [facility].   Appt pending w/ spine at   #4 renal/hepatic cyst- follow-up with urology  #5 Paget's disease- In remission. follow-[facility]ocrinology at [facility]. Alkaline phosphatase through their office, per pt stable. stressed importance again  #6 urinary freq- increased symptoms, resume 2 Flomax every day, UA/Cx. . Follow-up with urology ASA  #7 fatigue/ dyspnea- stable/improved. No significant change with discontinuing metoprolol. Follow-up cardiology  #8 mod AI/AS- clinically stable. Continue afterload reduction. f[facility]u cards at [facility]   #9 mild, stable normocytic anemia. Normal iron, B12, folic acid. stable for >6 yrs. retest   #10 depression/anxiety- remains an issue. Continue increased duloxetine 30 mg po qd  #11 BPH w/ LUTS- remote ureinary retention.  s/p TURP 2017. f/u  - appt pending  #12 mild SANCHEZ-stable/improved. f/u cards   #13 ? pulm HTN on echo 3/17- reviewed. clinically stable. f/u cardiology.   #14 diastolic dysfxn- reviewed. BP well controlled.   #15 gout- clinically resolved. UA next visit  #16 constipation- remains an issue.  f/u  w/ GI , con't  miralax every day.   #17 CAD/angina- stable. f/u cards   #18 elevated glucose (non-fasting)- elevated hemoglobin A1c. stay focused on a reduced sugar low. carbohydrate diet, recheck 6 mths  #19 hip and shoulder pain- f/u ortho.  #20 lipids- good  #21 insomnia- reviewed. con't melatonin 1 mg po qhs  #22 vit d def- increase to 3,000 otc d3  #23 cough with eating/dysphasia- f/u GI  #24 mild imbalance-  fall precautions reviewed. Better. Con't PT  #25 ? neuropathy rt arm- ?CTS. reviewed. rec NCS/EMG. he declines. normal exam. f/u inb 3-4 weeks.   #26 hip pain- reviewed. Appt pending w/ [facility]  #27 depression- low-dose Cymbalta.    #28 syncope/presyncope- no further episodes. f/u cards.   #29 back pain- f/u  [facility]   #30 pulmonary nodules- f/u chest CT stable x >2 yrs  #31 ? pulm fibrosis- no s/sx. no cough/SOB. stable on CT 2017-->2022. reviewed DDx at length again. Rec pulm eval again. He has declined, he now agrees.   #32 knee pain- s/p remote tka. f/u  w/ ortho at [facility].   C/s  ortho per pt req.   #33 nasal congestion- trial astelin     Rt knee pain- better w/ brace.  f/u  ortho at [facility]  [facility]- ok w/ surgery

## 2025-03-25 ENCOUNTER — OFFICE VISIT (OUTPATIENT)
Dept: CARDIOLOGY | Facility: HOSPITAL | Age: OVER 89
End: 2025-03-25
Payer: MEDICARE

## 2025-03-25 VITALS
OXYGEN SATURATION: 98 % | BODY MASS INDEX: 26.68 KG/M2 | HEIGHT: 67 IN | WEIGHT: 170 LBS | DIASTOLIC BLOOD PRESSURE: 92 MMHG | SYSTOLIC BLOOD PRESSURE: 143 MMHG | HEART RATE: 69 BPM

## 2025-03-25 DIAGNOSIS — E78.5 DYSLIPIDEMIA: ICD-10-CM

## 2025-03-25 DIAGNOSIS — I25.10 CORONARY ARTERY DISEASE INVOLVING NATIVE HEART WITHOUT ANGINA PECTORIS, UNSPECIFIED VESSEL OR LESION TYPE: ICD-10-CM

## 2025-03-25 DIAGNOSIS — I10 HYPERTENSION, UNSPECIFIED TYPE: Primary | ICD-10-CM

## 2025-03-25 LAB
ALBUMIN SERPL-MCNC: 3.5 G/DL (ref 3.6–5.1)
ALP SERPL-CCNC: 146 U/L (ref 35–144)
ALT SERPL-CCNC: 15 U/L (ref 9–46)
ANION GAP SERPL CALCULATED.4IONS-SCNC: 8 MMOL/L (CALC) (ref 7–17)
AST SERPL-CCNC: 16 U/L (ref 10–35)
ATRIAL RATE: 69 BPM
BILIRUB SERPL-MCNC: 0.4 MG/DL (ref 0.2–1.2)
BUN SERPL-MCNC: 28 MG/DL (ref 7–25)
CALCIUM SERPL-MCNC: 9.1 MG/DL (ref 8.6–10.3)
CHLORIDE SERPL-SCNC: 107 MMOL/L (ref 98–110)
CO2 SERPL-SCNC: 27 MMOL/L (ref 20–32)
CREAT SERPL-MCNC: 0.98 MG/DL (ref 0.7–1.22)
CRP SERPL-MCNC: 20 MG/L
EGFRCR SERPLBLD CKD-EPI 2021: 71 ML/MIN/1.73M2
GLUCOSE SERPL-MCNC: 90 MG/DL (ref 65–139)
P AXIS: 11 DEGREES
P OFFSET: 154 MS
P ONSET: 102 MS
POTASSIUM SERPL-SCNC: 4.2 MMOL/L (ref 3.5–5.3)
PR INTERVAL: 226 MS
PROT SERPL-MCNC: 6.6 G/DL (ref 6.1–8.1)
Q ONSET: 215 MS
QRS COUNT: 12 BEATS
QRS DURATION: 92 MS
QT INTERVAL: 388 MS
QTC CALCULATION(BAZETT): 415 MS
QTC FREDERICIA: 406 MS
R AXIS: -41 DEGREES
SODIUM SERPL-SCNC: 142 MMOL/L (ref 135–146)
T AXIS: -11 DEGREES
T OFFSET: 409 MS
TSH SERPL-ACNC: 1.35 MIU/L (ref 0.4–4.5)
VENTRICULAR RATE: 69 BPM

## 2025-03-25 PROCEDURE — 1160F RVW MEDS BY RX/DR IN RCRD: CPT | Performed by: NURSE PRACTITIONER

## 2025-03-25 PROCEDURE — 99214 OFFICE O/P EST MOD 30 MIN: CPT | Performed by: NURSE PRACTITIONER

## 2025-03-25 PROCEDURE — 1159F MED LIST DOCD IN RCRD: CPT | Performed by: NURSE PRACTITIONER

## 2025-03-25 PROCEDURE — 3077F SYST BP >= 140 MM HG: CPT | Performed by: NURSE PRACTITIONER

## 2025-03-25 PROCEDURE — 1157F ADVNC CARE PLAN IN RCRD: CPT | Performed by: NURSE PRACTITIONER

## 2025-03-25 PROCEDURE — 93005 ELECTROCARDIOGRAM TRACING: CPT | Performed by: NURSE PRACTITIONER

## 2025-03-25 PROCEDURE — 1123F ACP DISCUSS/DSCN MKR DOCD: CPT | Performed by: NURSE PRACTITIONER

## 2025-03-25 PROCEDURE — 1036F TOBACCO NON-USER: CPT | Performed by: NURSE PRACTITIONER

## 2025-03-25 PROCEDURE — 3080F DIAST BP >= 90 MM HG: CPT | Performed by: NURSE PRACTITIONER

## 2025-03-25 PROCEDURE — 1111F DSCHRG MED/CURRENT MED MERGE: CPT | Performed by: NURSE PRACTITIONER

## 2025-03-25 PROCEDURE — 93010 ELECTROCARDIOGRAM REPORT: CPT | Performed by: INTERNAL MEDICINE

## 2025-03-25 RX ORDER — METOPROLOL SUCCINATE 25 MG/1
12.5 TABLET, EXTENDED RELEASE ORAL DAILY
Qty: 45 TABLET | Refills: 3 | Status: SHIPPED | OUTPATIENT
Start: 2025-03-25 | End: 2026-03-25

## 2025-03-25 RX ORDER — METOPROLOL SUCCINATE 25 MG/1
12.5 TABLET, EXTENDED RELEASE ORAL DAILY
Qty: 45 TABLET | Refills: 3 | Status: SHIPPED | OUTPATIENT
Start: 2025-03-25 | End: 2025-03-25 | Stop reason: WASHOUT

## 2025-03-25 ASSESSMENT — ENCOUNTER SYMPTOMS
HYPERTENSION: 1
DEPRESSION: 0
LOSS OF SENSATION IN FEET: 0
OCCASIONAL FEELINGS OF UNSTEADINESS: 0

## 2025-03-25 NOTE — PROGRESS NOTES
Subjective   Elroy Greco is a 89 y.o. male.    Chief Complaint:  Hypertension, Hyperlipidemia, Coronary Artery Disease, and Valve Disorder    Mr. Greco returns for a hospital follow up. He presented to [facility] ER two weeks ago with complaints of chest pain. He was started on metoprolol in hopes to manage his CAD medically. He has been feeling well since discharge. He has been taking it somewhat easy and being cautious, though did go back to the [facility] center. For the most part feels well walking and going water aerobics. He has not been lifting as many weights as he usually does. He offers no new cardiovascular complaints or concerns today. He denies any complaints of chest pain, lightheadedness, dizziness, palpitations, syncope, orthopnea, paroxysmal nocturnal dyspnea, lower extremity swelling or bleeding concerns.      Hypertension    Hyperlipidemia    Coronary Artery Disease  Risk factors include hyperlipidemia.       Review of Systems   All other systems reviewed and are negative.      Objective   Physical Exam  Constitutional:       Appearance: Healthy appearance. In no distress  Pulmonary:      Effort: Pulmonary effort is normal.      Breath sounds: Normal breath sounds.   Cardiovascular:      Normal rate. Regular rhythm. Normal S1. Normal S2.       Murmurs: There is 2/6 systolic murmur.      Carotids: right carotid pulse +2, no bruit heard over the right carotid. left carotid pulse +2, no bruit heard over the left carotid.  Edema:     Peripheral edema absent.   Abdominal:      Palpations: Abdomen is soft.   Musculoskeletal:       Cervical back: Normal range of motion.   Skin:     General: Skin is warm and dry. Normal color and pigmentation   Neurological:      Mental Status: Alert and oriented to person, place and time.   Psychiatric:     Mood and Affect: appropriate mood and appropriate affect.     EKG obtained and reviewed. Sinus rhythm with 1st degree AV block. HR 69.     Lab Review:   Lab Results    Component Value Date     03/24/2025    K 4.2 03/24/2025     03/24/2025    CO2 27 03/24/2025    BUN 28 (H) 03/24/2025    CREATININE 0.98 03/24/2025    GLUCOSE 90 03/24/2025    CALCIUM 9.1 03/24/2025     Lab Results   Component Value Date    WBC 7.4 03/24/2025    HGB 11.1 (L) 03/24/2025    HCT 35.4 (L) 03/24/2025    MCV 88.1 03/24/2025     03/24/2025     Lab Results   Component Value Date    CHOL 147 11/20/2023    TRIG 105 11/20/2023    HDL 56.4 11/20/2023       Assessment/Plan   Mr. Greco is a very pleasant 89 year old  male with a past medical history significant for hypertension, hyperlipidemia, CAD by coronary CTA in 2018, CKD, anemia and spinal stenosis. Echocardiogram 1/7/2025 showed an EF of 63% with low normal RV systolic function and moderate aortic stenosis with a peak gradient of 30 mmHg, mean 16 mmHg, stable and unchanged from prior studies. Heart catheterization 2/2025 showed moderate LAD and ramus branch disease, as well as a mid RCA lesion that is being medically managed. He presents today for routine follow up stable from a cardiac standpoint. His VS and EKG remain stable. He has tolerated the initiation of metoprolol. I will have him continue all medications unchanged. He was encouraged to work on increasing his walking program. Should his dyspnea not improve, or become more bothersome, we discussed potential PCI to RCA. He will follow up with us in clinic in June as previously scheduled. He knows to call for any concerns.

## 2025-03-28 ENCOUNTER — TELEPHONE (OUTPATIENT)
Dept: PRIMARY CARE | Facility: CLINIC | Age: OVER 89
End: 2025-03-28
Payer: MEDICARE

## 2025-03-28 DIAGNOSIS — D64.9 ANEMIA, UNSPECIFIED TYPE: ICD-10-CM

## 2025-03-28 DIAGNOSIS — R63.4 WEIGHT LOSS: ICD-10-CM

## 2025-03-28 DIAGNOSIS — R79.82 ELEVATED C-REACTIVE PROTEIN (CRP): Primary | ICD-10-CM

## 2025-03-28 NOTE — TELEPHONE ENCOUNTER
----- Message from Trice Kenan sent at 3/28/2025  6:01 AM EDT -----  Notify patient overall all looks improved.  Inflammatory markers-CRP/ESR still a bit elevated though.  Recommend repeat blood work in 3 to 4 weeks.  No fasting needed.  Orders in the lab.

## 2025-03-28 NOTE — TELEPHONE ENCOUNTER
I called and spoke to pt, relayed message with understanding..    Also  WANTED YOU TO KNOW THAT HE DOWN 2 MORE POUNDS SINCE LAST VISIT, said you wanted to know

## 2025-03-31 ENCOUNTER — PATIENT OUTREACH (OUTPATIENT)
Dept: PRIMARY CARE | Facility: CLINIC | Age: OVER 89
End: 2025-03-31
Payer: MEDICARE

## 2025-03-31 NOTE — PROGRESS NOTES
Unable to reach patient for call back after recent hospitalization.   LVM with call back number for patient to call if needed   If no voicemail available call attempts x 2 were made to contact the patient to assist with any questions or concerns patient may have.    Office Visit with Trice Grayson MD (03/24/2025)

## 2025-04-09 ENCOUNTER — PATIENT OUTREACH (OUTPATIENT)
Dept: PRIMARY CARE | Facility: CLINIC | Age: OVER 89
End: 2025-04-09
Payer: MEDICARE

## 2025-05-22 ENCOUNTER — OFFICE VISIT (OUTPATIENT)
Dept: UROLOGY | Facility: HOSPITAL | Age: OVER 89
End: 2025-05-22
Payer: MEDICARE

## 2025-05-22 DIAGNOSIS — N39.41 URGE INCONTINENCE: ICD-10-CM

## 2025-05-22 DIAGNOSIS — N39.41 URINARY INCONTINENCE, URGE: Primary | ICD-10-CM

## 2025-05-22 PROCEDURE — 81002 URINALYSIS NONAUTO W/O SCOPE: CPT | Performed by: UROLOGY

## 2025-05-22 PROCEDURE — 95972 ALYS CPLX SP/PN NPGT W/PRGRM: CPT | Performed by: UROLOGY

## 2025-05-22 PROCEDURE — 99214 OFFICE O/P EST MOD 30 MIN: CPT | Performed by: UROLOGY

## 2025-05-22 PROCEDURE — G2211 COMPLEX E/M VISIT ADD ON: HCPCS | Performed by: UROLOGY

## 2025-05-22 PROCEDURE — 51798 US URINE CAPACITY MEASURE: CPT | Performed by: UROLOGY

## 2025-05-22 NOTE — PROGRESS NOTES
FUV    Last seen 1/31/25     HISTORY OF PRESENT ILLNESS:   Elroy Greco is a 89 y.o. male who is being seen today for fuv    In recap:  Dr Wilkerson has done HOLEP and botox. Has been taking myrbetriq.  Has tried anti-cholinergics in past.  Has terrible urge, UI, frequency. Gets up 3-4x    Pt axonics PNE in office on 11/8/24 - Doing well.  Urge and UI much improved.  Daytime and night time frequency improved    Pt had Stage 1/2 in OR on 11/27/24    Pt admitted to [facility] on 11/30-12/5 with urinary retention and bowel issues.  Discharged to rehab with romo    12/20/24 - Been feeling better.  Romo removed and axonics reprogrammed    1/31/25 - Voiding small amounts.  Had episode of urge, UI last week, has improved    Pt has been admitted twice to hospital since last visit with cardiac issues    3/14/25 - Reports over the past month been getting up multiple times per night, urgency, urge incontinence    5/22/25 - Continues to have the same issues, upset about his frequency    PAST MEDICAL HISTORY:  Past Medical History:   Diagnosis Date    Aortic stenosis     CAD (coronary artery disease)     Cardiology follow-up encounter 03/27/2024    JOHNNY Hernandez    CKD (chronic kidney disease)     Gout     H/O cardiovascular stress test 04/04/2016    H/O echocardiogram 04/05/2024    Hyperlipidemia     Hypertension     Nocturia 03/01/2017    Nocturia    Paget's disease of bone     Preoperative clearance     JOHNNY Hernandez 3/27/24   Should his echocardiogram remain stable, he will be cleared for his urologic procedure. He will follow up with us in clinic in one year. He knows to call for any concerns.    Spinal stenosis     Urinary incontinence, urge     Plan: Stage 1 & 2 Insertion Continence Control Stimulator Lead & Generator 11/27/24       PAST SURGICAL HISTORY:  Past Surgical History:   Procedure Laterality Date    CARDIAC CATHETERIZATION N/A 2/18/2025    Procedure: Left & Right Heart Cath w Angiography & LV;   Surgeon: Osiel Dickens MD;  Location: [facility] Cardiac Cath Lab;  Service: Cardiovascular;  Laterality: N/A;  LEFT AND RIGHT HEART CATH TUES FEB 18TH, 2025 AT 11:00 AM PT WILL ARRIVE AT 9:30 AM @ [facility] - DR. DICKENS    CT ANGIO CORONARY ART WITH HEARTFLOW IF SCORE >30%  11/12/2018    CT HEART CORONARY ANGIOGRAM 11/12/2018 [facility] ANCILLARY LEGACY    HERNIA REPAIR  07/18/2012    Hernia Repair    OTHER SURGICAL HISTORY  07/18/2012    Nerve Block Transforaminal Epidural    OTHER SURGICAL HISTORY  07/18/2012    Nerve Ablation    OTHER SURGICAL HISTORY      shoulder replacement    OTHER SURGICAL HISTORY      blockage in abdomen-part of intenstine removed    TOTAL KNEE ARTHROPLASTY  07/18/2012    Knee Replacement    TOTAL KNEE ARTHROPLASTY  07/18/2012    Knee Replacement        ALLERGIES:   Allergies   Allergen Reactions    Finasteride Rash    Pollen Extracts Unknown    Mirabegron Constipation    Penicillins Rash     Over 70 years ago--itchy. Has tolerated cephalexin        MEDICATIONS:   Current Outpatient Medications   Medication Instructions    acetaminophen (TYLENOL) 1,000 mg, Every 6 hours PRN    aspirin 81 mg, Daily    atorvastatin (LIPITOR) 40 mg, oral, Nightly    colchicine 0.6 mg, oral, Daily    docusate sodium (STOOL SOFTENER ORAL) 2 tablets, 2 times daily    metoprolol succinate XL (TOPROL-XL) 12.5 mg, oral, Daily, Do not crush or chew.    psyllium (Metamucil) powder 1 Dose, 2 times daily    tamsulosin (FLOMAX) 0.4 mg, oral, Daily        PHYSICAL EXAM:  There were no vitals taken for this visit.  Constitutional: Patient appears well-developed and well-nourished. No distress.    Pulmonary/Chest: Effort normal. No respiratory distress.   Musculoskeletal: Normal range of motion.    Neurological: Alert and oriented to person, place, and time.  Psychiatric: Normal mood and affect. Behavior is normal. Thought content normal.    Back - incision well healed, IPG seated nicely    Labs  Lab Results   Component Value  Date    GFRMALE 53 (A) 07/18/2023     Lab Results   Component Value Date    CREATININE 0.98 03/24/2025     Lab Results   Component Value Date    CHOL 147 11/20/2023     Lab Results   Component Value Date    HDL 56.4 11/20/2023     Lab Results   Component Value Date    CHHDL 2.6 11/20/2023     Lab Results   Component Value Date    LDLF 77 11/18/2022     Lab Results   Component Value Date    VLDL 21 11/20/2023     Lab Results   Component Value Date    TRIG 105 11/20/2023     Lab Results   Component Value Date    HGBA1C 5.9 (H) 06/21/2024     Lab Results   Component Value Date    HCT 35.4 (L) 03/24/2025     UA trace blood, no nit/LE  PVR 0cc      Assessment:      1. Urinary incontinence, urge        2. Urge incontinence  Measure post void residual    POCT UA (nonautomated) manually resulted        Elroy Greco is a 89 y.o. male here for fuv     Plan:   Device turned off today, will give his nerves a rest  Settings adjusted, will turn back on in 2wks  Conservative measures    Fu in 6m    Lower Urinary Tract Symptoms (LUTS)    I educated the patient on relevant lower urinary tract anatomy and physiology with emphasis on differential diagnosis as well as contributing factors to the patient's lower urinary tract symptoms. I educated the patient on dietary and behavioral modifications pertinent to the patient's complaints. I recommended to avoid caffeine, alcohol, spicy and acidic oral intake and to regulate fluid intake and voiding (timed voiding, double voiding). I stressed the importance of avoiding constipation and recommended stool softeners unless diarrhea present. We discussed limiting fluid intake at night and elevating legs prior to bedtime.     OAB    We discussed the pathophysiology of overactive bladder. We discussed possible treatment options including doing conservative voiding techniques, medications, and surgical options.. He was counseled regarding bladder retraining, diet choices, and fluid  restriction.  A handout about this given as well as a voiding diary    Patient was informed that first line treatment is behavioral therapy.  This includes:   -Fluid balancing and sometimes restriction   -Reducing caffeine or other bladder stimulants   -Bladder retraining  -Delayed voiding to help defer the overwhelming urge

## 2025-06-09 ENCOUNTER — PATIENT OUTREACH (OUTPATIENT)
Dept: PRIMARY CARE | Facility: CLINIC | Age: OVER 89
End: 2025-06-09
Payer: MEDICARE

## 2025-06-25 ENCOUNTER — OFFICE VISIT (OUTPATIENT)
Dept: CARDIOLOGY | Facility: HOSPITAL | Age: OVER 89
End: 2025-06-25
Payer: MEDICARE

## 2025-06-25 VITALS
BODY MASS INDEX: 25.46 KG/M2 | DIASTOLIC BLOOD PRESSURE: 75 MMHG | WEIGHT: 168 LBS | HEART RATE: 68 BPM | HEIGHT: 68 IN | SYSTOLIC BLOOD PRESSURE: 135 MMHG

## 2025-06-25 DIAGNOSIS — I25.10 CORONARY ARTERY DISEASE INVOLVING NATIVE CORONARY ARTERY OF NATIVE HEART WITHOUT ANGINA PECTORIS: Primary | ICD-10-CM

## 2025-06-25 PROCEDURE — 1159F MED LIST DOCD IN RCRD: CPT | Performed by: INTERNAL MEDICINE

## 2025-06-25 PROCEDURE — 1160F RVW MEDS BY RX/DR IN RCRD: CPT | Performed by: INTERNAL MEDICINE

## 2025-06-25 PROCEDURE — 3078F DIAST BP <80 MM HG: CPT | Performed by: INTERNAL MEDICINE

## 2025-06-25 PROCEDURE — 1036F TOBACCO NON-USER: CPT | Performed by: INTERNAL MEDICINE

## 2025-06-25 PROCEDURE — 99214 OFFICE O/P EST MOD 30 MIN: CPT | Performed by: INTERNAL MEDICINE

## 2025-06-25 PROCEDURE — 3075F SYST BP GE 130 - 139MM HG: CPT | Performed by: INTERNAL MEDICINE

## 2025-06-25 PROCEDURE — 99212 OFFICE O/P EST SF 10 MIN: CPT

## 2025-06-25 NOTE — PROGRESS NOTES
Subjective:  Elroy returns for a routine 3-month follow-up.  We follow him for coronary disease and moderate aortic stenosis.    He continues to struggle with his cardiovascular issues as well as some significant orthopedic issues.  He is also fighting his advanced age of 89.    He has not had any hospitalizations.  He denies any other new health concerns.  He continues to try PT for his orthopedic issues, but this does not appear to help too much.  He does go to the [facility] center and does some bicycling and elliptical training as well as some limited weight training.  He generally appears to tolerate this well.  He continues to be frustrated and bothered by his exertional fatigue and activity intolerance.  He does notice some mild dyspnea but does not appear to have much in the way of angina.    He remains compliant with all of his medications and is tolerating them well.    Objective:  General: Alert, usual delightful elderly self.  HEENT: Unchanged.  Lungs: Clear with good air exchange.  Cardiac: Normal S1 and S2 with 2/6 systolic murmur.  Abdomen: Nontender.  Extremities: No edema.  Skin: No rash.  Neuro: Grossly unchanged.    Impression/plan:  Elroy appears to be reasonably stable at this time.  I did tell him that with all of this is cardiovascular issues and comorbidities, that we should continue to try to be conservative and that he should continue to try to pace himself.  Overall, he generally appears to be reasonably functional given his advanced age of 89.  He does not appear to be having much angina that would prompt us to reconsider percutaneous intervention.  I will continue him on low-dose metoprolol to help keep his coronary disease symptoms under control.  I also do not think his aortic valve was getting much worse either.    His lipids have generally looked reasonably good, so I will continue his atorvastatin unchanged.    Patient instructions:    Continue current medications unchanged.    Return  to clinic in 4 months.    Call for any intercurrent problems.    I will see him back for routine follow-up in 4 months but he knows to call for any intercurrent problems.

## 2025-07-07 ENCOUNTER — OFFICE VISIT (OUTPATIENT)
Dept: UROLOGY | Facility: HOSPITAL | Age: OVER 89
End: 2025-07-07
Payer: MEDICARE

## 2025-07-07 DIAGNOSIS — N39.41 URINARY INCONTINENCE, URGE: Primary | ICD-10-CM

## 2025-07-07 DIAGNOSIS — R39.9 LOWER URINARY TRACT SYMPTOMS (LUTS): ICD-10-CM

## 2025-07-07 DIAGNOSIS — N40.0 BENIGN PROSTATIC HYPERPLASIA, UNSPECIFIED WHETHER LOWER URINARY TRACT SYMPTOMS PRESENT: ICD-10-CM

## 2025-07-07 LAB
POC APPEARANCE, URINE: CLEAR
POC BILIRUBIN, URINE: NEGATIVE
POC BLOOD, URINE: ABNORMAL
POC COLOR, URINE: YELLOW
POC GLUCOSE, URINE: NEGATIVE MG/DL
POC KETONES, URINE: NEGATIVE MG/DL
POC LEUKOCYTES, URINE: NEGATIVE
POC NITRITE,URINE: NEGATIVE
POC PH, URINE: 5.5 PH
POC PROTEIN, URINE: ABNORMAL MG/DL
POC SPECIFIC GRAVITY, URINE: 1.02
POC UROBILINOGEN, URINE: 0.2 EU/DL

## 2025-07-07 PROCEDURE — 81003 URINALYSIS AUTO W/O SCOPE: CPT | Performed by: UROLOGY

## 2025-07-07 PROCEDURE — 99214 OFFICE O/P EST MOD 30 MIN: CPT | Performed by: UROLOGY

## 2025-07-07 PROCEDURE — G2211 COMPLEX E/M VISIT ADD ON: HCPCS | Performed by: UROLOGY

## 2025-07-07 PROCEDURE — 51798 US URINE CAPACITY MEASURE: CPT | Performed by: UROLOGY

## 2025-07-07 NOTE — PROGRESS NOTES
FUV    Last seen 5/22/25     HISTORY OF PRESENT ILLNESS:   Elroy Greco is a 89 y.o. male who is being seen today for fuv    In recap:  Dr Wilkerson has done HOLEP and botox. Has been taking myrbetriq.  Has tried anti-cholinergics in past.  Has terrible urge, UI, frequency. Gets up 3-4x    Pt axonics PNE in office on 11/8/24 - Doing well.  Urge and UI much improved.  Daytime and night time frequency improved    Pt had Stage 1/2 in OR on 11/27/24    Pt admitted to [facility] on 11/30-12/5 with urinary retention and bowel issues.  Discharged to rehab with romo    12/20/24 - Been feeling better.  Romo removed and axonics reprogrammed    1/31/25 - Voiding small amounts.  Had episode of urge, UI last week, has improved    Pt has been admitted twice to hospital since last visit with cardiac issues    3/14/25 - Reports over the past month been getting up multiple times per night, urgency, urge incontinence    5/22/25 - Continues to have the same issues, upset about his frequency. Settings adjusted    7/7/25 - Still having issues with urgency and UI.          PAST MEDICAL HISTORY:  Past Medical History:   Diagnosis Date    Aortic stenosis     CAD (coronary artery disease)     Cardiology follow-up encounter 03/27/2024    JOHNNY Hernandez    CKD (chronic kidney disease)     Gout     H/O cardiovascular stress test 04/04/2016    H/O echocardiogram 04/05/2024    Hyperlipidemia     Hypertension     Nocturia 03/01/2017    Nocturia    Paget's disease of bone     Preoperative clearance     JOHNNY Hernandez 3/27/24   Should his echocardiogram remain stable, he will be cleared for his urologic procedure. He will follow up with us in clinic in one year. He knows to call for any concerns.    Spinal stenosis     Urinary incontinence, urge     Plan: Stage 1 & 2 Insertion Continence Control Stimulator Lead & Generator 11/27/24       PAST SURGICAL HISTORY:  Past Surgical History:   Procedure Laterality Date    CARDIAC  CATHETERIZATION N/A 2/18/2025    Procedure: Left & Right Heart Cath w Angiography & LV;  Surgeon: Osiel Dickens MD;  Location: [facility] Cardiac Cath Lab;  Service: Cardiovascular;  Laterality: N/A;  LEFT AND RIGHT HEART CATH TUES FEB 18TH, 2025 AT 11:00 AM PT WILL ARRIVE AT 9:30 AM @ [facility] - DR. DICKENS    CT ANGIO CORONARY ART WITH HEARTFLOW IF SCORE >30%  11/12/2018    CT HEART CORONARY ANGIOGRAM 11/12/2018 [facility]    HERNIA REPAIR  07/18/2012    Hernia Repair    OTHER SURGICAL HISTORY  07/18/2012    Nerve Block Transforaminal Epidural    OTHER SURGICAL HISTORY  07/18/2012    Nerve Ablation    OTHER SURGICAL HISTORY      shoulder replacement    OTHER SURGICAL HISTORY      blockage in abdomen-part of intenstine removed    TOTAL KNEE ARTHROPLASTY  07/18/2012    Knee Replacement    TOTAL KNEE ARTHROPLASTY  07/18/2012    Knee Replacement        ALLERGIES:   Allergies   Allergen Reactions    Finasteride Rash    Pollen Extracts Unknown    Mirabegron Constipation    Penicillins Rash     Over 70 years ago--itchy. Has tolerated cephalexin        MEDICATIONS:   Current Outpatient Medications   Medication Instructions    acetaminophen (TYLENOL) 1,000 mg, Every 6 hours PRN    aspirin 81 mg, Daily    atorvastatin (LIPITOR) 40 mg, oral, Nightly    colchicine 0.6 mg, oral, Daily    docusate sodium (STOOL SOFTENER ORAL) 2 tablets, 2 times daily    metoprolol succinate XL (TOPROL-XL) 12.5 mg, oral, Daily, Do not crush or chew.    psyllium (Metamucil) powder 1 Dose, 2 times daily    tamsulosin (FLOMAX) 0.4 mg, oral, Daily        PHYSICAL EXAM:  There were no vitals taken for this visit.  Constitutional: Patient appears well-developed and well-nourished. No distress.    Pulmonary/Chest: Effort normal. No respiratory distress.   Musculoskeletal: Normal range of motion.    Neurological: Alert and oriented to person, place, and time.  Psychiatric: Normal mood and affect. Behavior is normal. Thought content normal.     Back - incision well healed, IPG seated nicely    Labs  Lab Results   Component Value Date    GFRMALE 53 (A) 07/18/2023     Lab Results   Component Value Date    CREATININE 0.98 03/24/2025     Lab Results   Component Value Date    CHOL 147 11/20/2023     Lab Results   Component Value Date    HDL 56.4 11/20/2023     Lab Results   Component Value Date    CHHDL 2.6 11/20/2023     Lab Results   Component Value Date    LDLF 77 11/18/2022     Lab Results   Component Value Date    VLDL 21 11/20/2023     Lab Results   Component Value Date    TRIG 105 11/20/2023     Lab Results   Component Value Date    HGBA1C 5.9 (H) 06/21/2024     Lab Results   Component Value Date    HCT 35.4 (L) 03/24/2025     UA trace blood, no nit/LE  PVR 5cc      Assessment:      1. Urinary incontinence, urge        2. Lower urinary tract symptoms (LUTS)  Measure post void residual    POCT UA Automated manually resulted      3. Benign prostatic hyperplasia, unspecified whether lower urinary tract symptoms present            Elroy Greco is a 89 y.o. male here for fuv     Plan:   Stressed bowel regimen  Will re-try gemtesa    Fu in 6m    Lower Urinary Tract Symptoms (LUTS)    I educated the patient on relevant lower urinary tract anatomy and physiology with emphasis on differential diagnosis as well as contributing factors to the patient's lower urinary tract symptoms. I educated the patient on dietary and behavioral modifications pertinent to the patient's complaints. I recommended to avoid caffeine, alcohol, spicy and acidic oral intake and to regulate fluid intake and voiding (timed voiding, double voiding). I stressed the importance of avoiding constipation and recommended stool softeners unless diarrhea present. We discussed limiting fluid intake at night and elevating legs prior to bedtime.     OAB    We discussed the pathophysiology of overactive bladder. We discussed possible treatment options including doing conservative voiding  techniques, medications, and surgical options.. He was counseled regarding bladder retraining, diet choices, and fluid restriction.  A handout about this given as well as a voiding diary    Patient was informed that first line treatment is behavioral therapy.  This includes:   -Fluid balancing and sometimes restriction   -Reducing caffeine or other bladder stimulants   -Bladder retraining  -Delayed voiding to help defer the overwhelming urge

## 2025-07-14 DIAGNOSIS — N32.81 OVERACTIVE BLADDER: ICD-10-CM

## 2025-07-16 ENCOUNTER — APPOINTMENT (OUTPATIENT)
Dept: PRIMARY CARE | Facility: CLINIC | Age: OVER 89
End: 2025-07-16
Payer: MEDICARE

## 2025-07-16 VITALS
BODY MASS INDEX: 26.37 KG/M2 | WEIGHT: 174 LBS | HEIGHT: 68 IN | SYSTOLIC BLOOD PRESSURE: 104 MMHG | DIASTOLIC BLOOD PRESSURE: 66 MMHG

## 2025-07-16 DIAGNOSIS — Z00.00 REGULAR CHECK-UP: Primary | ICD-10-CM

## 2025-07-16 DIAGNOSIS — E78.5 DYSLIPIDEMIA: ICD-10-CM

## 2025-07-16 DIAGNOSIS — N18.31 STAGE 3A CHRONIC KIDNEY DISEASE (MULTI): ICD-10-CM

## 2025-07-16 DIAGNOSIS — R33.9 URINARY RETENTION: ICD-10-CM

## 2025-07-16 DIAGNOSIS — I10 PRIMARY HYPERTENSION: ICD-10-CM

## 2025-07-16 DIAGNOSIS — M10.9 ACUTE GOUT OF LEFT FOOT, UNSPECIFIED CAUSE: ICD-10-CM

## 2025-07-16 DIAGNOSIS — R73.09 ELEVATED GLUCOSE: ICD-10-CM

## 2025-07-16 DIAGNOSIS — I50.32 CHRONIC DIASTOLIC (CONGESTIVE) HEART FAILURE: ICD-10-CM

## 2025-07-16 PROCEDURE — 1160F RVW MEDS BY RX/DR IN RCRD: CPT | Performed by: INTERNAL MEDICINE

## 2025-07-16 PROCEDURE — G0439 PPPS, SUBSEQ VISIT: HCPCS | Performed by: INTERNAL MEDICINE

## 2025-07-16 PROCEDURE — 1170F FXNL STATUS ASSESSED: CPT | Performed by: INTERNAL MEDICINE

## 2025-07-16 PROCEDURE — 3074F SYST BP LT 130 MM HG: CPT | Performed by: INTERNAL MEDICINE

## 2025-07-16 PROCEDURE — 99213 OFFICE O/P EST LOW 20 MIN: CPT | Performed by: INTERNAL MEDICINE

## 2025-07-16 PROCEDURE — 1036F TOBACCO NON-USER: CPT | Performed by: INTERNAL MEDICINE

## 2025-07-16 PROCEDURE — 3078F DIAST BP <80 MM HG: CPT | Performed by: INTERNAL MEDICINE

## 2025-07-16 PROCEDURE — 1159F MED LIST DOCD IN RCRD: CPT | Performed by: INTERNAL MEDICINE

## 2025-07-16 RX ORDER — TAMSULOSIN HYDROCHLORIDE 0.4 MG/1
0.4 CAPSULE ORAL DAILY
Qty: 90 CAPSULE | Refills: 3 | Status: SHIPPED | OUTPATIENT
Start: 2025-07-16 | End: 2026-07-16

## 2025-07-16 RX ORDER — MIRABEGRON 25 MG/1
25 TABLET, FILM COATED, EXTENDED RELEASE ORAL DAILY
Qty: 30 TABLET | Refills: 5 | Status: SHIPPED | OUTPATIENT
Start: 2025-07-16

## 2025-07-16 RX ORDER — MIRABEGRON 25 MG/1
25 TABLET, FILM COATED, EXTENDED RELEASE ORAL DAILY
COMMUNITY
End: 2025-07-16 | Stop reason: SDUPTHER

## 2025-07-16 RX ORDER — COLCHICINE 0.6 MG/1
0.6 TABLET ORAL DAILY
Qty: 30 TABLET | Refills: 0 | Status: CANCELLED | OUTPATIENT
Start: 2025-07-16

## 2025-07-16 ASSESSMENT — PATIENT HEALTH QUESTIONNAIRE - PHQ9
1. LITTLE INTEREST OR PLEASURE IN DOING THINGS: NOT AT ALL
2. FEELING DOWN, DEPRESSED OR HOPELESS: NOT AT ALL
SUM OF ALL RESPONSES TO PHQ9 QUESTIONS 1 AND 2: 0

## 2025-07-16 ASSESSMENT — ENCOUNTER SYMPTOMS
DEPRESSION: 0
OCCASIONAL FEELINGS OF UNSTEADINESS: 0
LOSS OF SENSATION IN FEET: 0

## 2025-07-16 ASSESSMENT — ACTIVITIES OF DAILY LIVING (ADL)
DRESSING: INDEPENDENT
TAKING_MEDICATION: INDEPENDENT
BATHING: INDEPENDENT
MANAGING_FINANCES: INDEPENDENT
GROCERY_SHOPPING: INDEPENDENT
DOING_HOUSEWORK: INDEPENDENT

## 2025-07-16 NOTE — ASSESSMENT & PLAN NOTE
Orders:    tamsulosin (Flomax) 0.4 mg 24 hr capsule; Take 1 capsule (0.4 mg) by mouth once daily.    mirabegron (Myrbetriq) 25 mg tablet extended release 24 hr; Take 1 tablet (25 mg) by mouth once daily. Take with water. Do not chew, crush, or divide.

## 2025-07-16 NOTE — PROGRESS NOTES
"Subjective   Reason for Visit: Elroy Greco is an 89 y.o. male here for a Medicare Wellness visit.     Past Medical, Surgical, and Family History reviewed and updated in chart.         HPI    Note 3/24/25 reviewed  Ongoing hip and back pain.  [facility], s/p RF ablation w/ modest relief.     Patient Care Team:  Trice Grayson MD as PCP - General  Trice Grayson MD as PCP - [employer] PCP  Pantera Mckinney MD as Surgeon (Urology)  SABINA Hernandez-CNP as Nurse Practitioner (Cardiology)     Review of Systems    Objective   Vitals:  /66 (BP Location: Left arm, Patient Position: Sitting)   Ht 1.715 m (5' 7.5\")   Wt 78.9 kg (174 lb)   BMI 26.85 kg/m²       Physical Exam        Lab Results   Component Value Date    WBC 7.4 03/24/2025    HGB 11.1 (L) 03/24/2025    HCT 35.4 (L) 03/24/2025     03/24/2025    CHOL 147 11/20/2023    TRIG 105 11/20/2023    HDL 56.4 11/20/2023    ALT 15 03/24/2025    AST 16 03/24/2025     03/24/2025    K 4.2 03/24/2025     03/24/2025    CREATININE 0.98 03/24/2025    BUN 28 (H) 03/24/2025    CO2 27 03/24/2025    TSH 1.35 03/24/2025    INR 1.3 (H) 03/09/2025    HGBA1C 5.9 (H) 06/21/2024       Assessment & Plan  Urinary retention    Orders:    tamsulosin (Flomax) 0.4 mg 24 hr capsule; Take 1 capsule (0.4 mg) by mouth once daily.    mirabegron (Myrbetriq) 25 mg tablet extended release 24 hr; Take 1 tablet (25 mg) by mouth once daily. Take with water. Do not chew, crush, or divide.    Acute gout of left foot, unspecified cause         Regular check-up         Chronic diastolic (congestive) heart failure    Orders:    Albumin-Creatinine Ratio, Urine Random; Future    Stage 3a chronic kidney disease (Multi)    Orders:    Albumin-Creatinine Ratio, Urine Random; Future    Primary hypertension    Orders:    Albumin-Creatinine Ratio, Urine Random; Future    Dyslipidemia    Orders:    Lipid Panel; Future    Alanine Aminotransferase; " Future    Elevated glucose    Orders:    Hemoglobin A1C; Future                       #1 hypertension- on target.    #2 ckd3- stable. Check labs    #3 back pain- a little better. [facility]up [facility].     #4 renal/hepatic cyst- follow-up with urology  #5 Paget's disease-   follow[facility] at [facility].   #6 urinary freq-   Follow-up with urology   #7 fatigue/ dyspnea- stable/improved.  Follow-up cardiology  #8 mod AI/AS- clinically stable. f/u  cards  #9 mild, stable normocytic anemia.  retest   #10 depression/anxiety- stable  #11 BPH w/ LUTS-   f/u     #12 mild SANCHEZ-stable. f/u cards   #13 ? pulm HTN on echo 3/17- clinically stable. f/u cardiology.   #14 diastolic dysfxn- reviewed.   f/u cardiology.  #15 gout  #16 constipation- good,  con't  miralax every day.   #17 CAD/angina- stable. -CAD - [facility] 2/18/2028 with 70% mid RCA lesion - no intervention.  Follow-up cardiology    #18 elevated glucose (non-fasting)- retest now  #19 hip and shoulder pain- f/u ortho.  #20 lipids- good last test, retest  #21 insomnia- reviewed. con't melatonin 1 mg po qhs  #22 vit d def-  con't vitD  #23 cough with eating/dysphasia-  good  #24 mild imbalance- fall precautions reviewed.   #25 ? neuropathy rt arm- ?CTS. reviewed. rec NCS/EMG. he declines. normal exam. f/u inb 3-4 weeks.   #26 hip pain- reviewed. Appt pending w/ [facility]  #27 depression- low-dose Cymbalta.    #28 syncope/presyncope- no further episodes. f/u cards.   #29 back pain- [facility]   #30 pulmonary nodules- f/u chest CT stable x >2 yrs  #31 ? pulm fibrosis- f/u  pulm--> [facility]  #32 knee pain- stable  #33 nasal congestion- ok

## 2025-07-17 LAB
ALBUMIN SERPL-MCNC: 3.8 G/DL (ref 3.6–5.1)
ALBUMIN/CREAT UR: 86 MG/G CREAT
ALP SERPL-CCNC: 177 U/L (ref 35–144)
ALT SERPL-CCNC: 10 U/L (ref 9–46)
ALT SERPL-CCNC: 9 U/L (ref 9–46)
ANION GAP SERPL CALCULATED.4IONS-SCNC: 9 MMOL/L (CALC) (ref 7–17)
AST SERPL-CCNC: 14 U/L (ref 10–35)
BILIRUB SERPL-MCNC: 0.6 MG/DL (ref 0.2–1.2)
BUN SERPL-MCNC: 26 MG/DL (ref 7–25)
CALCIUM SERPL-MCNC: 9.2 MG/DL (ref 8.6–10.3)
CHLORIDE SERPL-SCNC: 103 MMOL/L (ref 98–110)
CHOLEST SERPL-MCNC: 107 MG/DL
CHOLEST/HDLC SERPL: 1.7 (CALC)
CO2 SERPL-SCNC: 27 MMOL/L (ref 20–32)
CREAT SERPL-MCNC: 1.07 MG/DL (ref 0.7–1.22)
CREAT UR-MCNC: 64 MG/DL (ref 20–320)
CRP SERPL-MCNC: 77.5 MG/L
EGFRCR SERPLBLD CKD-EPI 2021: 64 ML/MIN/1.73M2
ERYTHROCYTE [DISTWIDTH] IN BLOOD BY AUTOMATED COUNT: 13.3 % (ref 11–15)
ERYTHROCYTE [SEDIMENTATION RATE] IN BLOOD BY WESTERGREN METHOD: 51 MM/H
EST. AVERAGE GLUCOSE BLD GHB EST-MCNC: 126 MG/DL
EST. AVERAGE GLUCOSE BLD GHB EST-SCNC: 7 MMOL/L
GLUCOSE SERPL-MCNC: 103 MG/DL (ref 65–99)
HBA1C MFR BLD: 6 %
HCT VFR BLD AUTO: 38 % (ref 38.5–50)
HDLC SERPL-MCNC: 62 MG/DL
HGB BLD-MCNC: 11.7 G/DL (ref 13.2–17.1)
LDLC SERPL CALC-MCNC: 31 MG/DL (CALC)
MCH RBC QN AUTO: 27.7 PG (ref 27–33)
MCHC RBC AUTO-ENTMCNC: 30.8 G/DL (ref 32–36)
MCV RBC AUTO: 90 FL (ref 80–100)
MICROALBUMIN UR-MCNC: 5.5 MG/DL
NONHDLC SERPL-MCNC: 45 MG/DL (CALC)
PLATELET # BLD AUTO: 187 THOUSAND/UL (ref 140–400)
PMV BLD REES-ECKER: 10.9 FL (ref 7.5–12.5)
POTASSIUM SERPL-SCNC: 3.9 MMOL/L (ref 3.5–5.3)
PROT SERPL-MCNC: 6.9 G/DL (ref 6.1–8.1)
RBC # BLD AUTO: 4.22 MILLION/UL (ref 4.2–5.8)
SODIUM SERPL-SCNC: 139 MMOL/L (ref 135–146)
TRIGL SERPL-MCNC: 63 MG/DL
WBC # BLD AUTO: 8.5 THOUSAND/UL (ref 3.8–10.8)

## 2025-07-21 DIAGNOSIS — R33.9 URINARY RETENTION: ICD-10-CM

## 2025-07-21 RX ORDER — MIRABEGRON 25 MG/1
25 TABLET, FILM COATED, EXTENDED RELEASE ORAL DAILY
Qty: 90 TABLET | Refills: 1 | Status: CANCELLED | OUTPATIENT
Start: 2025-07-21

## 2025-08-08 ENCOUNTER — APPOINTMENT (OUTPATIENT)
Dept: PHARMACY | Facility: HOSPITAL | Age: OVER 89
End: 2025-08-08
Payer: MEDICARE

## 2025-08-08 DIAGNOSIS — N40.0 BENIGN PROSTATIC HYPERPLASIA, UNSPECIFIED WHETHER LOWER URINARY TRACT SYMPTOMS PRESENT: Primary | ICD-10-CM

## 2025-08-08 DIAGNOSIS — R33.9 URINARY RETENTION: ICD-10-CM

## 2025-08-08 NOTE — PROGRESS NOTES
"  Patient ID: Elroy Greco is a 89 y.o. male who presents for No chief complaint on file..    Pt is here for First appointment.     Referring Provider: Pantera Mckinney MD  Reason for Referral: urinary incontinence, myrbetriq assistance    Subjective     Medication and allergy reconciliation completed     Drug Interactions  No relevant drug interactions were noted.    Medication System Management  Patient's preferred pharmacy:     [id number] - [place], [place] - [address]  [address] [facility]  [address]  [facility] [address]  Phone: [phone] Fax: [phone]    [facility] - [place], [place] - [address]  [address]  [facility] [address]  [facility] [address]  Phone: [phone] Fax: [phone]     [facility]  [address]  [facility] [address]  Phone: [phone] Fax: [phone]    Adherence/Organization: no issue  Affordability/Accessibility: Assess for [facility]      Medications Ordered Prior to Encounter[1]    Urinary Symptoms  Medications that may contribute to symptoms:   none  Any history of:   Narrow-angle glaucoma: no  Impaired gastric emptying: no  Urinary retention: yes  If yes to any of the above use caution/avoid antimuscarinic medications  Prediabetes or diabetes:    Lab Results   Component Value Date    GLUCOSE 103 (H) 07/16/2025    HGBA1C 6.0 (H) 07/16/2025    HGBA1C 5.9 (H) 06/21/2024    HGBA1C 6.1 (H) 11/20/2023     No results found for: \"LEPTIN\", \"INSULFAST\", \"GLUF\"  Frequently of bowel movement: severe constipation. Taking dulcolax and miralax  Tobacco use: no      What medications have been tried/stopped?   Oxybutynin  Vesicare  Current medication? Myrbetriq 25 mg daily  When started? 7/2024  3 weeks  Side effects?   Improvement in symptoms? Waking up 1-2 times/night, previously Waking up 3-4 times/night  Urinary incidence 3-4 times a week  Exercise [facility] gym: everyday elliptical, walk around " "track, stationary bike, water exercises      Cardiovascular Health  The ASCVD Risk score (Oliva LEON, et al., 2019) failed to calculate for the following reasons:    The 2019 ASCVD risk score is only valid for ages 40 to 79    Lab Results   Component Value Date    CHOL 107 07/16/2025     Lab Results   Component Value Date    HDL 62 07/16/2025     Lab Results   Component Value Date    LDLCALC 31 07/16/2025     Lab Results   Component Value Date    TRIG 63 07/16/2025     No components found for: \"CHOLHDL\"     Vitals  BP Readings from Last 2 Encounters:   07/16/25 104/66   06/25/25 135/75     BMI Readings from Last 1 Encounters:   07/16/25 26.85 kg/m²        BMP  Lab Results   Component Value Date    CALCIUM 9.2 07/16/2025     07/16/2025    K 3.9 07/16/2025    CO2 27 07/16/2025     07/16/2025    BUN 26 (H) 07/16/2025    CREATININE 1.07 07/16/2025    EGFR 64 07/16/2025     Make sure GFR >15 ml/min or dose adjust    LFTs  Lab Results   Component Value Date    ALT 9 07/16/2025    AST 14 07/16/2025    ALKPHOS 177 (H) 07/16/2025    BILITOT 0.6 07/16/2025         Assessment/Plan     Patient is experiencing urinary frequency, urgency, and incontinence. Patient s/p axonics, patient has been adjusting settings, reported he did not feel a difference. He has been taking myrbetriq for 3 weeks, noted slight improvement with frequency. However, incontinence issue is still a concern. Patient experienced moderate-severe constipation. Recommended increase fiber intake, fermented foods.   Has tried before oxybutynin, vesicare  Advised patient we will trial Myrbetriq for at least 12 weeks, titrate as tolerated    Myrbetriq  Discussed MOA: activates beta-3 adrenergic receptors in the bladder resulting in relaxation of the detrusor smooth muscle during the urine storage phase, thus increasing bladder capacity.  Provided education on administration (swallow whole with water; do not chew, divide, or crush) and potential side " effects including but not limited to hypertension 8-11%, UTI 3-6%, headache 2-4%, nose or throat irritation 3%, dry mouth 3-4%, and constipation 1-3%.   Any signs or symptoms of angioedema- immediately discontinue use and seek immediate medical attention.   Monitor blood pressure and heart rate. May notice a slight increase. Please call me if blood pressure becomes >120/80 mmHg or pulse significantly elevates from baseline.   BP Readings from Last 3 Encounters:   07/16/25 104/66   06/25/25 135/75   03/25/25 (!) 143/92     Caution with other meds that prolong QT interval- many drug:drug interactions  Patient is in agreement that they will notify me if starting any new medications  Efficacy is seen within 8 weeks; steady state achieved within 7 days.      Continue   Myrbetriq 25 mg once daily. May increase to 50 mg once daily after 4 to 8 weeks based on response and tolerability.     Patient Assistance Program (PAP)    Application for program to be submitted for the following medications: Gemtesa    Prescription Insurance:  Yes   Paid Test Claim:  Yes 30 ds only   County of Permanent Address:  [place]   Members of Household:  2   Files Taxes:  No     Patient will be faxing financial information to pharmacist directly at at this [facility] inbox.    Patient verbally reports monthly or yearly income which is less than 400% federal poverty level    Patient aware this process may take up to 6 weeks.     If approved medication must be filled through [facility] and MEDICATION WILL BE MAILED TO PATIENT.          Follow-up: : 9/25/2025 2:00 PM      Time spent with pt: Total length of time 40 (minutes) of the encounter and more than 50% was spent counseling the patient.      Lelo Gross, Sarah   Meds Clinical Pharmacist  Phone: [phone]     Continue all meds under the continuation of care with the referring provider and clinical pharmacy team.    IMPORTANT: please make sure you continue to have at least least  yearly visits with the referring provider in order to continue to receive clinical pharmacy services.     Verbal consent to manage patient's drug therapy was obtained from the patient. They were informed they may decline to participate or withdraw from participation in pharmacy services at any time.         [1]   Current Outpatient Medications on File Prior to Visit   Medication Sig Dispense Refill    acetaminophen (Tylenol) 500 mg tablet Take 2 tablets (1,000 mg) by mouth every 6 hours if needed for mild pain (1 - 3).      aspirin 81 mg EC tablet Take 1 tablet (81 mg) by mouth once daily.      atorvastatin (Lipitor) 40 mg tablet Take 1 tablet (40 mg) by mouth once daily at bedtime. 90 tablet 3    colchicine 0.6 mg tablet Take 1 tablet (0.6 mg) by mouth once daily. 5 tablet 0    docusate sodium (STOOL SOFTENER ORAL) Take 2 tablets by mouth 2 times a day.      metoprolol succinate XL (Toprol-XL) 25 mg 24 hr tablet Take 0.5 tablets (12.5 mg) by mouth once daily. Do not crush or chew. 45 tablet 3    mirabegron (Myrbetriq) 25 mg tablet extended release 24 hr Take 1 tablet (25 mg) by mouth once daily. Take with water. Do not chew, crush, or divide. 30 tablet 5    psyllium (Metamucil) powder Take 1 Dose (5.8 g) by mouth 2 times a day.      tamsulosin (Flomax) 0.4 mg 24 hr capsule Take 1 capsule (0.4 mg) by mouth once daily. 90 capsule 3    vibegron 75 mg tablet Take 1 tablet (75 mg) by mouth early in the morning.. 30 tablet 11     No current facility-administered medications on file prior to visit.

## 2025-08-15 ENCOUNTER — TELEPHONE (OUTPATIENT)
Dept: PRIMARY CARE | Facility: CLINIC | Age: OVER 89
End: 2025-08-15
Payer: MEDICARE

## 2025-08-15 PROCEDURE — RXMED WILLOW AMBULATORY MEDICATION CHARGE

## 2025-08-16 DIAGNOSIS — D72.829 LEUKOCYTOSIS, UNSPECIFIED TYPE: ICD-10-CM

## 2025-08-16 DIAGNOSIS — I10 PRIMARY HYPERTENSION: Primary | ICD-10-CM

## 2025-08-16 DIAGNOSIS — R73.09 ELEVATED GLUCOSE: ICD-10-CM

## 2025-08-16 DIAGNOSIS — N18.31 STAGE 3A CHRONIC KIDNEY DISEASE (MULTI): ICD-10-CM

## 2025-08-16 DIAGNOSIS — R74.8 ALKALINE PHOSPHATASE ELEVATION: ICD-10-CM

## 2025-08-18 ENCOUNTER — PHARMACY VISIT (OUTPATIENT)
Dept: PHARMACY | Facility: CLINIC | Age: OVER 89
End: 2025-08-18
Payer: COMMERCIAL

## 2025-09-25 ENCOUNTER — APPOINTMENT (OUTPATIENT)
Dept: PHARMACY | Facility: HOSPITAL | Age: OVER 89
End: 2025-09-25
Payer: MEDICARE

## 2025-11-18 ENCOUNTER — APPOINTMENT (OUTPATIENT)
Dept: PRIMARY CARE | Facility: CLINIC | Age: OVER 89
End: 2025-11-18
Payer: MEDICARE

## 2025-11-25 ENCOUNTER — APPOINTMENT (OUTPATIENT)
Dept: UROLOGY | Facility: HOSPITAL | Age: OVER 89
End: 2025-11-25
Payer: MEDICARE

## 2026-01-14 ENCOUNTER — APPOINTMENT (OUTPATIENT)
Dept: CARDIOLOGY | Facility: CLINIC | Age: OVER 89
End: 2026-01-14
Payer: MEDICARE

## (undated) DEVICE — TOWEL PACK, STERILE, 4/PACK, BLUE

## (undated) DEVICE — COVER, C-ARM W/CLIPS, OEC GE

## (undated) DEVICE — NEEDLE, HYPODERMIC, 25 G X 1.5 IN, A BEVEL, STERILE

## (undated) DEVICE — SUTURE, VICRYL, 2-0, 27 IN, BR/SH 27, VIOLET

## (undated) DEVICE — DRAPE, SHEET, 17 X 23 IN

## (undated) DEVICE — GOWN, SURGICAL, SIRUS, NON REINFORCED, LARGE

## (undated) DEVICE — GLOVE, PROTEXIS PI CLASSIC, SZ-6.5, PF, LF

## (undated) DEVICE — CATHETER, PIGTAIL 155 MOD DIAGNOSTIC, 4 FR (110 CM)

## (undated) DEVICE — BAG, DRAINAGE, CONTINUOUS IRRIGATION, 4L

## (undated) DEVICE — Device

## (undated) DEVICE — PREP TRAY, BASIC

## (undated) DEVICE — INTRODUCER, MICRO, 4FR, 7CM ECHO

## (undated) DEVICE — NEEDLE, INJECTOR, FLEX CYSTO, SIINGLE USE

## (undated) DEVICE — CATHETER, DIAGNOSTIC, 4 FR-JL 4.5

## (undated) DEVICE — ADHESIVE, SKIN, DERMABOND ADVANCED, 15CM, PEN-STYLE

## (undated) DEVICE — GUIDEWIRE, STRAIGHT, HI-TORQUE BALANCE MIDDLEWEIGHT, 0.014 IN X 190 CM, HYDROCOAT

## (undated) DEVICE — TUBING, ELLIK, FOR ELLIK/TOOMEY ADAPTERS

## (undated) DEVICE — SYRINGE, TOOMEY, IRRIGATION, 60ML, INDIVIDUAL WRAP, STERILE

## (undated) DEVICE — REMOTE CONTROL, PATIENT (2301)

## (undated) DEVICE — DRESSING, ISLAND, TELFA, 4 X 5 IN

## (undated) DEVICE — UROVAC BLADDER EVACUATOR

## (undated) DEVICE — GUIDEWIRE, INQWIRE, 3MM J, .035, 150

## (undated) DEVICE — KIT, LEAD IMPLANT

## (undated) DEVICE — CATHETER, LASER URETERAL, 7.1FR, 40CM

## (undated) DEVICE — CATHETER, DIAGNOSTIC, 4 FR-JR 4

## (undated) DEVICE — GUIDEWIRE, DUAL SENSOR, .035 X 150 STRAIGHT,  3CM

## (undated) DEVICE — GLOVE, SURGEON, PREMIERPRO PI, MICRO, SZ-7.5, PF, WH

## (undated) DEVICE — DRAPE, C-ARM, W/12 IN COVER, LI XTRAY TUBE

## (undated) DEVICE — GLOVE, SURGICAL, PROTEXIS PI , 6.5, PF, LF

## (undated) DEVICE — INTRODUCER, GLIDESHEATH SLENDER A-KIT, 5FR 10CM

## (undated) DEVICE — SYRINGE, 20 CC, LUER LOCK

## (undated) DEVICE — IRRIGATION SET, CYSTOSCOPY, TURP, Y, CONTINUOUS, 81 IN

## (undated) DEVICE — TUBING, MORCELLATOR PUMP, DISPOSABLE

## (undated) DEVICE — CATHETER, DIAGNOSTIC, 4FR-AR MOD

## (undated) DEVICE — CATHETER, WEDGE PRESSURE, BALLOON, DOUBLE LUMEN, 5 FR, 110 CM

## (undated) DEVICE — BLADE, ROTATION MORCELLATOR, 4.8MM X 335MM, PIRHANA, DISPOSABLE